# Patient Record
Sex: FEMALE | Race: WHITE | Employment: UNEMPLOYED | URBAN - METROPOLITAN AREA
[De-identification: names, ages, dates, MRNs, and addresses within clinical notes are randomized per-mention and may not be internally consistent; named-entity substitution may affect disease eponyms.]

---

## 2017-01-09 RX ORDER — SYRINGE-NEEDLE,INSULIN,0.5 ML 30 GX5/16"
SYRINGE, EMPTY DISPOSABLE MISCELLANEOUS
Qty: 100 SYRINGE | Refills: 5 | Status: SHIPPED | OUTPATIENT
Start: 2017-01-09 | End: 2017-03-15 | Stop reason: SDUPTHER

## 2017-01-09 NOTE — TELEPHONE ENCOUNTER
Please call [phone] when this is sent to the pharmacy. Pt called in requesting refill of her   Requested Prescriptions     Pending Prescriptions Disp Refills    insulin lispro protamine/insulin lispro (HUMALOG MIX 75-25) 100 unit/mL (75-25) injection 10 mL 5     Sig: INJECT 25 UNITS SUBCUTANEOUSLY BEFORE BREAKFAST AND DINNER    Insulin Syringe-Needle U-100 1/2 mL 30 gauge x 5/16 syrg 100 Syringe 5   .

## 2017-01-09 NOTE — TELEPHONE ENCOUNTER
Pt called to  Check status on insulin and needles. i they they both was approved. But i also see that the insulin may not have been sent to the pharmacy. could this be resent or corrected if need be. Pt states she's been without for awhile now.

## 2017-01-10 NOTE — TELEPHONE ENCOUNTER
Called the pt at the number listed above but there was no answer. Left her a message requesting a call back.

## 2017-03-15 ENCOUNTER — TELEPHONE (OUTPATIENT)
Dept: INTERNAL MEDICINE CLINIC | Age: 57
End: 2017-03-15

## 2017-03-15 RX ORDER — SYRINGE-NEEDLE,INSULIN,0.5 ML 30 GX5/16"
SYRINGE, EMPTY DISPOSABLE MISCELLANEOUS
Qty: 100 SYRINGE | Refills: 5 | Status: SHIPPED | OUTPATIENT
Start: 2017-03-15 | End: 2017-04-03 | Stop reason: SDUPTHER

## 2017-03-15 NOTE — TELEPHONE ENCOUNTER
Requested Prescriptions     Pending Prescriptions Disp Refills    Insulin Syringe-Needle U-100 1/2 mL 30 gauge x 5/16 syrg 100 Syringe 5     Sig: INJECT TWICE A DAY

## 2017-03-15 NOTE — TELEPHONE ENCOUNTER
Pt declined earlier appointment due to transportation issues.  Stated that she will keep her appointment that is scheduled for 3/23/17

## 2017-03-15 NOTE — TELEPHONE ENCOUNTER
Requested Prescriptions     Pending Prescriptions Disp Refills    Insulin Syringe-Needle U-100 1/2 mL 30 gauge x 5/16 syrg 100 Syringe 5     Sig: INJECT TWICE A DAY       Last office visit was  11/15/16  Next office visit is     3/23/17    Please assist.

## 2017-03-15 NOTE — TELEPHONE ENCOUNTER
Pt calling for RX refill and also shared the following, however she declined an earlier appt due to transportation. Has appt 03/23/2017     pt said for 2 to 3 months has red bumps that started off  itchy and white stuff come out when you press on them.  Located on stomach and in vaginal area     Pt does not have her own ph, but can be reached at [phone]    Pharmacy is [facility]

## 2017-04-03 ENCOUNTER — OFFICE VISIT (OUTPATIENT)
Dept: INTERNAL MEDICINE CLINIC | Age: 57
End: 2017-04-03

## 2017-04-03 ENCOUNTER — HOSPITAL ENCOUNTER (OUTPATIENT)
Dept: LAB | Age: 57
Discharge: HOME | End: 2017-04-03

## 2017-04-03 VITALS
OXYGEN SATURATION: 98 % | TEMPERATURE: 98.7 F | HEART RATE: 102 BPM | WEIGHT: 119.5 LBS | SYSTOLIC BLOOD PRESSURE: 128 MMHG | BODY MASS INDEX: 23.46 KG/M2 | HEIGHT: 60 IN | DIASTOLIC BLOOD PRESSURE: 81 MMHG | RESPIRATION RATE: 20 BRPM

## 2017-04-03 DIAGNOSIS — E78.00 HIGH CHOLESTEROL: ICD-10-CM

## 2017-04-03 DIAGNOSIS — R80.9 MICROALBUMINURIA: ICD-10-CM

## 2017-04-03 DIAGNOSIS — Z11.59 ENCOUNTER FOR HEPATITIS C SCREENING TEST FOR LOW RISK PATIENT: ICD-10-CM

## 2017-04-03 DIAGNOSIS — Z12.11 COLON CANCER SCREENING: ICD-10-CM

## 2017-04-03 DIAGNOSIS — K65.1 ABSCESS OF ABDOMINAL CAVITY (HCC): ICD-10-CM

## 2017-04-03 LAB — HBA1C MFR BLD HPLC: 11.7 %

## 2017-04-03 PROCEDURE — 99001 SPECIMEN HANDLING PT-LAB: CPT | Performed by: INTERNAL MEDICINE

## 2017-04-03 RX ORDER — SYRINGE-NEEDLE,INSULIN,0.5 ML 30 GX5/16"
SYRINGE, EMPTY DISPOSABLE MISCELLANEOUS
Qty: 100 SYRINGE | Refills: 5 | Status: SHIPPED | OUTPATIENT
Start: 2017-04-03 | End: 2018-07-09 | Stop reason: SDUPTHER

## 2017-04-03 RX ORDER — CEPHALEXIN 500 MG/1
500 CAPSULE ORAL 3 TIMES DAILY
Qty: 30 CAP | Refills: 0 | Status: SHIPPED | OUTPATIENT
Start: 2017-04-03 | End: 2017-04-13

## 2017-04-03 RX ORDER — LISINOPRIL 10 MG/1
10 TABLET ORAL DAILY
Qty: 30 TAB | Refills: 5 | Status: SHIPPED | OUTPATIENT
Start: 2017-04-03 | End: 2017-08-01 | Stop reason: SDUPTHER

## 2017-04-03 RX ORDER — GABAPENTIN 300 MG/1
CAPSULE ORAL
Qty: 90 CAP | Refills: 5 | Status: SHIPPED | OUTPATIENT
Start: 2017-04-03 | End: 2017-08-01 | Stop reason: SDUPTHER

## 2017-04-03 RX ORDER — DOCUSATE SODIUM 100 MG/1
100 CAPSULE, LIQUID FILLED ORAL 2 TIMES DAILY
Qty: 60 CAP | Refills: 3 | Status: SHIPPED | OUTPATIENT
Start: 2017-04-03 | End: 2019-03-15

## 2017-04-03 RX ORDER — BENZONATATE 200 MG/1
200 CAPSULE ORAL
Qty: 21 CAP | Refills: 0 | Status: SHIPPED | OUTPATIENT
Start: 2017-04-03 | End: 2017-04-10

## 2017-04-03 NOTE — PATIENT INSTRUCTIONS
Advance Directives: Care Instructions  Your Care Instructions  An advance directive is a legal way to state your wishes at the end of your life. It tells your family and your doctor what to do if you can no longer say what you want. There are two main types of advance directives. You can change them any time that your wishes change. · A living will tells your family and your doctor your wishes about life support and other treatment. · A durable power of  for health care lets you name a person to make treatment decisions for you when you can't speak for yourself. This person is called a health care agent. If you do not have an advance directive, decisions about your medical care may be made by a doctor or a  who doesn't know you. It may help to think of an advance directive as a gift to the people who care for you. If you have one, they won't have to make tough decisions by themselves. Follow-up care is a key part of your treatment and safety. Be sure to make and go to all appointments, and call your doctor if you are having problems. It's also a good idea to know your test results and keep a list of the medicines you take. How can you care for yourself at home? · Discuss your wishes with your loved ones and your doctor. This way, there are no surprises. · Many states have a unique form. Or you might use a universal form that has been approved by many states. This kind of form can sometimes be completed and stored online. Your electronic copy will then be available wherever you have a connection to the Internet. In most cases, doctors will respect your wishes even if you have a form from a different state. · You don't need a  to do an advance directive. But you may want to get legal advice. · Think about these questions when you prepare an advance directive:  ¨ Who do you want to make decisions about your medical care if you are not able to?  Many people choose a family member or close friend. ¨ Do you know enough about life support methods that might be used? If not, talk to your doctor so you understand. ¨ What are you most afraid of that might happen? You might be afraid of having pain, losing your independence, or being kept alive by machines. ¨ Where would you prefer to die? Choices include your home, a hospital, or a nursing home. ¨ Would you like to have information about hospice care to support you and your family? ¨ Do you want to donate organs when you die? ¨ Do you want certain Religion practices performed before you die? If so, put your wishes in the advance directive. · Read your advance directive every year, and make changes as needed. When should you call for help? Be sure to contact your doctor if you have any questions. Where can you learn more? Go to http://karis-shannon.info/. Enter R264 in the search box to learn more about \"Advance Directives: Care Instructions. \"  Current as of: November 17, 2016  Content Version: 11.2  © 0204-3982 Health[employer] Incorporated. Care instructions adapted under license by [employer] (which disclaims liability or warranty for this information). If you have questions about a medical condition or this instruction, always ask your healthcare professional. [address] any warranty or liability for your use of this information.

## 2017-04-03 NOTE — PROGRESS NOTES
Subjective:       Chief Complaint  The patient presents for follow up of diabetesand high cholesterol. Proteinuria, back pain         HPI  Josh Washington is a 61 y.o. female seen for follow up of diabetes. Shealso has  hyperlipidemia. Diabetes uncontrolled due to poor compliance with taking her insulin, pt is only taking Humalog mix 75/25 25 units every day instead of BID and she recently ran out of syringes and had no transportation to go to pharmacy so she has been off insulin for a few weeks. She is also just taking metformin 1x/day instead of BID, pt with poor insight to her medical problems due to developmental delay,   Hyperlipidemia control uncertain, no significant medication side effects noted, on Pravachol. Will check lipid profile today. Pt remains on lisinopril for her proteinuria. Diet and Lifestyle: generally follows a low fat low cholesterol diet, does not rigorously follow a diabetic diet, sedentary    Diabetic Review of Systems - home glucose monitoring: is performed sporadically, should be 3x/day . Other symptoms and concerns: . Pt's chronic back pain is stable on Ultram prn. Pt c/o of abscess on her left lower abdominal wall which is oozing pus. She also says she has something going on in her groin but she did not want to show it to me since I am male. I will start her on Keflex and refer her to GYN. Current Outpatient Prescriptions   Medication Sig    gabapentin (NEURONTIN) 300 mg capsule TAKE ONE CAPSULE BY MOUTH THREE TIMES DAILY    lisinopril (PRINIVIL, ZESTRIL) 10 mg tablet Take 1 Tab by mouth daily.  Insulin Syringe-Needle U-100 1/2 mL 30 gauge x 5/16 syrg INJECT TWICE A DAY    cephALEXin (KEFLEX) 500 mg capsule Take 1 Cap by mouth three (3) times daily for 10 days.  benzonatate (TESSALON) 200 mg capsule Take 1 Cap by mouth three (3) times daily as needed for Cough for up to 7 days.     docusate sodium (COLACE) 100 mg capsule Take 1 Cap by mouth two (2) times a day.    pravastatin (PRAVACHOL) 40 mg tablet TAKE ONE TABLET BY MOUTH EVERY NIGHT    traMADol (ULTRAM) 50 mg tablet TAKE TWO TABLETS BY MOUTH EVERY 6 HOURS AS NEEDED FOR PAIN    metFORMIN (GLUCOPHAGE) 1,000 mg tablet TAKE ONE TABLET BY MOUTH TWICE DAILY WITH MEALS    budesonide (RHINOCORT AQUA) 32 mcg/actuation nasal spray 2 Sprays by Both Nostrils route daily.  glucose blood VI test strips (ONETOUCH ULTRA TEST) strip Check BS 3x/day E11.65    aspirin 81 mg tablet Take 81 mg by mouth daily.  OXAPROZIN PO Take  by mouth two (2) times a day.  insulin lispro protamine/insulin lispro (HUMALOG MIX 75-25) 100 unit/mL (75-25) injection INJECT 25 UNITS SUBCUTANEOUSLY BEFORE BREAKFAST AND DINNER     No current facility-administered medications for this visit. Review of Systems  Respiratory: negative for dyspnea on exertion  Cardiovascular: negative for chest pain    Objective:     Visit Vitals    /81 (BP 1 Location: Left arm, BP Patient Position: Sitting)    Pulse (!) 102    Temp 98.7 °F (37.1 °C) (Oral)    Resp 20    Ht 5' (1.524 m)    Wt 119 lb 8 oz (54.2 kg)    SpO2 98%    BMI 23.34 kg/m2        Eyes - pupils equal and reactive, extraocular eye movements intact  Chest - clear to auscultation, no wheezes, rales or rhonchi, symmetric air entry  Heart - normal rate, regular rhythm, normal S1, S2, no murmurs, rubs, clicks or gallops  Extremities - no edema   Skin - skin abscess left lower abdomen. Observed with nurse Rama Fang.         Labs:   Lab Results   Component Value Date/Time    Hemoglobin A1c 8.6 05/16/2016 08:24 AM    Hemoglobin A1c 11.7 01/14/2016 08:15 AM    Hemoglobin A1c 8.1 09/08/2015 08:17 AM    Glucose 232 08/26/2016 04:30 PM    Glucose (POC) 227 10/14/2015 09:28 PM    Microalbumin/Creat ratio (mg/g creat) 17 03/29/2011 04:01 PM    Microalb/Creat ratio (ug/mg creat.) 90.7 09/08/2015 08:17 AM    Microalbumin,urine random 1.00 03/29/2011 04:01 PM    LDL, calculated 91 05/16/2016 08:24 AM    Creatinine (POC) 0.5 08/23/2010 10:49 AM    Creatinine 1.21 08/26/2016 04:30 PM      Lab Results   Component Value Date/Time    Cholesterol, total 168 05/16/2016 08:24 AM    HDL Cholesterol 54 05/16/2016 08:24 AM    LDL, calculated 91 05/16/2016 08:24 AM    Triglyceride 117 05/16/2016 08:24 AM    CHOL/HDL Ratio 5.7 03/26/2012 03:45 AM     Lab Results   Component Value Date/Time    ALT (SGPT) 13 08/26/2016 04:30 PM    AST (SGOT) 10 08/26/2016 04:30 PM    Alk. phosphatase 115 08/26/2016 04:30 PM    Bilirubin, direct 0.1 03/26/2012 03:45 AM    Bilirubin, total 0.5 08/26/2016 04:30 PM     Lab Results   Component Value Date/Time    GFR est AA 56 08/26/2016 04:30 PM    GFR est non-AA 46 08/26/2016 04:30 PM    Creatinine (POC) 0.5 08/23/2010 10:49 AM    Creatinine 1.21 08/26/2016 04:30 PM    BUN 9 08/26/2016 04:30 PM    Sodium 132 08/26/2016 04:30 PM    Potassium 4.0 08/26/2016 04:30 PM    Chloride 98 08/26/2016 04:30 PM    CO2 22 08/26/2016 04:30 PM      Lab Results   Component Value Date/Time    Glucose 232 08/26/2016 04:30 PM    Glucose (POC) 227 10/14/2015 09:28 PM              Assessment / Plan     Diabetes poorly controlled due to poor compliance with insulin, pt to use metformin BID and continue insulin 75/25 25 BID. Hyperlipidemia control uncertain, will check lipid profile and pt to continue to take pravachol on a regular basis. ICD-10-CM ICD-9-CM    1. Uncontrolled type 2 diabetes mellitus without complication, without long-term current use of insulin (HCC) E11.65 250.02 AMB POC HEMOGLOBIN D9I      METABOLIC PANEL, COMPREHENSIVE      HEMOGLOBIN A1C W/O EAG      MICROALBUMIN, UR, RAND W/ MICROALBUMIN/CREA RATIO      HEMOGLOBIN A1C W/O EAG      MICROALBUMIN, UR, RAND W/ MICROALBUMIN/CREA RATIO      METABOLIC PANEL, COMPREHENSIVE      HEMOGLOBIN A1C W/O EAG      MICROALBUMIN, UR, RAND W/ MICROALBUMIN/CREA RATIO   2.  High cholesterol E78.00 272.0 LIPID PANEL METABOLIC PANEL, COMPREHENSIVE      LIPID PANEL      LIPID PANEL   3. Microalbuminuria R80.9 791.0 Status unknown. Will continue lisinopril (PRINIVIL, ZESTRIL) 10 mg tablet      Check MICROALBUMIN, UR, RAND W/ MICROALBUMIN/CREA RATIO   4. Abscess of abdominal cavity (HCC) K65.1 567.22 cephALEXin (KEFLEX) 500 mg capsule and refer to GYN for further evaluation of groin infection per pt. CBC WITH AUTOMATED DIFF      CBC WITH AUTOMATED DIFF   5. Encounter for hepatitis C screening test for low risk patient Z11.59 V73.89 HEPATITIS C AB      HEPATITIS C AB   6. Colon cancer screening Z12.11 V76.51 OCCULT BLOOD, IMMUNOASSAY (FIT)                      Diabetic issues reviewed with her: diabetic diet discussed in detail, and low cholesterol diet, weight control and daily exercise discussed. Medication instructions and potential side effects reviewed with patient in detail. Follow-up Disposition:  Return in about 4 months (around 8/3/2017). Reviewed plan of care. Patient has provided input and agrees with goals.

## 2017-04-03 NOTE — MR AVS SNAPSHOT
Visit Information Date & Time Provider Department Dept. Phone Encounter #  
 4/3/2017  1:15 PM Harriet Fang MD [facility] 8 [id number] Follow-up Instructions Return in about 4 months (around 8/3/2017). Upcoming Health Maintenance Date Due Hepatitis C Screening 1960 EYE EXAM RETINAL OR DILATED Q1 5/13/1969 DTaP/Tdap/Td series (1 - Tdap) 5/13/1980 BREAST CANCER SCRN MAMMOGRAM 5/13/2009 FOOT EXAM Q1 8/14/2015 HEMOGLOBIN A1C Q6M 11/16/2016 Pneumococcal 19-64 Medium Risk (1 of 1 - PPSV23) 4/5/2017* MICROALBUMIN Q1 5/16/2017 LIPID PANEL Q1 5/16/2017 COLONOSCOPY 4/4/2021 *Topic was postponed. The date shown is not the original due date. Allergies as of 4/3/2017  Review Complete On: 4/3/2017 By: Harriet Fang MD  
 No Known Allergies Current Immunizations  Reviewed on 11/15/2016 Name Date Influenza Vaccine (Quad) PF 11/15/2016, 9/15/2015 Influenza Vaccine Whole 1/22/2011 Not reviewed this visit You Were Diagnosed With   
  
 Codes Comments Uncontrolled type 2 diabetes mellitus without complication, without long-term current use of insulin ([facility]    -  Primary ICD-10-CM: E11.65 ICD-9-CM: 250.02 Microalbuminuria     ICD-10-CM: R80.9 ICD-9-CM: 791.0 Abscess of abdominal cavity ([facility]     ICD-10-CM: K65.1 ICD-9-CM: 567.22 High cholesterol     ICD-10-CM: E78.00 ICD-9-CM: 272.0 Vitals BP Pulse Temp Resp Height(growth percentile) Weight(growth percentile) 128/81 (BP 1 Location: Left arm, BP Patient Position: Sitting) (!) 102 98.7 °F (37.1 °C) (Oral) 20 5' (1.524 m) 119 lb 8 oz (54.2 kg) SpO2 BMI OB Status Smoking Status 98% 23.34 kg/m2 Hysterectomy Current Every Day Smoker Vitals History BMI and BSA Data Body Mass Index Body Surface Area  
 23.34 kg/m 2 1.51 m 2 Preferred Pharmacy Pharmacy Name Phone [address] [place] [address] [phone] Your Updated Medication List  
  
   
This list is accurate as of: 4/3/17  1:47 PM.  Always use your most recent med list.  
  
  
  
  
 aspirin 81 mg tablet Take 81 mg by mouth daily. benzonatate 200 mg capsule Commonly known as:  TESSALON Take 1 Cap by mouth three (3) times daily as needed for Cough for up to 7 days. budesonide 32 mcg/actuation nasal spray Commonly known as:  RHINOCORT AQUA  
2 Sprays by Both Nostrils route daily. cephALEXin 500 mg capsule Commonly known as:  Jenn Crane Take 1 Cap by mouth three (3) times daily for 10 days. gabapentin 300 mg capsule Commonly known as:  NEURONTIN  
TAKE ONE CAPSULE BY MOUTH THREE TIMES DAILY  
  
 glucose blood VI test strips strip Commonly known as:  ONETOUCH ULTRA TEST Check BS 3x/day E11.65  
  
 insulin lispro protamine/insulin lispro 100 unit/mL (75-25) injection Commonly known as:  HumaLOG Mix 75-25 INJECT 25 UNITS SUBCUTANEOUSLY BEFORE BREAKFAST AND DINNER Insulin Syringe-Needle U-100 1/2 mL 30 gauge x 5/16 Syrg INJECT TWICE A DAY  
  
 lisinopril 10 mg tablet Commonly known as:  [place] Take 1 Tab by mouth daily. metFORMIN 1,000 mg tablet Commonly known as:  GLUCOPHAGE  
TAKE ONE TABLET BY MOUTH TWICE DAILY WITH MEALS  
  
 OXAPROZIN PO Take  by mouth two (2) times a day. pravastatin 40 mg tablet Commonly known as:  PRAVACHOL  
TAKE ONE TABLET BY MOUTH EVERY NIGHT  
  
 traMADol 50 mg tablet Commonly known as:  ULTRAM  
TAKE TWO TABLETS BY MOUTH EVERY 6 HOURS AS NEEDED FOR PAIN Prescriptions Sent to Pharmacy Refills  
 gabapentin (NEURONTIN) 300 mg capsule 5 Sig: TAKE ONE CAPSULE BY MOUTH THREE TIMES DAILY Class: Normal  
 Pharmacy: [id number] [place] [address], [facility] Ph #: [phone] lisinopril (PRINIVIL, ZESTRIL) 10 mg tablet 5 Sig: Take 1 Tab by mouth daily. Class: Normal  
 Pharmacy: [address] [place] [address], [address] [place] [address] Ph #: [phone] Route: Oral  
 Insulin Syringe-Needle U-100 1/2 mL 30 gauge x 5/16 syrg 5 Sig: INJECT TWICE A DAY Class: Normal  
 Pharmacy: [address] [place] [address], [address] Ph #: [phone]  
 cephALEXin (KEFLEX) 500 mg capsule 0 Sig: Take 1 Cap by mouth three (3) times daily for 10 days. Class: Normal  
 Pharmacy: [address] [place] [address], [address] [place] [address] Ph #: [phone] Route: Oral  
 benzonatate (TESSALON) 200 mg capsule 0 Sig: Take 1 Cap by mouth three (3) times daily as needed for Cough for up to 7 days. Class: Normal  
 Pharmacy: [address] [place] [address] Ph #: [phone] Route: Oral  
  
We Performed the Following AMB POC HEMOGLOBIN A1C [43521 CPT(R)] Follow-up Instructions Return in about 4 months (around 8/3/2017). To-Do List   
 10/01/2017 Lab:  LIPID PANEL   
  
 10/01/2017 Lab:  METABOLIC PANEL, COMPREHENSIVE   
  
 10/02/2017 Lab:  CBC WITH AUTOMATED DIFF   
  
 10/02/2017 Lab:  MICROALBUMIN, UR, RAND W/ MICROALBUMIN/CREA RATIO   
  
 10/03/2017 Lab:  HEMOGLOBIN A1C W/O EAG Patient Instructions Advance Directives: Care Instructions Your Care Instructions An advance directive is a legal way to state your wishes at the end of your life. It tells your family and your doctor what to do if you can no longer say what you want. There are two main types of advance directives. You can change them any time that your wishes change. · A living will tells your family and your doctor your wishes about life support and other treatment.  
· A durable power of  for health care lets you name a person to make treatment decisions for you when you can't speak for yourself. This person is called a health care agent. If you do not have an advance directive, decisions about your medical care may be made by a doctor or a  who doesn't know you. It may help to think of an advance directive as a gift to the people who care for you. If you have one, they won't have to make tough decisions by themselves. Follow-up care is a key part of your treatment and safety. Be sure to make and go to all appointments, and call your doctor if you are having problems. It's also a good idea to know your test results and keep a list of the medicines you take. How can you care for yourself at home? · Discuss your wishes with your loved ones and your doctor. This way, there are no surprises. · Many states have a unique form. Or you might use a universal form that has been approved by many states. This kind of form can sometimes be completed and stored online. Your electronic copy will then be available wherever you have a connection to the Internet. In most cases, doctors will respect your wishes even if you have a form from a different state. · You don't need a  to do an advance directive. But you may want to get legal advice. · Think about these questions when you prepare an advance directive: ¨ Who do you want to make decisions about your medical care if you are not able to? Many people choose a family member or close friend. ¨ Do you know enough about life support methods that might be used? If not, talk to your doctor so you understand. ¨ What are you most afraid of that might happen? You might be afraid of having pain, losing your independence, or being kept alive by machines. ¨ Where would you prefer to die? Choices include your home, a hospital, or a nursing home. ¨ Would you like to have information about hospice care to support you and your family? ¨ Do you want to donate organs when you die? ¨ Do you want certain Quaker practices performed before you die? If so, put your wishes in the advance directive. · Read your advance directive every year, and make changes as needed. When should you call for help? Be sure to contact your doctor if you have any questions. Where can you learn more? Go to http://karis-shannon.info/. Enter R264 in the search box to learn more about \"Advance Directives: Care Instructions. \" Current as of: November 17, 2016 Content Version: 11.2 © 9728-7762 [employer]. Care instructions adapted under license by [employer] (which disclaims liability or warranty for this information). If you have questions about a medical condition or this instruction, always ask your healthcare professional. [address] any warranty or liability for your use of this information. Introducing [facility]! Matthew Asher introduces [email or web address] patient portal. Now you can access parts of your medical record, email your doctor's office, and request medication refills online. 1. In your internet browser, go to [email or web address]: turn off the lights/Ringpay 2. Click on the First Time User? Click Here link in the Sign In box. You will see the New Member Sign Up page. 3. Enter your [email or web address] Access Code exactly as it appears below. You will not need to use this code after youve completed the sign-up process. If you do not sign up before the expiration date, you must request a new code. · [email or web address] Access Code: [email or web address] Expires: 7/2/2017  1:25 PM 
 
4. Enter the last four digits of your Social Security Number (xxxx) and Date of Birth (mm/dd/yyyy) as indicated and click Submit. You will be taken to the next sign-up page. 5. Create a [email or web address] ID. This will be your [email or web address] login ID and cannot be changed, so think of one that is secure and easy to remember. 6. Create a [email or web address] password. You can change your password at any time. 7. Enter your Password Reset Question and Answer. This can be used at a later time if you forget your password. 8. Enter your e-mail address. You will receive e-mail notification when new information is available in [address] 9. Click Sign Up. You can now view and download portions of your medical record. 10. Click the Download Summary menu link to download a portable copy of your medical information. If you have questions, please visit the Frequently Asked Questions section of the Ifbyphone website. Remember, Ifbyphone is NOT to be used for urgent needs. For medical emergencies, dial 911. Now available from your iPhone and Android! Please provide this summary of care documentation to your next provider. Your primary care clinician is listed as MARK CASH. If you have any questions after today's visit, please call [phone].

## 2017-04-03 NOTE — PROGRESS NOTES
Patient is in the office today for a bad cough, and rash on her stomach and groin. Patient states she has very bag leg pain, from giving herself insulin injections. Patient states she has not been giving herself insulin because she ran out of insulin syringes. 1. Have you been to the ER, urgent care clinic since your last visit? Hospitalized since your last visit? No    2. Have you seen or consulted any other health care providers outside of the [address] [place] [address] since your last visit? Include any pap smears or colon screening.  No

## 2017-04-04 LAB
ALBUMIN SERPL-MCNC: 4.3 G/DL (ref 3.5–5.5)
ALBUMIN/CREAT UR: 129.8 MG/G CREAT (ref 0–30)
ALBUMIN/GLOB SERPL: 1.8 {RATIO} (ref 1.2–2.2)
ALP SERPL-CCNC: 102 IU/L (ref 39–117)
ALT SERPL-CCNC: 8 IU/L (ref 0–32)
AST SERPL-CCNC: 10 IU/L (ref 0–40)
BASOPHILS # BLD AUTO: 0.1 X10E3/UL (ref 0–0.2)
BASOPHILS NFR BLD AUTO: 1 %
BILIRUB SERPL-MCNC: 0.3 MG/DL (ref 0–1.2)
BUN SERPL-MCNC: 13 MG/DL (ref 6–24)
BUN/CREAT SERPL: 12 (ref 9–23)
CALCIUM SERPL-MCNC: 9.3 MG/DL (ref 8.7–10.2)
CHLORIDE SERPL-SCNC: 93 MMOL/L (ref 96–106)
CHOLEST SERPL-MCNC: 216 MG/DL (ref 100–199)
CO2 SERPL-SCNC: 25 MMOL/L (ref 18–29)
CREAT SERPL-MCNC: 1.13 MG/DL (ref 0.57–1)
CREAT UR-MCNC: 87.5 MG/DL
EOSINOPHIL # BLD AUTO: 0.1 X10E3/UL (ref 0–0.4)
EOSINOPHIL NFR BLD AUTO: 1 %
ERYTHROCYTE [DISTWIDTH] IN BLOOD BY AUTOMATED COUNT: 15.2 % (ref 12.3–15.4)
GLOBULIN SER CALC-MCNC: 2.4 G/DL (ref 1.5–4.5)
GLUCOSE SERPL-MCNC: 292 MG/DL (ref 65–99)
HBA1C MFR BLD: 12.7 % (ref 4.8–5.6)
HCT VFR BLD AUTO: 40.2 % (ref 34–46.6)
HCV AB S/CO SERPL IA: <0.1 S/CO RATIO (ref 0–0.9)
HDLC SERPL-MCNC: 45 MG/DL
HGB BLD-MCNC: 13.2 G/DL (ref 11.1–15.9)
IMM GRANULOCYTES # BLD: 0 X10E3/UL (ref 0–0.1)
IMM GRANULOCYTES NFR BLD: 0 %
INTERPRETATION, 910389: NORMAL
INTERPRETATION: NORMAL
LDLC SERPL CALC-MCNC: 133 MG/DL (ref 0–99)
LYMPHOCYTES # BLD AUTO: 2.5 X10E3/UL (ref 0.7–3.1)
LYMPHOCYTES NFR BLD AUTO: 29 %
Lab: NORMAL
MCH RBC QN AUTO: 29.3 PG (ref 26.6–33)
MCHC RBC AUTO-ENTMCNC: 32.8 G/DL (ref 31.5–35.7)
MCV RBC AUTO: 89 FL (ref 79–97)
MICROALBUMIN UR-MCNC: 113.6 UG/ML
MONOCYTES # BLD AUTO: 0.7 X10E3/UL (ref 0.1–0.9)
MONOCYTES NFR BLD AUTO: 9 %
NEUTROPHILS # BLD AUTO: 5.2 X10E3/UL (ref 1.4–7)
NEUTROPHILS NFR BLD AUTO: 60 %
PDF IMAGE, 910387: NORMAL
PLATELET # BLD AUTO: 370 X10E3/UL (ref 150–379)
POTASSIUM SERPL-SCNC: 5.6 MMOL/L (ref 3.5–5.2)
PROT SERPL-MCNC: 6.7 G/DL (ref 6–8.5)
RBC # BLD AUTO: 4.5 X10E6/UL (ref 3.77–5.28)
SODIUM SERPL-SCNC: 133 MMOL/L (ref 134–144)
TRIGL SERPL-MCNC: 191 MG/DL (ref 0–149)
VLDLC SERPL CALC-MCNC: 38 MG/DL (ref 5–40)
WBC # BLD AUTO: 8.6 X10E3/UL (ref 3.4–10.8)

## 2017-06-01 ENCOUNTER — HOSPITAL ENCOUNTER (EMERGENCY)
Age: 57
Discharge: HOME | End: 2017-06-01
Attending: EMERGENCY MEDICINE
Payer: MEDICAID

## 2017-06-01 VITALS
HEART RATE: 98 BPM | OXYGEN SATURATION: 98 % | SYSTOLIC BLOOD PRESSURE: 158 MMHG | BODY MASS INDEX: 21.53 KG/M2 | TEMPERATURE: 97.4 F | WEIGHT: 121.5 LBS | RESPIRATION RATE: 18 BRPM | DIASTOLIC BLOOD PRESSURE: 88 MMHG | HEIGHT: 63 IN

## 2017-06-01 DIAGNOSIS — R35.0 URINARY FREQUENCY: ICD-10-CM

## 2017-06-01 DIAGNOSIS — I10 HYPERTENSION, ESSENTIAL: ICD-10-CM

## 2017-06-01 DIAGNOSIS — R73.9 HYPERGLYCEMIA: ICD-10-CM

## 2017-06-01 LAB
ALBUMIN SERPL BCP-MCNC: 4.2 G/DL (ref 3.4–5)
ALBUMIN/GLOB SERPL: 1.1 {RATIO} (ref 0.8–1.7)
ALP SERPL-CCNC: 99 U/L (ref 45–117)
ALT SERPL-CCNC: 17 U/L (ref 13–56)
ANION GAP BLD CALC-SCNC: 7 MMOL/L (ref 3–18)
APPEARANCE UR: CLEAR
AST SERPL W P-5'-P-CCNC: 10 U/L (ref 15–37)
BASOPHILS # BLD AUTO: 0.1 K/UL (ref 0–0.1)
BASOPHILS # BLD: 1 % (ref 0–2)
BILIRUB SERPL-MCNC: 0.3 MG/DL (ref 0.2–1)
BILIRUB UR QL: NEGATIVE
BUN SERPL-MCNC: 14 MG/DL (ref 7–18)
BUN/CREAT SERPL: 12 (ref 12–20)
CALCIUM SERPL-MCNC: 9 MG/DL (ref 8.5–10.1)
CHLORIDE SERPL-SCNC: 100 MMOL/L (ref 100–108)
CO2 SERPL-SCNC: 24 MMOL/L (ref 21–32)
COLOR UR: YELLOW
CREAT SERPL-MCNC: 1.14 MG/DL (ref 0.6–1.3)
DIFFERENTIAL METHOD BLD: ABNORMAL
EOSINOPHIL # BLD: 0.1 K/UL (ref 0–0.4)
EOSINOPHIL NFR BLD: 1 % (ref 0–5)
ERYTHROCYTE [DISTWIDTH] IN BLOOD BY AUTOMATED COUNT: 14.7 % (ref 11.6–14.5)
GLOBULIN SER CALC-MCNC: 3.9 G/DL (ref 2–4)
GLUCOSE BLD STRIP.AUTO-MCNC: 254 MG/DL (ref 70–110)
GLUCOSE BLD STRIP.AUTO-MCNC: 314 MG/DL (ref 70–110)
GLUCOSE SERPL-MCNC: 300 MG/DL (ref 74–99)
GLUCOSE UR STRIP.AUTO-MCNC: >1000 MG/DL
HCT VFR BLD AUTO: 40.7 % (ref 35–45)
HGB BLD-MCNC: 14.6 G/DL (ref 12–16)
HGB UR QL STRIP: NEGATIVE
KETONES UR QL STRIP.AUTO: NEGATIVE MG/DL
LEUKOCYTE ESTERASE UR QL STRIP.AUTO: NEGATIVE
LYMPHOCYTES # BLD AUTO: 28 % (ref 21–52)
LYMPHOCYTES # BLD: 2.5 K/UL (ref 0.9–3.6)
MCH RBC QN AUTO: 31 PG (ref 24–34)
MCHC RBC AUTO-ENTMCNC: 35.9 G/DL (ref 31–37)
MCV RBC AUTO: 86.4 FL (ref 74–97)
MONOCYTES # BLD: 0.6 K/UL (ref 0.05–1.2)
MONOCYTES NFR BLD AUTO: 7 % (ref 3–10)
NEUTS SEG # BLD: 5.7 K/UL (ref 1.8–8)
NEUTS SEG NFR BLD AUTO: 63 % (ref 40–73)
NITRITE UR QL STRIP.AUTO: NEGATIVE
PH UR STRIP: 5 [PH] (ref 5–8)
PLATELET # BLD AUTO: 377 K/UL (ref 135–420)
PMV BLD AUTO: 10.5 FL (ref 9.2–11.8)
POTASSIUM SERPL-SCNC: 4.3 MMOL/L (ref 3.5–5.5)
PROT SERPL-MCNC: 8.1 G/DL (ref 6.4–8.2)
PROT UR STRIP-MCNC: NEGATIVE MG/DL
RBC # BLD AUTO: 4.71 M/UL (ref 4.2–5.3)
SODIUM SERPL-SCNC: 131 MMOL/L (ref 136–145)
SP GR UR REFRACTOMETRY: 1.01 (ref 1–1.03)
UROBILINOGEN UR QL STRIP.AUTO: 0.2 EU/DL (ref 0.2–1)
WBC # BLD AUTO: 9 K/UL (ref 4.6–13.2)

## 2017-06-01 PROCEDURE — 87077 CULTURE AEROBIC IDENTIFY: CPT | Performed by: PHYSICIAN ASSISTANT

## 2017-06-01 PROCEDURE — 82962 GLUCOSE BLOOD TEST: CPT

## 2017-06-01 PROCEDURE — 85025 COMPLETE CBC W/AUTO DIFF WBC: CPT | Performed by: PHYSICIAN ASSISTANT

## 2017-06-01 PROCEDURE — 96360 HYDRATION IV INFUSION INIT: CPT

## 2017-06-01 PROCEDURE — 80053 COMPREHEN METABOLIC PANEL: CPT | Performed by: PHYSICIAN ASSISTANT

## 2017-06-01 PROCEDURE — 99283 EMERGENCY DEPT VISIT LOW MDM: CPT

## 2017-06-01 PROCEDURE — 87086 URINE CULTURE/COLONY COUNT: CPT | Performed by: PHYSICIAN ASSISTANT

## 2017-06-01 PROCEDURE — 81003 URINALYSIS AUTO W/O SCOPE: CPT | Performed by: PHYSICIAN ASSISTANT

## 2017-06-01 PROCEDURE — 74011250636 HC RX REV CODE- 250/636: Performed by: PHYSICIAN ASSISTANT

## 2017-06-01 PROCEDURE — 96361 HYDRATE IV INFUSION ADD-ON: CPT

## 2017-06-01 PROCEDURE — 87186 SC STD MICRODIL/AGAR DIL: CPT | Performed by: PHYSICIAN ASSISTANT

## 2017-06-01 RX ORDER — LISINOPRIL 10 MG/1
10 TABLET ORAL DAILY
Qty: 10 TAB | Refills: 0 | Status: SHIPPED | OUTPATIENT
Start: 2017-06-01 | End: 2017-06-01

## 2017-06-01 RX ORDER — LISINOPRIL 10 MG/1
10 TABLET ORAL DAILY
Qty: 10 TAB | Refills: 0 | Status: SHIPPED | OUTPATIENT
Start: 2017-06-01 | End: 2017-06-11

## 2017-06-01 RX ORDER — SODIUM CHLORIDE 9 MG/ML
1000 INJECTION, SOLUTION INTRAVENOUS ONCE
Status: COMPLETED | OUTPATIENT
Start: 2017-06-01 | End: 2017-06-01

## 2017-06-01 RX ADMIN — SODIUM CHLORIDE 1000 ML: 900 INJECTION, SOLUTION INTRAVENOUS at 16:12

## 2017-06-01 NOTE — DISCHARGE INSTRUCTIONS
Frequent Urination: Care Instructions  Your Care Instructions  An urge to urinate frequently but usually passing only small amounts of urine is a common symptom of urinary problems, such as urinary tract infections. The bladder may become inflamed. This can cause the urge to urinate. You may try to urinate more often than usual to try to soothe that urge. Frequent urination also may be caused by sexually transmitted infections (STIs) or kidney stones. Or it may happen when something irritates the tube that carries urine from the bladder to the outside of the body (urethra). It may also be a sign of diabetes. The cause may be hard to find. You may need tests. Follow-up care is a key part of your treatment and safety. Be sure to make and go to all appointments, and call your doctor if you are having problems. It's also a good idea to know your test results and keep a list of the medicines you take. How can you care for yourself at home? · Drink extra water for the next day or two. This will help make the urine less concentrated. (If you have kidney, heart, or liver disease and have to limit fluids, talk with your doctor before you increase the amount of fluids you drink.)  · Avoid drinks that are carbonated or have caffeine. They can irritate the bladder. For women:  · Urinate right after you have sex. · After you go to the bathroom, wipe from front to back. · Avoid douches, bubble baths, and feminine hygiene sprays. And avoid other feminine hygiene products that have deodorants. When should you call for help? Call your doctor now or seek immediate medical care if:  · You have new symptoms, such as fever, nausea, or vomiting. · You have new or worse symptoms of a urinary problem. For example:  ¨ You have blood or pus in your urine. ¨ You have chills or body aches. ¨ It hurts to urinate. ¨ You have groin or belly pain. ¨ You have pain in your back just below your rib cage (the flank area).   Watch closely for changes in your health, and be sure to contact your doctor if you feel thirstier than usual.  Where can you learn more? Go to http://karis-shannon.info/. Enter 480 [id number] in the search box to learn more about \"Frequent Urination: Care Instructions. \"  Current as of: November 28, 2016  Content Version: 11.2  © 4392-6532 [employer]. Care instructions adapted under license by [employer] (which disclaims liability or warranty for this information). If you have questions about a medical condition or this instruction, always ask your healthcare professional. [place] [address] any warranty or liability for your use of this information.

## 2017-06-01 NOTE — ED PROVIDER NOTES
HPI Comments: 55yoF with hx of DM, UTI, depression to ED c/o right flank pain x 2 days. Reports associated frequency and urgency which started this morning. Denies dysuria, hematuria, fever, chills, abd pain, N/V or any other complaints. States symptoms similar to previous UTIs. Patient is a 61 y.o. female presenting with flank pain and urgency. The history is provided by the patient. Flank Pain    Pertinent negatives include no dysuria. Urgency          Past Medical History:   Diagnosis Date    Bladder infection     Depression     Diabetes ([facility] [address]     Difficulty walking     DM (diabetes mellitus) ([facility] [address] 6/22/2012    Ill-defined condition     leaking bladder;high cholesterol       Past Surgical History:   Procedure Laterality Date    HX GYN      hysterectomy    HX OTHER SURGICAL      bladder surgery         Family History:   Problem Relation Age of Onset    Diabetes Mother     Cancer Mother     Heart Attack Father      had 3 hear attacks    Diabetes Father        Social History     Social History    Marital status: SINGLE     Spouse name: N/A    Number of children: N/A    Years of education: N/A     Occupational History    Not on file. Social History Main Topics    Smoking status: Current Every Day Smoker     Packs/day: 1.00     Years: 38.00    Smokeless tobacco: Never Used    Alcohol use No    Drug use: No    Sexual activity: No     Other Topics Concern    Not on file     Social History Narrative    ** Merged History Encounter **              ALLERGIES: Review of patient's allergies indicates no known allergies. Review of Systems   Genitourinary: Positive for flank pain and urgency. Negative for dysuria and hematuria. All other systems reviewed and are negative.       Vitals:    06/01/17 1433   BP: (!) 186/97   Pulse: 98   Resp: 18   Temp: 97.4 °F (36.3 °C)   SpO2: 98%   Weight: 55.1 kg (121 lb 8 oz)   Height: 5' 3\" (1.6 m)            Physical Exam   Constitutional: She appears well-developed and well-nourished. No distress. HENT:   Head: Normocephalic and atraumatic. Right Ear: External ear normal.   Left Ear: External ear normal.   Eyes: Conjunctivae are normal.   Neck: Normal range of motion. Neck supple. Cardiovascular: Normal rate and regular rhythm. Pulmonary/Chest: Effort normal and breath sounds normal.   Abdominal: Soft. Bowel sounds are normal. There is no tenderness. There is CVA tenderness (right). Neurological: She is alert. Skin: Skin is warm and dry. She is not diaphoretic. Psychiatric: She has a normal mood and affect. MDM  Number of Diagnoses or Management Options  Hyperglycemia:   Hypertension, essential:   Uncontrolled type 2 diabetes mellitus without complication, without long-term current use of insulin ([address]):   Urinary frequency:   Diagnosis management comments: Pt presents with flank pain, urgency, urinary frequency. FSBS noted to be 341. Pt states she did not take meds today due to not eating anything. Does not have glucometer at home. Glucometer given to pt per . Will check basic labs, give fluids. Anticipate discharge. Pt states she has been out of BP meds for several months, will refill 1 week of lisinopril. Advised to f/u with pcp regarding BP.  ISHAN Mcgowan 4:13 PM         Amount and/or Complexity of Data Reviewed  Clinical lab tests: reviewed and ordered    Risk of Complications, Morbidity, and/or Mortality  Presenting problems: moderate  Diagnostic procedures: low  Management options: low    Patient Progress  Patient progress: improved    ED Course       Procedures

## 2017-06-01 NOTE — ED NOTES
I have reviewed discharge instructions with the patient. The patient verbalized understanding.  Pt ambulated out of Ed in stable condition with no distress noted and no complaints voiced, states she will not be driving home

## 2017-06-01 NOTE — LETTER
6/3/2017 1:44 PM 
 
Ms. Chadwick Rene [id number] Dear Ms. Choi: The results of your lab work performed in our office were abnormal and we have had difficulty reaching you by telephone. Please contact our office as soon as possible to discuss these results. Sincerely, ISHAN Soliz

## 2017-06-01 NOTE — ED TRIAGE NOTES
Patient c/o right flank pain.  States \"I think I have a bladder infection because I feel like I gotta pee and i cant pee\"

## 2017-06-03 LAB
BACTERIA SPEC CULT: ABNORMAL
SERVICE CMNT-IMP: ABNORMAL

## 2017-06-03 NOTE — ED NOTES
Initial number not the correct number. Left message on mobile phone for results that need treatment and return call asap. Letter sent as well.

## 2017-06-06 RX ORDER — TRAMADOL HYDROCHLORIDE 50 MG/1
TABLET ORAL
Qty: 100 TAB | Refills: 3 | Status: SHIPPED | OUTPATIENT
Start: 2017-06-06 | End: 2018-10-15 | Stop reason: SDUPTHER

## 2017-06-07 ENCOUNTER — TELEPHONE (OUTPATIENT)
Dept: INTERNAL MEDICINE CLINIC | Age: 57
End: 2017-06-07

## 2017-06-12 NOTE — ED NOTES
Received phone call from patient regarding letter. Urine culture positive for klebsiella. Needs abx. Will call in [facility] to preferred pharmacy. [facility] in [place].   Eva Hathaway PA-C

## 2017-06-18 ENCOUNTER — APPOINTMENT (OUTPATIENT)
Dept: GENERAL RADIOLOGY | Age: 57
End: 2017-06-18
Attending: PHYSICIAN ASSISTANT
Payer: MEDICAID

## 2017-06-18 ENCOUNTER — HOSPITAL ENCOUNTER (EMERGENCY)
Age: 57
Discharge: HOME | End: 2017-06-18
Attending: EMERGENCY MEDICINE
Payer: MEDICAID

## 2017-06-18 VITALS
TEMPERATURE: 98.4 F | RESPIRATION RATE: 18 BRPM | SYSTOLIC BLOOD PRESSURE: 135 MMHG | DIASTOLIC BLOOD PRESSURE: 88 MMHG | OXYGEN SATURATION: 96 % | WEIGHT: 123 LBS | BODY MASS INDEX: 21.79 KG/M2 | HEART RATE: 98 BPM

## 2017-06-18 DIAGNOSIS — S40.011A CONTUSION OF RIGHT SHOULDER, INITIAL ENCOUNTER: Primary | ICD-10-CM

## 2017-06-18 PROCEDURE — 73030 X-RAY EXAM OF SHOULDER: CPT

## 2017-06-18 PROCEDURE — 74011250637 HC RX REV CODE- 250/637: Performed by: PHYSICIAN ASSISTANT

## 2017-06-18 PROCEDURE — 99283 EMERGENCY DEPT VISIT LOW MDM: CPT

## 2017-06-18 RX ORDER — ACETAMINOPHEN 500 MG
1000 TABLET ORAL
Status: COMPLETED | OUTPATIENT
Start: 2017-06-18 | End: 2017-06-18

## 2017-06-18 RX ORDER — TRAMADOL HYDROCHLORIDE 50 MG/1
50 TABLET ORAL
Qty: 12 TAB | Refills: 0 | Status: SHIPPED | OUTPATIENT
Start: 2017-06-18 | End: 2017-08-01 | Stop reason: SDUPTHER

## 2017-06-18 RX ORDER — ACETAMINOPHEN 325 MG/1
650 TABLET ORAL
Qty: 20 TAB | Refills: 0 | Status: SHIPPED | OUTPATIENT
Start: 2017-06-18

## 2017-06-18 RX ADMIN — ACETAMINOPHEN 1000 MG: 500 TABLET ORAL at 21:04

## 2017-06-19 NOTE — ED PROVIDER NOTES
HPI Comments: 8:05 PM Olga Reeves is a 61 y.o. female with a history of diabetes who presents to ED to be evaluated for upper back and R shoulder pain onset after a fall two days ago. Pt explains she fell from her bed onto the floor. No head injury or LOC at time of fall. Pt took half a Vicodin 5 mg for pain today. Pt denies taking blood thinners daily, LOC, or head injury. No other concerns at this time. The history is provided by the patient. Past Medical History:   Diagnosis Date    Bladder infection     Depression     Diabetes ([address]     Difficulty walking     DM (diabetes mellitus) ([facility] [address] 6/22/2012    Ill-defined condition     leaking bladder;high cholesterol       Past Surgical History:   Procedure Laterality Date    HX GYN      hysterectomy    HX OTHER SURGICAL      bladder surgery         Family History:   Problem Relation Age of Onset    Diabetes Mother     Cancer Mother     Heart Attack Father      had 3 hear attacks    Diabetes Father        Social History     Social History    Marital status: SINGLE     Spouse name: N/A    Number of children: N/A    Years of education: N/A     Occupational History    Not on file. Social History Main Topics    Smoking status: Current Every Day Smoker     Packs/day: 1.00     Years: 38.00    Smokeless tobacco: Never Used    Alcohol use No    Drug use: No    Sexual activity: No     Other Topics Concern    Not on file     Social History Narrative    ** Merged History Encounter **              ALLERGIES: Review of patient's allergies indicates no known allergies. Review of Systems   Constitutional: Negative for chills and fever. HENT: Negative for congestion. Eyes: Negative for photophobia, pain and visual disturbance. Respiratory: Negative for cough and shortness of breath. Cardiovascular: Negative for chest pain and leg swelling. Gastrointestinal: Negative for abdominal pain, diarrhea, nausea and vomiting. Genitourinary: Negative for difficulty urinating, dysuria and hematuria. Musculoskeletal: Positive for back pain (Upper) and joint swelling. Negative for arthralgias, neck pain and neck stiffness. R arm pain   Skin: Negative for rash. Neurological: Negative for dizziness, light-headedness, numbness and headaches. All other systems reviewed and are negative. There were no vitals filed for this visit. Physical Exam   Constitutional: She is oriented to person, place, and time. She appears well-developed and well-nourished. No distress. HENT:   Head: Normocephalic and atraumatic. Mouth/Throat: Oropharynx is clear and moist.   Eyes: Conjunctivae are normal.   Neck: Normal range of motion. Neck supple. Cardiovascular: Normal rate, regular rhythm and normal heart sounds. Pulmonary/Chest: Effort normal and breath sounds normal. No respiratory distress. She has no wheezes. She exhibits no tenderness. Abdominal: Soft. She exhibits no distension. There is no tenderness. Musculoskeletal: Normal range of motion. Non tender to midline palpation of the cervical, thoracic, and lumbar spine. No step off or deformity. FROM of BUE and BLE against resistance in flexion and extension with 5/5 strength. Non tender to bilateral shoulders/elbows/hands/hips/knees/ankles. Pulses intact and equal.      Rt scapula pain. FROM. Mild pain with active ROM of shoulder, no pain with distracted exam.    Neurological: She is alert and oriented to person, place, and time. No cranial nerve deficit. Coordination normal.   Skin: Skin is warm and dry. No rash noted. She is not diaphoretic. Psychiatric: She has a normal mood and affect. Nursing note and vitals reviewed. MDM  Number of Diagnoses or Management Options  Diagnosis management comments: Xray of shoulder and scapula appear without fracture or acute process. Pt will f/u with Dr. Cassie Ordonez by Tuesday for recheck.  No indication for further ED work up        Amount and/or Complexity of Data Reviewed  Tests in the radiology section of CPT®: ordered and reviewed      ED Course       Procedures      Scribe Attestation:   Rohini Sanchez, scribing for and in the presence of Brigette Monk June 18, 2017 at 8:08 PM     Physician Assistant Attestation:   I personally performed the services described in this documentation, reviewed and edited the documentation which was dictated to the scribe in my presence, and it accurately records my words and actions.  Brigette Monk  June 18, 2017 at 8:08 PM    Signed by: Rosa Valencia, June 18, 2017 at 8:08 PM

## 2017-06-19 NOTE — DISCHARGE INSTRUCTIONS
Bruises: Care Instructions  Your Care Instructions    Bruises occur when small blood vessels under the skin tear or rupture, most often from a twist, bump, or fall. Blood leaks into tissues under the skin and causes a black-and-blue spot that often turns colors, including purplish black, reddish blue, or yellowish green, as the bruise heals. Bruises hurt, but most are not serious and will go away on their own within 2 to 4 weeks. Sometimes, gravity causes them to spread down the body. A leg bruise usually will take longer to heal than a bruise on the face or arms. Follow-up care is a key part of your treatment and safety. Be sure to make and go to all appointments, and call your doctor if you are having problems. Its also a good idea to know your test results and keep a list of the medicines you take. How can you care for yourself at home? · Take pain medicines exactly as directed. ¨ If the doctor gave you a prescription medicine for pain, take it as prescribed. ¨ If you are not taking a prescription pain medicine, ask your doctor if you can take an over-the-counter medicine. · Put ice or a cold pack on the area for 10 to 20 minutes at a time. Put a thin cloth between the ice and your skin. · If you can, prop up the bruised area on pillows as much as possible for the next few days. Try to keep the bruise above the level of your heart. When should you call for help? Call your doctor now or seek immediate medical care if:  · You have signs of infection, such as:  ¨ Increased pain, swelling, warmth, or redness. ¨ Red streaks leading from the bruise. ¨ Pus draining from the bruise. ¨ A fever. · You have a bruise on your leg and signs of a blood clot, such as:  ¨ Increasing redness and swelling along with warmth, tenderness, and pain in the bruised area. ¨ Pain in your calf, back of the knee, thigh, or groin. ¨ Redness and swelling in your leg or groin. · Your pain gets worse.   Watch closely for changes in your health, and be sure to contact your doctor if:  · You do not get better as expected. Where can you learn more? Go to http://karis-shannon.info/. Enter [phone] in the search box to learn more about \"Bruises: Care Instructions. \"  Current as of: May 27, 2016  Content Version: 11.2  © 8720-5734 [employer]. Care instructions adapted under license by [employer] (which disclaims liability or warranty for this information). If you have questions about a medical condition or this instruction, always ask your healthcare professional. [place] [address] any warranty or liability for your use of this information.

## 2017-06-20 ENCOUNTER — TELEPHONE (OUTPATIENT)
Dept: INTERNAL MEDICINE CLINIC | Age: 57
End: 2017-06-20

## 2017-06-20 NOTE — TELEPHONE ENCOUNTER
Kimani Helton (not on [facility]) calling on pt behalf  wanted Dr Alana Boone to be aware of recent ED visit  Of 06/18/2017 for left arm and shoulder.     Per Ms Naomie Gitelman, pt declined an appt, said will see Dr Alana Boone at her August appt

## 2017-08-01 ENCOUNTER — OFFICE VISIT (OUTPATIENT)
Dept: INTERNAL MEDICINE CLINIC | Age: 57
End: 2017-08-01

## 2017-08-01 ENCOUNTER — HOSPITAL ENCOUNTER (OUTPATIENT)
Dept: LAB | Age: 57
Discharge: HOME | End: 2017-08-01

## 2017-08-01 VITALS
OXYGEN SATURATION: 99 % | BODY MASS INDEX: 21.62 KG/M2 | TEMPERATURE: 98.4 F | HEART RATE: 86 BPM | SYSTOLIC BLOOD PRESSURE: 138 MMHG | HEIGHT: 63 IN | WEIGHT: 122 LBS | DIASTOLIC BLOOD PRESSURE: 76 MMHG | RESPIRATION RATE: 20 BRPM

## 2017-08-01 DIAGNOSIS — M54.50 CHRONIC MIDLINE LOW BACK PAIN WITHOUT SCIATICA: ICD-10-CM

## 2017-08-01 DIAGNOSIS — G89.29 CHRONIC MIDLINE LOW BACK PAIN WITHOUT SCIATICA: ICD-10-CM

## 2017-08-01 DIAGNOSIS — E78.00 HIGH CHOLESTEROL: ICD-10-CM

## 2017-08-01 DIAGNOSIS — L02.429 BOIL, THIGH: ICD-10-CM

## 2017-08-01 DIAGNOSIS — R80.9 MICROALBUMINURIA: ICD-10-CM

## 2017-08-01 DIAGNOSIS — Z79.891 ENCOUNTER FOR LONG-TERM OPIATE ANALGESIC USE: ICD-10-CM

## 2017-08-01 LAB — HBA1C MFR BLD HPLC: 10.8 %

## 2017-08-01 PROCEDURE — 99001 SPECIMEN HANDLING PT-LAB: CPT | Performed by: INTERNAL MEDICINE

## 2017-08-01 RX ORDER — PRAVASTATIN SODIUM 40 MG/1
TABLET ORAL
Qty: 30 TAB | Refills: 5 | Status: SHIPPED | OUTPATIENT
Start: 2017-08-01 | End: 2018-04-10 | Stop reason: SDUPTHER

## 2017-08-01 RX ORDER — METFORMIN HYDROCHLORIDE 1000 MG/1
TABLET ORAL
Qty: 60 TAB | Refills: 5 | Status: SHIPPED | OUTPATIENT
Start: 2017-08-01 | End: 2018-04-10 | Stop reason: SDUPTHER

## 2017-08-01 RX ORDER — GABAPENTIN 300 MG/1
CAPSULE ORAL
Qty: 90 CAP | Refills: 5 | Status: SHIPPED | OUTPATIENT
Start: 2017-08-01 | End: 2018-04-10 | Stop reason: SDUPTHER

## 2017-08-01 RX ORDER — LISINOPRIL 10 MG/1
10 TABLET ORAL DAILY
Qty: 30 TAB | Refills: 5 | Status: SHIPPED | OUTPATIENT
Start: 2017-08-01 | End: 2018-04-12 | Stop reason: SDUPTHER

## 2017-08-01 RX ORDER — CLINDAMYCIN HYDROCHLORIDE 300 MG/1
300 CAPSULE ORAL 3 TIMES DAILY
Qty: 30 CAP | Refills: 0 | Status: SHIPPED | OUTPATIENT
Start: 2017-08-01 | End: 2017-08-11

## 2017-08-01 RX ORDER — TRAMADOL HYDROCHLORIDE 50 MG/1
50 TABLET ORAL
Qty: 12 TAB | Refills: 0 | Status: SHIPPED | OUTPATIENT
Start: 2017-08-01 | End: 2018-04-10 | Stop reason: SDUPTHER

## 2017-08-01 NOTE — PROGRESS NOTES
Subjective:       Chief Complaint  The patient presents for follow up of diabetesand high cholesterol. Proteinuria, back pain         HPI  Jacy Macedo is a 61 y.o. female seen for follow up of diabetes. Shealso has  hyperlipidemia. Diabetes uncontrolled due to poor compliance with taking her insulin, pt shoulde be taking Humalog mix 75/25 25 BID. She is also just taking metformin 1x/day instead of BID, pt with poor insight to her medical problems due to developmental delay,   Hyperlipidemia control uncertain, no significant medication side effects noted, on Pravachol. Will check lipid profile today. Pt remains on lisinopril for her proteinuria. Diet and Lifestyle: generally follows a low fat low cholesterol diet, does not rigorously follow a diabetic diet, sedentary    Diabetic Review of Systems - home glucose monitoring: is performed sporadically, should be 3x/day . Other symptoms and concerns: . Pt's chronic back pain is stable on Ultram prn. Pt c/o of abscess on her left thigh which is oozing pus. She also says she has something going on in her groin but she did not want to show it to me since I am male. I will start her on Keflex and refer her again to GYN. Current Outpatient Prescriptions   Medication Sig    traMADol (ULTRAM) 50 mg tablet Take 1 Tab by mouth every six (6) hours as needed for Pain. Max Daily Amount: 200 mg.    gabapentin (NEURONTIN) 300 mg capsule TAKE ONE CAPSULE BY MOUTH THREE TIMES DAILY    lisinopril (PRINIVIL, ZESTRIL) 10 mg tablet Take 1 Tab by mouth daily.     insulin lispro protamine/insulin lispro (HUMALOG MIX 75-25) 100 unit/mL (75-25) injection INJECT 25 UNITS SUBCUTANEOUSLY BEFORE BREAKFAST AND DINNER    pravastatin (PRAVACHOL) 40 mg tablet TAKE ONE TABLET BY MOUTH EVERY NIGHT    metFORMIN (GLUCOPHAGE) 1,000 mg tablet TAKE ONE TABLET BY MOUTH TWICE DAILY WITH MEALS    acetaminophen (TYLENOL) 325 mg tablet Take 2 Tabs by mouth every four (4) hours as needed for Pain.  traMADol (ULTRAM) 50 mg tablet TAKE 2 TABLETS BY MOUTH EVERY 6 HOURS AS NEEDED FOR PAIN    Insulin Syringe-Needle U-100 1/2 mL 30 gauge x 5/16 syrg INJECT TWICE A DAY    glucose blood VI test strips (ONETOUCH ULTRA TEST) strip Check BS 3x/day E11.65    aspirin 81 mg tablet Take 81 mg by mouth daily.  OXAPROZIN PO Take  by mouth two (2) times a day.  docusate sodium (COLACE) 100 mg capsule Take 1 Cap by mouth two (2) times a day.  budesonide (RHINOCORT AQUA) 32 mcg/actuation nasal spray 2 Sprays by Both Nostrils route daily. No current facility-administered medications for this visit. Review of Systems  Respiratory: negative for dyspnea on exertion  Cardiovascular: negative for chest pain    Objective:     Visit Vitals    /76 (BP 1 Location: Left arm, BP Patient Position: Sitting)    Pulse 86    Temp 98.4 °F (36.9 °C) (Oral)    Resp 20    Ht 5' 3\" (1.6 m)    Wt 122 lb (55.3 kg)    SpO2 99%    BMI 21.61 kg/m2        Eyes - pupils equal and reactive, extraocular eye movements intact  Chest - clear to auscultation, no wheezes, rales or rhonchi, symmetric air entry  Heart - normal rate, regular rhythm, normal S1, S2, no murmurs, rubs, clicks or gallops  Extremities - no edema   Skin - skin abscess left lower abdomen. Observed with nurse Martina Cooler.         Labs:   Lab Results   Component Value Date/Time    Hemoglobin A1c 12.7 04/03/2017 01:54 PM    Hemoglobin A1c 8.6 05/16/2016 08:24 AM    Hemoglobin A1c 11.7 01/14/2016 08:15 AM    Glucose 279 08/01/2017 03:29 PM    Glucose (POC) 254 06/01/2017 05:16 PM    Microalbumin/Creat ratio (mg/g creat) 17 03/29/2011 04:01 PM    Microalb/Creat ratio (ug/mg creat.) 129.8 04/03/2017 01:54 PM    Microalbumin,urine random 1.00 03/29/2011 04:01 PM    LDL, calculated 151 08/01/2017 03:29 PM    Creatinine, POC 0.5 08/23/2010 10:49 AM    Creatinine 0.97 08/01/2017 03:29 PM      Lab Results   Component Value Date/Time Cholesterol, total 225 08/01/2017 03:29 PM    HDL Cholesterol 55 08/01/2017 03:29 PM    LDL, calculated 151 08/01/2017 03:29 PM    Triglyceride 95 08/01/2017 03:29 PM    CHOL/HDL Ratio 5.7 03/26/2012 03:45 AM     Lab Results   Component Value Date/Time    ALT (SGPT) 7 08/01/2017 03:29 PM    AST (SGOT) 15 08/01/2017 03:29 PM    Alk. phosphatase 120 08/01/2017 03:29 PM    Bilirubin, direct 0.1 03/26/2012 03:45 AM    Bilirubin, total 0.3 08/01/2017 03:29 PM     Lab Results   Component Value Date/Time    GFR est AA 74 08/01/2017 03:29 PM    GFR est non-AA 65 08/01/2017 03:29 PM    Creatinine, POC 0.5 08/23/2010 10:49 AM    Creatinine 0.97 08/01/2017 03:29 PM    BUN 8 08/01/2017 03:29 PM    Sodium 128 08/01/2017 03:29 PM    Potassium 5.6 08/01/2017 03:29 PM    Chloride 88 08/01/2017 03:29 PM    CO2 23 08/01/2017 03:29 PM      Lab Results   Component Value Date/Time    Glucose 279 08/01/2017 03:29 PM    Glucose (POC) 254 06/01/2017 05:16 PM      Labs:   Lab Results   Component Value Date/Time    Hemoglobin A1c 12.7 04/03/2017 01:54 PM    Hemoglobin A1c (POC) 10.8 08/01/2017 03:04 PM              Assessment / Plan     Diabetes poorly controlled due to poor compliance with insulin, pt to use metformin BID and try to take  insulin 75/25 25 BID instead of every day. Hyperlipidemia poorly controlled on Pravachol due to poor compliance. ICD-10-CM ICD-9-CM    1. Uncontrolled type 2 diabetes mellitus without complication, without long-term current use of insulin (HCC) E11.65 250.02 Management as above AMB POC HEMOGLOBIN A1C   2. Microalbuminuria R80.9 791.0 lisinopril (PRINIVIL, ZESTRIL) 10 mg tablet   3. High cholesterol E78.00 272.0 LIPID PANEL      METABOLIC PANEL, COMPREHENSIVE      LIPID PANEL      METABOLIC PANEL, COMPREHENSIVE   4. Chronic midline low back pain without sciatica M54.5 724.2 Will continue traMADol (ULTRAM) 50 mg tablet    G89.29 338.29 13-DRUG SCREEN, UR      13-DRUG SCREEN, UR   5.  Boil, thigh L02.439 680.6 clindamycin (CLEOCIN) 300 mg capsule and pt to f/u with GN    6. Encounter for long-term opiate analgesic use Z79.891 V58.69 13-DRUG SCREEN, UR      13-DRUG SCREEN, UR                      Diabetic issues reviewed with her: diabetic diet discussed in detail, and low cholesterol diet, weight control and daily exercise discussed. Medication instructions and potential side effects reviewed with patient in detail. Follow-up Disposition:  Return in about 4 months (around 12/1/2017). Reviewed plan of care. Patient has provided input and agrees with goals.

## 2017-08-01 NOTE — MR AVS SNAPSHOT
Visit Information Date & Time Provider Department Dept. Phone Encounter #  
 8/1/2017  2:30 PM Tessa Mesa MD [facility] 8 [id number] Follow-up Instructions Return in about 4 months (around 12/1/2017). Your Appointments 12/12/2017  2:30 PM  
ROUTINE CARE with Tessa Mesa MD  
[facility] [employer] [facility] (--) Appt Note: 4 mo f/u  
 [address] [place] [address] [phone]  
  
   
 [address] [place] [address] [address] Upcoming Health Maintenance Date Due  
 EYE EXAM RETINAL OR DILATED Q1 5/13/1969 Pneumococcal 19-64 Medium Risk (1 of 1 - PPSV23) 5/13/1978 DTaP/Tdap/Td series (1 - Tdap) 5/13/1980 BREAST CANCER SCRN MAMMOGRAM 5/13/2009 FOOT EXAM Q1 8/14/2015 INFLUENZA AGE 9 TO ADULT 8/1/2017 HEMOGLOBIN A1C Q6M 10/3/2017 MICROALBUMIN Q1 4/3/2018 LIPID PANEL Q1 4/3/2018 COLONOSCOPY 4/4/2021 Allergies as of 8/1/2017  Review Complete On: 8/1/2017 By: Tessa Mesa MD  
 No Known Allergies Current Immunizations  Reviewed on 11/15/2016 Name Date Influenza Vaccine (Quad) PF 11/15/2016, 9/15/2015 Influenza Vaccine Whole 1/22/2011 Not reviewed this visit You Were Diagnosed With   
  
 Codes Comments Uncontrolled type 2 diabetes mellitus without complication, without long-term current use of insulin ([address]    -  Primary ICD-10-CM: E11.65 ICD-9-CM: 250.02 Microalbuminuria     ICD-10-CM: R80.9 ICD-9-CM: 791.0 High cholesterol     ICD-10-CM: E78.00 ICD-9-CM: 272.0 Chronic midline low back pain without sciatica     ICD-10-CM: M54.5, G89.29 ICD-9-CM: 724.2, 338.29 Boil, thigh     ICD-10-CM: L02.439 ICD-9-CM: 680. 6 Vitals BP Pulse Temp Resp Height(growth percentile) Weight(growth percentile) 138/76 (BP 1 Location: Left arm, BP Patient Position: Sitting) 86 98.4 °F (36.9 °C) (Oral) 20 5' 3\" (1.6 m) 122 lb (55.3 kg) SpO2 BMI OB Status Smoking Status 99% 21.61 kg/m2 Hysterectomy Current Every Day Smoker Vitals History BMI and BSA Data Body Mass Index Body Surface Area  
 21.61 kg/m 2 1.57 m 2 Preferred Pharmacy Pharmacy Name Phone [address], [address] [place] [address] [phone] Your Updated Medication List  
  
   
This list is accurate as of: 8/1/17  3:17 PM.  Always use your most recent med list.  
  
  
  
  
 acetaminophen 325 mg tablet Commonly known as:  TYLENOL Take 2 Tabs by mouth every four (4) hours as needed for Pain. aspirin 81 mg tablet Take 81 mg by mouth daily. budesonide 32 mcg/actuation nasal spray Commonly known as:  RHINOCORT AQUA  
2 Sprays by Both Nostrils route daily. clindamycin 300 mg capsule Commonly known as:  CLEOCIN Take 1 Cap by mouth three (3) times daily for 10 days. docusate sodium 100 mg capsule Commonly known as:  Nichole Rout Take 1 Cap by mouth two (2) times a day.  
  
 gabapentin 300 mg capsule Commonly known as:  NEURONTIN  
TAKE ONE CAPSULE BY MOUTH THREE TIMES DAILY  
  
 glucose blood VI test strips strip Commonly known as:  ONETOUCH ULTRA TEST Check BS 3x/day E11.65  
  
 insulin lispro protamine/insulin lispro 100 unit/mL (75-25) injection Commonly known as:  HumaLOG Mix 75-25 INJECT 25 UNITS SUBCUTANEOUSLY BEFORE BREAKFAST AND DINNER Insulin Syringe-Needle U-100 1/2 mL 30 gauge x 5/16 Syrg INJECT TWICE A DAY  
  
 lisinopril 10 mg tablet Commonly known as:  Zacarias June Take 1 Tab by mouth daily. metFORMIN 1,000 mg tablet Commonly known as:  GLUCOPHAGE  
TAKE ONE TABLET BY MOUTH TWICE DAILY WITH MEALS  
  
 OXAPROZIN PO Take  by mouth two (2) times a day. pravastatin 40 mg tablet Commonly known as:  PRAVACHOL  
TAKE ONE TABLET BY MOUTH EVERY NIGHT * traMADol 50 mg tablet Commonly known as:  Antoine Bulla  
 TAKE 2 TABLETS BY MOUTH EVERY 6 HOURS AS NEEDED FOR PAIN  
  
 * traMADol 50 mg tablet Commonly known as:  ULTRAM  
Take 1 Tab by mouth every six (6) hours as needed for Pain. Max Daily Amount: 200 mg.  
  
 * Notice: This list has 2 medication(s) that are the same as other medications prescribed for you. Read the directions carefully, and ask your doctor or other care provider to review them with you. Prescriptions Printed Refills  
 traMADol (ULTRAM) 50 mg tablet 0 Sig: Take 1 Tab by mouth every six (6) hours as needed for Pain. Max Daily Amount: 200 mg. Class: Print Route: Oral  
  
Prescriptions Sent to Pharmacy Refills  
 gabapentin (NEURONTIN) 300 mg capsule 5 Sig: TAKE ONE CAPSULE BY MOUTH THREE TIMES DAILY Class: Normal  
 Pharmacy: [address] [place] [address] Ph #: [phone]  
 lisinopril (PRINIVIL, ZESTRIL) 10 mg tablet 5 Sig: Take 1 Tab by mouth daily. Class: Normal  
 Pharmacy: [address] [place] [address] Ph #: [phone] Route: Oral  
 pravastatin (PRAVACHOL) 40 mg tablet 5 Sig: TAKE ONE TABLET BY MOUTH EVERY NIGHT Class: Normal  
 Pharmacy: [address] [place] [address] Ph #: [phone]  
 metFORMIN (GLUCOPHAGE) 1,000 mg tablet 5 Sig: TAKE ONE TABLET BY MOUTH TWICE DAILY WITH MEALS Class: Normal  
 Pharmacy: [address] [place] [address] Ph #: [phone]  
 clindamycin (CLEOCIN) 300 mg capsule 0 Sig: Take 1 Cap by mouth three (3) times daily for 10 days. Class: Normal  
 Pharmacy: [address] [place] [address] Ph #: [phone] Route: Oral  
  
We Performed the Following AMB POC HEMOGLOBIN A1C [14226 CPT(R)] Follow-up Instructions Return in about 4 months (around 12/1/2017). To-Do List   
 01/29/2018 Lab: LIPID PANEL   
  
 01/29/2018 Lab:  METABOLIC PANEL, COMPREHENSIVE Patient Instructions Learning About Diabetes Food Guidelines Your Care Instructions Meal planning is important to manage diabetes. It helps keep your blood sugar at a target level (which you set with your doctor). You don't have to eat special foods. You can eat what your family eats, including sweets once in a while. But you do have to pay attention to how often you eat and how much you eat of certain foods. You may want to work with a dietitian or a certified diabetes educator (CDE) to help you plan meals and snacks. A dietitian or CDE can also help you lose weight if that is one of your goals. What should you know about eating carbs? Managing the amount of carbohydrate (carbs) you eat is an important part of healthy meals when you have diabetes. Carbohydrate is found in many foods. · Learn which foods have carbs. And learn the amounts of carbs in different foods. ¨ Bread, cereal, pasta, and rice have about 15 grams of carbs in a serving. A serving is 1 slice of bread (1 ounce), ½ cup of cooked cereal, or 1/3 cup of cooked pasta or rice. ¨ Fruits have 15 grams of carbs in a serving. A serving is 1 small fresh fruit, such as an apple or orange; ½ of a banana; ½ cup of cooked or canned fruit; ½ cup of fruit juice; 1 cup of melon or raspberries; or 2 tablespoons of dried fruit. ¨ Milk and no-sugar-added yogurt have 15 grams of carbs in a serving. A serving is 1 cup of milk or 2/3 cup of no-sugar-added yogurt. ¨ Starchy vegetables have 15 grams of carbs in a serving. A serving is ½ cup of mashed potatoes or sweet potato; 1 cup winter squash; ½ of a small baked potato; ½ cup of cooked beans; or ½ cup cooked corn or green peas. · Learn how much carbs to eat each day and at each meal. A dietitian or CDE can teach you how to keep track of the amount of carbs you eat. This is called carbohydrate counting. · If you are not sure how to count carbohydrate grams, use the Plate Method to plan meals. It is a good, quick way to make sure that you have a balanced meal. It also helps you spread carbs throughout the day. ¨ Divide your plate by types of foods. Put non-starchy vegetables on half the plate, meat or other protein food on one-quarter of the plate, and a grain or starchy vegetable in the final quarter of the plate. To this you can add a small piece of fruit and 1 cup of milk or yogurt, depending on how many carbs you are supposed to eat at a meal. 
· Try to eat about the same amount of carbs at each meal. Do not \"save up\" your daily allowance of carbs to eat at one meal. 
· Proteins have very little or no carbs per serving. Examples of proteins are beef, chicken, turkey, fish, eggs, tofu, cheese, cottage cheese, and peanut butter. A serving size of meat is 3 ounces, which is about the size of a deck of cards. Examples of meat substitute serving sizes (equal to 1 ounce of meat) are 1/4 cup of cottage cheese, 1 egg, 1 tablespoon of peanut butter, and ½ cup of tofu. How can you eat out and still eat healthy? · Learn to estimate the serving sizes of foods that have carbohydrate. If you measure food at home, it will be easier to estimate the amount in a serving of restaurant food. · If the meal you order has too much carbohydrate (such as potatoes, corn, or baked beans), ask to have a low-carbohydrate food instead. Ask for a salad or green vegetables. · If you use insulin, check your blood sugar before and after eating out to help you plan how much to eat in the future. · If you eat more carbohydrate at a meal than you had planned, take a walk or do other exercise. This will help lower your blood sugar. What else should you know? · Limit saturated fat, such as the fat from meat and dairy products.  This is a healthy choice because people who have diabetes are at higher risk of heart disease. So choose lean cuts of meat and nonfat or low-fat dairy products. Use olive or canola oil instead of butter or shortening when cooking. · Don't skip meals. Your blood sugar may drop too low if you skip meals and take insulin or certain medicines for diabetes. · Check with your doctor before you drink alcohol. Alcohol can cause your blood sugar to drop too low. Alcohol can also cause a bad reaction if you take certain diabetes medicines. Follow-up care is a key part of your treatment and safety. Be sure to make and go to all appointments, and call your doctor if you are having problems. It's also a good idea to know your test results and keep a list of the medicines you take. Where can you learn more? Go to http://karis-shannon.info/. Enter W574 in the search box to learn more about \"Learning About Diabetes Food Guidelines. \" Current as of: March 13, 2017 Content Version: 11.3 © 9561-0725 "[employer]. Care instructions adapted under license by [employer] (which disclaims liability or warranty for this information). If you have questions about a medical condition or this instruction, always ask your healthcare professional. [place] [address] any warranty or liability for your use of this information. Introducing [facility]! Walter Chen introduces Nexopia patient portal. Now you can access parts of your medical record, email your doctor's office, and request medication refills online. 1. In your internet browser, go to [email or web address] "[employer]"/Pheedo 2. Click on the First Time User? Click Here link in the Sign In box. You will see the New Member Sign Up page. 3. Enter your Nexopia Access Code exactly as it appears below. You will not need to use this code after youve completed the sign-up process. If you do not sign up before the expiration date, you must request a new code. · HutGrip Access Code: [email or web address] Expires: 10/30/2017  2:49 PM 
 
4. Enter the last four digits of your Social Security Number (xxxx) and Date of Birth (mm/dd/yyyy) as indicated and click Submit. You will be taken to the next sign-up page. 5. Create a HutGrip ID. This will be your HutGrip login ID and cannot be changed, so think of one that is secure and easy to remember. 6. Create a HutGrip password. You can change your password at any time. 7. Enter your Password Reset Question and Answer. This can be used at a later time if you forget your password. 8. Enter your e-mail address. You will receive e-mail notification when new information is available in [address] 9. Click Sign Up. You can now view and download portions of your medical record. 10. Click the Download Summary menu link to download a portable copy of your medical information. If you have questions, please visit the Frequently Asked Questions section of the Hut[employer] website. Remember, HutGrip is NOT to be used for urgent needs. For medical emergencies, dial 911. Now available from your iPhone and Android! Please provide this summary of care documentation to your next provider. Your primary care clinician is listed as MARK CASH. If you have any questions after today's visit, please call [phone].

## 2017-08-01 NOTE — PATIENT INSTRUCTIONS

## 2017-08-01 NOTE — PROGRESS NOTES
Patient is in the office today for a follow up. Patient also has a boil on the left inner thigh that is hurting. 1. Have you been to the ER, urgent care clinic since your last visit? Hospitalized since your last visit? No    2. Have you seen or consulted any other health care providers outside of the [address] [place] [address] since your last visit? Include any pap smears or colon screening.  No

## 2017-08-02 LAB
ALBUMIN SERPL-MCNC: 4.4 G/DL (ref 3.5–5.5)
ALBUMIN/GLOB SERPL: 1.4 {RATIO} (ref 1.2–2.2)
ALP SERPL-CCNC: 120 IU/L (ref 39–117)
ALT SERPL-CCNC: 7 IU/L (ref 0–32)
AMPHETAMINES UR QL SCN: NEGATIVE NG/ML
AST SERPL-CCNC: 15 IU/L (ref 0–40)
BARBITURATES UR QL: NEGATIVE NG/ML
BENZODIAZ UR QL: NEGATIVE NG/ML
BILIRUB SERPL-MCNC: 0.3 MG/DL (ref 0–1.2)
BUN SERPL-MCNC: 8 MG/DL (ref 6–24)
BUN/CREAT SERPL: 8 (ref 9–23)
BZE UR QL: NEGATIVE NG/ML
CALCIUM SERPL-MCNC: 9.1 MG/DL (ref 8.7–10.2)
CANNABINOIDS UR QL SCN: NEGATIVE NG/ML
CHLORIDE SERPL-SCNC: 88 MMOL/L (ref 96–106)
CHOLEST SERPL-MCNC: 225 MG/DL (ref 100–199)
CO2 SERPL-SCNC: 23 MMOL/L (ref 18–29)
CREAT SERPL-MCNC: 0.97 MG/DL (ref 0.57–1)
CREAT UR-MCNC: 58.8 MG/DL (ref 20–300)
ETHANOL UR-MCNC: NEGATIVE %
GLOBULIN SER CALC-MCNC: 3.1 G/DL (ref 1.5–4.5)
GLUCOSE SERPL-MCNC: 279 MG/DL (ref 65–99)
HDLC SERPL-MCNC: 55 MG/DL
INTERPRETATION, 910389: NORMAL
LDLC SERPL CALC-MCNC: 151 MG/DL (ref 0–99)
MEPERIDINE UR QL: NEGATIVE NG/ML
METHADONE UR QL: NEGATIVE NG/ML
OPIATES UR QL SCN: NEGATIVE NG/ML
OXYCODONE+OXYMORPHONE UR QL SCN: NEGATIVE NG/ML
PCP UR QL: NEGATIVE NG/ML
POTASSIUM SERPL-SCNC: 5.6 MMOL/L (ref 3.5–5.2)
PROPOXYPH UR QL: NEGATIVE NG/ML
PROT SERPL-MCNC: 7.5 G/DL (ref 6–8.5)
SODIUM SERPL-SCNC: 128 MMOL/L (ref 134–144)
TRAMADOL UR QL SCN: NEGATIVE NG/ML
TRIGL SERPL-MCNC: 95 MG/DL (ref 0–149)
VLDLC SERPL CALC-MCNC: 19 MG/DL (ref 5–40)

## 2017-09-29 ENCOUNTER — HOSPITAL ENCOUNTER (EMERGENCY)
Age: 57
Discharge: HOME | End: 2017-09-29
Attending: EMERGENCY MEDICINE
Payer: MEDICAID

## 2017-09-29 VITALS
RESPIRATION RATE: 16 BRPM | DIASTOLIC BLOOD PRESSURE: 96 MMHG | HEART RATE: 89 BPM | SYSTOLIC BLOOD PRESSURE: 173 MMHG | WEIGHT: 122 LBS | BODY MASS INDEX: 21.61 KG/M2 | OXYGEN SATURATION: 98 % | TEMPERATURE: 97.8 F

## 2017-09-29 DIAGNOSIS — L24.A9 WOUND DRAINAGE: Primary | ICD-10-CM

## 2017-09-29 PROCEDURE — 99282 EMERGENCY DEPT VISIT SF MDM: CPT

## 2017-09-29 PROCEDURE — 74011000250 HC RX REV CODE- 250: Performed by: NURSE PRACTITIONER

## 2017-09-29 PROCEDURE — 17250 CHEM CAUT OF GRANLTJ TISSUE: CPT

## 2017-09-29 RX ORDER — SILVER NITRATE 38.21; 12.74 MG/1; MG/1
1 STICK TOPICAL
Status: COMPLETED | OUTPATIENT
Start: 2017-09-29 | End: 2017-09-29

## 2017-09-29 RX ADMIN — SILVER NITRATE APPLICATORS 1 APPLICATOR: 25; 75 STICK TOPICAL at 21:00

## 2017-09-30 NOTE — ED PROVIDER NOTES
HPI       Pt presents to ed with bleeding to a mole on her nose that she picked and it would not stop bleeding. At this time she has been holding pressure and she bleeding has stopped and there is scant amount of oozing. No history of bleeding problems and no other symptoms or complaints      Past Medical History:   Diagnosis Date    Bladder infection     Depression     Diabetes ([facility]     Difficulty walking     DM (diabetes mellitus) ([facility] 6/22/2012    Ill-defined condition     leaking bladder;high cholesterol       Past Surgical History:   Procedure Laterality Date    HX GYN      hysterectomy    HX OTHER SURGICAL      bladder surgery         Family History:   Problem Relation Age of Onset    Diabetes Mother     Cancer Mother     Heart Attack Father      had 3 hear attacks    Diabetes Father        Social History     Social History    Marital status: SINGLE     Spouse name: N/A    Number of children: N/A    Years of education: N/A     Occupational History    Not on file. Social History Main Topics    Smoking status: Current Every Day Smoker     Packs/day: 1.00     Years: 38.00    Smokeless tobacco: Never Used    Alcohol use No    Drug use: No    Sexual activity: No     Other Topics Concern    Not on file     Social History Narrative    ** Merged History Encounter **              ALLERGIES: Review of patient's allergies indicates no known allergies. Review of Systems   Skin: Positive for wound. All other systems reviewed and are negative. Vitals:    09/29/17 2029 09/29/17 2031   BP: (!) 173/96    Pulse: 89    Resp: 16    Temp: 97.8 °F (36.6 °C)    SpO2: 98%    Weight:  55.3 kg (122 lb)            Physical Exam   Constitutional: She is oriented to person, place, and time. She appears well-developed and well-nourished. HENT:   Head: Normocephalic and atraumatic. Eyes: Conjunctivae and EOM are normal. Pupils are equal, round, and reactive to light.    Neck: Normal range of motion. Neck supple. Cardiovascular: Normal rate and regular rhythm. Pulmonary/Chest: Effort normal and breath sounds normal.   Abdominal: Soft. Bowel sounds are normal.   Musculoskeletal: Normal range of motion. Neurological: She is alert and oriented to person, place, and time. She has normal reflexes. Skin: Skin is warm and dry. Scant amount of oozing of pink drainage from mole on nose. Psychiatric: She has a normal mood and affect. Her behavior is normal. Judgment and thought content normal.   Nursing note and vitals reviewed. MDM  Number of Diagnoses or Management Options  Diagnosis management comments: Silver nitrate stick used to stop oozing of fluid from mole pt will be discharged to home    Risk of Complications, Morbidity, and/or Mortality  Presenting problems: minimal  Diagnostic procedures: minimal  Management options: minimal      ED Course       Procedures            Vitals:  Patient Vitals for the past 12 hrs:   Temp Pulse Resp BP SpO2   09/29/17 2029 97.8 °F (36.6 °C) 89 16 (!) 173/96 98 %       Medications ordered:   Medications   silver nitrate applicators (ARZOL) 1 Applicator (not administered)          Progress notes, Consult notes or additional Procedure notes:   Silver nitrate sticks used to stop oozing of wound, no sign of infection    Reevaluation of patient:   I have reassessed the patient. Patient is feeling better and is asking to go home     Disposition:    Diagnosis:   1. Wound drainage        Disposition: to Home    Follow-up Information     Follow up With Details Comments MD Junior In 1 day  [address] [place] [address] [phone]             Patient's Medications   Start Taking    No medications on file   Continue Taking    ACETAMINOPHEN (TYLENOL) 325 MG TABLET    Take 2 Tabs by mouth every four (4) hours as needed for Pain. ASPIRIN 81 MG TABLET    Take 81 mg by mouth daily.       BUDESONIDE (RHINOCORT AQUA) 32 MCG/ACTUATION NASAL SPRAY    2 Sprays by Both Nostrils route daily. DOCUSATE SODIUM (COLACE) 100 MG CAPSULE    Take 1 Cap by mouth two (2) times a day. GABAPENTIN (NEURONTIN) 300 MG CAPSULE    TAKE ONE CAPSULE BY MOUTH THREE TIMES DAILY    GLUCOSE BLOOD VI TEST STRIPS (ONETOUCH ULTRA TEST) STRIP    Check BS 3x/day E11.65    INSULIN LISPRO PROTAMINE/INSULIN LISPRO (HUMALOG MIX 75-25) 100 UNIT/ML (75-25) INJECTION    INJECT 25 UNITS SUBCUTANEOUSLY BEFORE BREAKFAST AND DINNER    INSULIN SYRINGE-NEEDLE U-100 1/2 ML 30 GAUGE X 5/16 SYRG    INJECT TWICE A DAY    LISINOPRIL (PRINIVIL, ZESTRIL) 10 MG TABLET    Take 1 Tab by mouth daily. METFORMIN (GLUCOPHAGE) 1,000 MG TABLET    TAKE ONE TABLET BY MOUTH TWICE DAILY WITH MEALS    OXAPROZIN PO    Take  by mouth two (2) times a day. PRAVASTATIN (PRAVACHOL) 40 MG TABLET    TAKE ONE TABLET BY MOUTH EVERY NIGHT    TRAMADOL (ULTRAM) 50 MG TABLET    TAKE 2 TABLETS BY MOUTH EVERY 6 HOURS AS NEEDED FOR PAIN    TRAMADOL (ULTRAM) 50 MG TABLET    Take 1 Tab by mouth every six (6) hours as needed for Pain. Max Daily Amount: 200 mg. These Medications have changed    No medications on file   Stop Taking    No medications on file       Return to the ER if you are unable to obtain referral as directed. Almahuber Renetta Choi's  results have been reviewed with her. She has been counseled regarding her diagnosis, treatment, and plan. She verbally conveys understanding and agreement of the signs, symptoms, diagnosis, treatment and prognosis and additionally agrees to follow up as discussed. She also agrees with the care-plan and conveys that all of her questions have been answered. I have also provided discharge instructions for her that include: educational information regarding their diagnosis and treatment, and list of reasons why they would want to return to the ED prior to their follow-up appointment, should her condition change.

## 2017-09-30 NOTE — ED NOTES
I have reviewed discharge instructions with the patient. The patient verbalized understanding.  Pt ambulated out of Ed in stable condition no bleeding observed from mole upon discharge

## 2018-04-10 ENCOUNTER — HOSPITAL ENCOUNTER (OUTPATIENT)
Dept: LAB | Age: 58
Discharge: HOME | End: 2018-04-10
Payer: COMMERCIAL

## 2018-04-10 ENCOUNTER — OFFICE VISIT (OUTPATIENT)
Dept: FAMILY MEDICINE CLINIC | Age: 58
End: 2018-04-10

## 2018-04-10 VITALS
OXYGEN SATURATION: 96 % | DIASTOLIC BLOOD PRESSURE: 73 MMHG | BODY MASS INDEX: 23.95 KG/M2 | HEIGHT: 60 IN | WEIGHT: 122 LBS | SYSTOLIC BLOOD PRESSURE: 122 MMHG | HEART RATE: 84 BPM | TEMPERATURE: 97.7 F | RESPIRATION RATE: 20 BRPM

## 2018-04-10 DIAGNOSIS — E11.40 TYPE 2 DIABETES MELLITUS WITH DIABETIC NEUROPATHY, WITH LONG-TERM CURRENT USE OF INSULIN (HCC): Primary | ICD-10-CM

## 2018-04-10 DIAGNOSIS — Z79.4 TYPE 2 DIABETES MELLITUS WITH DIABETIC NEUROPATHY, WITH LONG-TERM CURRENT USE OF INSULIN (HCC): ICD-10-CM

## 2018-04-10 DIAGNOSIS — E78.00 HIGH CHOLESTEROL: ICD-10-CM

## 2018-04-10 DIAGNOSIS — M54.50 CHRONIC MIDLINE LOW BACK PAIN WITHOUT SCIATICA: ICD-10-CM

## 2018-04-10 DIAGNOSIS — G89.29 CHRONIC MIDLINE LOW BACK PAIN WITHOUT SCIATICA: ICD-10-CM

## 2018-04-10 DIAGNOSIS — R30.0 DYSURIA: ICD-10-CM

## 2018-04-10 DIAGNOSIS — E11.40 TYPE 2 DIABETES MELLITUS WITH DIABETIC NEUROPATHY, WITH LONG-TERM CURRENT USE OF INSULIN (HCC): ICD-10-CM

## 2018-04-10 DIAGNOSIS — R80.9 MICROALBUMINURIA: ICD-10-CM

## 2018-04-10 DIAGNOSIS — Z79.4 TYPE 2 DIABETES MELLITUS WITH DIABETIC NEUROPATHY, WITH LONG-TERM CURRENT USE OF INSULIN (HCC): Primary | ICD-10-CM

## 2018-04-10 LAB
APPEARANCE UR: CLEAR
BILIRUB UR QL: NEGATIVE
COLOR UR: YELLOW
GLUCOSE UR STRIP.AUTO-MCNC: >1000 MG/DL
HBA1C MFR BLD: 13 % (ref 4.2–5.6)
HGB UR QL STRIP: ABNORMAL
KETONES UR QL STRIP.AUTO: NEGATIVE MG/DL
LEUKOCYTE ESTERASE UR QL STRIP.AUTO: ABNORMAL
NITRITE UR QL STRIP.AUTO: NEGATIVE
PH UR STRIP: 5 [PH] (ref 5–8)
PROT UR STRIP-MCNC: ABNORMAL MG/DL
SP GR UR REFRACTOMETRY: 1.02 (ref 1–1.03)
UROBILINOGEN UR QL STRIP.AUTO: 0.2 EU/DL (ref 0.2–1)

## 2018-04-10 PROCEDURE — 36415 COLL VENOUS BLD VENIPUNCTURE: CPT | Performed by: INTERNAL MEDICINE

## 2018-04-10 PROCEDURE — 82570 ASSAY OF URINE CREATININE: CPT | Performed by: INTERNAL MEDICINE

## 2018-04-10 PROCEDURE — 81003 URINALYSIS AUTO W/O SCOPE: CPT | Performed by: INTERNAL MEDICINE

## 2018-04-10 PROCEDURE — 80053 COMPREHEN METABOLIC PANEL: CPT | Performed by: INTERNAL MEDICINE

## 2018-04-10 PROCEDURE — 80061 LIPID PANEL: CPT | Performed by: INTERNAL MEDICINE

## 2018-04-10 PROCEDURE — 83036 HEMOGLOBIN GLYCOSYLATED A1C: CPT | Performed by: INTERNAL MEDICINE

## 2018-04-10 RX ORDER — LISINOPRIL 10 MG/1
10 TABLET ORAL DAILY
Qty: 30 TAB | Refills: 5 | Status: SHIPPED | OUTPATIENT
Start: 2018-04-10 | End: 2018-07-16 | Stop reason: SDUPTHER

## 2018-04-10 RX ORDER — GABAPENTIN 300 MG/1
CAPSULE ORAL
Qty: 90 CAP | Refills: 5 | Status: SHIPPED | OUTPATIENT
Start: 2018-04-10 | End: 2018-10-15 | Stop reason: SDUPTHER

## 2018-04-10 RX ORDER — PRAVASTATIN SODIUM 40 MG/1
TABLET ORAL
Qty: 30 TAB | Refills: 5 | Status: SHIPPED | OUTPATIENT
Start: 2018-04-10 | End: 2018-10-15 | Stop reason: SDUPTHER

## 2018-04-10 RX ORDER — INSULIN LISPRO 100 [IU]/ML
INJECTION, SUSPENSION SUBCUTANEOUS
COMMUNITY
Start: 2018-01-08 | End: 2018-04-10 | Stop reason: SINTOL

## 2018-04-10 RX ORDER — METFORMIN HYDROCHLORIDE 1000 MG/1
TABLET ORAL
Qty: 60 TAB | Refills: 5 | Status: SHIPPED | OUTPATIENT
Start: 2018-04-10 | End: 2018-10-15 | Stop reason: SDUPTHER

## 2018-04-10 RX ORDER — TRAMADOL HYDROCHLORIDE 50 MG/1
50 TABLET ORAL
Qty: 100 TAB | Refills: 0 | Status: SHIPPED | OUTPATIENT
Start: 2018-04-10 | End: 2018-10-15 | Stop reason: SDUPTHER

## 2018-04-10 RX ORDER — INSULIN ASPART 100 [IU]/ML
INJECTION, SUSPENSION SUBCUTANEOUS
Qty: 10 ML | Refills: 4 | Status: SHIPPED | OUTPATIENT
Start: 2018-04-10 | End: 2018-08-20 | Stop reason: SDUPTHER

## 2018-04-10 RX ORDER — LISINOPRIL 10 MG/1
TABLET ORAL
COMMUNITY
Start: 2018-01-08 | End: 2018-04-10 | Stop reason: SDUPTHER

## 2018-04-10 NOTE — PATIENT INSTRUCTIONS

## 2018-04-10 NOTE — PROGRESS NOTES
Subjective:       Chief Complaint  The patient presents for follow up of diabetesand high cholesterol. Proteinuria, back pain         HPI  Sabiha Herrera is a 61 y.o. female seen for follow up of diabetes. Shealso has  hyperlipidemia. Diabetes uncontrolled due to poor compliance with taking her insulin, pt shoulde be taking Humalog mix 75/25 25 BID. She is also just taking metformin 1x/day instead of BID, pt with poor insight to her medical problems due to developmental delay, her son is with her today so hopefully she will be more compliant, Hyperlipidemia poorly controlled, no significant medication side effects noted, on Pravachol. Hopefully she will be more compliant. Pt remains on lisinopril for her proteinuria. Compliance remains an issue    Diet and Lifestyle: generally follows a low fat low cholesterol diet, does not rigorously follow a diabetic diet, sedentary    Diabetic Review of Systems - home glucose monitoring: is performed sporadically, should be 3x/day . Other symptoms and concerns: . Pt's chronic back pain is stable on Ultram prn.  reviewed. Current Outpatient Prescriptions   Medication Sig    gabapentin-lidocaine-menthol 300-4-1 mg-%-% [facility]     traMADol (ULTRAM) 50 mg tablet Take 1 Tab by mouth every six (6) hours as needed for Pain. Max Daily Amount: 200 mg.    insulin aspart protamine/insulin aspart (NOVOLOG MIX 70/30) 100 unit/mL (70-30) injection 25 units BID    lisinopril (PRINIVIL, ZESTRIL) 10 mg tablet Take 1 Tab by mouth daily.     metFORMIN (GLUCOPHAGE) 1,000 mg tablet TAKE ONE TABLET BY MOUTH TWICE DAILY WITH MEALS    pravastatin (PRAVACHOL) 40 mg tablet TAKE ONE TABLET BY MOUTH EVERY NIGHT    gabapentin (NEURONTIN) 300 mg capsule TAKE ONE CAPSULE BY MOUTH THREE TIMES DAILY    Insulin Syringe-Needle U-100 1/2 mL 30 gauge x 5/16 syrg INJECT TWICE A DAY    glucose blood VI test strips (ONETOUCH ULTRA TEST) strip Check BS 3x/day E11.65    ciprofloxacin HCl (CIPRO) 500 mg tablet Take 1 Tab by mouth two (2) times a day for 3 days.  acetaminophen (TYLENOL) 325 mg tablet Take 2 Tabs by mouth every four (4) hours as needed for Pain.  traMADol (ULTRAM) 50 mg tablet TAKE 2 TABLETS BY MOUTH EVERY 6 HOURS AS NEEDED FOR PAIN    docusate sodium (COLACE) 100 mg capsule Take 1 Cap by mouth two (2) times a day.  budesonide (RHINOCORT AQUA) 32 mcg/actuation nasal spray 2 Sprays by Both Nostrils route daily.  aspirin 81 mg tablet Take 81 mg by mouth daily.  OXAPROZIN PO Take  by mouth two (2) times a day. No current facility-administered medications for this visit.             Review of Systems  Respiratory: negative for dyspnea on exertion  Cardiovascular: negative for chest pain    Objective:     Visit Vitals    /73 (BP 1 Location: Left arm, BP Patient Position: Sitting)    Pulse 84    Temp 97.7 °F (36.5 °C) (Oral)    Resp 20    Ht 5' (1.524 m)    Wt 122 lb (55.3 kg)    SpO2 96%    BMI 23.83 kg/m2        Eyes - pupils equal and reactive, extraocular eye movements intact  Chest - clear to auscultation, no wheezes, rales or rhonchi, symmetric air entry  Heart - normal rate, regular rhythm, normal S1, S2, no murmurs, rubs, clicks or gallops  Extremities - no edema         Labs:   Lab Results   Component Value Date/Time    Hemoglobin A1c 13.0 (H) 04/10/2018 02:35 PM    Hemoglobin A1c 12.7 (H) 04/03/2017 01:54 PM    Hemoglobin A1c 8.6 (H) 05/16/2016 08:24 AM    Glucose 474 (HH) 04/10/2018 02:35 PM    Glucose (POC) 254 (H) 06/01/2017 05:16 PM    Microalbumin/Creat ratio (mg/g creat) 640 (H) 04/10/2018 02:35 PM    Microalbumin,urine random 11.80 (H) 04/10/2018 02:35 PM    LDL, calculated 130.2 (H) 04/10/2018 02:35 PM    Creatinine, POC 0.5 (L) 08/23/2010 10:49 AM    Creatinine 1.23 04/10/2018 02:35 PM      Lab Results   Component Value Date/Time    Cholesterol, total 219 (H) 04/10/2018 02:35 PM    HDL Cholesterol 52 04/10/2018 02:35 PM    LDL, calculated 130.2 (H) 04/10/2018 02:35 PM    Triglyceride 184 (H) 04/10/2018 02:35 PM    CHOL/HDL Ratio 4.2 04/10/2018 02:35 PM     Lab Results   Component Value Date/Time    ALT (SGPT) 14 04/10/2018 02:35 PM    AST (SGOT) 6 (L) 04/10/2018 02:35 PM    Alk. phosphatase 124 (H) 04/10/2018 02:35 PM    Bilirubin, direct 0.1 03/26/2012 03:45 AM    Bilirubin, total 0.3 04/10/2018 02:35 PM     Lab Results   Component Value Date/Time    GFR est AA 54 (L) 04/10/2018 02:35 PM    GFR est non-AA 45 (L) 04/10/2018 02:35 PM    Creatinine, POC 0.5 (L) 08/23/2010 10:49 AM    Creatinine 1.23 04/10/2018 02:35 PM    BUN 19 (H) 04/10/2018 02:35 PM    Sodium 129 (L) 04/10/2018 02:35 PM    Potassium 5.1 04/10/2018 02:35 PM    Chloride 95 (L) 04/10/2018 02:35 PM    CO2 23 04/10/2018 02:35 PM      Lab Results   Component Value Date/Time    Glucose 474 (HH) 04/10/2018 02:35 PM    Glucose (POC) 254 (H) 06/01/2017 05:16 PM      Labs:   Lab Results   Component Value Date/Time    Hemoglobin A1c 13.0 (H) 04/10/2018 02:35 PM    Hemoglobin A1c (POC) 10.8 08/01/2017 03:04 PM              Assessment / Plan     Diabetes poorly controlled due to poor compliance with insulin, pt to use metformin BID and try to take  insulin 75/25 25 BID. Pt's son to help her. Hyperlipidemia poorly controlled on Pravachol due to poor compliance. Pt will restart. ICD-10-CM ICD-9-CM    1. Type 2 diabetes mellitus with diabetic neuropathy, with long-term current use of insulin ([facility]) R56.55 027.57 METABOLIC PANEL, COMPREHENSIVE    Z79.4 357.2 MICROALBUMIN, UR, RAND W/ MICROALB/CREAT RATIO     V58.67 HEMOGLOBIN A1C W/O EAG   2. High cholesterol E78.00 272.0 LIPID PANEL   3. Microalbuminuria R80.9 791.0 Pt to take lisinopril on a regular basis    4. Chronic midline low back pain without sciatica M54.5 724.2 Well controlled on traMADol (ULTRAM) 50 mg tablet    G89.29 338.29    5. Dysuria R30.0 788. 1 URINALYSIS W/O MICRO                      Diabetic issues reviewed with her: diabetic diet discussed in detail, and low cholesterol diet, weight control and daily exercise discussed. Medication instructions and potential side effects reviewed with patient in detail. Follow-up Disposition:  Return in about 3 months (around 7/10/2018). Reviewed plan of care. Patient has provided input and agrees with goals.

## 2018-04-10 NOTE — PROGRESS NOTES
Patient is in the office today for 6 month follow up. 1. Have you been to the ER, urgent care clinic since your last visit? Hospitalized since your last visit? No    2. Have you seen or consulted any other health care providers outside of the [facility] since your last visit? Include any pap smears or colon screening.  No

## 2018-04-10 NOTE — MR AVS SNAPSHOT
[address] [place] [address] 
 
 
 [address] [address] 
[phone] Patient: Micael Meckel MRN: [id number] SRP:[birth year] Visit Information Date & Time Provider Department Dept. Phone Encounter #  
 4/10/2018  1:15 PM Rody Asencio, [address] [place] [address] [phone] [id number] Follow-up Instructions Return in about 3 months (around 7/10/2018). Upcoming Health Maintenance Date Due  
 EYE EXAM RETINAL OR DILATED Q1 5/13/1969 Pneumococcal 19-64 Medium Risk (1 of 1 - PPSV23) 5/13/1978 DTaP/Tdap/Td series (1 - Tdap) 5/13/1980 BREAST CANCER SCRN MAMMOGRAM 5/13/2009 FOOT EXAM Q1 8/14/2015 HEMOGLOBIN A1C Q6M 2/1/2018 MICROALBUMIN Q1 4/3/2018 Influenza Age 5 to Adult 4/10/2019* LIPID PANEL Q1 8/1/2018 COLONOSCOPY 4/4/2021 *Topic was postponed. The date shown is not the original due date. Allergies as of 4/10/2018  Review Complete On: 4/10/2018 By: Kirstin Alfredo LPN No Known Allergies Current Immunizations  Reviewed on 11/15/2016 Name Date Influenza Vaccine (Quad) PF 11/15/2016, 9/15/2015 Influenza Vaccine Whole 1/22/2011 Not reviewed this visit You Were Diagnosed With   
  
 Codes Comments Type 2 diabetes mellitus with diabetic neuropathy, with long-term current use of insulin ([facility])    -  Primary ICD-10-CM: E11.40, Z79.4 ICD-9-CM: 250.60, 357.2, V58.67 High cholesterol     ICD-10-CM: E78.00 ICD-9-CM: 272.0 Chronic midline low back pain without sciatica     ICD-10-CM: M54.5, G89.29 ICD-9-CM: 724.2, 338.29 Dysuria     ICD-10-CM: R30.0 ICD-9-CM: 722. 1 Vitals BP Pulse Temp Resp Height(growth percentile) Weight(growth percentile) 122/73 (BP 1 Location: Left arm, BP Patient Position: Sitting) 84 97.7 °F (36.5 °C) (Oral) 20 5' (1.524 m) 122 lb (55.3 kg) SpO2 BMI OB Status Smoking Status 96% 23.83 kg/m2 Hysterectomy Current Every Day Smoker BMI and BSA Data Body Mass Index Body Surface Area  
 23.83 kg/m 2 1.53 m 2 Preferred Pharmacy Pharmacy Name Phone [address] [place] [address]. [phone] Your Updated Medication List  
  
   
This list is accurate as of 4/10/18  2:21 PM.  Always use your most recent med list.  
  
  
  
  
 acetaminophen 325 mg tablet Commonly known as:  TYLENOL Take 2 Tabs by mouth every four (4) hours as needed for Pain. aspirin 81 mg tablet Take 81 mg by mouth daily. budesonide 32 mcg/actuation nasal spray Commonly known as:  RHINOCORT AQUA  
2 Sprays by Both Nostrils route daily. docusate sodium 100 mg capsule Commonly known as:  Kennieth Argue Take 1 Cap by mouth two (2) times a day.  
  
 gabapentin 300 mg capsule Commonly known as:  NEURONTIN  
TAKE ONE CAPSULE BY MOUTH THREE TIMES DAILY  
  
 gabapentin-lidocaine-menthol 300-4-1 mg-%-% [facility]  
  
 glucose blood VI test strips strip Commonly known as:  ONETOUCH ULTRA TEST Check BS 3x/day E11.65  
  
 insulin aspart protamine/insulin aspart 100 unit/mL (70-30) injection Commonly known as:  NOVOLOG MIX 70/30  
25 units BID Insulin Syringe-Needle U-100 1/2 mL 30 gauge x 5/16 Syrg INJECT TWICE A DAY  
  
 * lisinopril 10 mg tablet Commonly known as:  Phillip Freddy Take 1 Tab by mouth daily. * lisinopril 10 mg tablet Commonly known as:  Phillip Freddy Take 1 Tab by mouth daily. metFORMIN 1,000 mg tablet Commonly known as:  GLUCOPHAGE  
TAKE ONE TABLET BY MOUTH TWICE DAILY WITH MEALS  
  
 OXAPROZIN PO Take  by mouth two (2) times a day. pravastatin 40 mg tablet Commonly known as:  PRAVACHOL  
TAKE ONE TABLET BY MOUTH EVERY NIGHT * traMADol 50 mg tablet Commonly known as:  ULTRAM  
TAKE 2 TABLETS BY MOUTH EVERY 6 HOURS AS NEEDED FOR PAIN  
  
 * traMADol 50 mg tablet Commonly known as:  Lora Vegas  
 Take 1 Tab by mouth every six (6) hours as needed for Pain. Max Daily Amount: 200 mg.  
  
 * Notice: This list has 4 medication(s) that are the same as other medications prescribed for you. Read the directions carefully, and ask your doctor or other care provider to review them with you. Prescriptions Printed Refills  
 traMADol (ULTRAM) 50 mg tablet 0 Sig: Take 1 Tab by mouth every six (6) hours as needed for Pain. Max Daily Amount: 200 mg. Class: Print Route: Oral  
  
Prescriptions Sent to Pharmacy Refills  
 insulin aspart protamine/insulin aspart (NOVOLOG MIX 70/30) 100 unit/mL (70-30) injection 4 Si units BID Class: Normal  
 Pharmacy: [address] [place] [address]. Ph #: [phone]  
 lisinopril (PRINIVIL, ZESTRIL) 10 mg tablet 5 Sig: Take 1 Tab by mouth daily. Class: Normal  
 Pharmacy: [address] [place] [address]. Ph #: [phone] Route: Oral  
 metFORMIN (GLUCOPHAGE) 1,000 mg tablet 5 Sig: TAKE ONE TABLET BY MOUTH TWICE DAILY WITH MEALS Class: Normal  
 Pharmacy: [address] [place] [address]. Ph #: [phone]  
 pravastatin (PRAVACHOL) 40 mg tablet 5 Sig: TAKE ONE TABLET BY MOUTH EVERY NIGHT Class: Normal  
 Pharmacy: [address] [place] [address] [place] [address]. Ph #: [phone]  
 gabapentin (NEURONTIN) 300 mg capsule 5 Sig: TAKE ONE CAPSULE BY MOUTH THREE TIMES DAILY Class: Normal  
 Pharmacy: [address] [place] [address]. Ph #: [phone] Follow-up Instructions Return in about 3 months (around 7/10/2018). To-Do List   
 04/10/2018 Lab:  URINALYSIS W/O MICRO   
  
 10/08/2018 Lab:  LIPID PANEL   
  
 10/08/2018 Lab:  METABOLIC PANEL, COMPREHENSIVE   
  
 10/09/2018 Lab:  MICROALBUMIN, UR, RAND W/ MICROALB/CREAT RATIO   
  
 10/10/2018 Lab:  HEMOGLOBIN A1C W/O EAG Patient Instructions Learning About Diabetes Food Guidelines Your Care Instructions Meal planning is important to manage diabetes. It helps keep your blood sugar at a target level (which you set with your doctor). You don't have to eat special foods. You can eat what your family eats, including sweets once in a while. But you do have to pay attention to how often you eat and how much you eat of certain foods. You may want to work with a dietitian or a certified diabetes educator (CDE) to help you plan meals and snacks. A dietitian or CDE can also help you lose weight if that is one of your goals. What should you know about eating carbs? Managing the amount of carbohydrate (carbs) you eat is an important part of healthy meals when you have diabetes. Carbohydrate is found in many foods. · Learn which foods have carbs. And learn the amounts of carbs in different foods. ¨ Bread, cereal, pasta, and rice have about 15 grams of carbs in a serving. A serving is 1 slice of bread (1 ounce), ½ cup of cooked cereal, or 1/3 cup of cooked pasta or rice. ¨ Fruits have 15 grams of carbs in a serving. A serving is 1 small fresh fruit, such as an apple or orange; ½ of a banana; ½ cup of cooked or canned fruit; ½ cup of fruit juice; 1 cup of melon or raspberries; or 2 tablespoons of dried fruit. ¨ Milk and no-sugar-added yogurt have 15 grams of carbs in a serving. A serving is 1 cup of milk or 2/3 cup of no-sugar-added yogurt. ¨ Starchy vegetables have 15 grams of carbs in a serving. A serving is ½ cup of mashed potatoes or sweet potato; 1 cup winter squash; ½ of a small baked potato; ½ cup of cooked beans; or ½ cup cooked corn or green peas. · Learn how much carbs to eat each day and at each meal. A dietitian or CDE can teach you how to keep track of the amount of carbs you eat. This is called carbohydrate counting. · If you are not sure how to count carbohydrate grams, use the Plate Method to plan meals. It is a good, quick way to make sure that you have a balanced meal. It also helps you spread carbs throughout the day. ¨ Divide your plate by types of foods. Put non-starchy vegetables on half the plate, meat or other protein food on one-quarter of the plate, and a grain or starchy vegetable in the final quarter of the plate. To this you can add a small piece of fruit and 1 cup of milk or yogurt, depending on how many carbs you are supposed to eat at a meal. 
· Try to eat about the same amount of carbs at each meal. Do not \"save up\" your daily allowance of carbs to eat at one meal. 
· Proteins have very little or no carbs per serving. Examples of proteins are beef, chicken, turkey, fish, eggs, tofu, cheese, cottage cheese, and peanut butter. A serving size of meat is 3 ounces, which is about the size of a deck of cards. Examples of meat substitute serving sizes (equal to 1 ounce of meat) are 1/4 cup of cottage cheese, 1 egg, 1 tablespoon of peanut butter, and ½ cup of tofu. How can you eat out and still eat healthy? · Learn to estimate the serving sizes of foods that have carbohydrate. If you measure food at home, it will be easier to estimate the amount in a serving of restaurant food. · If the meal you order has too much carbohydrate (such as potatoes, corn, or baked beans), ask to have a low-carbohydrate food instead. Ask for a salad or green vegetables. · If you use insulin, check your blood sugar before and after eating out to help you plan how much to eat in the future. · If you eat more carbohydrate at a meal than you had planned, take a walk or do other exercise. This will help lower your blood sugar. What else should you know? · Limit saturated fat, such as the fat from meat and dairy products.  This is a healthy choice because people who have diabetes are at higher risk of heart disease. So choose lean cuts of meat and nonfat or low-fat dairy products. Use olive or canola oil instead of butter or shortening when cooking. · Don't skip meals. Your blood sugar may drop too low if you skip meals and take insulin or certain medicines for diabetes. · Check with your doctor before you drink alcohol. Alcohol can cause your blood sugar to drop too low. Alcohol can also cause a bad reaction if you take certain diabetes medicines. Follow-up care is a key part of your treatment and safety. Be sure to make and go to all appointments, and call your doctor if you are having problems. It's also a good idea to know your test results and keep a list of the medicines you take. Where can you learn more? Go to http://karis-shannon.info/. Enter T758 in the search box to learn more about \"Learning About Diabetes Food Guidelines. \" Current as of: March 13, 2017 Content Version: 11.4 © 5253-0912 [employer]. Care instructions adapted under license by [employer] (which disclaims liability or warranty for this information). If you have questions about a medical condition or this instruction, always ask your healthcare professional. [address] any warranty or liability for your use of this information. Introducing [facility]! Clayton Alcala introduces [employer] patient portal. Now you can access parts of your medical record, email your doctor's office, and request medication refills online. 1. In your internet browser, go to [email or web address] 2. Click on the First Time User? Click Here link in the Sign In box. You will see the New Member Sign Up page. 3. Enter your [employer] Access Code exactly as it appears below. You will not need to use this code after youve completed the sign-up process. If you do not sign up before the expiration date, you must request a new code. · [employer] Access Code: [email or web address] Expires: 7/9/2018  2:21 PM 
 
4. Enter the last four digits of your Social Security Number (xxxx) and Date of Birth (mm/dd/yyyy) as indicated and click Submit. You will be taken to the next sign-up page. 5. Create a [employer] ID. This will be your [employer] login ID and cannot be changed, so think of one that is secure and easy to remember. 6. Create a [employer] password. You can change your password at any time. 7. Enter your Password Reset Question and Answer. This can be used at a later time if you forget your password. 8. Enter your e-mail address. You will receive e-mail notification when new information is available in [address] 9. Click Sign Up. You can now view and download portions of your medical record. 10. Click the Download Summary menu link to download a portable copy of your medical information. If you have questions, please visit the Frequently Asked Questions section of the [employer] website. Remember, [employer] is NOT to be used for urgent needs. For medical emergencies, dial 911. Now available from your iPhone and Android! Please provide this summary of care documentation to your next provider. Your primary care clinician is listed as MARK CASH. If you have any questions after today's visit, please call [phone].

## 2018-04-11 ENCOUNTER — TELEPHONE (OUTPATIENT)
Dept: FAMILY MEDICINE CLINIC | Age: 58
End: 2018-04-11

## 2018-04-11 LAB
ALBUMIN SERPL-MCNC: 3.8 G/DL (ref 3.4–5)
ALBUMIN/GLOB SERPL: 1.1 {RATIO} (ref 0.8–1.7)
ALP SERPL-CCNC: 124 U/L (ref 45–117)
ALT SERPL-CCNC: 14 U/L (ref 13–56)
ANION GAP SERPL CALC-SCNC: 11 MMOL/L (ref 3–18)
AST SERPL-CCNC: 6 U/L (ref 15–37)
BILIRUB SERPL-MCNC: 0.3 MG/DL (ref 0.2–1)
BUN SERPL-MCNC: 19 MG/DL (ref 7–18)
BUN/CREAT SERPL: 15 (ref 12–20)
CALCIUM SERPL-MCNC: 9.2 MG/DL (ref 8.5–10.1)
CHLORIDE SERPL-SCNC: 95 MMOL/L (ref 100–108)
CHOLEST SERPL-MCNC: 219 MG/DL
CO2 SERPL-SCNC: 23 MMOL/L (ref 21–32)
CREAT SERPL-MCNC: 1.23 MG/DL (ref 0.6–1.3)
CREAT UR-MCNC: 18.44 MG/DL (ref 30–125)
GLOBULIN SER CALC-MCNC: 3.5 G/DL (ref 2–4)
GLUCOSE SERPL-MCNC: 474 MG/DL (ref 74–99)
HDLC SERPL-MCNC: 52 MG/DL (ref 40–60)
HDLC SERPL: 4.2 {RATIO} (ref 0–5)
LDLC SERPL CALC-MCNC: 130.2 MG/DL (ref 0–100)
LDLC/HDLC SERPL: 2.5 {RATIO}
LIPID PROFILE,FLP: ABNORMAL
MICROALBUMIN UR-MCNC: 11.8 MG/DL (ref 0–3)
MICROALBUMIN/CREAT UR-RTO: 640 MG/G (ref 0–30)
POTASSIUM SERPL-SCNC: 5.1 MMOL/L (ref 3.5–5.5)
PROT SERPL-MCNC: 7.3 G/DL (ref 6.4–8.2)
SODIUM SERPL-SCNC: 129 MMOL/L (ref 136–145)
TRIGL SERPL-MCNC: 184 MG/DL (ref ?–150)
VLDLC SERPL CALC-MCNC: 36.8 MG/DL

## 2018-04-11 RX ORDER — CIPROFLOXACIN 500 MG/1
500 TABLET ORAL 2 TIMES DAILY
Qty: 6 TAB | Refills: 0 | Status: SHIPPED | OUTPATIENT
Start: 2018-04-11 | End: 2018-04-14

## 2018-04-11 NOTE — TELEPHONE ENCOUNTER
Kitty Arreaga with [facility] lab called with a critical glucose of 458. Spoke with patients Daughter Kathryn (onPHI) she is aware BS are high and needs to restart insulin ASAP and also antibiotic sent in for UTI. Any questions have patient call.

## 2018-04-11 NOTE — TELEPHONE ENCOUNTER
Tell pt her BS are high so she needs to restart insulin ASAP. She has a UTI and I will send in antibiotic.

## 2018-04-12 PROBLEM — E11.21 TYPE 2 DIABETES WITH NEPHROPATHY (HCC): Status: ACTIVE | Noted: 2018-04-12

## 2018-07-11 ENCOUNTER — TELEPHONE (OUTPATIENT)
Dept: FAMILY MEDICINE CLINIC | Age: 58
End: 2018-07-11

## 2018-07-11 NOTE — TELEPHONE ENCOUNTER
Patient called to ask for assistance with scheduling transportation to appointment. Patient not able to read or write and will need assistance when scheduling transportation and  once appointment is over. The insurance has schedule [facility] fo 7/16/18 appointment with Dr Eliseo Orantes. They will pick patient up at 1:00pm. [facility] #[id number] Phone # [phone].

## 2018-07-16 ENCOUNTER — HOSPITAL ENCOUNTER (OUTPATIENT)
Dept: LAB | Age: 58
Discharge: HOME | End: 2018-07-16
Payer: COMMERCIAL

## 2018-07-16 ENCOUNTER — OFFICE VISIT (OUTPATIENT)
Dept: FAMILY MEDICINE CLINIC | Age: 58
End: 2018-07-16

## 2018-07-16 VITALS
WEIGHT: 112 LBS | SYSTOLIC BLOOD PRESSURE: 115 MMHG | OXYGEN SATURATION: 98 % | HEART RATE: 101 BPM | DIASTOLIC BLOOD PRESSURE: 77 MMHG | HEIGHT: 60 IN | BODY MASS INDEX: 21.99 KG/M2 | TEMPERATURE: 97.9 F | RESPIRATION RATE: 20 BRPM

## 2018-07-16 DIAGNOSIS — R80.9 MICROALBUMINURIA: ICD-10-CM

## 2018-07-16 DIAGNOSIS — G89.29 CHRONIC MIDLINE LOW BACK PAIN WITHOUT SCIATICA: ICD-10-CM

## 2018-07-16 DIAGNOSIS — E78.00 HIGH CHOLESTEROL: ICD-10-CM

## 2018-07-16 DIAGNOSIS — M54.50 CHRONIC MIDLINE LOW BACK PAIN WITHOUT SCIATICA: ICD-10-CM

## 2018-07-16 LAB
ALBUMIN SERPL-MCNC: 4.1 G/DL (ref 3.4–5)
ALBUMIN/GLOB SERPL: 0.9 {RATIO} (ref 0.8–1.7)
ALP SERPL-CCNC: 162 U/L (ref 45–117)
ALT SERPL-CCNC: 16 U/L (ref 13–56)
ANION GAP SERPL CALC-SCNC: 13 MMOL/L (ref 3–18)
AST SERPL-CCNC: 10 U/L (ref 15–37)
BILIRUB SERPL-MCNC: 0.4 MG/DL (ref 0.2–1)
BUN SERPL-MCNC: 24 MG/DL (ref 7–18)
BUN/CREAT SERPL: 15 (ref 12–20)
CALCIUM SERPL-MCNC: 9.3 MG/DL (ref 8.5–10.1)
CHLORIDE SERPL-SCNC: 96 MMOL/L (ref 100–108)
CHOLEST SERPL-MCNC: 199 MG/DL
CO2 SERPL-SCNC: 23 MMOL/L (ref 21–32)
CREAT SERPL-MCNC: 1.58 MG/DL (ref 0.6–1.3)
GLOBULIN SER CALC-MCNC: 4.4 G/DL (ref 2–4)
GLUCOSE POC: 355 MG/DL
GLUCOSE SERPL-MCNC: 355 MG/DL (ref 74–99)
HBA1C MFR BLD HPLC: 11 %
HDLC SERPL-MCNC: 53 MG/DL (ref 40–60)
HDLC SERPL: 3.8 {RATIO} (ref 0–5)
LDLC SERPL CALC-MCNC: 106.8 MG/DL (ref 0–100)
LIPID PROFILE,FLP: ABNORMAL
POTASSIUM SERPL-SCNC: 5.3 MMOL/L (ref 3.5–5.5)
PROT SERPL-MCNC: 8.5 G/DL (ref 6.4–8.2)
SODIUM SERPL-SCNC: 132 MMOL/L (ref 136–145)
TRIGL SERPL-MCNC: 196 MG/DL (ref ?–150)
VLDLC SERPL CALC-MCNC: 39.2 MG/DL

## 2018-07-16 PROCEDURE — 80053 COMPREHEN METABOLIC PANEL: CPT | Performed by: INTERNAL MEDICINE

## 2018-07-16 PROCEDURE — 80061 LIPID PANEL: CPT | Performed by: INTERNAL MEDICINE

## 2018-07-16 PROCEDURE — 36415 COLL VENOUS BLD VENIPUNCTURE: CPT | Performed by: INTERNAL MEDICINE

## 2018-07-16 RX ORDER — LANCETS
EACH MISCELLANEOUS
Qty: 100 EACH | Refills: 5 | Status: SHIPPED | OUTPATIENT
Start: 2018-07-16 | End: 2021-10-14 | Stop reason: SDUPTHER

## 2018-07-16 RX ORDER — LISINOPRIL 10 MG/1
TABLET ORAL
Qty: 30 TAB | Refills: 5 | Status: SHIPPED | OUTPATIENT
Start: 2018-07-16 | End: 2018-10-15 | Stop reason: SDUPTHER

## 2018-07-16 RX ORDER — INSULIN PUMP SYRINGE, 3 ML
EACH MISCELLANEOUS
Qty: 1 KIT | Refills: 0 | Status: SHIPPED | OUTPATIENT
Start: 2018-07-16 | End: 2021-10-14 | Stop reason: SDUPTHER

## 2018-07-16 NOTE — PROGRESS NOTES
Patient is in the office today for 3 month follow up, patient is also complaining of dizziness. 1. Have you been to the ER, urgent care clinic since your last visit? Hospitalized since your last visit? No    2. Have you seen or consulted any other health care providers outside of the [address] [place] [address] since your last visit? Include any pap smears or colon screening.  No

## 2018-07-16 NOTE — PATIENT INSTRUCTIONS
Dizziness: Care Instructions  Your Care Instructions  Dizziness is the feeling of unsteadiness or fuzziness in your head. It is different than having vertigo, which is a feeling that the room is spinning or that you are moving or falling. It is also different from lightheadedness, which is the feeling that you are about to faint. It can be hard to know what causes dizziness. Some people feel dizzy when they have migraine headaches. Sometimes bouts of flu can make you feel dizzy. Some medical conditions, such as heart problems or high blood pressure, can make you feel dizzy. Many medicines can cause dizziness, including medicines for high blood pressure, pain, or anxiety. If a medicine causes your symptoms, your doctor may recommend that you stop or change the medicine. If it is a problem with your heart, you may need medicine to help your heart work better. If there is no clear reason for your symptoms, your doctor may suggest watching and waiting for a while to see if the dizziness goes away on its own. Follow-up care is a key part of your treatment and safety. Be sure to make and go to all appointments, and call your doctor if you are having problems. It's also a good idea to know your test results and keep a list of the medicines you take. How can you care for yourself at home? · If your doctor recommends or prescribes medicine, take it exactly as directed. Call your doctor if you think you are having a problem with your medicine. · Do not drive while you feel dizzy. · Try to prevent falls. Steps you can take include:  ¨ Using nonskid mats, adding grab bars near the tub, and using night-lights. ¨ Clearing your home so that walkways are free of anything you might trip on. ¨ Letting family and friends know that you have been feeling dizzy. This will help them know how to help you. When should you call for help? Call 911 anytime you think you may need emergency care.  For example, call if:    · You passed out (lost consciousness).     · You have dizziness along with symptoms of a heart attack. These may include:  ¨ Chest pain or pressure, or a strange feeling in the chest.  ¨ Sweating. ¨ Shortness of breath. ¨ Nausea or vomiting. ¨ Pain, pressure, or a strange feeling in the back, neck, jaw, or upper belly or in one or both shoulders or arms. ¨ Lightheadedness or sudden weakness. ¨ A fast or irregular heartbeat.     · You have symptoms of a stroke. These may include:  ¨ Sudden numbness, tingling, weakness, or loss of movement in your face, arm, or leg, especially on only one side of your body. ¨ Sudden vision changes. ¨ Sudden trouble speaking. ¨ Sudden confusion or trouble understanding simple statements. ¨ Sudden problems with walking or balance. ¨ A sudden, severe headache that is different from past headaches.    Call your doctor now or seek immediate medical care if:    · You feel dizzy and have a fever, headache, or ringing in your ears.     · You have new or increased nausea and vomiting.     · Your dizziness does not go away or comes back.    Watch closely for changes in your health, and be sure to contact your doctor if:    · You do not get better as expected. Where can you learn more? Go to http://karis-shannon.info/. Enter Y804 in the search box to learn more about \"Dizziness: Care Instructions. \"  Current as of: November 20, 2017  Content Version: 11.7  © 6625-8649 [employer]. Care instructions adapted under license by [employer] (which disclaims liability or warranty for this information). If you have questions about a medical condition or this instruction, always ask your healthcare professional. [place] [address] any warranty or liability for your use of this information.

## 2018-07-16 NOTE — MR AVS SNAPSHOT
[address] [place] [address] 
 
 
 [address] 25 072 7479 Candis Kuo [address] 
[phone] Patient: Valerie Chow MRN: [id number] HCX:[id number] Visit Information Date & Time Provider Department Dept. Phone Encounter #  
 7/16/2018  2:30 PM Frankhaylie Espinoza[address] [phone] [id number] Follow-up Instructions Return in about 3 months (around 10/16/2018) for labs 1 week before. Upcoming Health Maintenance Date Due  
 EYE EXAM RETINAL OR DILATED Q1 5/13/1969 Pneumococcal 19-64 Medium Risk (1 of 1 - PPSV23) 5/13/1978 DTaP/Tdap/Td series (1 - Tdap) 5/13/1980 BREAST CANCER SCRN MAMMOGRAM 5/13/2009 FOOT EXAM Q1 8/14/2015 Influenza Age 5 to Adult 4/10/2019* HEMOGLOBIN A1C Q6M 10/10/2018 MICROALBUMIN Q1 4/10/2019 LIPID PANEL Q1 4/10/2019 COLONOSCOPY 4/4/2021 *Topic was postponed. The date shown is not the original due date. Allergies as of 7/16/2018  Review Complete On: 7/16/2018 By: Merari Monreal LPN No Known Allergies Current Immunizations  Reviewed on 11/15/2016 Name Date Influenza Vaccine (Quad) PF 11/15/2016, 9/15/2015 Influenza Vaccine Whole 1/22/2011 Not reviewed this visit You Were Diagnosed With   
  
 Codes Comments Uncontrolled type 2 diabetes mellitus without complication, without long-term current use of insulin ([facility]    -  Primary ICD-10-CM: E11.65 ICD-9-CM: 250.02 High cholesterol     ICD-10-CM: E78.00 ICD-9-CM: 272.0 Vitals BP Pulse Temp Resp Height(growth percentile) Weight(growth percentile) 115/77 (BP 1 Location: Left arm, BP Patient Position: Sitting) (!) 101 97.9 °F (36.6 °C) (Oral) 20 5' (1.524 m) 112 lb (50.8 kg) LMP SpO2 BMI OB Status Smoking Status (LMP Unknown) 98% 21.87 kg/m2 Hysterectomy Current Every Day Smoker BMI and BSA Data Body Mass Index Body Surface Area  
 21.87 kg/m 2 1.47 m 2 Preferred Pharmacy Pharmacy Name [facility] [address] [place] [address],[facility] [address] [place] [address] [phone] Your Updated Medication List  
  
   
This list is accurate as of 7/16/18  2:55 PM.  Always use your most recent med list.  
  
  
  
  
 acetaminophen 325 mg tablet Commonly known as:  TYLENOL Take 2 Tabs by mouth every four (4) hours as needed for Pain. aspirin 81 mg tablet Take 81 mg by mouth daily. Blood-Glucose Meter monitoring kit Check BS 3x/day E11.65  
  
 budesonide 32 mcg/actuation nasal spray Commonly known as:  RHINOCORT AQUA  
2 Sprays by Both Nostrils route daily. docusate sodium 100 mg capsule Commonly known as:  Génesis Mule Take 1 Cap by mouth two (2) times a day.  
  
 gabapentin 300 mg capsule Commonly known as:  NEURONTIN  
TAKE ONE CAPSULE BY MOUTH THREE TIMES DAILY  
  
 gabapentin-lidocaine-menthol 300-4-1 mg-%-% [facility]  
  
 glucose blood VI test strips strip Commonly known as:  ONETOUCH ULTRA TEST Check BS 3x/day E11.65  
  
 insulin aspart protamine/insulin aspart 100 unit/mL (70-30) injection Commonly known as:  NOVOLOG MIX 70/30  
25 units BID Insulin Syringe-Needle U-100 1/2 mL 30 gauge x 5/16 Syrg INJECT TWICE A DAY Lancets Misc Check BS 3x/day E11.65  
  
 lisinopril 10 mg tablet Commonly known as:  Anna Vasquez Take 1 Tab by mouth daily. metFORMIN 1,000 mg tablet Commonly known as:  GLUCOPHAGE  
TAKE ONE TABLET BY MOUTH TWICE DAILY WITH MEALS  
  
 OXAPROZIN PO Take  by mouth two (2) times a day. pravastatin 40 mg tablet Commonly known as:  PRAVACHOL  
TAKE ONE TABLET BY MOUTH EVERY NIGHT * traMADol 50 mg tablet Commonly known as:  ULTRAM  
TAKE 2 TABLETS BY MOUTH EVERY 6 HOURS AS NEEDED FOR PAIN  
  
 * traMADol 50 mg tablet Commonly known as:  ULTRAM  
Take 1 Tab by mouth every six (6) hours as needed for Pain. Max Daily Amount: 200 mg. * Notice: This list has 2 medication(s) that are the same as other medications prescribed for you. Read the directions carefully, and ask your doctor or other care provider to review them with you. Prescriptions Sent to Pharmacy Refills Blood-Glucose Meter monitoring kit 0 Sig: Check BS 3x/day E11.65 Class: Normal  
 Pharmacy: [facility] [address] Ph #: [phone] Lancets misc 5 Sig: Check BS 3x/day E11.65 Class: Normal  
 Pharmacy: [facility] [address] Ph #: [phone]  
 glucose blood VI test strips (ONETOUCH ULTRA TEST) strip 5 Sig: Check BS 3x/day E11.65 Class: Normal  
 Pharmacy: [facility] [address] Ph #: [phone] Follow-up Instructions Return in about 3 months (around 10/16/2018) for labs 1 week before. To-Do List   
 01/13/2019 Lab:  LIPID PANEL   
  
 01/13/2019 Lab:  METABOLIC PANEL, COMPREHENSIVE Patient Instructions Dizziness: Care Instructions Your Care Instructions Dizziness is the feeling of unsteadiness or fuzziness in your head. It is different than having vertigo, which is a feeling that the room is spinning or that you are moving or falling. It is also different from lightheadedness, which is the feeling that you are about to faint. It can be hard to know what causes dizziness. Some people feel dizzy when they have migraine headaches. Sometimes bouts of flu can make you feel dizzy. Some medical conditions, such as heart problems or high blood pressure, can make you feel dizzy. Many medicines can cause dizziness, including medicines for high blood pressure, pain, or anxiety. If a medicine causes your symptoms, your doctor may recommend that you stop or change the medicine. If it is a problem with your heart, you may need medicine to help your heart work better.  If there is no clear reason for your symptoms, your doctor may suggest watching and waiting for a while to see if the dizziness goes away on its own. Follow-up care is a key part of your treatment and safety. Be sure to make and go to all appointments, and call your doctor if you are having problems. It's also a good idea to know your test results and keep a list of the medicines you take. How can you care for yourself at home? · If your doctor recommends or prescribes medicine, take it exactly as directed. Call your doctor if you think you are having a problem with your medicine. · Do not drive while you feel dizzy. · Try to prevent falls. Steps you can take include: ¨ Using nonskid mats, adding grab bars near the tub, and using night-lights. ¨ Clearing your home so that walkways are free of anything you might trip on. ¨ Letting family and friends know that you have been feeling dizzy. This will help them know how to help you. When should you call for help? Call 911 anytime you think you may need emergency care. For example, call if: 
  · You passed out (lost consciousness).  
  · You have dizziness along with symptoms of a heart attack. These may include: ¨ Chest pain or pressure, or a strange feeling in the chest. 
¨ Sweating. ¨ Shortness of breath. ¨ Nausea or vomiting. ¨ Pain, pressure, or a strange feeling in the back, neck, jaw, or upper belly or in one or both shoulders or arms. ¨ Lightheadedness or sudden weakness. ¨ A fast or irregular heartbeat.  
  · You have symptoms of a stroke. These may include: 
¨ Sudden numbness, tingling, weakness, or loss of movement in your face, arm, or leg, especially on only one side of your body. ¨ Sudden vision changes. ¨ Sudden trouble speaking. ¨ Sudden confusion or trouble understanding simple statements. ¨ Sudden problems with walking or balance. ¨ A sudden, severe headache that is different from past headaches.  
 Call your doctor now or seek immediate medical care if:   · You feel dizzy and have a fever, headache, or ringing in your ears.  
  · You have new or increased nausea and vomiting.  
  · Your dizziness does not go away or comes back.  
 Watch closely for changes in your health, and be sure to contact your doctor if: 
  · You do not get better as expected. Where can you learn more? Go to http://karis-shannon.info/. Enter Q368 in the search box to learn more about \"Dizziness: Care Instructions. \" Current as of: November 20, 2017 Content Version: 11.7 © 0393-3569 [employer]. Care instructions adapted under license by [employer] (which disclaims liability or warranty for this information). If you have questions about a medical condition or this instruction, always ask your healthcare professional. [address] any warranty or liability for your use of this information. Introducing \[facility]\"" & HEALTH SERVICES! [facility] introduces [employer] patient portal. Now you can access parts of your medical record, email your doctor's office, and request medication refills online. 1. In your internet browser, go to [email or web address] insightt 2. Click on the First Time User? Click Here link in the Sign In box. You will see the New Member Sign Up page. 3. Enter your [employer] Access Code exactly as it appears below. You will not need to use this code after youve completed the sign-up process. If you do not sign up before the expiration date, you must request a new code. · [employer] Access Code: [email or web address] Expires: 10/14/2018  2:55 PM 
 
4. Enter the last four digits of your Social Security Number (xxxx) and Date of Birth (mm/dd/yyyy) as indicated and click Submit. You will be taken to the next sign-up page. 5. Create a [employer] ID. This will be your [employer] login ID and cannot be changed, so think of one that is secure and easy to remember. 6. Create a [employer] password. You can change your password at any time. 7. Enter your Password Reset Question and Answer. This can be used at a later time if you forget your password. 8. Enter your e-mail address. You will receive e-mail notification when new information is available in [address] 9. Click Sign Up. You can now view and download portions of your medical record. 10. Click the Download Summary menu link to download a portable copy of your medical information. If you have questions, please visit the Frequently Asked Questions section of the [employer] website. Remember, [employer] is NOT to be used for urgent needs. For medical emergencies, dial 911. Now available from your iPhone and Android! Please provide this summary of care documentation to your next provider. Your primary care clinician is listed as MARK CASH. If you have any questions after today's visit, please call [phone].

## 2018-07-18 NOTE — PROGRESS NOTES
Subjective:       Chief Complaint  The patient presents for follow up of diabetesand high cholesterol. Proteinuria, back pain         HPI  Linh Hill is a 60 y.o. female seen for follow up of diabetes. Shealso has  hyperlipidemia. Diabetes uncontrolled due to poor compliance with taking her insulin, pt should be taking Humalog mix 75/25 25 BID. Is hard for me to confirm the patient is actually taken this dose of insulin due to patient having some mild MR and her relative with her does not actually look at her injections. He will try to be more vigilant so that we can confirm the actual amount of insulin patient is taking. She is also just taking metformin 1x/day instead of BID, pt with poor insight to her medical problems due to developmental delay, her son is with her today so hopefully she will be more compliant, Hyperlipidemia borderline controlled, no significant medication side effects noted, on Pravachol. Pt remains on lisinopril for her proteinuria. Compliance remains an issue. Patient has been unwilling to come in to see Pharm. D. due to issues with transportation    Diet and Lifestyle: generally follows a low fat low cholesterol diet, does not rigorously follow a diabetic diet, sedentary    Diabetic Review of Systems - home glucose monitoring: is performed sporadically, should be 3x/day . Other symptoms and concerns: . Pt's chronic back pain is stable on Ultram prn.  reviewed. Patient is also taking Neurontin for peripheral neuropathy. According to son patient is having some problems with dizziness. Unfortunately due to patient's developmental delay she is unable to give me a good explanation of this dizziness. Concerned because of her weight loss that it may be due to mild dehydration due to uncontrolled diabetes or even low blood sugars due to poor p.o. intake. Vertigo is also a possibility.   Will try and get diabetes under control to get that factor out of the equation and then further evaluate      Current Outpatient Prescriptions   Medication Sig    Blood-Glucose Meter monitoring kit Check BS 3x/day E11.65    Lancets misc Check BS 3x/day E11.65    glucose blood VI test strips (ONETOUCH ULTRA TEST) strip Check BS 3x/day E11.65    Insulin Syringe-Needle U-100 1/2 mL 30 gauge x 5/16 syrg INJECT TWICE A DAY    gabapentin-lidocaine-menthol 300-4-1 mg-%-% [facility]     traMADol (ULTRAM) 50 mg tablet Take 1 Tab by mouth every six (6) hours as needed for Pain. Max Daily Amount: 200 mg.    insulin aspart protamine/insulin aspart (NOVOLOG MIX 70/30) 100 unit/mL (70-30) injection 25 units BID    metFORMIN (GLUCOPHAGE) 1,000 mg tablet TAKE ONE TABLET BY MOUTH TWICE DAILY WITH MEALS    pravastatin (PRAVACHOL) 40 mg tablet TAKE ONE TABLET BY MOUTH EVERY NIGHT    gabapentin (NEURONTIN) 300 mg capsule TAKE ONE CAPSULE BY MOUTH THREE TIMES DAILY    acetaminophen (TYLENOL) 325 mg tablet Take 2 Tabs by mouth every four (4) hours as needed for Pain.  traMADol (ULTRAM) 50 mg tablet TAKE 2 TABLETS BY MOUTH EVERY 6 HOURS AS NEEDED FOR PAIN    docusate sodium (COLACE) 100 mg capsule Take 1 Cap by mouth two (2) times a day.  budesonide (RHINOCORT AQUA) 32 mcg/actuation nasal spray 2 Sprays by Both Nostrils route daily.  aspirin 81 mg tablet Take 81 mg by mouth daily.  OXAPROZIN PO Take  by mouth two (2) times a day.  lisinopril (PRINIVIL, ZESTRIL) 10 mg tablet TAKE 1 TABLET BY MOUTH ONCE DAILY     No current facility-administered medications for this visit.             Review of Systems  Respiratory: negative for dyspnea on exertion  Cardiovascular: negative for chest pain    Objective:     Visit Vitals    /77 (BP 1 Location: Left arm, BP Patient Position: Sitting)    Pulse (!) 101    Temp 97.9 °F (36.6 °C) (Oral)    Resp 20    Ht 5' (1.524 m)    Wt 112 lb (50.8 kg)    LMP  (LMP Unknown)    SpO2 98%    BMI 21.87 kg/m2        Eyes - pupils equal and reactive, extraocular eye movements intact  Chest - clear to auscultation, no wheezes, rales or rhonchi, symmetric air entry  Heart - normal rate, regular rhythm, normal S1, S2, no murmurs, rubs, clicks or gallops  Extremities - no edema         Labs:   Lab Results   Component Value Date/Time    Hemoglobin A1c 13.0 (H) 04/10/2018 02:35 PM    Hemoglobin A1c 12.7 (H) 04/03/2017 01:54 PM    Hemoglobin A1c 8.6 (H) 05/16/2016 08:24 AM    Glucose 355 (H) 07/16/2018 03:06 PM    Glucose (POC) 254 (H) 06/01/2017 05:16 PM    Glucose  07/16/2018 03:51 PM    Microalbumin/Creat ratio (mg/g creat) 640 (H) 04/10/2018 02:35 PM    Microalbumin,urine random 11.80 (H) 04/10/2018 02:35 PM    LDL, calculated 106.8 (H) 07/16/2018 03:06 PM    Creatinine, POC 0.5 (L) 08/23/2010 10:49 AM    Creatinine 1.58 (H) 07/16/2018 03:06 PM      Lab Results   Component Value Date/Time    Cholesterol, total 199 07/16/2018 03:06 PM    HDL Cholesterol 53 07/16/2018 03:06 PM    LDL, calculated 106.8 (H) 07/16/2018 03:06 PM    Triglyceride 196 (H) 07/16/2018 03:06 PM    CHOL/HDL Ratio 3.8 07/16/2018 03:06 PM     Lab Results   Component Value Date/Time    ALT (SGPT) 16 07/16/2018 03:06 PM    AST (SGOT) 10 (L) 07/16/2018 03:06 PM    Alk.  phosphatase 162 (H) 07/16/2018 03:06 PM    Bilirubin, direct 0.1 03/26/2012 03:45 AM    Bilirubin, total 0.4 07/16/2018 03:06 PM     Lab Results   Component Value Date/Time    GFR est AA 41 (L) 07/16/2018 03:06 PM    GFR est non-AA 33 (L) 07/16/2018 03:06 PM    Creatinine, POC 0.5 (L) 08/23/2010 10:49 AM    Creatinine 1.58 (H) 07/16/2018 03:06 PM    BUN 24 (H) 07/16/2018 03:06 PM    Sodium 132 (L) 07/16/2018 03:06 PM    Potassium 5.3 07/16/2018 03:06 PM    Chloride 96 (L) 07/16/2018 03:06 PM    CO2 23 07/16/2018 03:06 PM      Lab Results   Component Value Date/Time    Glucose 355 (H) 07/16/2018 03:06 PM    Glucose (POC) 254 (H) 06/01/2017 05:16 PM    Glucose  07/16/2018 03:51 PM      Labs:   Lab Results   Component Value Date/Time Hemoglobin A1c 13.0 (H) 04/10/2018 02:35 PM    Hemoglobin A1c (POC) 11.0 07/16/2018 03:51 PM              Assessment / Plan     Diabetes poorly controlled due to poor compliance with insulin, pt to use metformin BID and try to take  insulin 75/25 35 units BID. Pt's son to help her. Hyperlipidemia borderline controlled on Pravachol. Patient seems to be more compliant with taking this medication      ICD-10-CM ICD-9-CM    1. Uncontrolled type 2 diabetes mellitus without complication, without long-term current use of insulin (HCC) E11.65 250.02 AMB POC HEMOGLOBIN A1C      AMB POC GLUCOSE BLOOD, BY GLUCOSE MONITORING DEVICE   2. High cholesterol E78.00 272.0 LIPID PANEL      METABOLIC PANEL, COMPREHENSIVE   3. Chronic midline low back pain without sciatica M54.5 724.2  fairly well controlled on tramadol as needed    G89.29 338.29    4. Microalbuminuria R80.9 791.0  patient to continue on lisinopril 10 mg daily                      Diabetic issues reviewed with her: diabetic diet discussed in detail, and low cholesterol diet, weight control and daily exercise discussed. Medication instructions and potential side effects reviewed with patient in detail. Follow-up Disposition:  Return in about 3 months (around 10/16/2018) for labs 1 week before. Reviewed plan of care. Patient has provided input and agrees with goals.

## 2018-08-20 RX ORDER — INSULIN ASPART 100 [IU]/ML
INJECTION, SUSPENSION SUBCUTANEOUS
Qty: 10 ML | Refills: 4 | Status: SHIPPED | OUTPATIENT
Start: 2018-08-20 | End: 2018-10-15 | Stop reason: SDUPTHER

## 2018-08-20 RX ORDER — SYRINGE-NEEDLE,INSULIN,0.5 ML 30 GX5/16"
SYRINGE, EMPTY DISPOSABLE MISCELLANEOUS
Qty: 100 SYRINGE | Refills: 5 | Status: SHIPPED | OUTPATIENT
Start: 2018-08-20 | End: 2020-02-07 | Stop reason: SDUPTHER

## 2018-08-20 NOTE — TELEPHONE ENCOUNTER
[facility] pharmacy calling requesting a refill on the patients     Humalog mix 75-25 25units before breakfast and inner    Insulin needles 1/2mL 30 gauge 6mm

## 2018-10-15 ENCOUNTER — HOSPITAL ENCOUNTER (OUTPATIENT)
Dept: LAB | Age: 58
Discharge: HOME | End: 2018-10-15
Payer: COMMERCIAL

## 2018-10-15 ENCOUNTER — OFFICE VISIT (OUTPATIENT)
Dept: FAMILY MEDICINE CLINIC | Age: 58
End: 2018-10-15

## 2018-10-15 VITALS
HEART RATE: 80 BPM | TEMPERATURE: 98.5 F | RESPIRATION RATE: 18 BRPM | OXYGEN SATURATION: 98 % | DIASTOLIC BLOOD PRESSURE: 69 MMHG | BODY MASS INDEX: 22.97 KG/M2 | HEIGHT: 60 IN | SYSTOLIC BLOOD PRESSURE: 112 MMHG | WEIGHT: 117 LBS

## 2018-10-15 DIAGNOSIS — R80.9 MICROALBUMINURIA: ICD-10-CM

## 2018-10-15 DIAGNOSIS — H66.90 ACUTE OTITIS MEDIA, UNSPECIFIED OTITIS MEDIA TYPE: ICD-10-CM

## 2018-10-15 DIAGNOSIS — E11.40 TYPE 2 DIABETES MELLITUS WITH DIABETIC NEUROPATHY, WITH LONG-TERM CURRENT USE OF INSULIN (HCC): Primary | ICD-10-CM

## 2018-10-15 DIAGNOSIS — E11.40 TYPE 2 DIABETES MELLITUS WITH DIABETIC NEUROPATHY, WITH LONG-TERM CURRENT USE OF INSULIN (HCC): ICD-10-CM

## 2018-10-15 DIAGNOSIS — E78.00 HIGH CHOLESTEROL: ICD-10-CM

## 2018-10-15 DIAGNOSIS — E55.9 VITAMIN D DEFICIENCY: ICD-10-CM

## 2018-10-15 DIAGNOSIS — Z79.4 TYPE 2 DIABETES MELLITUS WITH DIABETIC NEUROPATHY, WITH LONG-TERM CURRENT USE OF INSULIN (HCC): ICD-10-CM

## 2018-10-15 DIAGNOSIS — Z79.4 TYPE 2 DIABETES MELLITUS WITH DIABETIC NEUROPATHY, WITH LONG-TERM CURRENT USE OF INSULIN (HCC): Primary | ICD-10-CM

## 2018-10-15 DIAGNOSIS — M25.551 PAIN OF RIGHT HIP JOINT: ICD-10-CM

## 2018-10-15 LAB
ALBUMIN SERPL-MCNC: 3.8 G/DL (ref 3.4–5)
ALBUMIN/GLOB SERPL: 1 {RATIO} (ref 0.8–1.7)
ALP SERPL-CCNC: 121 U/L (ref 45–117)
ALT SERPL-CCNC: 19 U/L (ref 13–56)
ANION GAP SERPL CALC-SCNC: 7 MMOL/L (ref 3–18)
AST SERPL-CCNC: 19 U/L (ref 15–37)
BILIRUB SERPL-MCNC: 0.3 MG/DL (ref 0.2–1)
BUN SERPL-MCNC: 12 MG/DL (ref 7–18)
BUN/CREAT SERPL: 9 (ref 12–20)
CALCIUM SERPL-MCNC: 8.9 MG/DL (ref 8.5–10.1)
CHLORIDE SERPL-SCNC: 103 MMOL/L (ref 100–108)
CHOLEST SERPL-MCNC: 159 MG/DL
CO2 SERPL-SCNC: 25 MMOL/L (ref 21–32)
CREAT SERPL-MCNC: 1.38 MG/DL (ref 0.6–1.3)
GLOBULIN SER CALC-MCNC: 3.8 G/DL (ref 2–4)
GLUCOSE SERPL-MCNC: 189 MG/DL (ref 74–99)
HBA1C MFR BLD HPLC: 11.4 %
HDLC SERPL-MCNC: 54 MG/DL (ref 40–60)
HDLC SERPL: 2.9 {RATIO} (ref 0–5)
LDLC SERPL CALC-MCNC: 90 MG/DL (ref 0–100)
LIPID PROFILE,FLP: NORMAL
POTASSIUM SERPL-SCNC: 5.6 MMOL/L (ref 3.5–5.5)
PROT SERPL-MCNC: 7.6 G/DL (ref 6.4–8.2)
SODIUM SERPL-SCNC: 135 MMOL/L (ref 136–145)
TRIGL SERPL-MCNC: 75 MG/DL (ref ?–150)
VLDLC SERPL CALC-MCNC: 15 MG/DL

## 2018-10-15 PROCEDURE — 80053 COMPREHEN METABOLIC PANEL: CPT | Performed by: INTERNAL MEDICINE

## 2018-10-15 PROCEDURE — 82306 VITAMIN D 25 HYDROXY: CPT | Performed by: INTERNAL MEDICINE

## 2018-10-15 PROCEDURE — 80061 LIPID PANEL: CPT | Performed by: INTERNAL MEDICINE

## 2018-10-15 PROCEDURE — 36415 COLL VENOUS BLD VENIPUNCTURE: CPT | Performed by: INTERNAL MEDICINE

## 2018-10-15 RX ORDER — AMOXICILLIN 500 MG/1
500 CAPSULE ORAL 2 TIMES DAILY
Qty: 20 CAP | Refills: 0 | Status: SHIPPED | OUTPATIENT
Start: 2018-10-15 | End: 2018-10-25

## 2018-10-15 RX ORDER — LISINOPRIL 10 MG/1
TABLET ORAL
Qty: 30 TAB | Refills: 5 | Status: SHIPPED | OUTPATIENT
Start: 2018-10-15 | End: 2019-11-15 | Stop reason: SDUPTHER

## 2018-10-15 RX ORDER — METFORMIN HYDROCHLORIDE 1000 MG/1
TABLET ORAL
Qty: 60 TAB | Refills: 5 | Status: SHIPPED | OUTPATIENT
Start: 2018-10-15 | End: 2019-05-20 | Stop reason: SDUPTHER

## 2018-10-15 RX ORDER — GABAPENTIN 300 MG/1
CAPSULE ORAL
Qty: 90 CAP | Refills: 5 | Status: SHIPPED | OUTPATIENT
Start: 2018-10-15 | End: 2019-03-25 | Stop reason: SDUPTHER

## 2018-10-15 RX ORDER — INSULIN ASPART 100 [IU]/ML
INJECTION, SUSPENSION SUBCUTANEOUS
Qty: 10 ML | Refills: 4 | Status: SHIPPED | OUTPATIENT
Start: 2018-10-15 | End: 2019-02-08 | Stop reason: SDUPTHER

## 2018-10-15 RX ORDER — PRAVASTATIN SODIUM 40 MG/1
TABLET ORAL
Qty: 30 TAB | Refills: 5 | Status: SHIPPED | OUTPATIENT
Start: 2018-10-15 | End: 2019-05-20 | Stop reason: SDUPTHER

## 2018-10-15 RX ORDER — TRAMADOL HYDROCHLORIDE 50 MG/1
TABLET ORAL
Qty: 100 TAB | Refills: 3 | Status: SHIPPED | OUTPATIENT
Start: 2018-10-15 | End: 2019-01-10 | Stop reason: SDUPTHER

## 2018-10-15 NOTE — PROGRESS NOTES
Patient is in the office today for a 3 month follow up. Patient states she has right hip pain 6/10.    1. Have you been to the ER, urgent care clinic since your last visit? Hospitalized since your last visit? No    2. Have you seen or consulted any other health care providers outside of the [address] [place] [address] since your last visit? Include any pap smears or colon screening.  No

## 2018-10-15 NOTE — PATIENT INSTRUCTIONS
Advance Directives: Care Instructions  Your Care Instructions  An advance directive is a legal way to state your wishes at the end of your life. It tells your family and your doctor what to do if you can no longer say what you want. There are two main types of advance directives. You can change them any time that your wishes change. · A living will tells your family and your doctor your wishes about life support and other treatment. · A durable power of  for health care lets you name a person to make treatment decisions for you when you can't speak for yourself. This person is called a health care agent. If you do not have an advance directive, decisions about your medical care may be made by a doctor or a  who doesn't know you. It may help to think of an advance directive as a gift to the people who care for you. If you have one, they won't have to make tough decisions by themselves. Follow-up care is a key part of your treatment and safety. Be sure to make and go to all appointments, and call your doctor if you are having problems. It's also a good idea to know your test results and keep a list of the medicines you take. How can you care for yourself at home? · Discuss your wishes with your loved ones and your doctor. This way, there are no surprises. · Many states have a unique form. Or you might use a universal form that has been approved by many states. This kind of form can sometimes be completed and stored online. Your electronic copy will then be available wherever you have a connection to the Internet. In most cases, doctors will respect your wishes even if you have a form from a different state. · You don't need a  to do an advance directive. But you may want to get legal advice. · Think about these questions when you prepare an advance directive:  ¨ Who do you want to make decisions about your medical care if you are not able to?  Many people choose a family member or close friend. ¨ Do you know enough about life support methods that might be used? If not, talk to your doctor so you understand. ¨ What are you most afraid of that might happen? You might be afraid of having pain, losing your independence, or being kept alive by machines. ¨ Where would you prefer to die? Choices include your home, a hospital, or a nursing home. ¨ Would you like to have information about hospice care to support you and your family? ¨ Do you want to donate organs when you die? ¨ Do you want certain [employer] practices performed before you die? If so, put your wishes in the advance directive. · Read your advance directive every year, and make changes as needed. When should you call for help? Be sure to contact your doctor if you have any questions. Where can you learn more? Go to http://karisEyeICshannon.info/. Enter R264 in the search box to learn more about \"Advance Directives: Care Instructions. \"  Current as of: April 19, 2018  Content Version: 11.8  © 0803-7657 [employer]. Care instructions adapted under license by [employer] (which disclaims liability or warranty for this information). If you have questions about a medical condition or this instruction, always ask your healthcare professional. [place] [address] any warranty or liability for your use of this information. Learning About Meal Planning for Diabetes  Why plan your meals? Meal planning can be a key part of managing diabetes. Planning meals and snacks with the right balance of carbohydrate, protein, and fat can help you keep your blood sugar at the target level you set with your doctor. You don't have to eat special foods. You can eat what your family eats, including sweets once in a while. But you do have to pay attention to how often you eat and how much you eat of certain foods. You may want to work with a dietitian or a certified diabetes educator.  He or she can give you tips and meal ideas and can answer your questions about meal planning. This health professional can also help you reach a healthy weight if that is one of your goals. What plan is right for you? Your dietitian or diabetes educator may suggest that you start with the plate format or carbohydrate counting. The plate format  The plate format is a simple way to help you manage how you eat. You plan meals by learning how much space each food should take on a plate. Using the plate format helps you spread carbohydrate throughout the day. It can make it easier to keep your blood sugar level within your target range. It also helps you see if you're eating healthy portion sizes. To use the plate format, you put non-starchy vegetables on half your plate. Add meat or meat substitutes on one-quarter of the plate. Put a grain or starchy vegetable (such as brown rice or a potato) on the final quarter of the plate. You can add a small piece of fruit and some low-fat or fat-free milk or yogurt, depending on your carbohydrate goal for each meal.  Here are some tips for using the plate format:  · Make sure that you are not using an oversized plate. A 9-inch plate is best. Many restaurants use larger plates. · Get used to using the plate format at home. Then you can use it when you eat out. · Write down your questions about using the plate format. Talk to your doctor, a dietitian, or a diabetes educator about your concerns. Carbohydrate counting  With carbohydrate counting, you plan meals based on the amount of carbohydrate in each food. Carbohydrate raises blood sugar higher and more quickly than any other nutrient. It is found in desserts, breads and cereals, and fruit. It's also found in starchy vegetables such as potatoes and corn, grains such as rice and pasta, and milk and yogurt. Spreading carbohydrate throughout the day helps keep your blood sugar levels within your target range.   Your daily amount depends on several things, including your weight, how active you are, which diabetes medicines you take, and what your goals are for your blood sugar levels. A registered dietitian or diabetes educator can help you plan how much carbohydrate to include in each meal and snack. A guideline for your daily amount of carbohydrate is:  · 45 to 60 grams at each meal. That's about the same as 3 to 4 carbohydrate servings. · 15 to 20 grams at each snack. That's about the same as 1 carbohydrate serving. The Nutrition Facts label on packaged foods tells you how much carbohydrate is in a serving of the food. First, look at the serving size on the food label. Is that the amount you eat in a serving? All of the nutrition information on a food label is based on that serving size. So if you eat more or less than that, you'll need to adjust the other numbers. Total carbohydrate is the next thing you need to look for on the label. If you count carbohydrate servings, one serving of carbohydrate is 15 grams. For foods that don't come with labels, such as fresh fruits and vegetables, you'll need a guide that lists carbohydrate in these foods. Ask your doctor, dietitian, or diabetes educator about books or other nutrition guides you can use. If you take insulin, you need to know how many grams of carbohydrate are in a meal. This lets you know how much rapid-acting insulin to take before you eat. If you use an insulin pump, you get a constant rate of insulin during the day. So the pump must be programmed at meals to give you extra insulin to cover the rise in blood sugar after meals. When you know how much carbohydrate you will eat, you can take the right amount of insulin. Or, if you always use the same amount of insulin, you need to make sure that you eat the same amount of carbohydrate at meals. If you need more help to understand carbohydrate counting and food labels, ask your doctor, dietitian, or diabetes educator.   How do you get started with meal planning? Here are some tips to get started:  · Plan your meals a week at a time. Don't forget to include snacks too. · Use cookbooks or online recipes to plan several main meals. Plan some quick meals for busy nights. You also can double some recipes that freeze well. Then you can save half for other busy nights when you don't have time to cook. · Make sure you have the ingredients you need for your recipes. If you're running low on basic items, put these items on your shopping list too. · List foods that you use to make breakfasts, lunches, and snacks. List plenty of fruits and vegetables. · Post this list on the refrigerator. Add to it as you think of more things you need. · Take the list to the store to do your weekly shopping. Follow-up care is a key part of your treatment and safety. Be sure to make and go to all appointments, and call your doctor if you are having problems. It's also a good idea to know your test results and keep a list of the medicines you take. Where can you learn more? Go to http://karis-shannon.info/. Gallito Cuero in the search box to learn more about \"Learning About Meal Planning for Diabetes. \"  Current as of: December 7, 2017  Content Version: 11.8  © 2734-5428 [employer]. Care instructions adapted under license by [employer] (which disclaims liability or warranty for this information). If you have questions about a medical condition or this instruction, always ask your healthcare professional. [place] [address] any warranty or liability for your use of this information.

## 2018-10-15 NOTE — MR AVS SNAPSHOT
[address] [place] [address] 
 
 
 [address] [address] [address]40 Candis Kuo [address] 
[phone] Patient: Samson Reason MRN: [id number] BDD:[id number] Visit Information Date & Time Provider Department Dept. Phone Encounter #  
 10/15/2018  1:00 PM Betzy Clayton, [address] [address] [phone] [id number] Follow-up Instructions Return in about 3 months (around 1/15/2019) for labs 1 week before. Upcoming Health Maintenance Date Due  
 EYE EXAM RETINAL OR DILATED Q1 5/13/1969 Pneumococcal 19-64 Medium Risk (1 of 1 - PPSV23) 5/13/1978 DTaP/Tdap/Td series (1 - Tdap) 5/13/1980 Shingrix Vaccine Age 50> (1 of 2) 5/13/2009 BREAST CANCER SCRN MAMMOGRAM 5/13/2009 FOOT EXAM Q1 8/14/2015 Influenza Age 5 to Adult 4/10/2019* HEMOGLOBIN A1C Q6M 1/16/2019 MICROALBUMIN Q1 4/10/2019 LIPID PANEL Q1 7/16/2019 COLONOSCOPY 4/4/2021 *Topic was postponed. The date shown is not the original due date. Allergies as of 10/15/2018  Review Complete On: 10/15/2018 By: Narayan Ngo LPN No Known Allergies Current Immunizations  Reviewed on 11/15/2016 Name Date Influenza Vaccine (Quad) PF 11/15/2016, 9/15/2015 Influenza Vaccine Whole 1/22/2011 Not reviewed this visit You Were Diagnosed With   
  
 Codes Comments Type 2 diabetes mellitus with diabetic neuropathy, with long-term current use of insulin (HCC)    -  Primary ICD-10-CM: E11.40, Z79.4 ICD-9-CM: 250.60, 357.2, V58.67 High cholesterol     ICD-10-CM: E78.00 ICD-9-CM: 272.0 Pain of right hip joint     ICD-10-CM: M25.551 ICD-9-CM: 719.45 Vitamin D deficiency     ICD-10-CM: E55.9 ICD-9-CM: 268.9 Acute otitis media, unspecified otitis media type     ICD-10-CM: H66.90 ICD-9-CM: 382. 9 Vitals BP Pulse Temp Resp Height(growth percentile) Weight(growth percentile)  112/69 (BP 1 Location: Left arm, BP Patient Position: Sitting) 80 98.5 °F (36.9 °C) (Oral) 18 5' (1.524 m) 117 lb (53.1 kg) LMP SpO2 BMI OB Status Smoking Status (LMP Unknown) 98% 22.85 kg/m2 Hysterectomy Current Every Day Smoker BMI and BSA Data Body Mass Index Body Surface Area  
 22.85 kg/m 2 1.5 m 2 Preferred Pharmacy Pharmacy Name [facility]  Jocelyne Oliva, [address] [place] [address],[address] [place] [address] [phone] Your Updated Medication List  
  
   
This list is accurate as of 10/15/18  1:32 PM.  Always use your most recent med list.  
  
  
  
  
 acetaminophen 325 mg tablet Commonly known as:  TYLENOL Take 2 Tabs by mouth every four (4) hours as needed for Pain.  
  
 amoxicillin 500 mg capsule Commonly known as:  AMOXIL Take 1 Cap by mouth two (2) times a day for 10 days. aspirin 81 mg tablet Take 81 mg by mouth daily. Blood-Glucose Meter monitoring kit Check BS 3x/day E11.65  
  
 budesonide 32 mcg/actuation nasal spray Commonly known as:  RHINOCORT AQUA  
2 Sprays by Both Nostrils route daily. docusate sodium 100 mg capsule Commonly known as:  Zafar Rutland Take 1 Cap by mouth two (2) times a day.  
  
 gabapentin 300 mg capsule Commonly known as:  NEURONTIN  
TAKE ONE CAPSULE BY MOUTH THREE TIMES DAILY  
  
 gabapentin-lidocaine-menthol 300-4-1 mg-%-% [facility]  
  
 glucose blood VI test strips strip Commonly known as:  ONETOUCH ULTRA TEST Check BS 3x/day E11.65  
  
 insulin aspart protamine/insulin aspart 100 unit/mL (70-30) injection Commonly known as:  NOVOLOG MIX 70/30  
25 units BID Insulin Syringe-Needle U-100 1/2 mL 30 gauge x 5/16 Syrg INJECT TWICE A DAY Lancets Misc Check BS 3x/day E11.65  
  
 lisinopril 10 mg tablet Commonly known as:  PRINIVIL, ZESTRIL  
TAKE 1 TABLET BY MOUTH ONCE DAILY  
  
 metFORMIN 1,000 mg tablet Commonly known as:  GLUCOPHAGE  
TAKE ONE TABLET BY MOUTH TWICE DAILY WITH MEALS  
  
 OXAPROZIN PO Take  by mouth two (2) times a day. pravastatin 40 mg tablet Commonly known as:  PRAVACHOL  
TAKE ONE TABLET BY MOUTH EVERY NIGHT  
  
 traMADol 50 mg tablet Commonly known as:  ULTRAM  
TAKE 2 TABLETS BY MOUTH EVERY 6 HOURS AS NEEDED FOR PAIN Prescriptions Printed Refills  
 traMADol (ULTRAM) 50 mg tablet 3 Sig: TAKE 2 TABLETS BY MOUTH EVERY 6 HOURS AS NEEDED FOR PAIN Class: Print Prescriptions Sent to Pharmacy Refills  
 insulin aspart protamine/insulin aspart (NOVOLOG MIX 70/30) 100 unit/mL (70-30) injection 4 Si units BID Class: Normal  
 Pharmacy: [facility] [address] [place] [address] Ph #: [phone]  
 lisinopril (PRINIVIL, ZESTRIL) 10 mg tablet 5 Sig: TAKE 1 TABLET BY MOUTH ONCE DAILY Class: Normal  
 Pharmacy: [facility] [address] [place] [address] Ph #: [phone]  
 metFORMIN (GLUCOPHAGE) 1,000 mg tablet 5 Sig: TAKE ONE TABLET BY MOUTH TWICE DAILY WITH MEALS Class: Normal  
 Pharmacy: [facility] [address] [place] [address] Ph #: [phone]  
 pravastatin (PRAVACHOL) 40 mg tablet 5 Sig: TAKE ONE TABLET BY MOUTH EVERY NIGHT Class: Normal  
 Pharmacy: [facility] [address] [place] [address] Ph #: [phone]  
 gabapentin (NEURONTIN) 300 mg capsule 5 Sig: TAKE ONE CAPSULE BY MOUTH THREE TIMES DAILY Class: Normal  
 Pharmacy: [facility] [address] [place] [address] Ph #: [phone]  
 amoxicillin (AMOXIL) 500 mg capsule 0 Sig: Take 1 Cap by mouth two (2) times a day for 10 days. Class: Normal  
 Pharmacy: [facility] [address] [place] [address] Ph #: [phone] Route: Oral  
  
We Performed the Following AMB POC HEMOGLOBIN A1C [02976 CPT(R)] Follow-up Instructions Return in about 3 months (around 1/15/2019) for labs 1 week before. To-Do List   
 2019 Lab:  LIPID PANEL   
  
 2019 Lab: METABOLIC PANEL, COMPREHENSIVE   
  
 04/16/2019 Lab:  VITAMIN D, 25 HYDROXY Patient Instructions Advance Directives: Care Instructions Your Care Instructions An advance directive is a legal way to state your wishes at the end of your life. It tells your family and your doctor what to do if you can no longer say what you want. There are two main types of advance directives. You can change them any time that your wishes change. · A living will tells your family and your doctor your wishes about life support and other treatment. · A durable power of  for health care lets you name a person to make treatment decisions for you when you can't speak for yourself. This person is called a health care agent. If you do not have an advance directive, decisions about your medical care may be made by a doctor or a  who doesn't know you. It may help to think of an advance directive as a gift to the people who care for you. If you have one, they won't have to make tough decisions by themselves. Follow-up care is a key part of your treatment and safety. Be sure to make and go to all appointments, and call your doctor if you are having problems. It's also a good idea to know your test results and keep a list of the medicines you take. How can you care for yourself at home? · Discuss your wishes with your loved ones and your doctor. This way, there are no surprises. · Many states have a unique form. Or you might use a universal form that has been approved by many states. This kind of form can sometimes be completed and stored online. Your electronic copy will then be available wherever you have a connection to the Internet. In most cases, doctors will respect your wishes even if you have a form from a different state. · You don't need a  to do an advance directive. But you may want to get legal advice. · Think about these questions when you prepare an advance directive: ¨ Who do you want to make decisions about your medical care if you are not able to? Many people choose a family member or close friend. ¨ Do you know enough about life support methods that might be used? If not, talk to your doctor so you understand. ¨ What are you most afraid of that might happen? You might be afraid of having pain, losing your independence, or being kept alive by machines. ¨ Where would you prefer to die? Choices include your home, a hospital, or a nursing home. ¨ Would you like to have information about hospice care to support you and your family? ¨ Do you want to donate organs when you die? ¨ Do you want certain Islam practices performed before you die? If so, put your wishes in the advance directive. · Read your advance directive every year, and make changes as needed. When should you call for help? Be sure to contact your doctor if you have any questions. Where can you learn more? Go to http://karis-shannon.info/. Enter R264 in the search box to learn more about \"Advance Directives: Care Instructions. \" Current as of: April 19, 2018 Content Version: 11.8 © 0394-2336 [employer]. Care instructions adapted under license by [employer] (which disclaims liability or warranty for this information). If you have questions about a medical condition or this instruction, always ask your healthcare professional. [address] any warranty or liability for your use of this information. Learning About Meal Planning for Diabetes Why plan your meals? Meal planning can be a key part of managing diabetes. Planning meals and snacks with the right balance of carbohydrate, protein, and fat can help you keep your blood sugar at the target level you set with your doctor. You don't have to eat special foods. You can eat what your family eats, including sweets once in a while.  But you do have to pay attention to how often you eat and how much you eat of certain foods. You may want to work with a dietitian or a certified diabetes educator. He or she can give you tips and meal ideas and can answer your questions about meal planning. This health professional can also help you reach a healthy weight if that is one of your goals. What plan is right for you? Your dietitian or diabetes educator may suggest that you start with the plate format or carbohydrate counting. The plate format The plate format is a simple way to help you manage how you eat. You plan meals by learning how much space each food should take on a plate. Using the plate format helps you spread carbohydrate throughout the day. It can make it easier to keep your blood sugar level within your target range. It also helps you see if you're eating healthy portion sizes. To use the plate format, you put non-starchy vegetables on half your plate. Add meat or meat substitutes on one-quarter of the plate. Put a grain or starchy vegetable (such as brown rice or a potato) on the final quarter of the plate. You can add a small piece of fruit and some low-fat or fat-free milk or yogurt, depending on your carbohydrate goal for each meal. 
Here are some tips for using the plate format: · Make sure that you are not using an oversized plate. A 9-inch plate is best. Many restaurants use larger plates. · Get used to using the plate format at home. Then you can use it when you eat out. · Write down your questions about using the plate format. Talk to your doctor, a dietitian, or a diabetes educator about your concerns. Carbohydrate counting With carbohydrate counting, you plan meals based on the amount of carbohydrate in each food. Carbohydrate raises blood sugar higher and more quickly than any other nutrient. It is found in desserts, breads and cereals, and fruit.  It's also found in starchy vegetables such as potatoes and corn, grains such as rice and pasta, and milk and yogurt. Spreading carbohydrate throughout the day helps keep your blood sugar levels within your target range. Your daily amount depends on several things, including your weight, how active you are, which diabetes medicines you take, and what your goals are for your blood sugar levels. A registered dietitian or diabetes educator can help you plan how much carbohydrate to include in each meal and snack. A guideline for your daily amount of carbohydrate is: · 45 to 60 grams at each meal. That's about the same as 3 to 4 carbohydrate servings. · 15 to 20 grams at each snack. That's about the same as 1 carbohydrate serving. The Nutrition Facts label on packaged foods tells you how much carbohydrate is in a serving of the food. First, look at the serving size on the food label. Is that the amount you eat in a serving? All of the nutrition information on a food label is based on that serving size. So if you eat more or less than that, you'll need to adjust the other numbers. Total carbohydrate is the next thing you need to look for on the label. If you count carbohydrate servings, one serving of carbohydrate is 15 grams. For foods that don't come with labels, such as fresh fruits and vegetables, you'll need a guide that lists carbohydrate in these foods. Ask your doctor, dietitian, or diabetes educator about books or other nutrition guides you can use. If you take insulin, you need to know how many grams of carbohydrate are in a meal. This lets you know how much rapid-acting insulin to take before you eat. If you use an insulin pump, you get a constant rate of insulin during the day. So the pump must be programmed at meals to give you extra insulin to cover the rise in blood sugar after meals. When you know how much carbohydrate you will eat, you can take the right amount of insulin.  Or, if you always use the same amount of insulin, you need to make sure that you eat the same amount of carbohydrate at meals. If you need more help to understand carbohydrate counting and food labels, ask your doctor, dietitian, or diabetes educator. How do you get started with meal planning? Here are some tips to get started: 
· Plan your meals a week at a time. Don't forget to include snacks too. · Use cookbooks or online recipes to plan several main meals. Plan some quick meals for busy nights. You also can double some recipes that freeze well. Then you can save half for other busy nights when you don't have time to cook. · Make sure you have the ingredients you need for your recipes. If you're running low on basic items, put these items on your shopping list too. · List foods that you use to make breakfasts, lunches, and snacks. List plenty of fruits and vegetables. · Post this list on the refrigerator. Add to it as you think of more things you need. · Take the list to the store to do your weekly shopping. Follow-up care is a key part of your treatment and safety. Be sure to make and go to all appointments, and call your doctor if you are having problems. It's also a good idea to know your test results and keep a list of the medicines you take. Where can you learn more? Go to http://karis-shannon.info/. Janna Metzger in the search box to learn more about \"Learning About Meal Planning for Diabetes. \" Current as of: December 7, 2017 Content Version: 11.8 © 1166-5173 [employer]. Care instructions adapted under license by [employer] (which disclaims liability or warranty for this information). If you have questions about a medical condition or this instruction, always ask your healthcare professional. [address] any warranty or liability for your use of this information. Introducing [facility]!    
 [facility] introduces [employer] patient portal. Now you can access parts of your medical record, email your doctor's office, and request medication refills online. 1. In your internet browser, go to [email or web address] 2. Click on the First Time User? Click Here link in the Sign In box. You will see the New Member Sign Up page. 3. Enter your [employer] Access Code exactly as it appears below. You will not need to use this code after youve completed the sign-up process. If you do not sign up before the expiration date, you must request a new code. · [employer] Access Code: [email or web address] Expires: 1/13/2019  1:32 PM 
 
4. Enter the last four digits of your Social Security Number (xxxx) and Date of Birth (mm/dd/yyyy) as indicated and click Submit. You will be taken to the next sign-up page. 5. Create a [employer] ID. This will be your [employer] login ID and cannot be changed, so think of one that is secure and easy to remember. 6. Create a [employer] password. You can change your password at any time. 7. Enter your Password Reset Question and Answer. This can be used at a later time if you forget your password. 8. Enter your e-mail address. You will receive e-mail notification when new information is available in [employer]. 9. Click Sign Up. You can now view and download portions of your medical record. 10. Click the Download Summary menu link to download a portable copy of your medical information. If you have questions, please visit the Frequently Asked Questions section of the [employer] website. Remember, [employer] is NOT to be used for urgent needs. For medical emergencies, dial 911. Now available from your iPhone and Android! Please provide this summary of care documentation to your next provider. Your primary care clinician is listed as MARK CASH. If you have any questions after today's visit, please call [phone].

## 2018-10-16 LAB — 25(OH)D3 SERPL-MCNC: 11.2 NG/ML (ref 30–100)

## 2018-10-24 PROBLEM — R80.9 MICROALBUMINURIA: Status: ACTIVE | Noted: 2018-10-24

## 2018-10-25 NOTE — PROGRESS NOTES
Subjective:       Chief Complaint  The patient presents for follow up of diabetesand high cholesterol. Proteinuria, back pain         HPI  Marsha Boas is a 60 y.o. female seen for follow up of diabetes. Shealso has  hyperlipidemia. Diabetes uncontrolled due to poor compliance with taking her insulin, pt should be taking Humalog mix 75/25 25 BID. Is hard for me to confirm the patient is actually taking this dose of insulin due to patient having some mild MR. Patient has poor insight into how to adjust her insulin and she does not check her blood sugars to be able to adjust insulin. Patient's son is not with her today. She is also just taking metformin 1x/day instead of BID, pt with poor insight to her medical problems due to developmental delay, her son is not with her today. Hyperlipidemia borderline controlled, no significant medication side effects noted, on Pravachol. Pt remains on lisinopril for her proteinuria. Compliance remains an issue. Patient has been unwilling to come in to see Pharm. D. due to issues with transportation    Diet and Lifestyle: generally follows a low fat low cholesterol diet, does not rigorously follow a diabetic diet, sedentary    Diabetic Review of Systems - home glucose monitoring: is performed sporadically, should be 3x/day . Other symptoms and concerns: . Pt's chronic back pain is stable on Ultram prn.  reviewed. Patient is also taking Neurontin for peripheral neuropathy. Patient complaining of ear pain today. She has minimal drainage from her ear.     Current Outpatient Medications   Medication Sig    insulin aspart protamine/insulin aspart (NOVOLOG MIX 70/30) 100 unit/mL (70-30) injection 25 units BID    lisinopril (PRINIVIL, ZESTRIL) 10 mg tablet TAKE 1 TABLET BY MOUTH ONCE DAILY    metFORMIN (GLUCOPHAGE) 1,000 mg tablet TAKE ONE TABLET BY MOUTH TWICE DAILY WITH MEALS    pravastatin (PRAVACHOL) 40 mg tablet TAKE ONE TABLET BY MOUTH EVERY NIGHT    gabapentin (NEURONTIN) 300 mg capsule TAKE ONE CAPSULE BY MOUTH THREE TIMES DAILY    traMADol (ULTRAM) 50 mg tablet TAKE 2 TABLETS BY MOUTH EVERY 6 HOURS AS NEEDED FOR PAIN    amoxicillin (AMOXIL) 500 mg capsule Take 1 Cap by mouth two (2) times a day for 10 days.  Insulin Syringe-Needle U-100 1/2 mL 30 gauge x 5/16 syrg INJECT TWICE A DAY    Blood-Glucose Meter monitoring kit Check BS 3x/day E11.65    Lancets misc Check BS 3x/day E11.65    glucose blood VI test strips (ONETOUCH ULTRA TEST) strip Check BS 3x/day E11.65    gabapentin-lidocaine-menthol 300-4-1 mg-%-% [facility]     aspirin 81 mg tablet Take 81 mg by mouth daily.  OXAPROZIN PO Take  by mouth two (2) times a day.  acetaminophen (TYLENOL) 325 mg tablet Take 2 Tabs by mouth every four (4) hours as needed for Pain.  docusate sodium (COLACE) 100 mg capsule Take 1 Cap by mouth two (2) times a day.  budesonide (RHINOCORT AQUA) 32 mcg/actuation nasal spray 2 Sprays by Both Nostrils route daily. No current facility-administered medications for this visit.             Review of Systems  Respiratory: negative for dyspnea on exertion  Cardiovascular: negative for chest pain    Objective:     Visit Vitals  /69 (BP 1 Location: Left arm, BP Patient Position: Sitting)   Pulse 80   Temp 98.5 °F (36.9 °C) (Oral)   Resp 18   Ht 5' (1.524 m)   Wt 117 lb (53.1 kg)   LMP  (LMP Unknown)   SpO2 98%   BMI 22.85 kg/m²        Eyes - pupils equal and reactive, extraocular eye movements intact  Chest - clear to auscultation, no wheezes, rales or rhonchi, symmetric air entry  Heart - normal rate, regular rhythm, normal S1, S2, no murmurs, rubs, clicks or gallops  Extremities - no edema         Labs:   Lab Results   Component Value Date/Time    Hemoglobin A1c 13.0 (H) 04/10/2018 02:35 PM    Hemoglobin A1c 12.7 (H) 04/03/2017 01:54 PM    Hemoglobin A1c 8.6 (H) 05/16/2016 08:24 AM    Glucose 189 (H) 10/15/2018 01:36 PM    Glucose (POC) 254 (H) 06/01/2017 05:16 PM    Glucose  07/16/2018 03:51 PM    Microalbumin/Creat ratio (mg/g creat) 640 (H) 04/10/2018 02:35 PM    Microalbumin,urine random 11.80 (H) 04/10/2018 02:35 PM    LDL, calculated 90 10/15/2018 01:36 PM    Creatinine, POC 0.5 (L) 08/23/2010 10:49 AM    Creatinine 1.38 (H) 10/15/2018 01:36 PM      Lab Results   Component Value Date/Time    Cholesterol, total 159 10/15/2018 01:36 PM    HDL Cholesterol 54 10/15/2018 01:36 PM    LDL, calculated 90 10/15/2018 01:36 PM    Triglyceride 75 10/15/2018 01:36 PM    CHOL/HDL Ratio 2.9 10/15/2018 01:36 PM     Lab Results   Component Value Date/Time    ALT (SGPT) 19 10/15/2018 01:36 PM    AST (SGOT) 19 10/15/2018 01:36 PM    Alk. phosphatase 121 (H) 10/15/2018 01:36 PM    Bilirubin, direct 0.1 03/26/2012 03:45 AM    Bilirubin, total 0.3 10/15/2018 01:36 PM     Lab Results   Component Value Date/Time    GFR est AA 47 (L) 10/15/2018 01:36 PM    GFR est non-AA 39 (L) 10/15/2018 01:36 PM    Creatinine, POC 0.5 (L) 08/23/2010 10:49 AM    Creatinine 1.38 (H) 10/15/2018 01:36 PM    BUN 12 10/15/2018 01:36 PM    Sodium 135 (L) 10/15/2018 01:36 PM    Potassium 5.6 (H) 10/15/2018 01:36 PM    Chloride 103 10/15/2018 01:36 PM    CO2 25 10/15/2018 01:36 PM      Lab Results   Component Value Date/Time    Glucose 189 (H) 10/15/2018 01:36 PM    Glucose (POC) 254 (H) 06/01/2017 05:16 PM    Glucose  07/16/2018 03:51 PM      Labs:   Lab Results   Component Value Date/Time    Hemoglobin A1c 13.0 (H) 04/10/2018 02:35 PM    Hemoglobin A1c (POC) 11.4 10/15/2018 01:16 PM              Assessment / Plan     Diabetes poorly controlled due to poor compliance with insulin, pt to use metformin BID and try to take  insulin 75/25 35 units BID. Patient does not seem to get much help family members with her insulin. Hyperlipidemia borderline controlled on Pravachol. Patient seems to be more compliant with taking this medication. ICD-10-CM ICD-9-CM    1.  Type 2 diabetes mellitus with diabetic neuropathy, with long-term current use of insulin ([facility]) E11.40 250.60 AMB POC HEMOGLOBIN A1C    L80.0 681.6 METABOLIC PANEL, COMPREHENSIVE     V58.67    2. High cholesterol E78.00 272.0 LIPID PANEL   3. Vitamin D deficiency E55.9 268.9  poorly controlled  on vitamin D supplementation due to poor compliance VITAMIN D, 25 HYDROXY   4. Pain of right hip joint M25.551 719.45  controlled on traMADol (ULTRAM) 50 mg tablet   5. Acute otitis media, unspecified otitis media type H66.90 382.9  will treat with amoxicillin   6. Microalbuminuria R80.9 791.0 Patient is on lisinopril 10 mg but again concerned about her compliance with taking the medication`                    Diabetic issues reviewed with her: diabetic diet discussed in detail, and low cholesterol diet, weight control and daily exercise discussed. Medication instructions and potential side effects reviewed with patient in detail. Follow-up Disposition:  Return in about 3 months (around 1/15/2019) for labs 1 week before. Reviewed plan of care. Patient has provided input and agrees with goals.

## 2018-12-07 ENCOUNTER — TELEPHONE (OUTPATIENT)
Dept: FAMILY MEDICINE CLINIC | Age: 58
End: 2018-12-07

## 2018-12-07 RX ORDER — NITROFURANTOIN 25; 75 MG/1; MG/1
100 CAPSULE ORAL 2 TIMES DAILY
Qty: 10 CAP | Refills: 0 | Status: SHIPPED | OUTPATIENT
Start: 2018-12-07 | End: 2019-01-04 | Stop reason: SDUPTHER

## 2018-12-07 NOTE — TELEPHONE ENCOUNTER
Rosy Cooper is callling in regard to the PT. She is living and taking care of the pt. Pt has no way to get to the doctor and does not want to go to the ER. Rosy Cooper is calling b/c she thinks the pt has a UTI infection. Pt is experiencing lower back pain and nausea and is putting out a lot of urine. Rosy Cooper is just wondering if the doctor can send in an antibiotic so that the pt can get the help that she needs. Please advise.      [phone]

## 2019-01-04 RX ORDER — NITROFURANTOIN 25; 75 MG/1; MG/1
CAPSULE ORAL
Qty: 10 CAP | Refills: 0 | Status: SHIPPED | OUTPATIENT
Start: 2019-01-04 | End: 2019-03-15

## 2019-01-10 ENCOUNTER — TELEPHONE (OUTPATIENT)
Dept: FAMILY MEDICINE CLINIC | Age: 59
End: 2019-01-10

## 2019-01-10 DIAGNOSIS — M25.551 PAIN OF RIGHT HIP JOINT: ICD-10-CM

## 2019-01-10 RX ORDER — TRAMADOL HYDROCHLORIDE 50 MG/1
TABLET ORAL
Qty: 100 TAB | Refills: 3 | Status: SHIPPED | OUTPATIENT
Start: 2019-01-10 | End: 2019-04-25 | Stop reason: SDUPTHER

## 2019-01-10 NOTE — TELEPHONE ENCOUNTER
Requested Prescriptions     Pending Prescriptions Disp Refills    traMADol (ULTRAM) 50 mg tablet 100 Tab 3     Sig: TAKE 2 TABLETS BY MOUTH EVERY 6 HOURS AS NEEDED FOR PAIN

## 2019-01-18 ENCOUNTER — TELEPHONE (OUTPATIENT)
Dept: FAMILY MEDICINE CLINIC | Age: 59
End: 2019-01-18

## 2019-01-18 NOTE — TELEPHONE ENCOUNTER
Patients son Kirsten Tylercarol calling says the pharmacy is now charging the patient 14 for her tramadol and they stated it had something to due with how the prescription was written.  Son would like a call back  [facility] advise     [phone]

## 2019-01-18 NOTE — TELEPHONE ENCOUNTER
Quin with [facility] ins calling to ask Dr Drake Head to start request for PA for Tramadol

## 2019-01-18 NOTE — TELEPHONE ENCOUNTER
Spoke to son and informed him that because he is not on the patient's release of information I couldn't give him any information on this matter. He told me, he would return home in 25 minutes at which I would be able to talk to the patient directly.

## 2019-01-23 NOTE — TELEPHONE ENCOUNTER
Tramadol not covered by insurance due to [employer]. Please advise. Family is upset patient is unable to get pain medication.

## 2019-01-25 ENCOUNTER — HOSPITAL ENCOUNTER (OUTPATIENT)
Dept: LAB | Age: 59
Discharge: HOME | End: 2019-01-25
Payer: COMMERCIAL

## 2019-01-25 ENCOUNTER — OFFICE VISIT (OUTPATIENT)
Dept: FAMILY MEDICINE CLINIC | Age: 59
End: 2019-01-25

## 2019-01-25 VITALS
WEIGHT: 112 LBS | OXYGEN SATURATION: 97 % | DIASTOLIC BLOOD PRESSURE: 73 MMHG | SYSTOLIC BLOOD PRESSURE: 117 MMHG | RESPIRATION RATE: 18 BRPM | HEIGHT: 60 IN | HEART RATE: 112 BPM | BODY MASS INDEX: 21.99 KG/M2 | TEMPERATURE: 98.1 F

## 2019-01-25 DIAGNOSIS — E78.00 HIGH CHOLESTEROL: ICD-10-CM

## 2019-01-25 DIAGNOSIS — L02.411 ABSCESS OF RIGHT AXILLA: ICD-10-CM

## 2019-01-25 DIAGNOSIS — E55.9 VITAMIN D DEFICIENCY: ICD-10-CM

## 2019-01-25 DIAGNOSIS — M54.50 CHRONIC MIDLINE LOW BACK PAIN WITHOUT SCIATICA: ICD-10-CM

## 2019-01-25 DIAGNOSIS — E13.9 DM (DIABETES MELLITUS), SECONDARY (HCC): Primary | ICD-10-CM

## 2019-01-25 DIAGNOSIS — L29.9 PRURITUS: ICD-10-CM

## 2019-01-25 DIAGNOSIS — G89.29 CHRONIC MIDLINE LOW BACK PAIN WITHOUT SCIATICA: ICD-10-CM

## 2019-01-25 DIAGNOSIS — Z23 ENCOUNTER FOR IMMUNIZATION: ICD-10-CM

## 2019-01-25 DIAGNOSIS — L30.9 DERMATITIS: ICD-10-CM

## 2019-01-25 DIAGNOSIS — E13.9 DM (DIABETES MELLITUS), SECONDARY (HCC): ICD-10-CM

## 2019-01-25 DIAGNOSIS — R80.9 MICROALBUMINURIA: ICD-10-CM

## 2019-01-25 LAB
ALBUMIN SERPL-MCNC: 3.7 G/DL (ref 3.4–5)
ALBUMIN/GLOB SERPL: 0.8 {RATIO} (ref 0.8–1.7)
ALP SERPL-CCNC: 167 U/L (ref 45–117)
ALT SERPL-CCNC: 14 U/L (ref 13–56)
ANION GAP SERPL CALC-SCNC: 14 MMOL/L (ref 3–18)
AST SERPL-CCNC: 8 U/L (ref 15–37)
BILIRUB SERPL-MCNC: 0.5 MG/DL (ref 0.2–1)
BUN SERPL-MCNC: 35 MG/DL (ref 7–18)
BUN/CREAT SERPL: 19 (ref 12–20)
CALCIUM SERPL-MCNC: 9.2 MG/DL (ref 8.5–10.1)
CHLORIDE SERPL-SCNC: 88 MMOL/L (ref 100–108)
CHOLEST SERPL-MCNC: 211 MG/DL
CO2 SERPL-SCNC: 22 MMOL/L (ref 21–32)
CREAT SERPL-MCNC: 1.81 MG/DL (ref 0.6–1.3)
GLOBULIN SER CALC-MCNC: 4.4 G/DL (ref 2–4)
GLUCOSE SERPL-MCNC: 531 MG/DL (ref 74–99)
HBA1C MFR BLD HPLC: 13.3 %
HBA1C MFR BLD: 12.5 % (ref 4.2–5.6)
HDLC SERPL-MCNC: 41 MG/DL (ref 40–60)
HDLC SERPL: 5.1 {RATIO} (ref 0–5)
LDLC SERPL CALC-MCNC: 127.2 MG/DL (ref 0–100)
LIPID PROFILE,FLP: ABNORMAL
POTASSIUM SERPL-SCNC: 5.7 MMOL/L (ref 3.5–5.5)
PROT SERPL-MCNC: 8.1 G/DL (ref 6.4–8.2)
SODIUM SERPL-SCNC: 124 MMOL/L (ref 136–145)
TRIGL SERPL-MCNC: 214 MG/DL (ref ?–150)
VLDLC SERPL CALC-MCNC: 42.8 MG/DL

## 2019-01-25 PROCEDURE — 80061 LIPID PANEL: CPT

## 2019-01-25 PROCEDURE — 83036 HEMOGLOBIN GLYCOSYLATED A1C: CPT

## 2019-01-25 PROCEDURE — 36415 COLL VENOUS BLD VENIPUNCTURE: CPT

## 2019-01-25 PROCEDURE — 80053 COMPREHEN METABOLIC PANEL: CPT

## 2019-01-25 PROCEDURE — 82306 VITAMIN D 25 HYDROXY: CPT

## 2019-01-25 RX ORDER — MUPIROCIN 20 MG/G
OINTMENT TOPICAL
Qty: 22 G | Refills: 1 | Status: SHIPPED | OUTPATIENT
Start: 2019-01-25 | End: 2019-10-09

## 2019-01-25 RX ORDER — CEPHALEXIN 500 MG/1
500 CAPSULE ORAL 2 TIMES DAILY
Qty: 20 CAP | Refills: 0 | Status: SHIPPED | OUTPATIENT
Start: 2019-01-25 | End: 2019-02-04

## 2019-01-25 RX ORDER — HYDROXYZINE 25 MG/1
25 TABLET, FILM COATED ORAL
Qty: 100 TAB | Refills: 1 | Status: SHIPPED | OUTPATIENT
Start: 2019-01-25 | End: 2019-03-15

## 2019-01-25 NOTE — PROGRESS NOTES
Verbal order read back per Dr. Jensen Ndiaye flu vaccine. Patient received flu vaccine in left deltoid. Patient was observed and no signs or symptoms of allergic reaction noted at this time. Patient tolerated well and left without complaints. Patient received flu VIS.

## 2019-01-25 NOTE — PROGRESS NOTES
Subjective:       Chief Complaint  The patient presents for follow up of diabetesand high cholesterol. Proteinuria, back pain         HPI  Cody العراقي is a 60 y.o. female seen for follow up of diabetes. Shealso has  hyperlipidemia. Diabetes uncontrolled due to poor compliance with taking her insulin, pt should be taking Humalog mix 75/25 25 BID. Her new Aide will try to help her be more compliant so she can get her DM under control. The Aide admits pt is not compliant with taking her insulin. Pt says it hurts her legs? Patient has poor insight into how to adjust her insulin and she does not check her blood sugars to be able to adjust insulin. .  She is also just taking metformin 1x/day instead of BID, pt with poor insight to her medical problems due to developmental delay. .Hyperlipidemia borderline controlled, no significant medication side effects noted, on Pravachol. Pt remains on lisinopril for her proteinuria. Compliance remains an issue. Patient has been unwilling to come in to see Pharm. D. due to issues with transportation    Diet and Lifestyle: generally follows a low fat low cholesterol diet, does not rigorously follow a diabetic diet, sedentary    Diabetic Review of Systems - home glucose monitoring: is performed sporadically, should be 3x/day . Other symptoms and concerns: . Pt's chronic back pain is stable on Ultram prn.  reviewed. Patient is also taking Neurontin for peripheral neuropathy. Pt c/o abscess in her right axilla today which she has had for about 1 week. Pt also has rash on back and generalized itching. This is most likely due to uncontrolled DM. Current Outpatient Medications   Medication Sig    cephALEXin (KEFLEX) 500 mg capsule Take 1 Cap by mouth two (2) times a day for 10 days.  hydrOXYzine HCl (ATARAX) 25 mg tablet Take 1 Tab by mouth three (3) times daily as needed for Itching.     mupirocin (BACTROBAN) 2 % ointment Apply to area affected BID    traMADol (ULTRAM) 50 mg tablet TAKE 2 TABLETS BY MOUTH EVERY 6 HOURS AS NEEDED FOR PAIN    nitrofurantoin, macrocrystal-monohydrate, (MACROBID) 100 mg capsule TAKE 1 CAPSULE BY MOUTH TWO TIMES A DAY FOR 5 DAYS TILL ALL TAKEN    insulin aspart protamine/insulin aspart (NOVOLOG MIX 70/30) 100 unit/mL (70-30) injection 25 units BID    lisinopril (PRINIVIL, ZESTRIL) 10 mg tablet TAKE 1 TABLET BY MOUTH ONCE DAILY    metFORMIN (GLUCOPHAGE) 1,000 mg tablet TAKE ONE TABLET BY MOUTH TWICE DAILY WITH MEALS    pravastatin (PRAVACHOL) 40 mg tablet TAKE ONE TABLET BY MOUTH EVERY NIGHT    gabapentin (NEURONTIN) 300 mg capsule TAKE ONE CAPSULE BY MOUTH THREE TIMES DAILY    Insulin Syringe-Needle U-100 1/2 mL 30 gauge x 5/16 syrg INJECT TWICE A DAY    Blood-Glucose Meter monitoring kit Check BS 3x/day E11.65    Lancets misc Check BS 3x/day E11.65    glucose blood VI test strips (ONETOUCH ULTRA TEST) strip Check BS 3x/day E11.65    gabapentin-lidocaine-menthol 300-4-1 mg-%-% [facility]     acetaminophen (TYLENOL) 325 mg tablet Take 2 Tabs by mouth every four (4) hours as needed for Pain.  aspirin 81 mg tablet Take 81 mg by mouth daily.  OXAPROZIN PO Take  by mouth two (2) times a day.  docusate sodium (COLACE) 100 mg capsule Take 1 Cap by mouth two (2) times a day.  budesonide (RHINOCORT AQUA) 32 mcg/actuation nasal spray 2 Sprays by Both Nostrils route daily. No current facility-administered medications for this visit.             Review of Systems  Respiratory: negative for dyspnea on exertion  Cardiovascular: negative for chest pain    Objective:     Visit Vitals  /73 (BP 1 Location: Left arm, BP Patient Position: Sitting)   Pulse (!) 112   Temp 98.1 °F (36.7 °C) (Oral)   Resp 18   Ht 5' (1.524 m)   Wt 112 lb (50.8 kg)   LMP  (LMP Unknown)   SpO2 97%   BMI 21.87 kg/m²        Eyes - pupils equal and reactive, extraocular eye movements intact  Chest - clear to auscultation, no wheezes, rales or rhonchi, symmetric air entry  Heart - normal rate, regular rhythm, normal S1, S2, no murmurs, rubs, clicks or gallops  Extremities - no edema   SKin about 3-4 cm in diameter abscess right axilla which is currently not draining. Pt with excoriations on upper back and torso with areas of post inflammatory hyperpigmentation       Labs:   Lab Results   Component Value Date/Time    Hemoglobin A1c 12.5 (H) 01/25/2019 04:06 PM    Hemoglobin A1c 13.0 (H) 04/10/2018 02:35 PM    Hemoglobin A1c 12.7 (H) 04/03/2017 01:54 PM    Glucose 531 (HH) 01/25/2019 04:06 PM    Glucose (POC) 254 (H) 06/01/2017 05:16 PM    Glucose  07/16/2018 03:51 PM    Microalbumin/Creat ratio (mg/g creat) 640 (H) 04/10/2018 02:35 PM    Microalbumin,urine random 11.80 (H) 04/10/2018 02:35 PM    LDL, calculated 127.2 (H) 01/25/2019 04:06 PM    Creatinine, POC 0.5 (L) 08/23/2010 10:49 AM    Creatinine 1.81 (H) 01/25/2019 04:06 PM      Lab Results   Component Value Date/Time    Cholesterol, total 211 (H) 01/25/2019 04:06 PM    HDL Cholesterol 41 01/25/2019 04:06 PM    LDL, calculated 127.2 (H) 01/25/2019 04:06 PM    Triglyceride 214 (H) 01/25/2019 04:06 PM    CHOL/HDL Ratio 5.1 (H) 01/25/2019 04:06 PM     Lab Results   Component Value Date/Time    ALT (SGPT) 14 01/25/2019 04:06 PM    AST (SGOT) 8 (L) 01/25/2019 04:06 PM    Alk.  phosphatase 167 (H) 01/25/2019 04:06 PM    Bilirubin, direct 0.1 03/26/2012 03:45 AM    Bilirubin, total 0.5 01/25/2019 04:06 PM     Lab Results   Component Value Date/Time    GFR est AA 35 (L) 01/25/2019 04:06 PM    GFR est non-AA 29 (L) 01/25/2019 04:06 PM    Creatinine, POC 0.5 (L) 08/23/2010 10:49 AM    Creatinine 1.81 (H) 01/25/2019 04:06 PM    BUN 35 (H) 01/25/2019 04:06 PM    Sodium 124 (L) 01/25/2019 04:06 PM    Potassium 5.7 (H) 01/25/2019 04:06 PM    Chloride 88 (L) 01/25/2019 04:06 PM    CO2 22 01/25/2019 04:06 PM      Lab Results   Component Value Date/Time    Glucose 531 (HH) 01/25/2019 04:06 PM    Glucose (POC) 254 (H) 06/01/2017 05:16 PM    Glucose  07/16/2018 03:51 PM      Labs:   Lab Results   Component Value Date/Time    Hemoglobin A1c 12.5 (H) 01/25/2019 04:06 PM    Hemoglobin A1c (POC) 13.3 01/25/2019 03:26 PM              Assessment / Plan     Diabetes poorly controlled due to poor compliance with insulin, pt to use metformin BID and try to take  insulin 75/25 35 units BID. Patient's Aide will try to help her  Hyperlipidemia borderline controlled on Pravachol. Patient aide to get a pill counter to help her be more compliant. ICD-10-CM ICD-9-CM    1. [facility]di[facility] mellitus[facility] E13.9 249.00 AMB POC HEMOGLOBIN A1C      LIPID PANEL      METABOLIC PANEL, COMPREHENSIVE      HEMOGLOBIN A1C W/O EAG      MICROALBUMIN, UR, RAND W/ MICROALB/CREAT RATIO   2. High cholesterol E78.00 272.0    3. Microalbuminuria R80.9 791.0 Pt continues on lisinopril    4. Vitamin D deficiency E55.9 268.9 Not well controlled due to poor compliance. Pt to take vit D 2000 units/day VITAMIN D, 25 HYDROXY   5. Encounter for immunization Z23 V03.89 INFLUENZA VIRUS VAC QUAD,SPLIT,PRESV FREE SYRINGE IM   6. Chronic midline low back pain without sciatica M54.5 724.2 Stable on Ultram prn     G89.29 338.29    7. Abscess of right axilla L02.411 682. 3 Will treat with cephALEXin (KEFLEX) 500 mg capsule   8. Dermatitis L30.9 692.9 Will treat with mupirocin (BACTROBAN) 2 % ointment   9. Pruritus L29.9 698.9 Treat with hydrOXYzine HCl (ATARAX) 25 mg tablet                    Diabetic issues reviewed with her: diabetic diet discussed in detail, and low cholesterol diet, weight control and daily exercise discussed. Medication instructions and potential side effects reviewed with patient in detail. Follow-up Disposition:  Return in about 3 months (around 4/25/2019) for labs 1 week before. Reviewed plan of care. Patient has provided input and agrees with goals.

## 2019-01-25 NOTE — PATIENT INSTRUCTIONS

## 2019-01-25 NOTE — PROGRESS NOTES
Patient is in the office today for 3 month follow up and abscess under right arm     1. Have you been to the ER, urgent care clinic since your last visit? Hospitalized since your last visit? No    2. Have you seen or consulted any other health care providers outside of the [address] [place] [address] since your last visit? Include any pap smears or colon screening.  No

## 2019-01-26 LAB — 25(OH)D3 SERPL-MCNC: 8.6 NG/ML (ref 30–100)

## 2019-03-15 ENCOUNTER — APPOINTMENT (OUTPATIENT)
Dept: GENERAL RADIOLOGY | Age: 59
End: 2019-03-15
Attending: PHYSICIAN ASSISTANT
Payer: MEDICAID

## 2019-03-15 ENCOUNTER — HOSPITAL ENCOUNTER (EMERGENCY)
Age: 59
Discharge: HOME | End: 2019-03-16
Attending: EMERGENCY MEDICINE | Admitting: EMERGENCY MEDICINE
Payer: MEDICAID

## 2019-03-15 DIAGNOSIS — M25.551 PAIN OF RIGHT HIP JOINT: Primary | ICD-10-CM

## 2019-03-15 DIAGNOSIS — R73.9 HYPERGLYCEMIA: ICD-10-CM

## 2019-03-15 LAB
ALBUMIN SERPL-MCNC: 3.7 G/DL (ref 3.4–5)
ALBUMIN/GLOB SERPL: 0.9 {RATIO} (ref 0.8–1.7)
ALP SERPL-CCNC: 104 U/L (ref 45–117)
ALT SERPL-CCNC: 16 U/L (ref 13–56)
ANION GAP SERPL CALC-SCNC: 9 MMOL/L (ref 3–18)
APPEARANCE UR: CLEAR
AST SERPL-CCNC: 11 U/L (ref 15–37)
BACTERIA URNS QL MICRO: ABNORMAL /HPF
BASOPHILS # BLD: 0.1 K/UL (ref 0–0.1)
BASOPHILS NFR BLD: 1 % (ref 0–2)
BILIRUB SERPL-MCNC: 0.3 MG/DL (ref 0.2–1)
BILIRUB UR QL: NEGATIVE
BUN SERPL-MCNC: 16 MG/DL (ref 7–18)
BUN/CREAT SERPL: 12 (ref 12–20)
CALCIUM SERPL-MCNC: 8.8 MG/DL (ref 8.5–10.1)
CHLORIDE SERPL-SCNC: 101 MMOL/L (ref 100–108)
CO2 SERPL-SCNC: 25 MMOL/L (ref 21–32)
COLOR UR: YELLOW
CREAT SERPL-MCNC: 1.31 MG/DL (ref 0.6–1.3)
DIFFERENTIAL METHOD BLD: ABNORMAL
EOSINOPHIL # BLD: 0.1 K/UL (ref 0–0.4)
EOSINOPHIL NFR BLD: 2 % (ref 0–5)
EPITH CASTS URNS QL MICRO: ABNORMAL /LPF (ref 0–5)
ERYTHROCYTE [DISTWIDTH] IN BLOOD BY AUTOMATED COUNT: 15.3 % (ref 11.6–14.5)
GLOBULIN SER CALC-MCNC: 4.1 G/DL (ref 2–4)
GLUCOSE SERPL-MCNC: 118 MG/DL (ref 74–99)
GLUCOSE UR STRIP.AUTO-MCNC: 500 MG/DL
HCT VFR BLD AUTO: 39.8 % (ref 35–45)
HGB BLD-MCNC: 13.9 G/DL (ref 12–16)
HGB UR QL STRIP: NEGATIVE
KETONES UR QL STRIP.AUTO: NEGATIVE MG/DL
LEUKOCYTE ESTERASE UR QL STRIP.AUTO: ABNORMAL
LYMPHOCYTES # BLD: 3.1 K/UL (ref 0.9–3.6)
LYMPHOCYTES NFR BLD: 36 % (ref 21–52)
MCH RBC QN AUTO: 30.3 PG (ref 24–34)
MCHC RBC AUTO-ENTMCNC: 34.9 G/DL (ref 31–37)
MCV RBC AUTO: 86.7 FL (ref 74–97)
MONOCYTES # BLD: 0.6 K/UL (ref 0.05–1.2)
MONOCYTES NFR BLD: 7 % (ref 3–10)
NEUTS SEG # BLD: 4.7 K/UL (ref 1.8–8)
NEUTS SEG NFR BLD: 54 % (ref 40–73)
NITRITE UR QL STRIP.AUTO: NEGATIVE
PH UR STRIP: 5.5 [PH] (ref 5–8)
PLATELET # BLD AUTO: 351 K/UL (ref 135–420)
PMV BLD AUTO: 11.1 FL (ref 9.2–11.8)
POTASSIUM SERPL-SCNC: 3.6 MMOL/L (ref 3.5–5.5)
PROT SERPL-MCNC: 7.8 G/DL (ref 6.4–8.2)
PROT UR STRIP-MCNC: ABNORMAL MG/DL
RBC # BLD AUTO: 4.59 M/UL (ref 4.2–5.3)
RBC #/AREA URNS HPF: ABNORMAL /HPF (ref 0–5)
SODIUM SERPL-SCNC: 135 MMOL/L (ref 136–145)
SP GR UR REFRACTOMETRY: 1.01 (ref 1–1.03)
UROBILINOGEN UR QL STRIP.AUTO: 0.2 EU/DL (ref 0.2–1)
WBC # BLD AUTO: 8.6 K/UL (ref 4.6–13.2)
WBC URNS QL MICRO: ABNORMAL /HPF (ref 0–4)

## 2019-03-15 PROCEDURE — 74011250636 HC RX REV CODE- 250/636: Performed by: PHYSICIAN ASSISTANT

## 2019-03-15 PROCEDURE — 96360 HYDRATION IV INFUSION INIT: CPT

## 2019-03-15 PROCEDURE — 87086 URINE CULTURE/COLONY COUNT: CPT

## 2019-03-15 PROCEDURE — 73502 X-RAY EXAM HIP UNI 2-3 VIEWS: CPT

## 2019-03-15 PROCEDURE — 80053 COMPREHEN METABOLIC PANEL: CPT

## 2019-03-15 PROCEDURE — 85025 COMPLETE CBC W/AUTO DIFF WBC: CPT

## 2019-03-15 PROCEDURE — 87077 CULTURE AEROBIC IDENTIFY: CPT

## 2019-03-15 PROCEDURE — 74011250637 HC RX REV CODE- 250/637: Performed by: PHYSICIAN ASSISTANT

## 2019-03-15 PROCEDURE — 99285 EMERGENCY DEPT VISIT HI MDM: CPT

## 2019-03-15 PROCEDURE — 87186 SC STD MICRODIL/AGAR DIL: CPT

## 2019-03-15 PROCEDURE — 81001 URINALYSIS AUTO W/SCOPE: CPT

## 2019-03-15 RX ORDER — LIDOCAINE 50 MG/G
PATCH TOPICAL
Qty: 1 PACKAGE | Refills: 0 | Status: SHIPPED | OUTPATIENT
Start: 2019-03-15

## 2019-03-15 RX ORDER — NAPROXEN 500 MG/1
500 TABLET ORAL 2 TIMES DAILY WITH MEALS
Qty: 20 TAB | Refills: 0 | Status: SHIPPED | OUTPATIENT
Start: 2019-03-15 | End: 2019-03-25

## 2019-03-15 RX ORDER — ACETAMINOPHEN 500 MG
1000 TABLET ORAL
Status: COMPLETED | OUTPATIENT
Start: 2019-03-15 | End: 2019-03-15

## 2019-03-15 RX ADMIN — SODIUM CHLORIDE 1000 ML: 900 INJECTION, SOLUTION INTRAVENOUS at 22:56

## 2019-03-15 RX ADMIN — ACETAMINOPHEN 1000 MG: 500 TABLET ORAL at 22:56

## 2019-03-16 VITALS
WEIGHT: 112 LBS | SYSTOLIC BLOOD PRESSURE: 149 MMHG | HEART RATE: 79 BPM | TEMPERATURE: 98.3 F | OXYGEN SATURATION: 100 % | DIASTOLIC BLOOD PRESSURE: 78 MMHG | BODY MASS INDEX: 19.84 KG/M2 | HEIGHT: 63 IN | RESPIRATION RATE: 13 BRPM

## 2019-03-16 NOTE — ED NOTES
I performed a brief evaluation, including history and physical, of the patient here in triage and I have determined that pt will need further treatment and evaluation from the main side ER physician. I have placed initial orders to help in expediting patients care.      March 15, 2019 at 8:14 PM - ISHAN Reilly        Visit Vitals  /85 (BP 1 Location: Left arm, BP Patient Position: At rest)   Pulse 93   Temp 97.5 °F (36.4 °C)   Resp 18   Ht 5' 3\" (1.6 m)   Wt 50.8 kg (112 lb)   SpO2 99%   BMI 19.84 kg/m²

## 2019-03-16 NOTE — DISCHARGE INSTRUCTIONS
Patient Education      Take medication as prescribed. Follow-up with your primary care physician in 2 days for reassessment. Bring the results from this visit with your for their review. Return to the ED immediately for any new, worsening, or persistent symptoms, including fever, vomiting, or any other medical concerns. Hip Pain: Care Instructions  Your Care Instructions    Hip pain may be caused by many things, including overuse, a fall, or a twisting movement. Another cause of hip pain is arthritis. Your pain may increase when you stand up, walk, or squat. The pain may come and go or may be constant. Home treatment can help relieve hip pain, swelling, and stiffness. If your pain is ongoing, you may need more tests and treatment. Follow-up care is a key part of your treatment and safety. Be sure to make and go to all appointments, and call your doctor if you are having problems. It's also a good idea to know your test results and keep a list of the medicines you take. How can you care for yourself at home? · Take pain medicines exactly as directed. ? If the doctor gave you a prescription medicine for pain, take it as prescribed. ? If you are not taking a prescription pain medicine, ask your doctor if you can take an over-the-counter medicine. · Rest and protect your hip. Take a break from any activity, including standing or walking, that may cause pain. · Put ice or a cold pack against your hip for 10 to 20 minutes at a time. Try to do this every 1 to 2 hours for the next 3 days (when you are awake) or until the swelling goes down. Put a thin cloth between the ice and your skin. · Sleep on your healthy side with a pillow between your knees, or sleep on your back with pillows under your knees. · If there is no swelling, you can put moist heat, a heating pad, or a warm cloth on your hip. Do gentle stretching exercises to help keep your hip flexible. · Learn how to prevent falls.  Have your vision and hearing checked regularly. Wear slippers or shoes with a nonskid sole. · Stay at a healthy weight. · Wear comfortable shoes. When should you call for help? Call 911 anytime you think you may need emergency care. For example, call if:    · You have sudden chest pain and shortness of breath, or you cough up blood.     · You are not able to stand or walk or bear weight.     · Your buttocks, legs, or feet feel numb or tingly.     · Your leg or foot is cool or pale or changes color.     · You have severe pain.    Call your doctor now or seek immediate medical care if:    · You have signs of infection, such as:  ? Increased pain, swelling, warmth, or redness in the hip area. ? Red streaks leading from the hip area. ? Pus draining from the hip area. ? A fever.     · You have signs of a blood clot, such as:  ? Pain in your calf, back of the knee, thigh, or groin. ? Redness and swelling in your leg or groin.     · You are not able to bend, straighten, or move your leg normally.     · You have trouble urinating or having bowel movements.    Watch closely for changes in your health, and be sure to contact your doctor if:    · You do not get better as expected. Where can you learn more? Go to http://karis-shannon.info/. Enter O142 in the search box to learn more about \"Hip Pain: Care Instructions. \"  Current as of: September 23, 2018  Content Version: 11.9  © 6528-0398 [employer]. Care instructions adapted under license by [employer] (which disclaims liability or warranty for this information). If you have questions about a medical condition or this instruction, always ask your healthcare professional. [place] [address] any warranty or liability for your use of this information. Patient Education        Learning About High Blood Sugar  What is high blood sugar? Your body turns the food you eat into glucose (sugar), which it uses for energy.  But if your body isn't able to use the sugar right away, it can build up in your blood and lead to high blood sugar. When the amount of sugar in your blood stays too high for too much of the time, you may have diabetes. Diabetes is a disease that can cause serious health problems. The good news is that lifestyle changes may help you get your blood sugar back to normal and avoid or delay diabetes. What causes high blood sugar? Sugar (glucose) can build up in your blood if you:  · Are overweight. · Have a family history of diabetes. · Take certain medicines, such as steroids. What are the symptoms? Having high blood sugar may not cause any symptoms at all. Or it may make you feel very thirsty or very hungry. You may also urinate more often than usual, have blurry vision, or lose weight without trying. How is high blood sugar treated? You can take steps to lower your blood sugar level if you understand what makes it get higher. Your doctor may want you to learn how to test your blood sugar level at home. Then you can see how illness, stress, or different kinds of food or medicine raise or lower your blood sugar level. Other tests may be needed to see if you have diabetes. How can you prevent high blood sugar? · Watch your weight. If you're overweight, losing just a small amount of weight may help. Reducing fat around your waist is most important. · Limit the amount of calories, sweets, and unhealthy fat you eat. Ask your doctor if a dietitian can help you. A registered dietitian can help you create meal plans that fit your lifestyle. · Get at least 30 minutes of exercise on most days of the week. Exercise helps control your blood sugar. It also helps you maintain a healthy weight. Walking is a good choice. You also may want to do other activities, such as running, swimming, cycling, or playing tennis or team sports. · If your doctor prescribed medicines, take them exactly as prescribed.  Call your doctor if you think you are having a problem with your medicine. You will get more details on the specific medicines your doctor prescribes. Follow-up care is a key part of your treatment and safety. Be sure to make and go to all appointments, and call your doctor if you are having problems. It's also a good idea to know your test results and keep a list of the medicines you take. Where can you learn more? Go to http://karis-shannon.info/. Enter O108 in the search box to learn more about \"Learning About High Blood Sugar. \"  Current as of: July 25, 2018  Content Version: 11.9  © 3358-9801 [employer]. Care instructions adapted under license by [employer] (which disclaims liability or warranty for this information). If you have questions about a medical condition or this instruction, always ask your healthcare professional. [facility] any warranty or liability for your use of this information.

## 2019-03-16 NOTE — ED PROVIDER NOTES
EMERGENCY DEPARTMENT HISTORY AND PHYSICAL EXAM    9:18 PM      Date: 3/15/2019  Patient Name: Patrice Delgado    History of Presenting Illness     Chief Complaint   Patient presents with    Flank Pain         History Provided By: Patient    Chief Complaint: R hip pain  Duration:  Days  Timing:  Constant  Location: R lateral/posterior hip  Quality: Aching  Severity: Moderate  Modifying Factors: worse with movement  Associated Symptoms: denies any other associated signs or symptoms      Additional History (Context): Patrice Delgado is a 60 y.o. female with hx of depression, DM who presents with complaint of right lateral and posterior hip pain times 1 week. Patient denies injury or trauma. Denies fever, chills, nausea, vomiting, diarrhea, abdominal pain, dysuria, hematuria, numbness/tingling, leg edema. Notes pain is worse with movement and laying on the right side. Notes \"someone gave me a pain pill that I took\". PCP: Eduardo Hucthinson MD    Current Facility-Administered Medications   Medication Dose Route Frequency Provider Last Rate Last Dose    sodium chloride 0.9 % bolus infusion 1,000 mL  1,000 mL IntraVENous ONCE [place], [employer], [place]        acetaminophen (TYLENOL) tablet 1,000 mg  1,000 mg Oral NOW [place], [employer], [place]         Current Outpatient Medications   Medication Sig Dispense Refill    lidocaine (LIDODERM) 5 % Apply patch to the affected area for 12 hours a day and remove for 12 hours a day. 1 Package 0    naproxen (NAPROSYN) 500 mg tablet Take 1 Tab by mouth two (2) times daily (with meals) for 10 days.  20 Tab 0    insulin aspart protamine/insulin aspart (NOVOLOG MIX 70-30 U-100 INSULN) 100 unit/mL (70-30) injection INJECT 25 UNITS UNDER THE SKIN TWO TIMES A DAY 10 mL 3    mupirocin (BACTROBAN) 2 % ointment Apply to area affected BID 22 g 1    traMADol (ULTRAM) 50 mg tablet TAKE 2 TABLETS BY MOUTH EVERY 6 HOURS AS NEEDED FOR PAIN 100 Tab 3    lisinopril (PRINIVIL, ZESTRIL) 10 mg tablet TAKE 1 TABLET BY MOUTH ONCE DAILY 30 Tab 5    metFORMIN (GLUCOPHAGE) 1,000 mg tablet TAKE ONE TABLET BY MOUTH TWICE DAILY WITH MEALS 60 Tab 5    pravastatin (PRAVACHOL) 40 mg tablet TAKE ONE TABLET BY MOUTH EVERY NIGHT 30 Tab 5    gabapentin (NEURONTIN) 300 mg capsule TAKE ONE CAPSULE BY MOUTH THREE TIMES DAILY 90 Cap 5    Insulin Syringe-Needle U-100 1/2 mL 30 gauge x 5/16 syrg INJECT TWICE A  Syringe 5    Blood-Glucose Meter monitoring kit Check BS 3x/day E11.65 1 Kit 0    Lancets misc Check BS 3x/day E11.65 100 Each 5    glucose blood VI test strips (ONETOUCH ULTRA TEST) strip Check BS 3x/day E11.65 100 Strip 5    acetaminophen (TYLENOL) 325 mg tablet Take 2 Tabs by mouth every four (4) hours as needed for Pain. 20 Tab 0    aspirin 81 mg tablet Take 81 mg by mouth daily. Past History     Past Medical History:  Past Medical History:   Diagnosis Date    Bladder infection     Depression     Diabetes ([facility] [address]     Difficulty walking     DM (diabetes mellitus) ([facility] [address] 6/22/2012    Ill-defined condition     leaking bladder;high cholesterol       Past Surgical History:  Past Surgical History:   Procedure Laterality Date    HX GYN      hysterectomy    HX OTHER SURGICAL      bladder surgery       Family History:  Family History   Problem Relation Age of Onset    Diabetes Mother     Cancer Mother     Heart Attack Father         had 3 hear attacks    Diabetes Father        Social History:  Social History     Tobacco Use    Smoking status: Current Every Day Smoker     Packs/day: 1.00     Years: 38.00     Pack years: 38.00    Smokeless tobacco: Never Used   Substance Use Topics    Alcohol use: No    Drug use: No       Allergies:  No Known Allergies      Review of Systems       Review of Systems   Constitutional: Negative for chills and fever. Respiratory: Negative for shortness of breath. Cardiovascular: Negative for chest pain.    Gastrointestinal: Negative for abdominal pain, nausea and vomiting. Musculoskeletal: Positive for myalgias. Skin: Negative for rash. Neurological: Negative for weakness. All other systems reviewed and are negative. Physical Exam     Visit Vitals  /80 (BP 1 Location: Right arm, BP Patient Position: At rest)   Pulse 83   Temp 98.3 °F (36.8 °C)   Resp 18   Ht 5' 3\" (1.6 m)   Wt 50.8 kg (112 lb)   LMP  (LMP Unknown)   SpO2 100%   BMI 19.84 kg/m²         Physical Exam   Constitutional: She appears well-developed and well-nourished. No distress. Eating, no distress     HENT:   Head: Normocephalic and atraumatic. Neck: Normal range of motion. Neck supple. Cardiovascular: Normal rate, regular rhythm, normal heart sounds and intact distal pulses. Exam reveals no gallop and no friction rub. No murmur heard. Pulmonary/Chest: Effort normal and breath sounds normal. No respiratory distress. She has no wheezes. She has no rales. Abdominal: Soft. Bowel sounds are normal. She exhibits no distension and no mass. There is no tenderness. There is no rebound and no guarding. Musculoskeletal: Normal range of motion. Right hip: She exhibits bony tenderness (R SI joint TTP, no erythema or edema). She exhibits normal range of motion, no swelling, no crepitus and no deformity. Full ROM of extremity, strength 5/5 in LE   Neurological: She is alert. Skin: Skin is warm. No rash noted. She is not diaphoretic. Nursing note and vitals reviewed.         Diagnostic Study Results     Labs -  Recent Results (from the past 12 hour(s))   URINALYSIS W/ RFLX MICROSCOPIC    Collection Time: 03/15/19  8:18 PM   Result Value Ref Range    Color YELLOW      Appearance CLEAR      Specific gravity 1.008 1.005 - 1.030      pH (UA) 5.5 5.0 - 8.0      Protein TRACE (A) NEG mg/dL    Glucose 500 (A) NEG mg/dL    Ketone NEGATIVE  NEG mg/dL    Bilirubin NEGATIVE  NEG      Blood NEGATIVE  NEG      Urobilinogen 0.2 0.2 - 1.0 EU/dL    Nitrites NEGATIVE  NEG Leukocyte Esterase TRACE (A) NEG     URINE MICROSCOPIC ONLY    Collection Time: 03/15/19  8:18 PM   Result Value Ref Range    WBC 0 to 3 0 - 4 /hpf    RBC NONE 0 - 5 /hpf    Epithelial cells 3+ 0 - 5 /lpf    Bacteria FEW (A) NEG /hpf   CBC WITH AUTOMATED DIFF    Collection Time: 03/15/19  8:48 PM   Result Value Ref Range    WBC 8.6 4.6 - 13.2 K/uL    RBC 4.59 4.20 - 5.30 M/uL    HGB 13.9 12.0 - 16.0 g/dL    HCT 39.8 35.0 - 45.0 %    MCV 86.7 74.0 - 97.0 FL    MCH 30.3 24.0 - 34.0 PG    MCHC 34.9 31.0 - 37.0 g/dL    RDW 15.3 (H) 11.6 - 14.5 %    PLATELET 737 216 - 671 K/uL    MPV 11.1 9.2 - 11.8 FL    NEUTROPHILS 54 40 - 73 %    LYMPHOCYTES 36 21 - 52 %    MONOCYTES 7 3 - 10 %    EOSINOPHILS 2 0 - 5 %    BASOPHILS 1 0 - 2 %    ABS. NEUTROPHILS 4.7 1.8 - 8.0 K/UL    ABS. LYMPHOCYTES 3.1 0.9 - 3.6 K/UL    ABS. MONOCYTES 0.6 0.05 - 1.2 K/UL    ABS. EOSINOPHILS 0.1 0.0 - 0.4 K/UL    ABS. BASOPHILS 0.1 0.0 - 0.1 K/UL    DF AUTOMATED     METABOLIC PANEL, COMPREHENSIVE    Collection Time: 03/15/19  8:48 PM   Result Value Ref Range    Sodium 135 (L) 136 - 145 mmol/L    Potassium 3.6 3.5 - 5.5 mmol/L    Chloride 101 100 - 108 mmol/L    CO2 25 21 - 32 mmol/L    Anion gap 9 3.0 - 18 mmol/L    Glucose 118 (H) 74 - 99 mg/dL    BUN 16 7.0 - 18 MG/DL    Creatinine 1.31 (H) 0.6 - 1.3 MG/DL    BUN/Creatinine ratio 12 12 - 20      GFR est AA 50 (L) >60 ml/min/1.73m2    GFR est non-AA 42 (L) >60 ml/min/1.73m2    Calcium 8.8 8.5 - 10.1 MG/DL    Bilirubin, total 0.3 0.2 - 1.0 MG/DL    ALT (SGPT) 16 13 - 56 U/L    AST (SGOT) 11 (L) 15 - 37 U/L    Alk. phosphatase 104 45 - 117 U/L    Protein, total 7.8 6.4 - 8.2 g/dL    Albumin 3.7 3.4 - 5.0 g/dL    Globulin 4.1 (H) 2.0 - 4.0 g/dL    A-G Ratio 0.9 0.8 - 1.7         Radiologic Studies -   XR HIP RT W OR WO PELV 2-3 VWS    (Results Pending)   arthritis, no acute process      Medical Decision Making   I am the first provider for this patient.     I reviewed the vital signs, available nursing notes, past medical history, past surgical history, family history and social history. Vital Signs-Reviewed the patient's vital signs. Records Reviewed: Nursing Notes and Old Medical Records (Time of Review: 9:18 PM)    ED Course: Progress Notes, Reevaluation, and Consults:  10:37 PM Reviewed results with patient. Discussed need for close outpatient follow-up. Discussed strict return precautions, including fever, abdominal pain, or any other medical concerns. Provider Notes (Medical Decision Making): 39-year-old female who presents due to atraumatic right hip pain. Afebrile, vital signs stable, nontoxic appearing. No abdominal tenderness, no nausea or vomiting. Labs essentially unremarkable except for hyperglycemia. No erythema, rash, or wound to hip. X-ray without acute process. Full range of motion of extremity. Stable for discharge with symptomatic management and close outpatient follow-up. Diagnosis     Clinical Impression:   1. Pain of right hip joint    2. Hyperglycemia        Disposition: home    Follow-up Information     Follow up With Specialties Details Why [address] [facility] EMERGENCY DEPT Emergency Medicine  If symptoms worsen [address]  [phone]    Enma Dooley MD Internal Medicine In 2 days  [address]  [phone]                Medication List      START taking these medications    lidocaine 5 %  Commonly known as:  LIDODERM  Apply patch to the affected area for 12 hours a day and remove for 12 hours a day. naproxen 500 mg tablet  Commonly known as:  NAPROSYN  Take 1 Tab by mouth two (2) times daily (with meals) for 10 days. ASK your doctor about these medications    acetaminophen 325 mg tablet  Commonly known as:  TYLENOL  Take 2 Tabs by mouth every four (4) hours as needed for Pain.      aspirin 81 mg tablet     Blood-Glucose Meter monitoring kit  Check BS 3x/day E11.65     gabapentin 300 mg capsule  Commonly known as:  NEURONTIN  TAKE ONE CAPSULE BY MOUTH THREE TIMES DAILY     glucose blood VI test strips strip  Commonly known as:  ONETOUCH ULTRA TEST  Check BS 3x/day E11.65     insulin aspart protamine/insulin aspart 100 unit/mL (70-30) injection  Commonly known as:  NovoLOG Mix 70-30 U-100 Insuln  INJECT 25 UNITS UNDER THE SKIN TWO TIMES A DAY     Insulin Syringe-Needle U-100 0.5 mL 30 gauge x 5/16\" Syrg  INJECT TWICE A DAY     lancets Misc  Check BS 3x/day E11.65     lisinopril 10 mg tablet  Commonly known as:  PRINIVIL, ZESTRIL  TAKE 1 TABLET BY MOUTH ONCE DAILY     metFORMIN 1,000 mg tablet  Commonly known as:  GLUCOPHAGE  TAKE ONE TABLET BY MOUTH TWICE DAILY WITH MEALS     mupirocin 2 % ointment  Commonly known as:  BACTROBAN  Apply to area affected BID     pravastatin 40 mg tablet  Commonly known as:  PRAVACHOL  TAKE ONE TABLET BY MOUTH EVERY NIGHT     traMADol 50 mg tablet  Commonly known as:  ULTRAM  TAKE 2 TABLETS BY MOUTH EVERY 6 HOURS AS NEEDED FOR PAIN           Where to Get Your Medications      Information about where to get these medications is not yet available    Ask your nurse or doctor about these medications  · lidocaine 5 %  · naproxen 500 mg tablet

## 2019-03-16 NOTE — ED TRIAGE NOTES
Patient reports week long period os side pain reports pain on urination denies nausea vomiting or diarrhea.

## 2019-03-19 LAB
BACTERIA SPEC CULT: ABNORMAL
BACTERIA SPEC CULT: ABNORMAL
SERVICE CMNT-IMP: ABNORMAL

## 2019-03-25 RX ORDER — GABAPENTIN 300 MG/1
CAPSULE ORAL
Qty: 90 CAP | Refills: 4 | Status: SHIPPED | OUTPATIENT
Start: 2019-03-25 | End: 2019-03-26 | Stop reason: SDUPTHER

## 2019-03-26 RX ORDER — GABAPENTIN 300 MG/1
CAPSULE ORAL
Qty: 90 CAP | Refills: 4 | Status: SHIPPED | OUTPATIENT
Start: 2019-03-26 | End: 2019-04-25 | Stop reason: SDUPTHER

## 2019-04-18 ENCOUNTER — HOSPITAL ENCOUNTER (OUTPATIENT)
Dept: LAB | Age: 59
Discharge: HOME | End: 2019-04-18
Payer: MEDICAID

## 2019-04-18 LAB
ALBUMIN SERPL-MCNC: 4 G/DL (ref 3.4–5)
ALBUMIN/GLOB SERPL: 1.1 {RATIO} (ref 0.8–1.7)
ALP SERPL-CCNC: 124 U/L (ref 45–117)
ALT SERPL-CCNC: 18 U/L (ref 13–56)
ANION GAP SERPL CALC-SCNC: 8 MMOL/L (ref 3–18)
AST SERPL-CCNC: 13 U/L (ref 15–37)
BILIRUB SERPL-MCNC: 0.4 MG/DL (ref 0.2–1)
BUN SERPL-MCNC: 17 MG/DL (ref 7–18)
BUN/CREAT SERPL: 13 (ref 12–20)
CALCIUM SERPL-MCNC: 9.4 MG/DL (ref 8.5–10.1)
CHLORIDE SERPL-SCNC: 93 MMOL/L (ref 100–108)
CHOLEST SERPL-MCNC: 218 MG/DL
CO2 SERPL-SCNC: 27 MMOL/L (ref 21–32)
CREAT SERPL-MCNC: 1.31 MG/DL (ref 0.6–1.3)
GLOBULIN SER CALC-MCNC: 3.6 G/DL (ref 2–4)
GLUCOSE SERPL-MCNC: 297 MG/DL (ref 74–99)
HDLC SERPL-MCNC: 48 MG/DL (ref 40–60)
HDLC SERPL: 4.5 {RATIO} (ref 0–5)
LDLC SERPL CALC-MCNC: 133.6 MG/DL (ref 0–100)
LIPID PROFILE,FLP: ABNORMAL
POTASSIUM SERPL-SCNC: 5 MMOL/L (ref 3.5–5.5)
PROT SERPL-MCNC: 7.6 G/DL (ref 6.4–8.2)
SODIUM SERPL-SCNC: 128 MMOL/L (ref 136–145)
TRIGL SERPL-MCNC: 182 MG/DL (ref ?–150)
VLDLC SERPL CALC-MCNC: 36.4 MG/DL

## 2019-04-18 PROCEDURE — 80053 COMPREHEN METABOLIC PANEL: CPT

## 2019-04-18 PROCEDURE — 80061 LIPID PANEL: CPT

## 2019-04-18 PROCEDURE — 83036 HEMOGLOBIN GLYCOSYLATED A1C: CPT

## 2019-04-18 PROCEDURE — 82306 VITAMIN D 25 HYDROXY: CPT

## 2019-04-19 LAB
25(OH)D3 SERPL-MCNC: 17.4 NG/ML (ref 30–100)
EST. AVERAGE GLUCOSE BLD GHB EST-MCNC: 232 MG/DL
HBA1C MFR BLD: 9.7 % (ref 4.2–5.6)

## 2019-04-25 ENCOUNTER — OFFICE VISIT (OUTPATIENT)
Dept: FAMILY MEDICINE CLINIC | Age: 59
End: 2019-04-25

## 2019-04-25 VITALS
WEIGHT: 129 LBS | BODY MASS INDEX: 22.86 KG/M2 | DIASTOLIC BLOOD PRESSURE: 79 MMHG | OXYGEN SATURATION: 99 % | HEIGHT: 63 IN | HEART RATE: 81 BPM | SYSTOLIC BLOOD PRESSURE: 173 MMHG | TEMPERATURE: 97.6 F | RESPIRATION RATE: 20 BRPM

## 2019-04-25 DIAGNOSIS — Z79.891 CHRONIC PRESCRIPTION OPIATE USE: ICD-10-CM

## 2019-04-25 DIAGNOSIS — E13.9 DM (DIABETES MELLITUS), SECONDARY (HCC): Primary | ICD-10-CM

## 2019-04-25 DIAGNOSIS — M25.551 PAIN OF RIGHT HIP JOINT: ICD-10-CM

## 2019-04-25 DIAGNOSIS — R80.9 MICROALBUMINURIA: ICD-10-CM

## 2019-04-25 DIAGNOSIS — E55.9 VITAMIN D DEFICIENCY: ICD-10-CM

## 2019-04-25 DIAGNOSIS — R03.0 ELEVATED BLOOD PRESSURE READING: ICD-10-CM

## 2019-04-25 DIAGNOSIS — R05.9 COUGH: ICD-10-CM

## 2019-04-25 DIAGNOSIS — E78.00 HIGH CHOLESTEROL: ICD-10-CM

## 2019-04-25 RX ORDER — GABAPENTIN 300 MG/1
300 CAPSULE ORAL 3 TIMES DAILY
Qty: 90 CAP | Refills: 4 | Status: SHIPPED | OUTPATIENT
Start: 2019-04-25 | End: 2019-11-15 | Stop reason: SDUPTHER

## 2019-04-25 RX ORDER — CODEINE PHOSPHATE AND GUAIFENESIN 10; 100 MG/5ML; MG/5ML
10 SOLUTION ORAL
Qty: 120 ML | Refills: 0 | Status: SHIPPED | OUTPATIENT
Start: 2019-04-25 | End: 2019-05-25

## 2019-04-25 RX ORDER — ERGOCALCIFEROL 1.25 MG/1
50000 CAPSULE ORAL
Qty: 4 CAP | Refills: 5 | Status: SHIPPED | OUTPATIENT
Start: 2019-04-25 | End: 2020-01-06 | Stop reason: SDUPTHER

## 2019-04-25 RX ORDER — MONTELUKAST SODIUM 10 MG/1
10 TABLET ORAL DAILY
Qty: 30 TAB | Refills: 5 | Status: SHIPPED | OUTPATIENT
Start: 2019-04-25 | End: 2020-01-07

## 2019-04-25 RX ORDER — TRAMADOL HYDROCHLORIDE 50 MG/1
50 TABLET ORAL
Qty: 100 TAB | Refills: 3 | Status: SHIPPED | OUTPATIENT
Start: 2019-04-25 | End: 2019-05-25

## 2019-04-25 NOTE — PROGRESS NOTES
Patient is in the office today for 3 month follow up. 1. Have you been to the ER, urgent care clinic since your last visit? Hospitalized since your last visit? No    2. Have you seen or consulted any other health care providers outside of the [facility] since your last visit? Include any pap smears or colon screening.  No

## 2019-04-25 NOTE — PROGRESS NOTES
Subjective:       Chief Complaint  The patient presents for follow up of diabetesand high cholesterol. Proteinuria, back pain         HPI  Korin Mike is a 60 y.o. female seen for follow up of diabetes. Shealso has  hyperlipidemia. Diabetes improved control due to better compliance with taking her insulin since her caretaker with her today has been helping her, pt will continue taking Humalog mix 75/25 25 BID. Her new Aide will try to help her be more compliant so she can get her DM under control. They can increase 2 units at a time until [facility] are better controlled. She continutes taking metformin and will try to take BID, pt with poor insight to her medical problems due to developmental delay. .Hyperlipidemia borderline controlled, no significant medication side effects noted, on Pravachol. Pt remains on lisinopril for her proteinuria. Compliance remains an issue. Patient has been unwilling to come in to see Pharm. D. due to issues with transportation. Her BP is elevated today. Will recheck at next OV and adjust lisinopril if needed. Diet and Lifestyle: generally follows a low fat low cholesterol diet, does not rigorously follow a diabetic diet, sedentary    Diabetic Review of Systems - home glucose monitoring: is performed sporadically, should be 3x/day . Other symptoms and concerns: . Pt's chronic back pain is stable on Ultram prn.  reviewed. Patient is also taking Neurontin for peripheral neuropathy. Pt's vit D level on low side. Pt to take vit D 50,000 units/week on a regular basis. Current Outpatient Medications   Medication Sig    gabapentin (NEURONTIN) 300 mg capsule Take 1 Cap by mouth three (3) times daily.  traMADol (ULTRAM) 50 mg tablet Take 1 Tab by mouth every eight (8) hours as needed for Pain for up to 30 days. Max Daily Amount: 150 mg. TAKE 2 TABLETS BY MOUTH EVERY 6 HOURS AS NEEDED FOR PAIN    montelukast (SINGULAIR) 10 mg tablet Take 1 Tab by mouth daily.  guaiFENesin-codeine (CHERATUSSIN AC) 100-10 mg/5 mL solution Take 10 mL by mouth four (4) times daily as needed for Cough for up to 30 days. Max Daily Amount: 40 mL.  ergocalciferol (ERGOCALCIFEROL) 50,000 unit capsule Take 1 Cap by mouth every seven (7) days.  lidocaine (LIDODERM) 5 % Apply patch to the affected area for 12 hours a day and remove for 12 hours a day.  insulin aspart protamine/insulin aspart (NOVOLOG MIX 70-30 U-100 INSULN) 100 unit/mL (70-30) injection INJECT 25 UNITS UNDER THE SKIN TWO TIMES A DAY    mupirocin (BACTROBAN) 2 % ointment Apply to area affected BID    lisinopril (PRINIVIL, ZESTRIL) 10 mg tablet TAKE 1 TABLET BY MOUTH ONCE DAILY    metFORMIN (GLUCOPHAGE) 1,000 mg tablet TAKE ONE TABLET BY MOUTH TWICE DAILY WITH MEALS    pravastatin (PRAVACHOL) 40 mg tablet TAKE ONE TABLET BY MOUTH EVERY NIGHT    Insulin Syringe-Needle U-100 1/2 mL 30 gauge x 5/16 syrg INJECT TWICE A DAY    Blood-Glucose Meter monitoring kit Check BS 3x/day E11.65    Lancets misc Check BS 3x/day E11.65    glucose blood VI test strips (ONETOUCH ULTRA TEST) strip Check BS 3x/day E11.65    acetaminophen (TYLENOL) 325 mg tablet Take 2 Tabs by mouth every four (4) hours as needed for Pain.  aspirin 81 mg tablet Take 81 mg by mouth daily. No current facility-administered medications for this visit.             Review of Systems  Respiratory: negative for dyspnea on exertion  Cardiovascular: negative for chest pain    Objective:     Visit Vitals  /79 (BP 1 Location: Left arm, BP Patient Position: Sitting)   Pulse 81   Temp 97.6 °F (36.4 °C) (Oral)   Resp 20   Ht 5' 3\" (1.6 m)   Wt 129 lb (58.5 kg)   LMP  (LMP Unknown)   SpO2 99%   BMI 22.85 kg/m²        Eyes - pupils equal and reactive, extraocular eye movements intact  Chest - clear to auscultation, no wheezes, rales or rhonchi, symmetric air entry  Heart - normal rate, regular rhythm, normal S1, S2, no murmurs, rubs, clicks or gallops  Extremities - no edema         Labs:   Lab Results   Component Value Date/Time    Hemoglobin A1c 9.7 (H) 04/18/2019 08:35 AM    Hemoglobin A1c 12.5 (H) 01/25/2019 04:06 PM    Hemoglobin A1c 13.0 (H) 04/10/2018 02:35 PM    Glucose 297 (H) 04/18/2019 08:35 AM    Glucose (POC) 254 (H) 06/01/2017 05:16 PM    Glucose  07/16/2018 03:51 PM    Microalbumin/Creat ratio (mg/g creat) 640 (H) 04/10/2018 02:35 PM    Microalbumin,urine random 11.80 (H) 04/10/2018 02:35 PM    LDL, calculated 133.6 (H) 04/18/2019 08:35 AM    Creatinine, POC 0.5 (L) 08/23/2010 10:49 AM    Creatinine 1.31 (H) 04/18/2019 08:35 AM      Lab Results   Component Value Date/Time    Cholesterol, total 218 (H) 04/18/2019 08:35 AM    HDL Cholesterol 48 04/18/2019 08:35 AM    LDL, calculated 133.6 (H) 04/18/2019 08:35 AM    Triglyceride 182 (H) 04/18/2019 08:35 AM    CHOL/HDL Ratio 4.5 04/18/2019 08:35 AM     Lab Results   Component Value Date/Time    ALT (SGPT) 18 04/18/2019 08:35 AM    AST (SGOT) 13 (L) 04/18/2019 08:35 AM    Alk.  phosphatase 124 (H) 04/18/2019 08:35 AM    Bilirubin, direct 0.1 03/26/2012 03:45 AM    Bilirubin, total 0.4 04/18/2019 08:35 AM     Lab Results   Component Value Date/Time    GFR est AA 50 (L) 04/18/2019 08:35 AM    GFR est non-AA 42 (L) 04/18/2019 08:35 AM    Creatinine, POC 0.5 (L) 08/23/2010 10:49 AM    Creatinine 1.31 (H) 04/18/2019 08:35 AM    BUN 17 04/18/2019 08:35 AM    Sodium 128 (L) 04/18/2019 08:35 AM    Potassium 5.0 04/18/2019 08:35 AM    Chloride 93 (L) 04/18/2019 08:35 AM    CO2 27 04/18/2019 08:35 AM      Lab Results   Component Value Date/Time    Glucose 297 (H) 04/18/2019 08:35 AM    Glucose (POC) 254 (H) 06/01/2017 05:16 PM    Glucose  07/16/2018 03:51 PM      Labs:   Lab Results   Component Value Date/Time    Hemoglobin A1c 9.7 (H) 04/18/2019 08:35 AM    Hemoglobin A1c (POC) 13.3 01/25/2019 03:26 PM              Assessment / Plan     Diabetes better controlled but still not optimal with insulin, pt to continue  metformin BID and can titrate insulin 75/25 up to 30 units BID 2 units at a time. Patient's Aide will continue to help her  Hyperlipidemia borderline controlled on Pravachol. Patient aide to get a pill counter to help her be more compliant. ICD-10-CM ICD-9-CM    1. DM (diabetes mellitus), secondary ([facility] [address] E13.9 249.00 HEMOGLOBIN A1C W/O EAG   2. High cholesterol E78.00 272.0 LIPID PANEL      METABOLIC PANEL, COMPREHENSIVE   3. Microalbuminuria R80.9 791.0 Pt to take lisinopril on a regular basis MICROALBUMIN, UR, RAND W/ MICROALB/CREAT RATIO   4. Vitamin D deficiency E55.9 268.9 Uncontrolled. Pt to take vit D 50,000 units/week on a regular basis VITAMIN D, 25 HYDROXY   5. Elevated blood pressure reading R03.0 796.2 Will continue to monitor and adjust lisinopril if needed    6. Pain of right hip joint M25.551 719.45 Will continue traMADol (ULTRAM) 50 mg tablet      Check 14-DRUG SCREEN, UR   7. Cough R05 786.2 guaiFENesin-codeine (CHERATUSSIN AC) 100-10 mg/5 mL solution   8. Chronic prescription opiate use Z79.891 V58.69 14-DRUG SCREEN, UR                    Diabetic issues reviewed with her: diabetic diet discussed in detail, and low cholesterol diet, weight control and daily exercise discussed. Medication instructions and potential side effects reviewed with patient in detail. Follow-up and Dispositions    · Return in about 3 months (around 7/25/2019) for labs 1 week before. Reviewed plan of care. Patient has provided input and agrees with goals.

## 2019-04-25 NOTE — PATIENT INSTRUCTIONS
High Blood Pressure: Care Instructions  Overview    It's normal for blood pressure to go up and down throughout the day. But if it stays up, you have high blood pressure. Another name for high blood pressure is hypertension. Despite what a lot of people think, high blood pressure usually doesn't cause headaches or make you feel dizzy or lightheaded. It usually has no symptoms. But it does increase your risk of stroke, heart attack, and other problems. You and your doctor will talk about your risks of these problems based on your blood pressure. Your doctor will give you a goal for your blood pressure. Your goal will be based on your health and your age. Lifestyle changes, such as eating healthy and being active, are always important to help lower blood pressure. You might also take medicine to reach your blood pressure goal.  Follow-up care is a key part of your treatment and safety. Be sure to make and go to all appointments, and call your doctor if you are having problems. It's also a good idea to know your test results and keep a list of the medicines you take. How can you care for yourself at home? Medical treatment  · If you stop taking your medicine, your blood pressure will go back up. You may take one or more types of medicine to lower your blood pressure. Be safe with medicines. Take your medicine exactly as prescribed. Call your doctor if you think you are having a problem with your medicine. · Talk to your doctor before you start taking aspirin every day. Aspirin can help certain people lower their risk of a heart attack or stroke. But taking aspirin isn't right for everyone, because it can cause serious bleeding. · See your doctor regularly. You may need to see the doctor more often at first or until your blood pressure comes down. · If you are taking blood pressure medicine, talk to your doctor before you take decongestants or anti-inflammatory medicine, such as ibuprofen.  Some of these medicines can raise blood pressure. · Learn how to check your blood pressure at home. Lifestyle changes  · Stay at a healthy weight. This is especially important if you put on weight around the waist. Losing even 10 pounds can help you lower your blood pressure. · If your doctor recommends it, get more exercise. Walking is a good choice. Bit by bit, increase the amount you walk every day. Try for at least 30 minutes on most days of the week. You also may want to swim, bike, or do other activities. · Avoid or limit alcohol. Talk to your doctor about whether you can drink any alcohol. · Try to limit how much sodium you eat to less than 2,300 milligrams (mg) a day. Your doctor may ask you to try to eat less than 1,500 mg a day. · Eat plenty of fruits (such as bananas and oranges), vegetables, legumes, whole grains, and low-fat dairy products. · Lower the amount of saturated fat in your diet. Saturated fat is found in animal products such as milk, cheese, and meat. Limiting these foods may help you lose weight and also lower your risk for heart disease. · Do not smoke. Smoking increases your risk for heart attack and stroke. If you need help quitting, talk to your doctor about stop-smoking programs and medicines. These can increase your chances of quitting for good. When should you call for help? Call 911 anytime you think you may need emergency care. This may mean having symptoms that suggest that your blood pressure is causing a serious heart or blood vessel problem. Your blood pressure may be over 180/120.   For example, call 911 if:    · You have symptoms of a heart attack. These may include:  ? Chest pain or pressure, or a strange feeling in the chest.  ? Sweating. ? Shortness of breath. ? Nausea or vomiting. ? Pain, pressure, or a strange feeling in the back, neck, jaw, or upper belly or in one or both shoulders or arms. ? Lightheadedness or sudden weakness.   ? A fast or irregular heartbeat.     · You have symptoms of a stroke. These may include:  ? Sudden numbness, tingling, weakness, or loss of movement in your face, arm, or leg, especially on only one side of your body. ? Sudden vision changes. ? Sudden trouble speaking. ? Sudden confusion or trouble understanding simple statements. ? Sudden problems with walking or balance. ? A sudden, severe headache that is different from past headaches.     · You have severe back or belly pain.    Do not wait until your blood pressure comes down on its own. Get help right away.   Call your doctor now or seek immediate care if:    · Your blood pressure is much higher than normal (such as 180/120 or higher), but you don't have symptoms.     · You think high blood pressure is causing symptoms, such as:  ? Severe headache.  ? Blurry vision.    Watch closely for changes in your health, and be sure to contact your doctor if:    · Your blood pressure measures higher than your doctor recommends at least 2 times. That means the top number is higher or the bottom number is higher, or both.     · You think you may be having side effects from your blood pressure medicine. Where can you learn more? Go to http://karis-shannon.info/. Enter A953 in the search box to learn more about \"High Blood Pressure: Care Instructions. \"  Current as of: July 22, 2018  Content Version: 11.9  © 0044-0889 [employer]. Care instructions adapted under license by [employer] (which disclaims liability or warranty for this information). If you have questions about a medical condition or this instruction, always ask your healthcare professional. [place] [address] any warranty or liability for your use of this information.

## 2019-05-15 ENCOUNTER — HOSPITAL ENCOUNTER (EMERGENCY)
Age: 59
Discharge: HOME | End: 2019-05-15
Attending: EMERGENCY MEDICINE
Payer: MEDICAID

## 2019-05-15 ENCOUNTER — APPOINTMENT (OUTPATIENT)
Dept: GENERAL RADIOLOGY | Age: 59
End: 2019-05-15
Attending: PHYSICIAN ASSISTANT
Payer: MEDICAID

## 2019-05-15 VITALS
OXYGEN SATURATION: 98 % | HEIGHT: 63 IN | TEMPERATURE: 96 F | HEART RATE: 109 BPM | DIASTOLIC BLOOD PRESSURE: 93 MMHG | WEIGHT: 112 LBS | RESPIRATION RATE: 16 BRPM | SYSTOLIC BLOOD PRESSURE: 141 MMHG | BODY MASS INDEX: 19.84 KG/M2

## 2019-05-15 DIAGNOSIS — R05.9 COUGH: Primary | ICD-10-CM

## 2019-05-15 DIAGNOSIS — J06.9 VIRAL UPPER RESPIRATORY TRACT INFECTION: ICD-10-CM

## 2019-05-15 PROCEDURE — 71046 X-RAY EXAM CHEST 2 VIEWS: CPT

## 2019-05-15 PROCEDURE — 99281 EMR DPT VST MAYX REQ PHY/QHP: CPT

## 2019-05-15 RX ORDER — AZITHROMYCIN 500 MG/1
TABLET, FILM COATED ORAL
Qty: 3 TAB | Refills: 0 | Status: SHIPPED | OUTPATIENT
Start: 2019-05-15 | End: 2019-09-30 | Stop reason: ALTCHOICE

## 2019-05-15 RX ORDER — BENZONATATE 100 MG/1
100 CAPSULE ORAL
Qty: 30 CAP | Refills: 0 | Status: SHIPPED | OUTPATIENT
Start: 2019-05-15 | End: 2019-05-22

## 2019-05-15 NOTE — ED PROVIDER NOTES
EMERGENCY DEPARTMENT HISTORY AND PHYSICAL EXAM 
 
6:30 PM 
 
 
Date: 5/15/2019 Patient Name: Gilbert Agarwal History of Presenting Illness Chief Complaint Patient presents with  Cough History Provided By: Patient Chief Complaint: cough Additional History (Context): Gilbert Agarwal is a 60 y.o. female with diabetes who presents with cough x 2 weeks, productive with white sputum. She smokes 2 packs a day. Her chest hurts but only when she coughs. She denies shortness of breath or leg swelling. She has not been taking anything for her cough. She denies any other issues. Denies back pain. PCP: Kristal Zuleta MD 
 
Current Outpatient Medications Medication Sig Dispense Refill  azithromycin (ZITHROMAX TRI-JORGE) 500 mg tab Take 1 tablet daily 3 Tab 0  
 benzonatate (TESSALON PERLES) 100 mg capsule Take 1 Cap by mouth three (3) times daily as needed for Cough for up to 7 days. 30 Cap 0  
 gabapentin (NEURONTIN) 300 mg capsule Take 1 Cap by mouth three (3) times daily. 90 Cap 4  
 traMADol (ULTRAM) 50 mg tablet Take 1 Tab by mouth every eight (8) hours as needed for Pain for up to 30 days. Max Daily Amount: 150 mg. TAKE 2 TABLETS BY MOUTH EVERY 6 HOURS AS NEEDED FOR PAIN 100 Tab 3  
 montelukast (SINGULAIR) 10 mg tablet Take 1 Tab by mouth daily. 30 Tab 5  
 guaiFENesin-codeine (CHERATUSSIN AC) 100-10 mg/5 mL solution Take 10 mL by mouth four (4) times daily as needed for Cough for up to 30 days. Max Daily Amount: 40 mL. 120 mL 0  
 ergocalciferol (ERGOCALCIFEROL) 50,000 unit capsule Take 1 Cap by mouth every seven (7) days. 4 Cap 5  lidocaine (LIDODERM) 5 % Apply patch to the affected area for 12 hours a day and remove for 12 hours a day.  1 Package 0  
 insulin aspart protamine/insulin aspart (NOVOLOG MIX 70-30 U-100 INSULN) 100 unit/mL (70-30) injection INJECT 25 UNITS UNDER THE SKIN TWO TIMES A DAY 10 mL 3  
  mupirocin (BACTROBAN) 2 % ointment Apply to area affected BID 22 g 1  
 lisinopril (PRINIVIL, ZESTRIL) 10 mg tablet TAKE 1 TABLET BY MOUTH ONCE DAILY 30 Tab 5  
 metFORMIN (GLUCOPHAGE) 1,000 mg tablet TAKE ONE TABLET BY MOUTH TWICE DAILY WITH MEALS 60 Tab 5  pravastatin (PRAVACHOL) 40 mg tablet TAKE ONE TABLET BY MOUTH EVERY NIGHT 30 Tab 5  
 Insulin Syringe-Needle U-100 1/2 mL 30 gauge x 5/16 syrg INJECT TWICE A  Syringe 5  Blood-Glucose Meter monitoring kit Check BS 3x/day E11.65 1 Kit 0  
 Lancets misc Check BS 3x/day E11.65 100 Each 5  
 glucose blood VI test strips (ONETOUCH ULTRA TEST) strip Check BS 3x/day E11.65 100 Strip 5  
 acetaminophen (TYLENOL) 325 mg tablet Take 2 Tabs by mouth every four (4) hours as needed for Pain. 20 Tab 0  
 aspirin 81 mg tablet Take 81 mg by mouth daily. Past History Past Medical History: 
Past Medical History:  
Diagnosis Date  Bladder infection  Depression  Diabetes ([facility] [address]  Difficulty walking  DM (diabetes mellitus) ([facility] [address] 6/22/2012  Ill-defined condition   
 leaking bladder;high cholesterol Past Surgical History: 
Past Surgical History:  
Procedure Laterality Date  HX GYN    
 hysterectomy  HX OTHER SURGICAL    
 bladder surgery Family History: 
Family History Problem Relation Age of Onset  Diabetes Mother  Cancer Mother  Heart Attack Father   
     had 3 hear attacks  Diabetes Father Social History: 
Social History Tobacco Use  Smoking status: Current Every Day Smoker Packs/day: 2.00 Years: 38.00 Pack years: 76.00  Smokeless tobacco: Never Used Substance Use Topics  Alcohol use: No  
 Drug use: No  
 
 
Allergies: 
No Known Allergies Review of Systems Review of Systems Constitutional: Negative for fever. HENT: Negative for facial swelling. Eyes: Negative for visual disturbance. Respiratory: Positive for cough. Negative for shortness of breath. Cardiovascular: Negative for chest pain. Gastrointestinal: Negative for abdominal pain. Genitourinary: Negative for dysuria. Musculoskeletal: Negative for neck pain. Skin: Negative for rash. Neurological: Negative for dizziness. Psychiatric/Behavioral: Negative for confusion. All other systems reviewed and are negative. Physical Exam  
 
Visit Vitals BP (!) 141/93 (BP 1 Location: Left arm, BP Patient Position: At rest) Pulse (!) 109 Temp 96 °F (35.6 °C) Resp 16 Ht 5' 3\" (1.6 m) Wt 50.8 kg (112 lb) LMP  (LMP Unknown) SpO2 98% BMI 19.84 kg/m² Physical Exam  
Constitutional: She is oriented to person, place, and time. She appears well-developed and well-nourished. No distress. HENT:  
Head: Normocephalic and atraumatic. Eyes: Conjunctivae are normal.  
Neck: Normal range of motion. Cardiovascular: Normal rate, regular rhythm and normal heart sounds. Pulmonary/Chest: Effort normal and breath sounds normal. She has no wheezes. She has no rales. Abdominal: She exhibits no distension. Musculoskeletal: Normal range of motion. Neurological: She is alert and oriented to person, place, and time. Skin: Skin is warm and dry. She is not diaphoretic. Psychiatric: She has a normal mood and affect. Nursing note and vitals reviewed. Diagnostic Study Results Labs - No results found for this or any previous visit (from the past 12 hour(s)). Radiologic Studies -  
XR CHEST PA LAT Final Result IMPRESSION:  
  
1. No acute cardiopulmonary disease. 2.  T7 compression deformity, not present on 05/03/15. Medical Decision Making I am the first provider for this patient. I reviewed the vital signs, available nursing notes, past medical history, past surgical history, family history and social history. Vital Signs-Reviewed the patient's vital signs. Records Reviewed: Nursing Notes (Time of Review: 6:30 PM) ED Course: Progress Notes, Reevaluation, and Consults: 
 
 
Provider Notes (Medical Decision Making): MDM Number of Diagnoses or Management Options Cough:  
Viral upper respiratory tract infection:  
Diagnosis management comments: Productive cough x 2 weeks. CXR negative for pneumonia, but will cover with abx due to comorbidities. Strongly recommended smoking cessation. Diagnosis Clinical Impression: 1. Cough 2. Viral upper respiratory tract infection Disposition: Discharged Follow-up Information Follow up With Specialties Details Why Contact Info Kristal Zuleta MD Internal Medicine Schedule an appointment as soon as possible for a visit If symptoms do not improve [address] [address] [place] [phone] [facility] EMERGENCY DEPT Emergency Medicine  Immediately if symptoms worsen [address] Patient's Medications Start Taking AZITHROMYCIN (ZITHROMAX TRI-JORGE) 500 MG TAB    Take 1 tablet daily BENZONATATE (TESSALON PERLES) 100 MG CAPSULE    Take 1 Cap by mouth three (3) times daily as needed for Cough for up to 7 days. Continue Taking ACETAMINOPHEN (TYLENOL) 325 MG TABLET    Take 2 Tabs by mouth every four (4) hours as needed for Pain. ASPIRIN 81 MG TABLET    Take 81 mg by mouth daily. BLOOD-GLUCOSE METER MONITORING KIT    Check BS 3x/day E11.65 ERGOCALCIFEROL (ERGOCALCIFEROL) 50,000 UNIT CAPSULE    Take 1 Cap by mouth every seven (7) days. GABAPENTIN (NEURONTIN) 300 MG CAPSULE    Take 1 Cap by mouth three (3) times daily. GLUCOSE BLOOD VI TEST STRIPS (ONETOUCH ULTRA TEST) STRIP    Check BS 3x/day E11.65 GUAIFENESIN-CODEINE (CHERATUSSIN AC) 100-10 MG/5 ML SOLUTION    Take 10 mL by mouth four (4) times daily as needed for Cough for up to 30 days. Max Daily Amount: 40 mL. INSULIN ASPART PROTAMINE/INSULIN ASPART (NOVOLOG MIX 70-30 U-100 INSULN) 100 UNIT/ML (70-30) INJECTION    INJECT 25 UNITS UNDER THE SKIN TWO TIMES A DAY INSULIN SYRINGE-NEEDLE U-100 1/2 ML 30 GAUGE X 5/16 SYRG    INJECT TWICE A DAY  
 LANCETS MISC    Check BS 3x/day E11.65 LIDOCAINE (LIDODERM) 5 %    Apply patch to the affected area for 12 hours a day and remove for 12 hours a day. LISINOPRIL (PRINIVIL, ZESTRIL) 10 MG TABLET    TAKE 1 TABLET BY MOUTH ONCE DAILY METFORMIN (GLUCOPHAGE) 1,000 MG TABLET    TAKE ONE TABLET BY MOUTH TWICE DAILY WITH MEALS MONTELUKAST (SINGULAIR) 10 MG TABLET    Take 1 Tab by mouth daily. MUPIROCIN (BACTROBAN) 2 % OINTMENT    Apply to area affected BID  
 PRAVASTATIN (PRAVACHOL) 40 MG TABLET    TAKE ONE TABLET BY MOUTH EVERY NIGHT  
 TRAMADOL (ULTRAM) 50 MG TABLET    Take 1 Tab by mouth every eight (8) hours as needed for Pain for up to 30 days. Max Daily Amount: 150 mg. TAKE 2 TABLETS BY MOUTH EVERY 6 HOURS AS NEEDED FOR PAIN These Medications have changed No medications on file Stop Taking No medications on file  
 
_______________________________ Attestations: 
Scribe Attestation Cesar Negrete PA-C acting as a scribe for and in the presence of [employer] May 15, 2019 at 6:35 PM 
    
Provider Attestation:     
I personally performed the services described in the documentation, reviewed the documentation, as recorded by the scribe in my presence, and it accurately and completely records my words and actions. May 15, 2019 at 6:35 PM - KATIE Benoit 
_______________________________

## 2019-05-15 NOTE — ED TRIAGE NOTES
The patient presents for evaluation of a productive cough of white sputum x two weeks. Denies fever, nausea, vomiting, or diarrhea.

## 2019-05-15 NOTE — DISCHARGE INSTRUCTIONS
Patient Education        Cough: Care Instructions  Your Care Instructions    A cough is your body's response to something that bothers your throat or airways. Many things can cause a cough. You might cough because of a cold or the flu, bronchitis, or asthma. Smoking, postnasal drip, allergies, and stomach acid that backs up into your throat also can cause coughs. A cough is a symptom, not a disease. Most coughs stop when the cause, such as a cold, goes away. You can take a few steps at home to cough less and feel better. Follow-up care is a key part of your treatment and safety. Be sure to make and go to all appointments, and call your doctor if you are having problems. It's also a good idea to know your test results and keep a list of the medicines you take. How can you care for yourself at home? · Drink lots of water and other fluids. This helps thin the mucus and soothes a dry or sore throat. Honey or lemon juice in hot water or tea may ease a dry cough. · Take cough medicine as directed by your doctor. · Prop up your head on pillows to help you breathe and ease a dry cough. · Try cough drops to soothe a dry or sore throat. Cough drops don't stop a cough. Medicine-flavored cough drops are no better than candy-flavored drops or hard candy. · Do not smoke. Avoid secondhand smoke. If you need help quitting, talk to your doctor about stop-smoking programs and medicines. These can increase your chances of quitting for good. When should you call for help? Call 911 anytime you think you may need emergency care.  For example, call if:    · You have severe trouble breathing.    Call your doctor now or seek immediate medical care if:    · You cough up blood.     · You have new or worse trouble breathing.     · You have a new or higher fever.     · You have a new rash.    Watch closely for changes in your health, and be sure to contact your doctor if:    · You cough more deeply or more often, especially if you notice more mucus or a change in the color of your mucus.     · You have new symptoms, such as a sore throat, an earache, or sinus pain.     · You do not get better as expected. Where can you learn more? Go to http://karis-shannon.info/. Enter D279 in the search box to learn more about \"Cough: Care Instructions. \"  Current as of: September 5, 2018  Content Version: 11.9  © 9145-6314 [employer]. Care instructions adapted under license by [employer] (which disclaims liability or warranty for this information). If you have questions about a medical condition or this instruction, always ask your healthcare professional. [address] any warranty or liability for your use of this information.

## 2019-05-20 DIAGNOSIS — L29.9 PRURITUS: ICD-10-CM

## 2019-05-20 RX ORDER — PRAVASTATIN SODIUM 40 MG/1
TABLET ORAL
Qty: 30 TAB | Refills: 4 | Status: SHIPPED | OUTPATIENT
Start: 2019-05-20 | End: 2019-11-15 | Stop reason: SDUPTHER

## 2019-05-20 RX ORDER — METFORMIN HYDROCHLORIDE 1000 MG/1
TABLET ORAL
Qty: 60 TAB | Refills: 4 | Status: SHIPPED | OUTPATIENT
Start: 2019-05-20 | End: 2020-01-13 | Stop reason: SDUPTHER

## 2019-05-21 RX ORDER — HYDROXYZINE 25 MG/1
TABLET, FILM COATED ORAL
Qty: 90 TAB | Refills: 0 | Status: SHIPPED | OUTPATIENT
Start: 2019-05-21

## 2019-07-02 ENCOUNTER — HOSPITAL ENCOUNTER (EMERGENCY)
Age: 59
Discharge: HOME | End: 2019-07-02
Attending: EMERGENCY MEDICINE
Payer: MEDICAID

## 2019-07-02 VITALS
HEART RATE: 98 BPM | OXYGEN SATURATION: 98 % | RESPIRATION RATE: 16 BRPM | TEMPERATURE: 97.4 F | DIASTOLIC BLOOD PRESSURE: 79 MMHG | SYSTOLIC BLOOD PRESSURE: 150 MMHG

## 2019-07-02 DIAGNOSIS — M10.9 ACUTE GOUT OF RIGHT WRIST, UNSPECIFIED CAUSE: Primary | ICD-10-CM

## 2019-07-02 DIAGNOSIS — M10.9 ACUTE GOUT OF RIGHT FOOT, UNSPECIFIED CAUSE: ICD-10-CM

## 2019-07-02 PROCEDURE — 99283 EMERGENCY DEPT VISIT LOW MDM: CPT

## 2019-07-02 PROCEDURE — 74011250637 HC RX REV CODE- 250/637: Performed by: PHYSICIAN ASSISTANT

## 2019-07-02 PROCEDURE — L3908 WHO COCK-UP NONMOLDE PRE OTS: HCPCS

## 2019-07-02 RX ORDER — COLCHICINE 0.6 MG/1
TABLET ORAL
Qty: 30 TAB | Refills: 0 | Status: SHIPPED | OUTPATIENT
Start: 2019-07-02

## 2019-07-02 RX ORDER — COLCHICINE 0.6 MG/1
0.6 CAPSULE ORAL
Status: COMPLETED | OUTPATIENT
Start: 2019-07-02 | End: 2019-07-02

## 2019-07-02 RX ORDER — COLCHICINE 0.6 MG/1
TABLET ORAL
Qty: 30 TAB | Refills: 0 | Status: SHIPPED | OUTPATIENT
Start: 2019-07-02 | End: 2019-07-02

## 2019-07-02 RX ADMIN — COLCHICINE 0.6 MG: 0.6 CAPSULE ORAL at 15:20

## 2019-07-02 NOTE — ED PROVIDER NOTES
EMERGENCY DEPARTMENT HISTORY AND PHYSICAL EXAM    2:57 PM      Date: 7/2/2019  Patient Name: Clarita Barrera    History of Presenting Illness     Chief Complaint   Patient presents with    Hand Pain    Foot Pain       History Provided By: Patient    Chief Complaint: Right wrist swelling/pain, right foot swelling/pain, minimal pain to bilateral thumbs  Duration:  Days  Timing:  Acute  Location:   Quality: Aching  Severity: Moderate  Modifying Factors:   Associated Symptoms: denies any other associated signs or symptoms      Additional History (Context):Chantel Norris is a 60 y.o. female with a pertinent history of gout, DM, HLD who presents to the emergency department for evaluation of pain and swelling to bilateral thumbs, right wrist, and right foot x2 days. Patient reports history of gout with similar presentation. Patient does not drink alcohol or eats shellfish. Patient denies recent fevers, chills, nausea, vomiting, injury/trauma, or any other complaints. No treatments prior to arrival.    PCP:  Simi Tolbert MD      Current Facility-Administered Medications   Medication Dose Route Frequency Provider Last Rate Last Dose    colchicine (MITIGARE) capsule 0.6 mg  0.6 mg Oral NOW Penny Perales PA-C         Current Outpatient Medications   Medication Sig Dispense Refill    colchicine 0.6 mg tablet Take 1 tablet by mouth Q1Hour for gout pain. NOT TO EXCEED 3 TABLETS IN 24 HOURS. 30 Tab 0    hydrOXYzine HCl (ATARAX) 25 mg tablet TAKE 1 TABLET BY MOUTH THREE TIMES DAILY AS NEEDED FOR ITCHING 90 Tab 0    pravastatin (PRAVACHOL) 40 mg tablet TAKE ONE TABLET BY MOUTH EVERY NIGHT 30 Tab 4    metFORMIN (GLUCOPHAGE) 1,000 mg tablet TAKE ONE TABLET BY MOUTH TWICE DAILY WITH MEALS 60 Tab 4    azithromycin (ZITHROMAX TRI-JORGE) 500 mg tab Take 1 tablet daily 3 Tab 0    gabapentin (NEURONTIN) 300 mg capsule Take 1 Cap by mouth three (3) times daily.  90 Cap 4    montelukast (SINGULAIR) 10 mg tablet Take 1 Tab by mouth daily. 30 Tab 5    ergocalciferol (ERGOCALCIFEROL) 50,000 unit capsule Take 1 Cap by mouth every seven (7) days. 4 Cap 5    lidocaine (LIDODERM) 5 % Apply patch to the affected area for 12 hours a day and remove for 12 hours a day. 1 Package 0    insulin aspart protamine/insulin aspart (NOVOLOG MIX 70-30 U-100 INSULN) 100 unit/mL (70-30) injection INJECT 25 UNITS UNDER THE SKIN TWO TIMES A DAY 10 mL 3    mupirocin (BACTROBAN) 2 % ointment Apply to area affected BID 22 g 1    lisinopril (PRINIVIL, ZESTRIL) 10 mg tablet TAKE 1 TABLET BY MOUTH ONCE DAILY 30 Tab 5    Insulin Syringe-Needle U-100 1/2 mL 30 gauge x 5/16 syrg INJECT TWICE A  Syringe 5    Blood-Glucose Meter monitoring kit Check BS 3x/day E11.65 1 Kit 0    Lancets misc Check BS 3x/day E11.65 100 Each 5    glucose blood VI test strips (ONETOUCH ULTRA TEST) strip Check BS 3x/day E11.65 100 Strip 5    acetaminophen (TYLENOL) 325 mg tablet Take 2 Tabs by mouth every four (4) hours as needed for Pain. 20 Tab 0    aspirin 81 mg tablet Take 81 mg by mouth daily. Past History     Past Medical History:  Past Medical History:   Diagnosis Date    Bladder infection     Depression     Diabetes ([facility]     Difficulty walking     DM (diabetes mellitus) ([facility] 6/22/2012    Ill-defined condition     leaking bladder;high cholesterol       Past Surgical History:  Past Surgical History:   Procedure Laterality Date    HX GYN      hysterectomy    HX OTHER SURGICAL      bladder surgery       Family History:  Family History   Problem Relation Age of Onset    Diabetes Mother     Cancer Mother     Heart Attack Father         had 3 hear attacks    Diabetes Father        Social History:  Social History     Tobacco Use    Smoking status: Current Every Day Smoker     Packs/day: 2.00     Years: 38.00     Pack years: 76.00    Smokeless tobacco: Never Used   Substance Use Topics    Alcohol use: No    Drug use:  No Allergies:  No Known Allergies      Review of Systems       Review of Systems   Constitutional: Negative for chills and fever. HENT: Negative for congestion, rhinorrhea and sore throat. Respiratory: Negative for cough and shortness of breath. Cardiovascular: Negative for chest pain. Gastrointestinal: Negative for abdominal pain, blood in stool, constipation, diarrhea, nausea and vomiting. Genitourinary: Negative for dysuria, frequency and hematuria. Musculoskeletal: Positive for arthralgias and joint swelling. Negative for back pain and myalgias. Skin: Negative for rash and wound. Neurological: Negative for dizziness and headaches. All other systems reviewed and are negative. Physical Exam     Visit Vitals  /79 (BP 1 Location: Right arm)   Pulse 98   Temp 97.4 °F (36.3 °C)   Resp 16   LMP  (LMP Unknown)   SpO2 98%         Physical Exam   Constitutional: She is oriented to person, place, and time. She appears well-developed and well-nourished. No distress. HENT:   Head: Normocephalic and atraumatic. Eyes: Conjunctivae are normal.   Neck: Normal range of motion. Neck supple. Cardiovascular: Normal rate, regular rhythm and normal heart sounds. Pulmonary/Chest: Effort normal and breath sounds normal. No respiratory distress. She has no wheezes. She has no rales. She exhibits no tenderness. Musculoskeletal: She exhibits no edema or deformity. Erythema with trace edema noted to radial aspect of right wrist, bilateral thumbs, right dorsal forefoot. Intact distal neurovascular status all extremities. Patient moving all extremities and no acute distress. Neurological: She is alert and oriented to person, place, and time. She has normal reflexes. Skin: Skin is warm and dry. She is not diaphoretic. Psychiatric: She has a normal mood and affect. Nursing note and vitals reviewed.         Diagnostic Study Results     Labs -  No results found for this or any previous visit (from the past 12 hour(s)). Radiologic Studies -   No results found. Medical Decision Making   I am the first provider for this patient. I reviewed the vital signs, available nursing notes, past medical history, past surgical history, family history and social history. Vital Signs-Reviewed the patient's vital signs. Pulse Oximetry Analysis -  98% on room air (Interpretation)    Records Reviewed: Nursing Notes and Old Medical Records (Time of Review: 2:57 PM)    ED Course: Progress Notes, Reevaluation, and Consults:      Provider Notes (Medical Decision Making):   differential diagnosis: Osteoarthritis, gout, RA, unlikely cellulitis     Plan: patient presents ambulatory no significant distress with normal vitals. No infectious signs or symptoms. Exam and HPI consistent with gout flare. Treated with colchicine, first dose here. Patient requesting wrist splint for comfortprovided. Follow-up with PCP. At this time, patient is stable and appropriate for discharge home. Patient demonstrates understanding of current diagnoses and is in agreement with the treatment plan. They are advised that while the likelihood of serious underlying condition is low at this point given the evaluation performed today, we cannot fully rule it out. They are advised to immediately return with any new symptoms or worsening of current condition. All questions have been answered. Patient is given educational material regarding their diagnoses, including danger symptoms and when to return to the ED. Diagnosis     Clinical Impression:   1. Acute gout of right wrist, unspecified cause    2.  Acute gout of right foot, unspecified cause        Disposition: DC Home    Follow-up Information     Follow up With Specialties Details Why Contact Info    Lydia Priest MD Internal Medicine Call in 2 days  [address] [facility]      [facility] EMERGENCY DEPT Emergency Medicine Go to As needed, If symptoms worsen [address] [facility] [address]  [phone]           Patient's Medications   Start Taking    COLCHICINE 0.6 MG TABLET    Take 1 tablet by mouth Q1Hour for gout pain. NOT TO EXCEED 3 TABLETS IN 24 HOURS. Continue Taking    ACETAMINOPHEN (TYLENOL) 325 MG TABLET    Take 2 Tabs by mouth every four (4) hours as needed for Pain. ASPIRIN 81 MG TABLET    Take 81 mg by mouth daily. AZITHROMYCIN (ZITHROMAX TRI-JORGE) 500 MG TAB    Take 1 tablet daily    BLOOD-GLUCOSE METER MONITORING KIT    Check BS 3x/day E11.65    ERGOCALCIFEROL (ERGOCALCIFEROL) 50,000 UNIT CAPSULE    Take 1 Cap by mouth every seven (7) days. GABAPENTIN (NEURONTIN) 300 MG CAPSULE    Take 1 Cap by mouth three (3) times daily. GLUCOSE BLOOD VI TEST STRIPS (ONETOUCH ULTRA TEST) STRIP    Check BS 3x/day E11.65    HYDROXYZINE HCL (ATARAX) 25 MG TABLET    TAKE 1 TABLET BY MOUTH THREE TIMES DAILY AS NEEDED FOR ITCHING    INSULIN ASPART PROTAMINE/INSULIN ASPART (NOVOLOG MIX 70-30 U-100 INSULN) 100 UNIT/ML (70-30) INJECTION    INJECT 25 UNITS UNDER THE SKIN TWO TIMES A DAY    INSULIN SYRINGE-NEEDLE U-100 1/2 ML 30 GAUGE X 5/16 SYRG    INJECT TWICE A DAY    LANCETS MISC    Check BS 3x/day E11.65    LIDOCAINE (LIDODERM) 5 %    Apply patch to the affected area for 12 hours a day and remove for 12 hours a day. LISINOPRIL (PRINIVIL, ZESTRIL) 10 MG TABLET    TAKE 1 TABLET BY MOUTH ONCE DAILY    METFORMIN (GLUCOPHAGE) 1,000 MG TABLET    TAKE ONE TABLET BY MOUTH TWICE DAILY WITH MEALS    MONTELUKAST (SINGULAIR) 10 MG TABLET    Take 1 Tab by mouth daily.     MUPIROCIN (BACTROBAN) 2 % OINTMENT    Apply to area affected BID    PRAVASTATIN (PRAVACHOL) 40 MG TABLET    TAKE ONE TABLET BY MOUTH EVERY NIGHT   These Medications have changed    No medications on file   Stop Taking    No medications on file     _______________________________

## 2019-07-02 NOTE — DISCHARGE INSTRUCTIONS
Patient Education     Please return immediately to the Emergency Room for re-evaluation if you are not improving, develop any new symptoms, or develop worsening of current symptoms! If you have been prescribed a medication and are unable to take this medication for any reason, please return to the Emergency Department for further evaluation! If you have been referred for follow-up to a specialist, but are unable to follow-up and your symptoms are either not improving or are worsening, please return to the Emergency Department for further evaluation! Gout: Care Instructions  Your Care Instructions    Gout is a form of arthritis caused by a buildup of uric acid crystals in a joint. It causes sudden attacks of pain, swelling, redness, and stiffness, usually in one joint, especially the big toe. Gout usually comes on without a cause. But it can be brought on by drinking alcohol (especially beer) or eating seafood and red meat. Taking certain medicines, such as diuretics or aspirin, also can bring on an attack of gout. Taking your medicines as prescribed and following up with your doctor regularly can help you avoid gout attacks in the future. Follow-up care is a key part of your treatment and safety. Be sure to make and go to all appointments, and call your doctor if you are having problems. It's also a good idea to know your test results and keep a list of the medicines you take. How can you care for yourself at home? · If the joint is swollen, put ice or a cold pack on the area for 10 to 20 minutes at a time. Put a thin cloth between the ice and your skin. · Prop up the sore limb on a pillow when you ice it or anytime you sit or lie down during the next 3 days. Try to keep it above the level of your heart. This will help reduce swelling. · Rest sore joints. Avoid activities that put weight or strain on the joints for a few days.  Take short rest breaks from your regular activities during the day.  · Take your medicines exactly as prescribed. Call your doctor if you think you are having a problem with your medicine. · Take pain medicines exactly as directed. ? If the doctor gave you a prescription medicine for pain, take it as prescribed. ? If you are not taking a prescription pain medicine, ask your doctor if you can take an over-the-counter medicine. · Eat less seafood and red meat. · Check with your doctor before drinking alcohol. · Losing weight, if you are overweight, may help reduce attacks of gout. But do not go on a [employer]. \" Losing a lot of weight in a short amount of time can cause a gout attack. When should you call for help? Call your doctor now or seek immediate medical care if:    · You have a fever.     · The joint is so painful you cannot use it.     · You have sudden, unexplained swelling, redness, warmth, or severe pain in one or more joints.    Watch closely for changes in your health, and be sure to contact your doctor if:    · You have joint pain.     · Your symptoms get worse or are not improving after 2 or 3 days. Where can you learn more? Go to http://karis-shannon.info/. Enter Z256 in the search box to learn more about \"Gout: Care Instructions. \"  Current as of: Loreta 10, 2018  Content Version: 11.9  © 4620-1385 [employer]. Care instructions adapted under license by [employer] (which disclaims liability or warranty for this information). If you have questions about a medical condition or this instruction, always ask your healthcare professional. [place] [address] any warranty or liability for your use of this information. Patient Education        Purine-Restricted Diet: Care Instructions  Your Care Instructions    Purines are substances that are found in some foods. Your body turns purines into uric acid.  High levels of uric acid can cause gout, which is a form of arthritis that causes pain and inflammation in joints. You may be able to help control the amount of uric acid in your body by limiting high-purine foods in your diet. Follow-up care is a key part of your treatment and safety. Be sure to make and go to all appointments, and call your doctor if you are having problems. It's also a good idea to know your test results and keep a list of the medicines you take. How can you care for yourself at home? · Plan your meals and snacks around foods that are low in purines and are safe for you to eat. These foods include:  ? Green vegetables and tomatoes. ? Fruits. ? Whole-grain breads, rice, and cereals. ? Eggs, peanut butter, and nuts. ? Low-fat milk, cheese, and other milk products. ? Popcorn. ? Gelatin desserts, chocolate, cocoa, and cakes and sweets, in small amounts. · You can eat certain foods that are medium-high in purines, but eat them only once in a while. These foods include:  ? Legumes, such as dried beans and dried peas. You can have 1 cup cooked legumes each day. ? Asparagus, cauliflower, spinach, mushrooms, and green peas. ? Fish and seafood (other than very high-purine seafood). ? Oatmeal, wheat bran, and wheat germ. · Limit very high-purine foods, including:  ? Organ meats, such as liver, kidneys, sweetbreads, and brains. ? Meats, including stone, beef, pork, and lamb. ? Game meats and any other meats in large amounts. ? Anchovies, sardines, herring, mackerel, and scallops. ? Gravy. ? Beer. Where can you learn more? Go to http://karis-shannon.info/. Enter F448 in the search box to learn more about \"Purine-Restricted Diet: Care Instructions. \"  Current as of: March 28, 2018  Content Version: 11.9  © 4851-2872 [employer]. Care instructions adapted under license by [employer] (which disclaims liability or warranty for this information).  If you have questions about a medical condition or this instruction, always ask your healthcare professional. [address] any warranty or liability for your use of this information.

## 2019-07-18 ENCOUNTER — HOSPITAL ENCOUNTER (OUTPATIENT)
Dept: LAB | Age: 59
Discharge: HOME | End: 2019-07-18
Payer: MEDICAID

## 2019-07-18 DIAGNOSIS — E78.00 HIGH CHOLESTEROL: ICD-10-CM

## 2019-07-18 DIAGNOSIS — E13.9 DM (DIABETES MELLITUS), SECONDARY (HCC): ICD-10-CM

## 2019-07-18 DIAGNOSIS — Z79.891 CHRONIC PRESCRIPTION OPIATE USE: ICD-10-CM

## 2019-07-18 DIAGNOSIS — R80.9 MICROALBUMINURIA: ICD-10-CM

## 2019-07-18 DIAGNOSIS — E55.9 VITAMIN D DEFICIENCY: ICD-10-CM

## 2019-07-18 DIAGNOSIS — M25.551 PAIN OF RIGHT HIP JOINT: ICD-10-CM

## 2019-07-18 LAB
ALBUMIN SERPL-MCNC: 3.7 G/DL (ref 3.4–5)
ALBUMIN/GLOB SERPL: 1.2 {RATIO} (ref 0.8–1.7)
ALP SERPL-CCNC: 85 U/L (ref 45–117)
ALT SERPL-CCNC: 20 U/L (ref 13–56)
ANION GAP SERPL CALC-SCNC: 4 MMOL/L (ref 3–18)
AST SERPL-CCNC: 21 U/L (ref 10–38)
BILIRUB SERPL-MCNC: 0.2 MG/DL (ref 0.2–1)
BUN SERPL-MCNC: 14 MG/DL (ref 7–18)
BUN/CREAT SERPL: 11 (ref 12–20)
CALCIUM SERPL-MCNC: 9.3 MG/DL (ref 8.5–10.1)
CHLORIDE SERPL-SCNC: 103 MMOL/L (ref 100–111)
CHOLEST SERPL-MCNC: 160 MG/DL
CO2 SERPL-SCNC: 29 MMOL/L (ref 21–32)
CREAT SERPL-MCNC: 1.33 MG/DL (ref 0.6–1.3)
GLOBULIN SER CALC-MCNC: 3.2 G/DL (ref 2–4)
GLUCOSE SERPL-MCNC: 112 MG/DL (ref 74–99)
HDLC SERPL-MCNC: 50 MG/DL (ref 40–60)
HDLC SERPL: 3.2 {RATIO} (ref 0–5)
LDLC SERPL CALC-MCNC: 91 MG/DL (ref 0–100)
LIPID PROFILE,FLP: NORMAL
POTASSIUM SERPL-SCNC: 5.1 MMOL/L (ref 3.5–5.5)
PROT SERPL-MCNC: 6.9 G/DL (ref 6.4–8.2)
SODIUM SERPL-SCNC: 136 MMOL/L (ref 136–145)
TRIGL SERPL-MCNC: 95 MG/DL (ref ?–150)
VLDLC SERPL CALC-MCNC: 19 MG/DL

## 2019-07-18 PROCEDURE — 82306 VITAMIN D 25 HYDROXY: CPT

## 2019-07-18 PROCEDURE — 80053 COMPREHEN METABOLIC PANEL: CPT

## 2019-07-18 PROCEDURE — 82043 UR ALBUMIN QUANTITATIVE: CPT

## 2019-07-18 PROCEDURE — 80061 LIPID PANEL: CPT

## 2019-07-18 PROCEDURE — 36415 COLL VENOUS BLD VENIPUNCTURE: CPT

## 2019-07-18 PROCEDURE — 83036 HEMOGLOBIN GLYCOSYLATED A1C: CPT

## 2019-07-18 PROCEDURE — 80307 DRUG TEST PRSMV CHEM ANLYZR: CPT

## 2019-07-19 LAB
25(OH)D3 SERPL-MCNC: 18.1 NG/ML (ref 30–100)
6MAM UR QL SCN: NEGATIVE NG/ML
AMPHETAMINE SCREEN, URINE, 734836: NEGATIVE NG/ML
BARBITURATES UR QL SCN: NEGATIVE NG/ML
BENZODIAZ UR QL: NEGATIVE NG/ML
BUPRENORPHINE, URINE: NEGATIVE NG/ML
BZE UR QL: NEGATIVE NG/ML
CANNABINOIDS UR QL SCN: NEGATIVE NG/ML
CREAT UR-MCNC: 28 MG/DL (ref 30–125)
CREAT UR-MCNC: 31.8 MG/DL (ref 20–300)
EDDP UR QL: NEGATIVE NG/ML
ETHANOL UR-MCNC: NEGATIVE %
HBA1C MFR BLD: 10.7 % (ref 4.2–5.6)
METHADONE UR QL SCN: NEGATIVE NG/ML
MICROALBUMIN UR-MCNC: 9.62 MG/DL (ref 0–3)
MICROALBUMIN/CREAT UR-RTO: 344 MG/G (ref 0–30)
OPIATES UR QL SCN: NEGATIVE NG/ML
OXYCODONE+OXYMORPHONE UR QL SCN: NEGATIVE NG/ML
PCP UR QL: NEGATIVE NG/ML
PH UR: 7 [PH] (ref 4.5–8.9)
PROPOXYPH UR QL: NEGATIVE NG/ML

## 2019-07-25 ENCOUNTER — OFFICE VISIT (OUTPATIENT)
Dept: FAMILY MEDICINE CLINIC | Age: 59
End: 2019-07-25

## 2019-07-25 VITALS
DIASTOLIC BLOOD PRESSURE: 92 MMHG | TEMPERATURE: 97.9 F | HEIGHT: 63 IN | RESPIRATION RATE: 20 BRPM | HEART RATE: 94 BPM | SYSTOLIC BLOOD PRESSURE: 160 MMHG | BODY MASS INDEX: 21.09 KG/M2 | OXYGEN SATURATION: 98 % | WEIGHT: 119 LBS

## 2019-07-25 DIAGNOSIS — R80.9 MICROALBUMINURIA: ICD-10-CM

## 2019-07-25 DIAGNOSIS — M54.50 CHRONIC MIDLINE LOW BACK PAIN WITHOUT SCIATICA: ICD-10-CM

## 2019-07-25 DIAGNOSIS — G89.29 CHRONIC MIDLINE LOW BACK PAIN WITHOUT SCIATICA: ICD-10-CM

## 2019-07-25 DIAGNOSIS — I10 ESSENTIAL HYPERTENSION: ICD-10-CM

## 2019-07-25 DIAGNOSIS — E11.40 TYPE 2 DIABETES MELLITUS WITH DIABETIC NEUROPATHY, WITH LONG-TERM CURRENT USE OF INSULIN (HCC): Primary | ICD-10-CM

## 2019-07-25 DIAGNOSIS — E78.00 HIGH CHOLESTEROL: ICD-10-CM

## 2019-07-25 DIAGNOSIS — Z79.4 TYPE 2 DIABETES MELLITUS WITH DIABETIC NEUROPATHY, WITH LONG-TERM CURRENT USE OF INSULIN (HCC): Primary | ICD-10-CM

## 2019-07-25 DIAGNOSIS — E55.9 VITAMIN D DEFICIENCY: ICD-10-CM

## 2019-07-25 DIAGNOSIS — M25.531 RIGHT WRIST PAIN: ICD-10-CM

## 2019-07-25 NOTE — PROGRESS NOTES
Patient is in the office today for 3 month follow up. 1. Have you been to the ER, urgent care clinic since your last visit? Hospitalized since your last visit? No    2. Have you seen or consulted any other health care providers outside of the [address] [place] [address] since your last visit? Include any pap smears or colon screening.  No

## 2019-07-25 NOTE — PROGRESS NOTES
Subjective:       Chief Complaint  The patient presents for follow up of diabetesand high cholesterol. Proteinuria, back pain         HPI  Rome Marquez is a 60 y.o. female seen for follow up of diabetes. She[place]varun has  hyperlipidemia. Diabetes poorly control due to poor compliance with taking her insulin,   her caretaker gives her her insulin at night, pt does not give herself in the AM, pt is prescribed  Humalog mix 75/25 25 BID. Her new Aide will try to help her be more compliant so she can get her DM under control. It has been difficult since pt is not motivated to take care of health. She continutes taking metformin and will try to take BID, pt with poor insight to her medical problems due to developmental delay. .Hyperlipidemia borderline controlled, no significant medication side effects noted, on Pravachol. Pt remains on lisinopril for her proteinuria. Compliance remains an issue. Patient has been unwilling to come in to see Pharm. D. due to issues with transportation. Her BP remains elevated today. May need to increase lisinopril if BP remains up. Diet and Lifestyle: generally follows a low fat low cholesterol diet, does not rigorously follow a diabetic diet, sedentary    Diabetic Review of Systems - home glucose monitoring: is performed sporadically, should be 3x/day . Other symptoms and concerns: . Pt's chronic back pain is stable on Ultram prn.  reviewed. Patient is also taking Neurontin for peripheral neuropathy. Pt's vit D level on low side. Pt to take vit D 50,000 units/week on a regular basis. Pt c/o right wrist pain for the last few weeks whgich is not improving even with brace. Pain with ulnar deviation. Current Outpatient Medications   Medication Sig    colchicine 0.6 mg tablet Take 1 tablet by mouth Q1Hour for gout pain. NOT TO EXCEED 3 TABLETS IN 24 HOURS.     hydrOXYzine HCl (ATARAX) 25 mg tablet TAKE 1 TABLET BY MOUTH THREE TIMES DAILY AS NEEDED FOR ITCHING    pravastatin (PRAVACHOL) 40 mg tablet TAKE ONE TABLET BY MOUTH EVERY NIGHT    metFORMIN (GLUCOPHAGE) 1,000 mg tablet TAKE ONE TABLET BY MOUTH TWICE DAILY WITH MEALS    azithromycin (ZITHROMAX TRI-JORGE) 500 mg tab Take 1 tablet daily    gabapentin (NEURONTIN) 300 mg capsule Take 1 Cap by mouth three (3) times daily.  montelukast (SINGULAIR) 10 mg tablet Take 1 Tab by mouth daily.  ergocalciferol (ERGOCALCIFEROL) 50,000 unit capsule Take 1 Cap by mouth every seven (7) days.  lidocaine (LIDODERM) 5 % Apply patch to the affected area for 12 hours a day and remove for 12 hours a day.  insulin aspart protamine/insulin aspart (NOVOLOG MIX 70-30 U-100 INSULN) 100 unit/mL (70-30) injection INJECT 25 UNITS UNDER THE SKIN TWO TIMES A DAY    mupirocin (BACTROBAN) 2 % ointment Apply to area affected BID    lisinopril (PRINIVIL, ZESTRIL) 10 mg tablet TAKE 1 TABLET BY MOUTH ONCE DAILY    Insulin Syringe-Needle U-100 1/2 mL 30 gauge x 5/16 syrg INJECT TWICE A DAY    Blood-Glucose Meter monitoring kit Check BS 3x/day E11.65    Lancets misc Check BS 3x/day E11.65    glucose blood VI test strips (ONETOUCH ULTRA TEST) strip Check BS 3x/day E11.65    acetaminophen (TYLENOL) 325 mg tablet Take 2 Tabs by mouth every four (4) hours as needed for Pain.  aspirin 81 mg tablet Take 81 mg by mouth daily. No current facility-administered medications for this visit.             Review of Systems  Respiratory: negative for dyspnea on exertion  Cardiovascular: negative for chest pain    Objective:     Visit Vitals  BP (!) 160/92 (BP 1 Location: Left arm, BP Patient Position: Sitting)   Pulse 94   Temp 97.9 °F (36.6 °C) (Oral)   Resp 20   Ht 5' 3\" (1.6 m)   Wt 119 lb (54 kg)   LMP  (LMP Unknown)   SpO2 98%   BMI 21.08 kg/m²        Eyes - pupils equal and reactive, extraocular eye movements intact  Chest - clear to auscultation, no wheezes, rales or rhonchi, symmetric air entry  Heart - normal rate, regular rhythm, normal S1, S2, no murmurs, rubs, clicks or gallops  Extremities - no edema         Labs:   Lab Results   Component Value Date/Time    Hemoglobin A1c 10.7 (H) 07/18/2019 08:25 AM    Hemoglobin A1c 9.7 (H) 04/18/2019 08:35 AM    Hemoglobin A1c 12.5 (H) 01/25/2019 04:06 PM    Glucose 112 (H) 07/18/2019 08:25 AM    Glucose (POC) 254 (H) 06/01/2017 05:16 PM    Glucose  07/16/2018 03:51 PM    Microalbumin/Creat ratio (mg/g creat) 344 (H) 07/18/2019 08:25 AM    Microalbumin,urine random 9.62 (H) 07/18/2019 08:25 AM    LDL, calculated 91 07/18/2019 08:25 AM    Creatinine, POC 0.5 (L) 08/23/2010 10:49 AM    Creatinine 1.33 (H) 07/18/2019 08:25 AM      Lab Results   Component Value Date/Time    Cholesterol, total 160 07/18/2019 08:25 AM    HDL Cholesterol 50 07/18/2019 08:25 AM    LDL, calculated 91 07/18/2019 08:25 AM    Triglyceride 95 07/18/2019 08:25 AM    CHOL/HDL Ratio 3.2 07/18/2019 08:25 AM     Lab Results   Component Value Date/Time    ALT (SGPT) 20 07/18/2019 08:25 AM    AST (SGOT) 21 07/18/2019 08:25 AM    Alk.  phosphatase 85 07/18/2019 08:25 AM    Bilirubin, direct 0.1 03/26/2012 03:45 AM    Bilirubin, total 0.2 07/18/2019 08:25 AM     Lab Results   Component Value Date/Time    GFR est AA 49 (L) 07/18/2019 08:25 AM    GFR est non-AA 41 (L) 07/18/2019 08:25 AM    Creatinine, POC 0.5 (L) 08/23/2010 10:49 AM    Creatinine 1.33 (H) 07/18/2019 08:25 AM    BUN 14 07/18/2019 08:25 AM    Sodium 136 07/18/2019 08:25 AM    Potassium 5.1 07/18/2019 08:25 AM    Chloride 103 07/18/2019 08:25 AM    CO2 29 07/18/2019 08:25 AM      Lab Results   Component Value Date/Time    Glucose 112 (H) 07/18/2019 08:25 AM    Glucose (POC) 254 (H) 06/01/2017 05:16 PM    Glucose  07/16/2018 03:51 PM      Labs:   Lab Results   Component Value Date/Time    Hemoglobin A1c 10.7 (H) 07/18/2019 08:25 AM    Hemoglobin A1c (POC) 13.3 01/25/2019 03:26 PM              Assessment / Plan     Diabetes uncontrolled due to poor compliance with  insulin 75/25 up to 25 units BID   Patient's Aide will continue to help her  Hyperlipidemia borderline controlled on Pravachol. Patient aide to get a pill counter to help her be more compliant. ICD-10-CM ICD-9-CM    1. Type 2 diabetes mellitus with diabetic neuropathy, with long-term current use of insulin ([facility]) D50.17 109.44 METABOLIC PANEL, COMPREHENSIVE    Z79.4 357.2 HEMOGLOBIN A1C W/O EAG     V58.67    2. High cholesterol E78.00 272.0 LIPID PANEL   3. Essential hypertension I10 401.9 Uncontrolled. Will need to increase lisinopril. .    4. Microalbuminuria R80.9 791.0 Worsening due to poor compliance with lisinopril. 5. Vitamin D deficiency E55.9 268.9 Uncontrolled due to poor compliance with vit D 50,000 units/week VITAMIN D, 25 HYDROXY   6. Chronic midline low back pain without sciatica M54.5 724.2 Stable on ultram     G89.29 338.29    7. Right wrist pain M25.531 719.43 REFERRAL TO ORTHOPEDICS                    Diabetic issues reviewed with her: diabetic diet discussed in detail, and low cholesterol diet, weight control and daily exercise discussed. Medication instructions and potential side effects reviewed with patient in detail. Follow-up and Dispositions    · Return in about 3 months (around 10/25/2019) for labs 1 week before. Reviewed plan of care. Patient has provided input and agrees with goals.

## 2019-07-25 NOTE — PATIENT INSTRUCTIONS
Hyperlipidemia: After Your Visit  Your Care Instructions  Hyperlipidemia is too much fat in your blood. The body has several kinds of fat, including cholesterol and triglycerides. Your body needs fat for many things, such as making new cells. But too much fat in your blood increases your chances of having a heart attack or stroke. You may be able to lower your cholesterol and triglycerides with a heart-healthy diet, exercise, and if needed, medicine. Your doctor may want you to try lifestyle changes first to see whether they lower the fat in your blood. You may need to take medicine if lifestyle changes do not lower the fat in your blood enough. Follow-up care is a key part of your treatment and safety. Be sure to make and go to all appointments, and call your doctor if you are having problems. Its also a good idea to know your test results and keep a list of the medicines you take. How can you care for yourself at home? Take your medicines  · Take your medicines exactly as prescribed. Call your doctor if you think you are having a problem with your medicine. · If you take medicine to lower your cholesterol, go to follow-up visits. You will need to have blood tests. · Do not take large doses of niacin, which is a B vitamin, while taking medicine called statins. It may increase the chance of muscle pain and liver problems. · Talk to your doctor about avoiding grapefruit juice if you are taking statins. Grapefruit juice can raise the level of this medicine in your blood. This could increase side effects. Eat more fruits, vegetables, and fiber  · Fruits and vegetables have lots of nutrients that help protect against heart disease, and they have littleif anyfat. Try to eat at least five servings a day. Dark green, deep orange, or yellow fruits and vegetables are healthy choices. · Keep carrots, celery, and other veggies handy for snacks.  Buy fruit that is in season and store it where you can see it so that you will be tempted to eat it. Cook dishes that have a lot of veggies in them, such as stir-fries and soups. · Foods high in fiber may reduce your cholesterol and provide important vitamins and minerals. High-fiber foods include whole-grain cereals and breads, oatmeal, beans, brown rice, citrus fruits, and apples. · Buy whole-grain breads and cereals instead of white bread and pastries. Limit saturated fat  · Read food labels and try to avoid saturated fat and trans fat. They increase your risk of heart disease. · Use olive or canola oil when you cook. Try cholesterol-lowering spreads, such as Benecol or Take Control. · Bake, broil, grill, or steam foods instead of frying them. · Limit the amount of high-fat meats you eat, including hot dogs and sausages. Cut out all visible fat when you prepare meat. · Eat fish, skinless poultry, and soy products such as tofu instead of high-fat meats. Soybeans may be especially good for your heart. Eat at least two servings of fish a week. Certain fish, such as salmon, contain omega-3 fatty acids, which may help reduce your risk of heart attack. · Choose low-fat or fat-free milk and dairy products. Get exercise, limit alcohol, and quit smoking  · Get more exercise. Work with your doctor to set up an exercise program. Even if you can do only a small amount, exercise will help you get stronger, have more energy, and manage your weight and your stress. Walking is an easy way to get exercise. Gradually increase the amount you walk every day. Aim for at least 30 minutes on most days of the week. You also may want to swim, bike, or do other activities. · Limit alcohol to no more than 2 drinks a day for men and 1 drink a day for women. · Do not smoke. If you need help quitting, talk to your doctor about stop-smoking programs and medicines. These can increase your chances of quitting for good. When should you call for help?   Call 911 anytime you think you may need emergency care. For example, call if:  · You have symptoms of a heart attack. These may include:  ¨ Chest pain or pressure, or a strange feeling in the chest.  ¨ Sweating. ¨ Shortness of breath. ¨ Nausea or vomiting. ¨ Pain, pressure, or a strange feeling in the back, neck, jaw, or upper belly or in one or both shoulders or arms. ¨ Lightheadedness or sudden weakness. ¨ A fast or irregular heartbeat. After you call 911, the  may tell you to chew 1 adult-strength or 2 to 4 low-dose aspirin. Wait for an ambulance. Do not try to drive yourself. · You have signs of a stroke. These may include:  ¨ Sudden numbness, paralysis, or weakness in your face, arm, or leg, especially on only one side of your body. ¨ New problems with walking or balance. ¨ Sudden vision changes. ¨ Drooling or slurred speech. ¨ New problems speaking or understanding simple statements, or feeling confused. ¨ A sudden, severe headache that is different from past headaches. · You passed out (lost consciousness). Call your doctor now or seek immediate medical care if:  · You have muscle pain or weakness. Watch closely for changes in your health, and be sure to contact your doctor if:  · You are very tired. · You have an upset stomach, gas, constipation, or belly pain or cramps. Where can you learn more? Go to ipnexus.be  Enter C406 in the search box to learn more about \"Hyperlipidemia: After Your Visit. \"   © 6583-8670 [employer]. Care instructions adapted under license by [employer] (which disclaims liability or warranty for this information). This care instruction is for use with your licensed healthcare professional. If you have questions about a medical condition or this instruction, always ask your healthcare professional. [place] [address] any warranty or liability for your use of this information.   Content Version: 2.5.187037; Last Revised: October 13, 2011

## 2019-09-30 ENCOUNTER — APPOINTMENT (OUTPATIENT)
Dept: GENERAL RADIOLOGY | Age: 59
DRG: 305 | End: 2019-09-30
Attending: EMERGENCY MEDICINE
Payer: MEDICAID

## 2019-09-30 ENCOUNTER — HOSPITAL ENCOUNTER (INPATIENT)
Age: 59
LOS: 9 days | Discharge: HOME HEALTH | DRG: 305 | End: 2019-10-09
Attending: EMERGENCY MEDICINE | Admitting: HOSPITALIST
Payer: MEDICAID

## 2019-09-30 DIAGNOSIS — L02.91 ABSCESS: Primary | ICD-10-CM

## 2019-09-30 DIAGNOSIS — G89.4 CHRONIC PAIN SYNDROME: ICD-10-CM

## 2019-09-30 PROBLEM — I10 HYPERTENSION: Status: ACTIVE | Noted: 2019-09-30

## 2019-09-30 PROBLEM — L02.619 CELLULITIS AND ABSCESS OF FOOT: Status: ACTIVE | Noted: 2019-09-30

## 2019-09-30 PROBLEM — L03.119 CELLULITIS AND ABSCESS OF FOOT: Status: ACTIVE | Noted: 2019-09-30

## 2019-09-30 PROBLEM — E87.1 HYPONATREMIA: Status: ACTIVE | Noted: 2019-09-30

## 2019-09-30 LAB
ALBUMIN SERPL-MCNC: 3.6 G/DL (ref 3.4–5)
ALBUMIN/GLOB SERPL: 0.8 {RATIO} (ref 0.8–1.7)
ALP SERPL-CCNC: 114 U/L (ref 45–117)
ALT SERPL-CCNC: 14 U/L (ref 13–56)
ANION GAP SERPL CALC-SCNC: 9 MMOL/L (ref 3–18)
AST SERPL-CCNC: 7 U/L (ref 10–38)
BASOPHILS # BLD: 0.1 K/UL (ref 0–0.1)
BASOPHILS NFR BLD: 1 % (ref 0–2)
BILIRUB SERPL-MCNC: 0.5 MG/DL (ref 0.2–1)
BUN SERPL-MCNC: 25 MG/DL (ref 7–18)
BUN/CREAT SERPL: 16 (ref 12–20)
CALCIUM SERPL-MCNC: 9.3 MG/DL (ref 8.5–10.1)
CHLORIDE SERPL-SCNC: 96 MMOL/L (ref 100–111)
CO2 SERPL-SCNC: 23 MMOL/L (ref 21–32)
CREAT SERPL-MCNC: 1.58 MG/DL (ref 0.6–1.3)
DIFFERENTIAL METHOD BLD: ABNORMAL
EOSINOPHIL # BLD: 0.1 K/UL (ref 0–0.4)
EOSINOPHIL NFR BLD: 1 % (ref 0–5)
ERYTHROCYTE [DISTWIDTH] IN BLOOD BY AUTOMATED COUNT: 13.2 % (ref 11.6–14.5)
EST. AVERAGE GLUCOSE BLD GHB EST-MCNC: 312 MG/DL
GLOBULIN SER CALC-MCNC: 4.6 G/DL (ref 2–4)
GLUCOSE BLD STRIP.AUTO-MCNC: 213 MG/DL (ref 70–110)
GLUCOSE BLD STRIP.AUTO-MCNC: 440 MG/DL (ref 70–110)
GLUCOSE SERPL-MCNC: 437 MG/DL (ref 74–99)
HBA1C MFR BLD: 12.5 % (ref 4.2–5.6)
HCT VFR BLD AUTO: 36.6 % (ref 35–45)
HGB BLD-MCNC: 12.9 G/DL (ref 12–16)
LACTATE BLD-SCNC: 1.25 MMOL/L (ref 0.4–2)
LYMPHOCYTES # BLD: 1.9 K/UL (ref 0.9–3.6)
LYMPHOCYTES NFR BLD: 15 % (ref 21–52)
MCH RBC QN AUTO: 29.9 PG (ref 24–34)
MCHC RBC AUTO-ENTMCNC: 35.2 G/DL (ref 31–37)
MCV RBC AUTO: 84.7 FL (ref 74–97)
MONOCYTES # BLD: 1.3 K/UL (ref 0.05–1.2)
MONOCYTES NFR BLD: 10 % (ref 3–10)
NEUTS SEG # BLD: 9.3 K/UL (ref 1.8–8)
NEUTS SEG NFR BLD: 73 % (ref 40–73)
PLATELET # BLD AUTO: 388 K/UL (ref 135–420)
PMV BLD AUTO: 10.6 FL (ref 9.2–11.8)
POTASSIUM SERPL-SCNC: 5 MMOL/L (ref 3.5–5.5)
PROT SERPL-MCNC: 8.2 G/DL (ref 6.4–8.2)
RBC # BLD AUTO: 4.32 M/UL (ref 4.2–5.3)
SODIUM SERPL-SCNC: 128 MMOL/L (ref 136–145)
WBC # BLD AUTO: 12.6 K/UL (ref 4.6–13.2)

## 2019-09-30 PROCEDURE — 99283 EMERGENCY DEPT VISIT LOW MDM: CPT

## 2019-09-30 PROCEDURE — 74011250636 HC RX REV CODE- 250/636: Performed by: EMERGENCY MEDICINE

## 2019-09-30 PROCEDURE — 71045 X-RAY EXAM CHEST 1 VIEW: CPT

## 2019-09-30 PROCEDURE — 83036 HEMOGLOBIN GLYCOSYLATED A1C: CPT

## 2019-09-30 PROCEDURE — 80053 COMPREHEN METABOLIC PANEL: CPT

## 2019-09-30 PROCEDURE — 74011636637 HC RX REV CODE- 636/637: Performed by: NURSE PRACTITIONER

## 2019-09-30 PROCEDURE — 74011000258 HC RX REV CODE- 258: Performed by: NURSE PRACTITIONER

## 2019-09-30 PROCEDURE — 82962 GLUCOSE BLOOD TEST: CPT

## 2019-09-30 PROCEDURE — 85025 COMPLETE CBC W/AUTO DIFF WBC: CPT

## 2019-09-30 PROCEDURE — 87076 CULTURE ANAEROBE IDENT EACH: CPT

## 2019-09-30 PROCEDURE — 74011250636 HC RX REV CODE- 250/636: Performed by: NURSE PRACTITIONER

## 2019-09-30 PROCEDURE — 90715 TDAP VACCINE 7 YRS/> IM: CPT | Performed by: EMERGENCY MEDICINE

## 2019-09-30 PROCEDURE — 65270000029 HC RM PRIVATE

## 2019-09-30 PROCEDURE — 87040 BLOOD CULTURE FOR BACTERIA: CPT

## 2019-09-30 PROCEDURE — 74011250637 HC RX REV CODE- 250/637: Performed by: NURSE PRACTITIONER

## 2019-09-30 PROCEDURE — 93005 ELECTROCARDIOGRAM TRACING: CPT

## 2019-09-30 PROCEDURE — 74011000258 HC RX REV CODE- 258: Performed by: EMERGENCY MEDICINE

## 2019-09-30 PROCEDURE — 83605 ASSAY OF LACTIC ACID: CPT

## 2019-09-30 PROCEDURE — 74011636637 HC RX REV CODE- 636/637: Performed by: EMERGENCY MEDICINE

## 2019-09-30 PROCEDURE — 73630 X-RAY EXAM OF FOOT: CPT

## 2019-09-30 RX ORDER — SODIUM CHLORIDE 0.9 % (FLUSH) 0.9 %
5-10 SYRINGE (ML) INJECTION AS NEEDED
Status: DISCONTINUED | OUTPATIENT
Start: 2019-09-30 | End: 2019-10-09 | Stop reason: HOSPADM

## 2019-09-30 RX ORDER — HEPARIN SODIUM 5000 [USP'U]/ML
5000 INJECTION, SOLUTION INTRAVENOUS; SUBCUTANEOUS EVERY 8 HOURS
Status: DISCONTINUED | OUTPATIENT
Start: 2019-09-30 | End: 2019-10-09 | Stop reason: HOSPADM

## 2019-09-30 RX ORDER — IBUPROFEN 200 MG
1 TABLET ORAL EVERY 24 HOURS
Status: DISCONTINUED | OUTPATIENT
Start: 2019-09-30 | End: 2019-10-09 | Stop reason: HOSPADM

## 2019-09-30 RX ORDER — PRAVASTATIN SODIUM 20 MG/1
40 TABLET ORAL
Status: DISCONTINUED | OUTPATIENT
Start: 2019-09-30 | End: 2019-10-09 | Stop reason: HOSPADM

## 2019-09-30 RX ORDER — MAGNESIUM SULFATE 100 %
4 CRYSTALS MISCELLANEOUS AS NEEDED
Status: DISCONTINUED | OUTPATIENT
Start: 2019-09-30 | End: 2019-10-09 | Stop reason: HOSPADM

## 2019-09-30 RX ORDER — VANCOMYCIN/0.9 % SOD CHLORIDE 1 G/100 ML
1000 PLASTIC BAG, INJECTION (ML) INTRAVENOUS ONCE
Status: COMPLETED | OUTPATIENT
Start: 2019-09-30 | End: 2019-09-30

## 2019-09-30 RX ORDER — DEXTROSE 50 % IN WATER (D50W) INTRAVENOUS SYRINGE
25-50 AS NEEDED
Status: DISCONTINUED | OUTPATIENT
Start: 2019-09-30 | End: 2019-09-30 | Stop reason: CLARIF

## 2019-09-30 RX ORDER — INSULIN LISPRO 100 [IU]/ML
INJECTION, SOLUTION INTRAVENOUS; SUBCUTANEOUS
Status: DISCONTINUED | OUTPATIENT
Start: 2019-09-30 | End: 2019-10-09 | Stop reason: HOSPADM

## 2019-09-30 RX ORDER — GUAIFENESIN 100 MG/5ML
81 LIQUID (ML) ORAL DAILY
Status: DISCONTINUED | OUTPATIENT
Start: 2019-10-01 | End: 2019-10-09 | Stop reason: HOSPADM

## 2019-09-30 RX ORDER — VANCOMYCIN/0.9 % SOD CHLORIDE 750 MG/250
750 PLASTIC BAG, INJECTION (ML) INTRAVENOUS EVERY 24 HOURS
Status: DISCONTINUED | OUTPATIENT
Start: 2019-10-01 | End: 2019-10-01

## 2019-09-30 RX ORDER — DEXTROSE MONOHYDRATE 100 MG/ML
125-250 INJECTION, SOLUTION INTRAVENOUS AS NEEDED
Status: DISCONTINUED | OUTPATIENT
Start: 2019-09-30 | End: 2019-10-09 | Stop reason: HOSPADM

## 2019-09-30 RX ORDER — MONTELUKAST SODIUM 10 MG/1
10 TABLET ORAL DAILY
Status: DISCONTINUED | OUTPATIENT
Start: 2019-10-01 | End: 2019-10-09 | Stop reason: HOSPADM

## 2019-09-30 RX ORDER — GABAPENTIN 300 MG/1
300 CAPSULE ORAL 3 TIMES DAILY
Status: DISCONTINUED | OUTPATIENT
Start: 2019-09-30 | End: 2019-10-01

## 2019-09-30 RX ORDER — HYDRALAZINE HYDROCHLORIDE 20 MG/ML
10 INJECTION INTRAMUSCULAR; INTRAVENOUS
Status: DISCONTINUED | OUTPATIENT
Start: 2019-09-30 | End: 2019-10-08

## 2019-09-30 RX ORDER — INSULIN GLARGINE 100 [IU]/ML
30 INJECTION, SOLUTION SUBCUTANEOUS DAILY
Status: DISCONTINUED | OUTPATIENT
Start: 2019-10-01 | End: 2019-10-01

## 2019-09-30 RX ORDER — ACETAMINOPHEN 325 MG/1
650 TABLET ORAL
Status: DISCONTINUED | OUTPATIENT
Start: 2019-09-30 | End: 2019-10-02

## 2019-09-30 RX ORDER — SODIUM CHLORIDE 9 MG/ML
100 INJECTION, SOLUTION INTRAVENOUS CONTINUOUS
Status: DISPENSED | OUTPATIENT
Start: 2019-09-30 | End: 2019-10-01

## 2019-09-30 RX ADMIN — PIPERACILLIN SODIUM AND TAZOBACTAM SODIUM 4.5 G: 4; .5 INJECTION, POWDER, LYOPHILIZED, FOR SOLUTION INTRAVENOUS at 16:15

## 2019-09-30 RX ADMIN — GABAPENTIN 300 MG: 300 CAPSULE ORAL at 21:38

## 2019-09-30 RX ADMIN — SODIUM CHLORIDE 620 ML: 900 INJECTION, SOLUTION INTRAVENOUS at 18:01

## 2019-09-30 RX ADMIN — VANCOMYCIN HYDROCHLORIDE 1000 MG: 10 INJECTION, POWDER, LYOPHILIZED, FOR SOLUTION INTRAVENOUS at 16:46

## 2019-09-30 RX ADMIN — SODIUM CHLORIDE 100 ML/HR: 900 INJECTION, SOLUTION INTRAVENOUS at 21:33

## 2019-09-30 RX ADMIN — PIPERACILLIN SODIUM,TAZOBACTAM SODIUM 2.25 G: 2; .25 INJECTION, POWDER, FOR SOLUTION INTRAVENOUS at 21:36

## 2019-09-30 RX ADMIN — SODIUM CHLORIDE 1000 ML: 900 INJECTION, SOLUTION INTRAVENOUS at 16:46

## 2019-09-30 RX ADMIN — INSULIN LISPRO 4 UNITS: 100 INJECTION, SOLUTION INTRAVENOUS; SUBCUTANEOUS at 21:30

## 2019-09-30 RX ADMIN — PRAVASTATIN SODIUM 40 MG: 20 TABLET ORAL at 21:38

## 2019-09-30 RX ADMIN — TETANUS TOXOID, REDUCED DIPHTHERIA TOXOID AND ACELLULAR PERTUSSIS VACCINE, ADSORBED 0.5 ML: 5; 2.5; 8; 8; 2.5 SUSPENSION INTRAMUSCULAR at 15:55

## 2019-09-30 RX ADMIN — INSULIN HUMAN 10 UNITS: 100 INJECTION, SOLUTION PARENTERAL at 16:43

## 2019-09-30 NOTE — PROGRESS NOTES
Kinetic Dosing- Initial Progress Note    Pharmacy Consult ordered by Dr. Sarah Barcenas     Indication: SSTI    Patient clinical status and labs ordered/reviewed. Pt Weight Weight: 54.4 kg (120 lb)   Serum Creatinine Lab Results   Component Value Date/Time    Creatinine 1.58 (H) 09/30/2019 03:52 PM    Creatinine, POC 0.5 (L) 08/23/2010 10:49 AM         Creatinine Clearance Estimated Creatinine Clearance: 31.3 mL/min (A) (based on SCr of 1.58 mg/dL (H)). BUN Lab Results   Component Value Date/Time    BUN 25 (H) 09/30/2019 03:52 PM         WBC Lab Results   Component Value Date/Time    WBC 12.6 09/30/2019 03:52 PM        Temperature Temp: 99 °F (37.2 °C)   HR Pulse (Heart Rate): (!) 108       BP BP: 107/75           Kinetic Dosing Parameters:   Vd = 45L     K = 0.029 hr-1              t ½ = 24h    Drug Levels:   Vancomycin    No results for input(s): VANCP, VANCT, VANCR, VANRA in the last 72 hours. Gentamicin   No results for input(s): GENP, GENT in the last 72 hours. No lab exists for component:  GENR   Tobramycin   No results for input(s): TOBP, TOBT, TOBR in the last 72 hours. Amikacin   No results for input(s): Norris Poag in the last 72 hours.     No lab exists for component:  SACHA Braxton DAMIKR     Dose for naïve patient was initiated at: Vancomycin 1gm IV x1 then 750mg q24h    Continue to monitor    Sign: [facility] [place]  Date: 9/30/2019  Time: 4:48 PM

## 2019-09-30 NOTE — ED PROVIDER NOTES
EMERGENCY DEPARTMENT HISTORY AND PHYSICAL EXAM    3:53 PM      Date: 9/30/2019  Patient Name: Fawad Maldonado    History of Presenting Illness     Chief Complaint   Patient presents with    Foot Injury         History Provided By: Patient    Additional History (Context): Fawad Maldonado is a 60 y.o. female with diabetes who presents with foot abscess. Patient states she stepped on a nail 2 weeks ago. She did not seek medical attention at the time. Area is now swollen. The top of the foot is now red. She denies fever. She has been taking Motrin and Tylenol with no improvement. Patient denies nausea, vomiting or diarrhea. Patient smokes 2 packs/day. Denies any alcohol recreational drug use. PCP: Liat Freeman MD      Current Facility-Administered Medications   Medication Dose Route Frequency Provider Last Rate Last Dose    diph,Pertuss(AC),Tet Vac-PF (BOOSTRIX) suspension 0.5 mL  0.5 mL IntraMUSCular PRIOR TO DISCHARGE Liz Morse, PA        sodium chloride (NS) flush 5-10 mL  5-10 mL IntraVENous PRN Miguelito Nayak DO        vancomycin (VANCOCIN) 1000 mg in  ml infusion  1,000 mg IntraVENous ONCE Miguelito Nayak DO        clindamycin phosphate (CLEOCIN) 600 mg in 0.9% sodium chloride 100 mL IVPB  600 mg IntraVENous Q8H Miguelito Nayak DO        piperacillin-tazobactam (ZOSYN) 4.5 g in 0.9% sodium chloride (MBP/ADV) 100 mL MBP  4.5 g IntraVENous Q6H Miguelito Nayak DO        sodium chloride 0.9 % bolus infusion 1,000 mL  1,000 mL IntraVENous ONCE Miguelito Nayak DO        Followed by   [facility] sodium chloride 0.9 % bolus infusion 620 mL  620 mL IntraVENous ONCE León Nayak DO         Current Outpatient Medications   Medication Sig Dispense Refill    colchicine 0.6 mg tablet Take 1 tablet by mouth Q1Hour for gout pain. NOT TO EXCEED 3 TABLETS IN 24 HOURS.  30 Tab 0    hydrOXYzine HCl (ATARAX) 25 mg tablet TAKE 1 TABLET BY MOUTH THREE TIMES DAILY AS NEEDED FOR ITCHING 90 Tab 0  pravastatin (PRAVACHOL) 40 mg tablet TAKE ONE TABLET BY MOUTH EVERY NIGHT 30 Tab 4    metFORMIN (GLUCOPHAGE) 1,000 mg tablet TAKE ONE TABLET BY MOUTH TWICE DAILY WITH MEALS 60 Tab 4    azithromycin (ZITHROMAX TRI-JORGE) 500 mg tab Take 1 tablet daily 3 Tab 0    gabapentin (NEURONTIN) 300 mg capsule Take 1 Cap by mouth three (3) times daily. 90 Cap 4    montelukast (SINGULAIR) 10 mg tablet Take 1 Tab by mouth daily. 30 Tab 5    ergocalciferol (ERGOCALCIFEROL) 50,000 unit capsule Take 1 Cap by mouth every seven (7) days. 4 Cap 5    lidocaine (LIDODERM) 5 % Apply patch to the affected area for 12 hours a day and remove for 12 hours a day. 1 Package 0    insulin aspart protamine/insulin aspart (NOVOLOG MIX 70-30 U-100 INSULN) 100 unit/mL (70-30) injection INJECT 25 UNITS UNDER THE SKIN TWO TIMES A DAY 10 mL 3    mupirocin (BACTROBAN) 2 % ointment Apply to area affected BID 22 g 1    lisinopril (PRINIVIL, ZESTRIL) 10 mg tablet TAKE 1 TABLET BY MOUTH ONCE DAILY 30 Tab 5    Insulin Syringe-Needle U-100 1/2 mL 30 gauge x 5/16 syrg INJECT TWICE A  Syringe 5    Blood-Glucose Meter monitoring kit Check BS 3x/day E11.65 1 Kit 0    Lancets misc Check BS 3x/day E11.65 100 Each 5    glucose blood VI test strips (ONETOUCH ULTRA TEST) strip Check BS 3x/day E11.65 100 Strip 5    acetaminophen (TYLENOL) 325 mg tablet Take 2 Tabs by mouth every four (4) hours as needed for Pain. 20 Tab 0    aspirin 81 mg tablet Take 81 mg by mouth daily.            Past History     Past Medical History:  Past Medical History:   Diagnosis Date    Bladder infection     Depression     Diabetes ([facility] [address]     Difficulty walking     DM (diabetes mellitus) ([facility] [address] 6/22/2012    Ill-defined condition     leaking bladder;high cholesterol       Past Surgical History:  Past Surgical History:   Procedure Laterality Date    HX GYN      hysterectomy    HX OTHER SURGICAL      bladder surgery       Family History:  Family History   Problem Relation Age of Onset    Diabetes Mother     Cancer Mother     Heart Attack Father         had 3 hear attacks    Diabetes Father        Social History:  Social History     Tobacco Use    Smoking status: Current Every Day Smoker     Packs/day: 2.00     Years: 38.00     Pack years: 76.00    Smokeless tobacco: Never Used   Substance Use Topics    Alcohol use: No    Drug use: No       Allergies:  No Known Allergies      Review of Systems       Review of Systems   Constitutional: Negative. Negative for chills, diaphoresis and fever. HENT: Negative. Negative for congestion, rhinorrhea and sore throat. Eyes: Negative. Negative for pain, discharge and redness. Respiratory: Negative. Negative for cough, chest tightness, shortness of breath and wheezing. Cardiovascular: Negative. Negative for chest pain. Gastrointestinal: Negative. Negative for abdominal pain, constipation, diarrhea, nausea and vomiting. Genitourinary: Negative. Negative for dysuria, flank pain, frequency, hematuria and urgency. Musculoskeletal: Positive for myalgias. Negative for back pain and neck pain. Skin: Positive for wound. Negative for rash. Neurological: Negative. Negative for syncope, weakness, numbness and headaches. Psychiatric/Behavioral: Negative. All other systems reviewed and are negative. Physical Exam     Visit Vitals  /75 (BP 1 Location: Right arm, BP Patient Position: At rest;Sitting)   Pulse (!) 108   Temp 99 °F (37.2 °C)   Resp 18   Ht 5' 3\" (1.6 m)   LMP  (LMP Unknown)   SpO2 96%   BMI 21.08 kg/m²         Physical Exam   Constitutional: She appears well-developed and well-nourished. Non-toxic appearance. She does not have a sickly appearance. She does not appear ill. No distress. HENT:   Head: Normocephalic and atraumatic. Mouth/Throat: Oropharynx is clear and moist. No oropharyngeal exudate. Eyes: Pupils are equal, round, and reactive to light.  Conjunctivae and EOM are normal. No scleral icterus. Neck: Normal range of motion. Neck supple. No hepatojugular reflux and no JVD present. No tracheal deviation present. No thyromegaly present. Cardiovascular: Normal rate, regular rhythm, S1 normal, S2 normal, normal heart sounds, intact distal pulses and normal pulses. Exam reveals no gallop, no S3 and no S4. No murmur heard. Pulses:       Radial pulses are 2+ on the right side, and 2+ on the left side. Dorsalis pedis pulses are 2+ on the right side, and 2+ on the left side. Pulmonary/Chest: Effort normal and breath sounds normal. No respiratory distress. She has no decreased breath sounds. She has no wheezes. She has no rhonchi. She has no rales. Abdominal: Soft. Normal appearance and bowel sounds are normal. She exhibits no distension and no mass. There is no hepatosplenomegaly. There is no tenderness. There is no rigidity, no rebound, no guarding, no CVA tenderness, no tenderness at McBurney's point and negative Duggan's sign. Musculoskeletal: Normal range of motion. Feet:    Lymphadenopathy:        Head (right side): No submental, no submandibular, no preauricular and no occipital adenopathy present. Head (left side): No submental, no submandibular, no preauricular and no occipital adenopathy present. She has no cervical adenopathy. Right: No supraclavicular adenopathy present. Left: No supraclavicular adenopathy present. Neurological: She is alert. She has normal strength and normal reflexes. She is not disoriented. No cranial nerve deficit or sensory deficit. Coordination and gait normal. GCS eye subscore is 4. GCS verbal subscore is 5. GCS motor subscore is 6. Grossly intact    Skin: Skin is warm, dry and intact. No rash noted. She is not diaphoretic. Psychiatric: She has a normal mood and affect.  Her speech is normal and behavior is normal. Judgment and thought content normal. Cognition and memory are normal.   Nursing note and vitals reviewed. Diagnostic Study Results     Labs -  No results found for this or any previous visit (from the past 12 hour(s)). Radiologic Studies -   XR FOOT LT MIN 3 V    (Results Pending)   XR CHEST PORT    (Results Pending)         Medical Decision Making   Provider Notes (Medical Decision Making):  MDM  Number of Diagnoses or Management Options  Abscess:       I am the first provider for this patient. I reviewed the vital signs, available nursing notes, past medical history, past surgical history, family history and social history. Vital Signs-Reviewed the patient's vital signs. Records Reviewed: Nursing Notes (Time of Review: 3:53 PM)    ED Course: Progress Notes, Reevaluation, and Consults:    Labs essentially normal with the exception of glucose of 437. X-ray of the left foot showed no osteomyelitis. However, there was areas of gas seen on x-ray. 4:23 PM 9/30/2019      Consult:  Discussed care with Dr Citlali Craig, hospitalist. Standard discussion; including history of patients chief complaint, available diagnostic results, and treatment course. Agrees to admit. 3:57 PM        Diagnosis       Clinical Impression:   1. Abscess        Disposition: Admitted       Attestation        Provider Attestation:     I personally performed the services described in the documentation, reviewed the documentation and it accurately and completely records my words and actions utilizing the [address] September 30, 2019 at 3:53 PM - Vinh Nayak DO    Disclaimer. It is dictated using utilizing voice recognition software. Unfortunately this leads to occasional typographical errors. I apologize in advance if the situation occurs. If questions arise please do not hesitate to contact me or call our department.

## 2019-09-30 NOTE — ED TRIAGE NOTES
Patient states that she stepped in a refugio nail about a month ago on the left foot. Patient states that it is now swollen and red.

## 2019-09-30 NOTE — H&P
Hospitalist Admission History and Physical    NAME:  Saida Covington   :   1960   MRN:   [id number]     PCP:  Art Pratt MD  Date/Time:  2019 4:56 PM  Subjective:   CHIEF COMPLAINT:  Left foot infection    HISTORY OF PRESENT ILLNESS:     Martinez Pizano is a 60 y.o.  female with past medical history of poorly controlled type 2 DM, gout and some issues with compliance / developmental delay per PCP records who presents with left foot pain following stepping on a refugio nail - patient states a couple months ago, states it \"healed\" and then flared up a couple weeks ago / has been getting worse and now with difficulty ambulating. Patient states she has been taking her pills as prescribed but she states she has been having difficulty ambulating to the refrigerator to get her insulin. Past Medical History:   Diagnosis Date    Bladder infection     Depression     [facility]     Difficulty walking     DM (diabetes mellitus) ([facility] [facility] 2012    Ill-defined condition     leaking bladder;high cholesterol        Past Surgical History:   Procedure Laterality Date    HX GYN      hysterectomy    HX OTHER SURGICAL      bladder surgery       Social History     Tobacco Use    Smoking status: Current Every Day Smoker     Packs/day: 2.00     Years: 38.00     Pack years: 76.00    Smokeless tobacco: Never Used   Substance Use Topics    Alcohol use: No        Family History   Problem Relation Age of Onset    Diabetes Mother     Cancer Mother     Heart Attack Father         had 3 hear attacks    Diabetes Father         No Known Allergies     Prior to Admission Medications   Prescriptions Last Dose Informant Patient Reported? Taking?    Blood-Glucose Meter monitoring kit Unknown at Unknown time  No No   Sig: Check BS 3x/day E11.65   Insulin Syringe-Needle U-100 1/2 mL 30 gauge x 5/16 syrg Unknown at Unknown time  No No   Sig: INJECT TWICE A DAY   Lancets misc Unknown at Unknown time  No No   Sig: Check BS 3x/day E11.65   acetaminophen (TYLENOL) 325 mg tablet 9/23/2019 at Unknown time  No Yes   Sig: Take 2 Tabs by mouth every four (4) hours as needed for Pain. aspirin 81 mg tablet Not Taking at Unknown time  Yes No   Sig: Take 81 mg by mouth daily. colchicine 0.6 mg tablet Not Taking at Unknown time  No No   Sig: Take 1 tablet by mouth Q1Hour for gout pain. NOT TO EXCEED 3 TABLETS IN 24 HOURS.   ergocalciferol (ERGOCALCIFEROL) 50,000 unit capsule 9/23/2019 at Unknown time  No Yes   Sig: Take 1 Cap by mouth every seven (7) days. gabapentin (NEURONTIN) 300 mg capsule 9/29/2019 at Unknown time  No Yes   Sig: Take 1 Cap by mouth three (3) times daily. glucose blood VI test strips (ONETOUCH ULTRA TEST) strip Unknown at Unknown time  No No   Sig: Check BS 3x/day E11.65   hydrOXYzine HCl (ATARAX) 25 mg tablet Unknown at Unknown time  No No   Sig: TAKE 1 TABLET BY MOUTH THREE TIMES DAILY AS NEEDED FOR ITCHING   insulin aspart protamine/insulin aspart (NOVOLOG MIX 70-30 U-100 INSULN) 100 unit/mL (70-30) injection Unknown at Unknown time  No No   Sig: INJECT 25 UNITS UNDER THE SKIN TWO TIMES A DAY   lidocaine (LIDODERM) 5 % Not Taking at Unknown time  No No   Sig: Apply patch to the affected area for 12 hours a day and remove for 12 hours a day. lisinopril (PRINIVIL, ZESTRIL) 10 mg tablet 9/29/2019 at Unknown time  No Yes   Sig: TAKE 1 TABLET BY MOUTH ONCE DAILY   metFORMIN (GLUCOPHAGE) 1,000 mg tablet 9/29/2019 at Unknown time  No Yes   Sig: TAKE ONE TABLET BY MOUTH TWICE DAILY WITH MEALS   montelukast (SINGULAIR) 10 mg tablet 9/29/2019 at Unknown time  No Yes   Sig: Take 1 Tab by mouth daily.    mupirocin (BACTROBAN) 2 % ointment Not Taking at Unknown time  No No   Sig: Apply to area affected BID   pravastatin (PRAVACHOL) 40 mg tablet 9/29/2019 at Unknown time  No Yes   Sig: TAKE ONE TABLET BY MOUTH EVERY NIGHT      Facility-Administered Medications: None       REVIEW OF SYSTEMS:     [] Unable to obtain ROS due to  []mental status change  []sedated   []intubated   []Total of 12 systems reviewed as follows:  Constitutional:  negative fever, negative chills, negative weight loss  Eyes:   negative visual changes  ENT:   negative sore throat, tongue or lip swelling  Respiratory:  negative cough, negative dyspnea  Cards:   negative for chest pain, palpitations, lower extremity edema  GI:   negative for nausea, vomiting, diarrhea, and abdominal pain  Genitourinary: negative for frequency, dysuria  Hematologic:  negative for easy bruising and gum/nose bleeding  Musculoskel: negative for myalgias,  back pain and muscle weakness  Neurological:  negative for headaches, dizziness, vertigo  Behavl/Psych:  negative for feelings of anxiety, depression     Pertinent Positives include : reports intermittent pruritis (a couple fleas were visualized on her person), pain to left foot with palpation / ambulation      Objective:   VITALS:    Visit Vitals  /75 (BP 1 Location: Right arm, BP Patient Position: At rest;Sitting)   Pulse (!) 108   Temp 99 °F (37.2 °C)   Resp 18   Ht 5' 3\" (1.6 m)   Wt 54.4 kg (120 lb)   LMP  (LMP Unknown)   SpO2 96%   BMI 21.26 kg/m²     Temp (24hrs), Av °F (37.2 °C), Min:99 °F (37.2 °C), Max:99 °F (37.2 °C)      PHYSICAL EXAM:   General:    Alert, cooperative, no distress, appears older than stated age. Head:   Normocephalic, without obvious abnormality, atraumatic. Eyes:   Conjunctivae clear, anicteric sclerae. Nose:  Nares normal. No drainage or sinus tenderness. Throat:    Lips, mucosa, and tongue normal.  No Thrush  Back:    Symmetric  Lungs:   Clear to auscultation bilaterally. No Wheezing or Rhonchi. No rales. Chest wall:  No tenderness or deformity. No Accessory muscle use. Heart:   Regular rate and rhythm,  no murmur, rub or gallop. Abdomen:   Soft, non-tender. Not distended. Bowel sounds normal. No masses  Extremities: Extremities normal, atraumatic, No cyanosis. No edema.  No clubbing; left foot with erythema outlined / tender; quarter sized w/ small amount of residual drainage  Skin:     Texture, turgor normal. No rashes or lesions. Not Jaundiced  Lymph nodes: Cervical, supraclavicular normal.  Psych:  Poor insight. Not depressed. Not anxious or agitated. Neurologic: No facial asymmetry. No aphasia or slurred speech. Normal strength, Alert and oriented X 3. LAB DATA REVIEWED:    No components found for: [place]  Recent Labs     09/30/19  1552   *   K 5.0   CL 96*   CO2 23   BUN 25*   CREA 1.58*   *   CA 9.3   ALB 3.6   WBC 12.6   HGB 12.9   HCT 36.6        IMAGING RESULTS:   [x]  I have personally reviewed the actual   [x]CXR  []CT scan    Assessment/Plan:      Active Problems:    Cellulitis and abscess of foot (9/30/2019)     ___________________________________________________  PLAN:    1. Cellulitis with abscess of left foot - s/p I&D in ED; cont vanc and zosyn; wound cx requested; podiatry called (message left with Dr. Alfredo Andrade) request to see  2. Type 2 Diabetes Mellitus with hyperglycemia - pt states she has been having difficulty getting to insulin d/t problems with ambulation; A1c ordered; start lantus / SSI, follow for need to adjust  3. Hypertension - hold lisinopril in setting of #5; hydralazine PRN   4. Hyponatremia - start NS; monitor in AM  5. Developmental delay - noted in PCP records; discussed with Krista Cason whom helps manage medications   6. Risk for acute kidney injury / dehydration - start IVF; hold lisinopril; follow  7. Tobacco abuse - reports smoking 2PPD; start nicotine patch  8. H/o gout     Discussed with son's girlfriend Nicohaylie Cason at [phone] she is able to state compliance with oral medications, she is unable to confirm compliance with insulin.       Risk of deterioration:  [x]Low    []Moderate  []High    Prophylaxis:  []Lovenox  []Coumadin  [x]Hep SQ  []SCDs  []H2B/PPI    Disposition:  []Home w/ Family   [x]HH PT,OT,RN   []SNF/LTC []SAH/Rehab    Discussed Code Status:    [x]Full Code      []DNR     ___________________________________________________    Care Plan discussed with:    [x]Patient   [x]Family    []ED Care Manager  []ED Doc   [x]Specialist : Message left with Dr. Laxmi Le  Total Time Coordinating Admission:   70   minutes    []Total Critical Care Time:     ___________________________________________________  Admitting Provider: Allie Pitt NP

## 2019-09-30 NOTE — ROUTINE PROCESS
TRANSFER - IN REPORT: 
 
Verbal report received from TOÑOΑΡΝΑKATELYNN RN(name) on Dovray Ovens  being received from ED(unit) for routine progression of care Report consisted of patients Situation, Background, Assessment and  
Recommendations(SBAR). Information from the following report(s) SBAR, Kardex, ED Summary and Recent Results was reviewed with the receiving nurse. Opportunity for questions and clarification was provided. Assessment completed upon patients arrival to unit and care assumed.

## 2019-09-30 NOTE — ED NOTES
TRANSFER - OUT REPORT:    Verbal report given to [employer] (name) on Bubba Stein  being transferred to [address] [place] [address](unit) for routine progression of care       Report consisted of patients Situation, Background, Assessment and   Recommendations(SBAR). Information from the following report(s) SBAR and Kardex was reviewed with the receiving nurse. Lines:   Peripheral IV 09/30/19 Left Antecubital (Active)   Site Assessment Clean, dry, & intact 9/30/2019  3:58 PM   Phlebitis Assessment 0 9/30/2019  3:58 PM   Infiltration Assessment 0 9/30/2019  3:58 PM   Dressing Status Clean, dry, & intact 9/30/2019  3:58 PM   Hub Color/Line Status Pink 9/30/2019  3:58 PM        Opportunity for questions and clarification was provided.       Patient transported with:   patient belongings  Family members

## 2019-10-01 ENCOUNTER — APPOINTMENT (OUTPATIENT)
Dept: MRI IMAGING | Age: 59
DRG: 305 | End: 2019-10-01
Attending: PODIATRIST
Payer: MEDICAID

## 2019-10-01 ENCOUNTER — ANESTHESIA EVENT (OUTPATIENT)
Dept: SURGERY | Age: 59
DRG: 305 | End: 2019-10-01
Payer: MEDICAID

## 2019-10-01 LAB
ANION GAP SERPL CALC-SCNC: 7 MMOL/L (ref 3–18)
ATRIAL RATE: 107 BPM
BASOPHILS # BLD: 0 K/UL (ref 0–0.06)
BASOPHILS NFR BLD: 0 % (ref 0–3)
BUN SERPL-MCNC: 21 MG/DL (ref 7–18)
BUN/CREAT SERPL: 15 (ref 12–20)
CALCIUM SERPL-MCNC: 8.5 MG/DL (ref 8.5–10.1)
CALCULATED P AXIS, ECG09: 58 DEGREES
CALCULATED R AXIS, ECG10: 85 DEGREES
CALCULATED T AXIS, ECG11: 41 DEGREES
CHLORIDE SERPL-SCNC: 105 MMOL/L (ref 100–111)
CO2 SERPL-SCNC: 22 MMOL/L (ref 21–32)
CREAT SERPL-MCNC: 1.43 MG/DL (ref 0.6–1.3)
DIAGNOSIS, 93000: NORMAL
DIFFERENTIAL METHOD BLD: ABNORMAL
EOSINOPHIL # BLD: 0.1 K/UL (ref 0–0.4)
EOSINOPHIL NFR BLD: 1 % (ref 0–5)
ERYTHROCYTE [DISTWIDTH] IN BLOOD BY AUTOMATED COUNT: 13.3 % (ref 11.6–14.5)
GLUCOSE BLD STRIP.AUTO-MCNC: 267 MG/DL (ref 70–110)
GLUCOSE BLD STRIP.AUTO-MCNC: 277 MG/DL (ref 70–110)
GLUCOSE BLD STRIP.AUTO-MCNC: 354 MG/DL (ref 70–110)
GLUCOSE BLD STRIP.AUTO-MCNC: 452 MG/DL (ref 70–110)
GLUCOSE SERPL-MCNC: 232 MG/DL (ref 74–99)
HCT VFR BLD AUTO: 33.9 % (ref 35–45)
HGB BLD-MCNC: 11.7 G/DL (ref 12–16)
LYMPHOCYTES # BLD: 3 K/UL (ref 0.8–3.5)
LYMPHOCYTES NFR BLD: 21 % (ref 20–51)
MCH RBC QN AUTO: 29.4 PG (ref 24–34)
MCHC RBC AUTO-ENTMCNC: 34.5 G/DL (ref 31–37)
MCV RBC AUTO: 85.2 FL (ref 74–97)
METAMYELOCYTES NFR BLD MANUAL: 1 %
MONOCYTES # BLD: 0.6 K/UL (ref 0–1)
MONOCYTES NFR BLD: 4 % (ref 2–9)
NEUTS SEG # BLD: 10.4 K/UL (ref 1.8–8)
NEUTS SEG NFR BLD: 73 % (ref 42–75)
P-R INTERVAL, ECG05: 170 MS
PLATELET # BLD AUTO: 353 K/UL (ref 135–420)
PLATELET COMMENTS,PCOM: ABNORMAL
PMV BLD AUTO: 10.4 FL (ref 9.2–11.8)
POTASSIUM SERPL-SCNC: 4.2 MMOL/L (ref 3.5–5.5)
Q-T INTERVAL, ECG07: 350 MS
QRS DURATION, ECG06: 106 MS
QTC CALCULATION (BEZET), ECG08: 467 MS
RBC # BLD AUTO: 3.98 M/UL (ref 4.2–5.3)
RBC MORPH BLD: ABNORMAL
SODIUM SERPL-SCNC: 134 MMOL/L (ref 136–145)
VENTRICULAR RATE, ECG03: 107 BPM
WBC # BLD AUTO: 14.2 K/UL (ref 4.6–13.2)

## 2019-10-01 PROCEDURE — 74011250637 HC RX REV CODE- 250/637: Performed by: HOSPITALIST

## 2019-10-01 PROCEDURE — 87070 CULTURE OTHR SPECIMN AEROBIC: CPT

## 2019-10-01 PROCEDURE — 74011250636 HC RX REV CODE- 250/636: Performed by: HOSPITALIST

## 2019-10-01 PROCEDURE — 87077 CULTURE AEROBIC IDENTIFY: CPT

## 2019-10-01 PROCEDURE — 82962 GLUCOSE BLOOD TEST: CPT

## 2019-10-01 PROCEDURE — 94762 N-INVAS EAR/PLS OXIMTRY CONT: CPT

## 2019-10-01 PROCEDURE — 65270000029 HC RM PRIVATE

## 2019-10-01 PROCEDURE — 74011636637 HC RX REV CODE- 636/637: Performed by: NURSE PRACTITIONER

## 2019-10-01 PROCEDURE — 74011250637 HC RX REV CODE- 250/637: Performed by: NURSE PRACTITIONER

## 2019-10-01 PROCEDURE — 73718 MRI LOWER EXTREMITY W/O DYE: CPT

## 2019-10-01 PROCEDURE — 74011000258 HC RX REV CODE- 258: Performed by: NURSE PRACTITIONER

## 2019-10-01 PROCEDURE — 80048 BASIC METABOLIC PNL TOTAL CA: CPT

## 2019-10-01 PROCEDURE — 74011000250 HC RX REV CODE- 250: Performed by: PODIATRIST

## 2019-10-01 PROCEDURE — 74011250636 HC RX REV CODE- 250/636: Performed by: NURSE PRACTITIONER

## 2019-10-01 PROCEDURE — 85025 COMPLETE CBC W/AUTO DIFF WBC: CPT

## 2019-10-01 PROCEDURE — 74011000258 HC RX REV CODE- 258: Performed by: HOSPITALIST

## 2019-10-01 PROCEDURE — 74011636637 HC RX REV CODE- 636/637: Performed by: HOSPITALIST

## 2019-10-01 PROCEDURE — 36415 COLL VENOUS BLD VENIPUNCTURE: CPT

## 2019-10-01 PROCEDURE — 74011250636 HC RX REV CODE- 250/636: Performed by: NURSE ANESTHETIST, CERTIFIED REGISTERED

## 2019-10-01 PROCEDURE — 87186 SC STD MICRODIL/AGAR DIL: CPT

## 2019-10-01 RX ORDER — FAMOTIDINE 20 MG/1
20 TABLET, FILM COATED ORAL ONCE
Status: DISPENSED | OUTPATIENT
Start: 2019-10-01 | End: 2019-10-02

## 2019-10-01 RX ORDER — LIDOCAINE HYDROCHLORIDE 10 MG/ML
0.1 INJECTION, SOLUTION EPIDURAL; INFILTRATION; INTRACAUDAL; PERINEURAL AS NEEDED
Status: DISCONTINUED | OUTPATIENT
Start: 2019-10-01 | End: 2019-10-02 | Stop reason: HOSPADM

## 2019-10-01 RX ORDER — MORPHINE SULFATE 4 MG/ML
4 INJECTION INTRAVENOUS
Status: DISCONTINUED | OUTPATIENT
Start: 2019-10-01 | End: 2019-10-02

## 2019-10-01 RX ORDER — VANCOMYCIN/0.9 % SOD CHLORIDE 1 G/100 ML
1000 PLASTIC BAG, INJECTION (ML) INTRAVENOUS EVERY 24 HOURS
Status: DISCONTINUED | OUTPATIENT
Start: 2019-10-01 | End: 2019-10-03

## 2019-10-01 RX ORDER — INSULIN LISPRO 100 [IU]/ML
INJECTION, SOLUTION INTRAVENOUS; SUBCUTANEOUS ONCE
Status: ACTIVE | OUTPATIENT
Start: 2019-10-01 | End: 2019-10-02

## 2019-10-01 RX ORDER — NALOXONE HYDROCHLORIDE 0.4 MG/ML
0.4 INJECTION, SOLUTION INTRAMUSCULAR; INTRAVENOUS; SUBCUTANEOUS
Status: DISCONTINUED | OUTPATIENT
Start: 2019-10-01 | End: 2019-10-09 | Stop reason: HOSPADM

## 2019-10-01 RX ORDER — INSULIN GLARGINE 100 [IU]/ML
40 INJECTION, SOLUTION SUBCUTANEOUS DAILY
Status: DISCONTINUED | OUTPATIENT
Start: 2019-10-02 | End: 2019-10-06

## 2019-10-01 RX ORDER — GABAPENTIN 100 MG/1
100 CAPSULE ORAL 3 TIMES DAILY
Status: DISCONTINUED | OUTPATIENT
Start: 2019-10-01 | End: 2019-10-09 | Stop reason: HOSPADM

## 2019-10-01 RX ORDER — SODIUM CHLORIDE, SODIUM LACTATE, POTASSIUM CHLORIDE, CALCIUM CHLORIDE 600; 310; 30; 20 MG/100ML; MG/100ML; MG/100ML; MG/100ML
50 INJECTION, SOLUTION INTRAVENOUS CONTINUOUS
Status: DISCONTINUED | OUTPATIENT
Start: 2019-10-01 | End: 2019-10-02 | Stop reason: HOSPADM

## 2019-10-01 RX ADMIN — SODIUM CHLORIDE 100 ML/HR: 900 INJECTION, SOLUTION INTRAVENOUS at 09:11

## 2019-10-01 RX ADMIN — INSULIN LISPRO 15 UNITS: 100 INJECTION, SOLUTION INTRAVENOUS; SUBCUTANEOUS at 16:19

## 2019-10-01 RX ADMIN — PIPERACILLIN SODIUM,TAZOBACTAM SODIUM 2.25 G: 2; .25 INJECTION, POWDER, FOR SOLUTION INTRAVENOUS at 15:18

## 2019-10-01 RX ADMIN — INSULIN LISPRO 6 UNITS: 100 INJECTION, SOLUTION INTRAVENOUS; SUBCUTANEOUS at 07:58

## 2019-10-01 RX ADMIN — GABAPENTIN 100 MG: 100 CAPSULE ORAL at 21:23

## 2019-10-01 RX ADMIN — PIPERACILLIN SODIUM,TAZOBACTAM SODIUM 2.25 G: 2; .25 INJECTION, POWDER, FOR SOLUTION INTRAVENOUS at 04:12

## 2019-10-01 RX ADMIN — MORPHINE SULFATE 4 MG: 4 INJECTION INTRAVENOUS at 21:09

## 2019-10-01 RX ADMIN — VANCOMYCIN HYDROCHLORIDE 1000 MG: 10 INJECTION, POWDER, LYOPHILIZED, FOR SOLUTION INTRAVENOUS at 16:42

## 2019-10-01 RX ADMIN — SODIUM CHLORIDE 100 ML/HR: 900 INJECTION, SOLUTION INTRAVENOUS at 16:42

## 2019-10-01 RX ADMIN — Medication 10 ML: at 06:43

## 2019-10-01 RX ADMIN — GABAPENTIN 300 MG: 300 CAPSULE ORAL at 15:18

## 2019-10-01 RX ADMIN — INSULIN LISPRO 9 UNITS: 100 INJECTION, SOLUTION INTRAVENOUS; SUBCUTANEOUS at 21:07

## 2019-10-01 RX ADMIN — INSULIN LISPRO 10 UNITS: 100 INJECTION, SOLUTION INTRAVENOUS; SUBCUTANEOUS at 11:39

## 2019-10-01 RX ADMIN — HEPARIN SODIUM 5000 UNITS: 5000 INJECTION INTRAVENOUS; SUBCUTANEOUS at 16:18

## 2019-10-01 RX ADMIN — ACETAMINOPHEN 650 MG: 325 TABLET ORAL at 09:01

## 2019-10-01 RX ADMIN — MONTELUKAST 10 MG: 10 TABLET, FILM COATED ORAL at 08:56

## 2019-10-01 RX ADMIN — PIPERACILLIN SODIUM,TAZOBACTAM SODIUM 2.25 G: 2; .25 INJECTION, POWDER, FOR SOLUTION INTRAVENOUS at 21:07

## 2019-10-01 RX ADMIN — ACETAMINOPHEN 650 MG: 325 TABLET ORAL at 00:15

## 2019-10-01 RX ADMIN — GABAPENTIN 300 MG: 300 CAPSULE ORAL at 08:56

## 2019-10-01 RX ADMIN — MORPHINE SULFATE 4 MG: 4 INJECTION INTRAVENOUS at 17:42

## 2019-10-01 RX ADMIN — HEPARIN SODIUM 5000 UNITS: 5000 INJECTION INTRAVENOUS; SUBCUTANEOUS at 08:56

## 2019-10-01 RX ADMIN — ASPIRIN 81 MG 81 MG: 81 TABLET ORAL at 08:56

## 2019-10-01 RX ADMIN — DAKIN'S SOLUTION 0.125% (QUARTER STRENGTH): 0.12 SOLUTION at 09:10

## 2019-10-01 RX ADMIN — INSULIN GLARGINE 30 UNITS: 100 INJECTION, SOLUTION SUBCUTANEOUS at 09:01

## 2019-10-01 RX ADMIN — PRAVASTATIN SODIUM 40 MG: 20 TABLET ORAL at 21:06

## 2019-10-01 RX ADMIN — HEPARIN SODIUM 5000 UNITS: 5000 INJECTION INTRAVENOUS; SUBCUTANEOUS at 00:48

## 2019-10-01 RX ADMIN — SODIUM CHLORIDE, SODIUM LACTATE, POTASSIUM CHLORIDE, AND CALCIUM CHLORIDE 50 ML/HR: 600; 310; 30; 20 INJECTION, SOLUTION INTRAVENOUS at 21:28

## 2019-10-01 RX ADMIN — PIPERACILLIN SODIUM,TAZOBACTAM SODIUM 2.25 G: 2; .25 INJECTION, POWDER, FOR SOLUTION INTRAVENOUS at 09:10

## 2019-10-01 NOTE — ROUTINE PROCESS
1935 Bedside and Verbal shift change  [facility] to SINA Butler (oncoming)  Pt. Is AOX r. IV Sl, Pt. denies  pain at this time. Report included the following information SBAR, Kardex, Procedure Summary, Intake/Output, MAR, Recent Lab Results. Will resume care and monitor Pt. Condition. Pt. Educated on call bell when in need of help and assistance. Pt. verbalized understanding. Bed alarm on. 
Pt. Educated on room and MD's order. Pt. Verbalized understanding. Pt. Head to toe Assessment Done and documented. Pt. Changed gown, purewick on. Nisha Clint 2045  Pt. Made no complaints. 2130  Pt. Denies pain. 2216 ECG done. 2300  Pt. Not in distress. 0005  Mews score 4 Notified Dr Flo Spears of Pt. Mews score. Blood culture done x2 in ED. Will given tylenol. MD made no order. 0015  Pt. Given tylenol 650mg po. 
 
0200  Pt. Resting with eyes closed, easily awaken. 0400  Pt. Denies discomfort. 0530  Pt. Given complete care, bath back care, changed gown, linen and pads. 0615  Pt. Able to rest well throughout the shift. Verbal and bedside Shift changed report given to Emeka Blanca RN (oncoming RN) on Pt. Condition. Report consisted of patients Situation, History, Activities, intake/output,  Background, Assessment and Recommendations(SBAR). Information from the following report(s) Kardex, order Summary, Lab results and MAR was reviewed with the receiving nurse. Opportunity for questions and clarification was provided.

## 2019-10-01 NOTE — CONSULTS
Consult    Patient: Arron Reynoso MRN: [id number]  SSN: [id number]    YOB: 1960  Age: 60 y.o. Sex: female      Subjective:      Arron Reynoso is a 60 y.o. female who is being seen for asked to evaluate and teat infected puncture wound left foot. Old puncture wound became secondarily infected.     Past Medical History:   Diagnosis Date    Bladder infection     Depression     [facility] ([facility] [address]     Difficulty walking     DM (diabetes mellitus) ([facility] [address] 6/22/2012    Ill-defined condition     leaking bladder;high cholesterol     Past Surgical History:   Procedure Laterality Date    HX GYN      hysterectomy    HX OTHER SURGICAL      bladder surgery      Family History   Problem Relation Age of Onset    Diabetes Mother     Cancer Mother     Heart Attack Father         had 3 hear attacks    Diabetes Father      Social History     Tobacco Use    Smoking status: Current Every Day Smoker     Packs/day: 2.00     Years: 38.00     Pack years: 76.00    Smokeless tobacco: Never Used   Substance Use Topics    Alcohol use: No      Current Facility-Administered Medications   Medication Dose Route Frequency Provider Last Rate Last Dose    sodium hypochlorite (QUARTER STRENGTH DAKIN'S) 0.125% irrigation (bottle)   Topical BID Leva Jez, DPM        sodium chloride (NS) flush 5-10 mL  5-10 mL IntraVENous PRN Echo Presto B, NP   10 mL at 10/01/19 0643    insulin lispro (HUMALOG) injection   SubCUTAneous AC&HS Echo Presto B, NP   6 Units at 10/01/19 0758    glucose chewable tablet 16 g  4 Tab Oral PRN Kia Distad, NP        glucagon (GLUCAGEN) injection 1 mg  1 mg IntraMUSCular PRN Kia Distad, NP        piperacillin-tazobactam (ZOSYN) 2.25 g in 0.9% sodium chloride (MBP/ADV) 50 mL MBP  2.25 g IntraVENous Q6H Echo Presto B,  mL/hr at 10/01/19 0412 2.25 g at 10/01/19 0412    nicotine (NICODERM CQ) 21 mg/24 hr patch 1 Patch  1 Patch TransDERmal Q24H Bety LEMONS, NP        heparin (porcine) injection 5,000 Units  5,000 Units SubCUTAneous Q8H Caine Antonio B, NP   5,000 Units at 10/01/19 0048    vancomycin (VANCOCIN) 750 mg in  mL infusion  750 mg IntraVENous Q24H Bety LEMONS, NP        insulin glargine (LANTUS) injection 30 Units  30 Units SubCUTAneous DAILY Bety LEMONS NP        0.9% sodium chloride infusion  100 mL/hr IntraVENous CONTINUOUS Bety LEMONS,  mL/hr at 09/30/19 2133 100 mL/hr at 09/30/19 2133    acetaminophen (TYLENOL) tablet 650 mg  650 mg Oral Q4H PRN Bety LEMONS, NP   650 mg at 10/01/19 0015    aspirin chewable tablet 81 mg  81 mg Oral DAILY Antwan Padilla NP        gabapentin (NEURONTIN) capsule 300 mg  300 mg Oral TID Bety LEMONS, NP   300 mg at 09/30/19 2138    pravastatin (PRAVACHOL) tablet 40 mg  40 mg Oral QHS Bety LEMONS, NP   40 mg at 09/30/19 2138    montelukast (SINGULAIR) tablet 10 mg  10 mg Oral DAILY Antwan Padilla NP        hydrALAZINE (APRESOLINE) 20 mg/mL injection 10 mg  10 mg IntraVENous Q6H PRN Antwan Padilla NP        dextrose 10% infusion 125-250 mL  125-250 mL IntraVENous PRN Bety LEMONS, NP            No Known Allergies    Review of Systems:  A comprehensive review of systems was negative except for that written in the History of Present Illness. Objective:     Vitals:    09/30/19 2009 10/01/19 0004 10/01/19 0057 10/01/19 0407   BP: 157/78 150/78 136/67 137/72   Pulse: (!) 107 (!) 110 (!) 109 89   Resp: 18 17 18 18   Temp: 100.3 °F (37.9 °C) (!) 102.2 °F (39 °C) 100.2 °F (37.9 °C) 98.1 °F (36.7 °C)   SpO2: 97% 96% 97% 98%   Weight:       Height:            Physical Exam  Inspected left foot. Resolving cellulitis. Plantar puncture wound, drainage,devitalized tissue. Redness, swelling dorsal left second and third metatarsal phalangeal joint.    Assessment:     Hospital Problems  Date Reviewed: 10/1/2019 Codes Class Noted POA    Cellulitis and abscess of foot ICD-10-CM: Y58.857, L02.619  ICD-9-CM: 682.7  9/30/2019 Unknown        Hypertension ICD-10-CM: I10  ICD-9-CM: 401.9  9/30/2019 Unknown        Hyponatremia ICD-10-CM: E87.1  ICD-9-CM: 276.1  9/30/2019 Unknown              Plan:     Needs further debridement and drainage, patient is somewhat resistant. MRI to rule out osteitis and /or septic joint.      Signed By: Arina Juarez DPM     October 1, 2019

## 2019-10-01 NOTE — PROGRESS NOTES
Bedside and Verbal shift change report given to Chelo Harris RN (oncoming nurse) by Edita Geller (offgoing nurse). Report included the following information SBAR and Kardex.

## 2019-10-01 NOTE — ROUTINE PROCESS
Bedside and Verbal shift change report given to Elena Marin RN (oncoming nurse) by Lashaun Parra RN (offgoing nurse). Report included the following information SBAR, Kardex, ED Summary and Recent Results. Report given to nurse as was reported per ED nurse.

## 2019-10-01 NOTE — PROGRESS NOTES
conducted an initial consultation and Spiritual Assessment for Echo Valenzuela, who is a 60 y.o.,female. Patient's Primary Language is: [place]. According to the patient's EMR Sabianism Affiliation is: Non Baptism.     The reason the Patient came to the hospital is:   Patient Active Problem List    Diagnosis Date Noted    Cellulitis and abscess of foot 09/30/2019    Hypertension 09/30/2019    Hyponatremia 09/30/2019    Microalbuminuria 10/24/2018    Type 2 diabetes with nephropathy ([facility] 04/12/2018    Type 2 diabetes mellitus with diabetic neuropathy ([facility] 04/10/2018    DM (diabetes mellitus), type 2, uncontrolled ([facility] 01/21/2016    Midline low back pain without sciatica 01/21/2016    Vaginitis, atrophic 05/03/2015    High cholesterol 05/09/2014    Hip pain 06/23/2012    Depression 08/11/2011    Sacroiliac joint pain 08/11/2011    Lumbago     Chronic pain syndrome     Myalgia and myositis, unspecified     Encounter for long-term (current) use of other medications         The  provided the following Interventions:  Initiated a relationship of care and support. Explored issues of bogdan, belief, spirituality and Congregational/ritual needs while hospitalized. Listened empathically. Provided chaplaincy education. Provided information about Spiritual Care Services. Offered prayer and assurance of continued prayers on patient's behalf. Chart reviewed. The following outcomes where achieved:  Patient shared limited information about both their medical narrative and spiritual journey/beliefs.  confirmed Patient's Sabianism Affiliation. Patient processed feeling about current hospitalization. Patient expressed gratitude for 's visit. Assessment:  Patient does not have any Congregational/cultural needs that will affect patient's preferences in health care. There are no spiritual or Congregational issues which require intervention at this time. Plan:  Chaplains will continue to follow and will provide pastoral care on an as needed/requested basis.  recommends bedside caregivers page  on duty if patient shows signs of acute spiritual or emotional distress.     [facility]   [phone]

## 2019-10-01 NOTE — PROGRESS NOTES
Hospitalist Progress Note    Patient: Bolivar Levy Age: 60 y.o. : 1960 MR#: [id number] SSN: [id number]  Date/Time: 10/1/2019 7:33 PM    Subjective:   Ms. Rut Middleton is a 60year old female with a history of uncontrolled Diabetes Mellitus on insulin, A1C 12.5, hyperlipidemia, tobacco use 2 packs/day and gout who reportedly stepped on a nail some time in September. She developed pain in the foot with swelling and redness and presented to the ER 2019. In the ED , she was tachycardic, 108, T 99, /75, pulse ox 96%, RR 18, alert, left plantar foot with 2.5 cm fluctuant puncture wound, WBC 12.6, lactic acid 1.25, glucose 440 mg/dl, Na+ 128, K+ 5.0, Cl 96, CO2 23, BUN 25, creatinine. 1.58, GFR 33, A1C 12.5  Podiatry was consulted from the ED. Sepsis bundle was initiated in the ED. Patient was admitted to telemetry with left foot wound, uncontrolled DM and hyponatremia. Patient received resting bed, asks about taking tele off, discussed her hyponatremia and tachycardia and need for tele  Has Nicoderm patch on 21 mg, fidgety, Denies etOH od drug use    Review of Systems -  A 12 point Review of Systems was performed and unremarkable except as noted in the HPI. Objective:   VS:   Visit Vitals  /67 (BP 1 Location: Right arm, BP Patient Position: At rest)   Pulse 75   Temp 99.3 °F (37.4 °C)   Resp 18   Ht 5' 3\" (1.6 m)   Wt 54.4 kg (120 lb)   LMP  (LMP Unknown)   SpO2 99%   Breastfeeding?  No   BMI 21.26 kg/m²      Tmax/24hrs: Temp (24hrs), Av.7 °F (37.6 °C), Min:98.1 °F (36.7 °C), Max:102.2 °F (39 °C)       Intake/Output Summary (Last 24 hours) at 10/1/2019 1933  Last data filed at 10/1/2019 1711  Gross per 24 hour   Intake 2119 ml   Output 3000 ml   Net -881 ml       General:awake and alert   HEENT:speech clear and goal directed, facial symmetry, temporal atrophy, conjunctiva clear, anicteric, mucous membranes dry, neck supple  Cardiovascular: HR reg 75  Pulmonary: clear   GI: soft, BS+, non tender   Extremities:  Moving all 4 extremities in bed, wound left foot, no erythema above ankle, calves soft, + pulses    Labs:    Recent Results (from the past 24 hour(s))   GLUCOSE, POC    Collection Time: 09/30/19  9:30 PM   Result Value Ref Range    Glucose (POC) 213 (H) 70 - 110 mg/dL   EKG, 12 LEAD, INITIAL    Collection Time: 09/30/19 10:16 PM   Result Value Ref Range    Ventricular Rate 107 BPM    Atrial Rate 107 BPM    P-R Interval 170 ms    QRS Duration 106 ms    Q-T Interval 350 ms    QTC Calculation (Bezet) 467 ms    Calculated P Axis 58 degrees    Calculated R Axis 85 degrees    Calculated T Axis 41 degrees    Diagnosis       Sinus tachycardia  Low voltage QRS  Borderline ECG  When compared with ECG of 26-MAR-2012 05:27,  No significant change was found  Confirmed by Manda Burgess MD, Flora Macdonald ([id number]) on 10/1/2019 7:70:41 AM     METABOLIC PANEL, BASIC    Collection Time: 10/01/19  4:06 AM   Result Value Ref Range    Sodium 134 (L) 136 - 145 mmol/L    Potassium 4.2 3.5 - 5.5 mmol/L    Chloride 105 100 - 111 mmol/L    CO2 22 21 - 32 mmol/L    Anion gap 7 3.0 - 18 mmol/L    Glucose 232 (H) 74 - 99 mg/dL    BUN 21 (H) 7.0 - 18 MG/DL    Creatinine 1.43 (H) 0.6 - 1.3 MG/DL    BUN/Creatinine ratio 15 12 - 20      GFR est AA 45 (L) >60 ml/min/1.73m2    GFR est non-AA 37 (L) >60 ml/min/1.73m2    Calcium 8.5 8.5 - 10.1 MG/DL   CBC WITH AUTOMATED DIFF    Collection Time: 10/01/19  4:06 AM   Result Value Ref Range    WBC 14.2 (H) 4.6 - 13.2 K/uL    RBC 3.98 (L) 4.20 - 5.30 M/uL    HGB 11.7 (L) 12.0 - 16.0 g/dL    HCT 33.9 (L) 35.0 - 45.0 %    MCV 85.2 74.0 - 97.0 FL    MCH 29.4 24.0 - 34.0 PG    MCHC 34.5 31.0 - 37.0 g/dL    RDW 13.3 11.6 - 14.5 %    PLATELET 559 636 - 294 K/uL    MPV 10.4 9.2 - 11.8 FL    NEUTROPHILS 73 42 - 75 %    LYMPHOCYTES 21 20 - 51 %    MONOCYTES 4 2 - 9 %    EOSINOPHILS 1 0 - 5 %    BASOPHILS 0 0 - 3 %    METAMYELOCYTES 1 (H) 0 %    ABS. NEUTROPHILS 10.4 (H) 1.8 - 8.0 K/UL    ABS. LYMPHOCYTES 3.0 0.8 - 3.5 K/UL    ABS. MONOCYTES 0.6 0 - 1.0 K/UL    ABS. EOSINOPHILS 0.1 0.0 - 0.4 K/UL    ABS. BASOPHILS 0.0 0.0 - 0.06 K/UL    DF MANUAL      PLATELET COMMENTS ADEQUATE PLATELETS      RBC COMMENTS NORMOCYTIC, NORMOCHROMIC     GLUCOSE, POC    Collection Time: 10/01/19  6:06 AM   Result Value Ref Range    Glucose (POC) 267 (H) 70 - 110 mg/dL   CULTURE, WOUND W GRAM STAIN    Collection Time: 10/01/19  9:48 AM   Result Value Ref Range    Special Requests: RIGHT FOOT     GRAM STAIN MANY WBC'S      GRAM STAIN RARE GRAM POSITIVE COCCI IN PAIRS      Culture result: PENDING    GLUCOSE, POC    Collection Time: 10/01/19 11:35 AM   Result Value Ref Range    Glucose (POC) 452 (HH) 70 - 110 mg/dL   GLUCOSE, POC    Collection Time: 10/01/19  4:11 PM   Result Value Ref Range    Glucose (POC) 354 (H) 70 - 110 mg/dL       Imaging:  Mri Foot Lt Wo Cont    Result Date: 10/1/2019  EXAM: MRI of the Left  foot INDICATION: Osteomyelitis suspected, foot swelling, no arthropathy, no ulcer, diabetic pt TECHNIQUE: Multiplanar multisequence MR imaging of the left  foot IV Contrast: None COMPARISON: Plain film dated 9/30/2019 FINDINGS: Osseous Structures: There is marrow edema in the medial sesamoid of the first metatarsal. The marrow signal of the left forefoot is otherwise unremarkable. The marrow signal is preserved. No fracture identified. Ligaments: No gross ligamentous pathology appreciated. Tendons/Muscle: Mild muscular edema is noted in the forefoot. Soft Tissues/Other: There is subcutaneous emphysema in the forefoot at the level of the proximal phalanges of the first through fifth toes. There appears to be a defect in the plantar soft tissues near the base of the second and third toes. No fluid collection is noted. IMPRESSION: 1. Plantar ulcer with subcutaneous emphysema in the forefoot. 2.  Medial sesamoiditis. 3.  No evidence of osteomyelitis.       Chest Xray 9/30/2019  EXAM:  Chest Portable.     INDICATION: Sepsis      COMPARISON:  05/15/19    TECHNIQUE:  Portable AP chest study   FINDINGS:    - Both lungs are clear.   - No pleural effusion or pneumothorax is detected. - Cardiac silhouette, mediastinum and hilar regions appear unremarkable. - No significant interval changes are appreciated   IMPRESSION  IMPRESSION:   Negative study.        Left foot Xray 9/30/2019    FINDINGS:       - Abnormal subcutaneous air collections are noted adjacent to the 1st and 2nd  proximal phalanges along the plantar aspect. Suspicious for colonization by  gas-forming organisms.  - No acute fracture or subluxation.     IMPRESSION  IMPRESSION:       1. Abnormal subcutaneous air collections along the plantar aspect suggestive of  aggressive infectious process. 2.  No acute bony abnormalities      Assessment/Plan:   Principal Problem:    Puncture wound of foot, left, complicated (7/03/7144)    Active Problems:    DM (diabetes mellitus), type 2, uncontrolled ([facility] (1/21/2016)      Hyponatremia (9/30/2019)      Proteinuria (10/1/2019)      Overview: RX  lisinopril for proteinuria      Acute on chronic renal insufficiency (10/1/2019)    Patient had her  bring in all her medications  RX fill dates were 8/17/2019 and essentially no tabs were missing from fill number, insulin vial had seal on. Attempted to discuss medication compliance with patient, who responded \"yes, I guess they are due for refills. \"  Diverting concern to scheduled procedure in am; will need further counseling when receptive    Continue empiric antibiotics pending cultures  Start Lantus with POC glucose with coverage  Parenteral fluids    Continue statin  Continue holding ace for now    Additional Notes:      Full Code    Disposition:   Home    Case discussed with:  [x]Patient  []Family  [x]Nursing  []Case Management  DVT Prophylaxis:  []Lovenox  []Hep SQ  []SCDs  []Coumadin   []On Heparin gtt    Signed By: Radha Griffin DO     October 1, 2019 7:33 PM

## 2019-10-01 NOTE — DIABETES MGMT
Diabetes Patient/Family Education Record  Factors That  May Influence Patients Ability  to Learn or  Comply with Recommendations   []   Language barrier    []   Cultural needs   []   Motivation    []   Cognitive limitation    []   Physical   [x]   Education    []   Physiological factors   []   Hearing/vision/speaking impairment   []   Anabaptism beliefs    []   Financial factors   []  Other:   []  No factors identified at this time. Person Instructed:   [x]   Patient   [x]   Family (brother)   []  Other     Preference for Learning:   [x]   Verbal   [x]   Written   []  Demonstration     Level of Comprehension & Competence:    []  Good                                      [] Fair                                     []  Poor                             [x]  Needs Reinforcement   [x]  Teachback completed    Education Component:   [x]  Medication management, including how to administer insulin (if appropriate) and potential medication interactions Novolog 70/30 insulin 25 units BID per PTA Meds. Pt unable to state how much she takes but reports giving her own injections. Stressed importance of taking insulin as prescribed. Also take Metformin    [x]  Nutritional management -obtain usual meal pattern   []  Exercise   [x]  Signs, symptoms, and treatment of hyperglycemia and hypoglycemia   [x] Prevention, recognition and treatment of hyperglycemia and hypoglycemia   [x]  Importance of blood glucose monitoring and how to obtain a blood glucose meter pt has glucometer but states she does not know how to use it.  States her family member Favian Preston who lives with her is a nurse and helps her   []  Instruction on use of the blood glucose meter   [x]  Discuss the importance of HbA1C monitoring    []  Sick day guidelines   []  Proper use and disposal of lancets, needles, syringes or insulin pens (if appropriate)   [x]  Potential long-term complications (retinopathy, kidney disease, neuropathy, foot care) [x] Information about whom to contact in case of emergency or for more information    [x]  Goal:  Patient/family will demonstrate understanding of Diabetes Self Management Skills by: 10/09/19  Plan for post-discharge education or self-management support:    [x] Outpatient class schedule provided            [] Patient Declined    [] Scheduled for outpatient classes (date) _______  Verify:  Does patient understand how diabetes medications work? Needs reinforcement  Does patient know what their most recent A1c is? reviewed  Does patient monitor glucose at home? yes  Does patient have difficulty obtaining diabetes medications or testing supplies?  None reported       Thomas Oliver, RD, CDE

## 2019-10-01 NOTE — PROGRESS NOTES
NUTRITION    Nursing Referral: [facility]     RECOMMENDATIONS / PLAN:     - Modify supplement: Continue Glucerna Shakes, decrease to once daily  - Continue RD inpatient monitoring and evaluation. NUTRITION INTERVENTIONS & DIAGNOSIS:     - Meals/snacks: modified composition  - Medical food supplement therapy: modify    Nutrition Diagnosis: Unintended weight loss related to predicted prolonged inadequate oral intake as evidenced by -24 lb, 10.4% x 3 years PTA. ASSESSMENT:     Pt off unit at time of visit. Has excellent meal intake since admission per chart. Noted weight fluctuations, with wt loss in past 3 years PTA per chart hx. Noted Glucerna Shake TID ordered for pt; given po intake of meals, energy provided by diet and estimated nutrition needs, will not need supplement TID. Nutritional intake adequate to meet patients estimated nutritional needs:  Yes    Diet: DIET DIABETIC CONSISTENT CARB Regular; No Conc.  Sweets  DIET NUTRITIONAL SUPPLEMENTS All Meals; [address] SHAKE  DIET NPO      Food Allergies:  None known   Current Appetite: Unknown  Appetite/meal intake prior to admission: Unknown  Feeding Limitations:  [] Swallowing difficulty    [] Chewing difficulty    [] Other:  Current Meal Intake:   Patient Vitals for the past 100 hrs:   % Diet Eaten   10/01/19 1711 100 %   10/01/19 1350 100 %   10/01/19 0839 100 %       BM:  PTA  Skin Integrity:  No pressure injury or wound noted  Edema:   [x] No     [] Yes   Pertinent Medications: Reviewed: NS at 100 mL/hr, SSI, lantus ordered    Recent Labs     10/01/19  0406 09/30/19  1552   * 128*   K 4.2 5.0    96*   CO2 22 23   * 437*   BUN 21* 25*   CREA 1.43* 1.58*   CA 8.5 9.3   ALB  --  3.6   SGOT  --  7*   ALT  --  14       Intake/Output Summary (Last 24 hours) at 10/1/2019 1723  Last data filed at 10/1/2019 1711  Gross per 24 hour   Intake 3989 ml   Output 3000 ml   Net 989 ml       Anthropometrics:  Ht Readings from Last 1 Encounters:   09/30/19 5' 3\" (1.6 m)     Last 3 Recorded Weights in this Encounter    09/30/19 1616   Weight: 54.4 kg (120 lb)     Body mass index is 21.26 kg/m². Weight History:    Weight fluctuating PTA. +29 lb from 8/2011 to 7/2014. Then wt loss and regain through 6/2016.  -24 lb, 10.4% x 3 years PTA per chart hx    Weight Metrics 9/30/2019 7/25/2019 5/15/2019 4/25/2019 3/15/2019 1/25/2019 10/15/2018   Weight 120 lb 119 lb 112 lb 129 lb 112 lb 112 lb 117 lb   BMI 21.26 kg/m2 21.08 kg/m2 19.84 kg/m2 22.85 kg/m2 19.84 kg/m2 21.87 kg/m2 22.85 kg/m2        Admitting Diagnosis: Cellulitis and abscess of foot [L03.119, L02.619]  Pertinent PMHx: depression, DM, high cholesterol, gout    Education Needs:        [x] None identified  [] Identified - Not appropriate at this time  []  Identified and addressed - refer to education log  Learning Limitations:   [x] None identified  [] Identified    Cultural, Tenriism & ethnic food preferences:  [x] None identified    [] Identified and addressed     ESTIMATED NUTRITION NEEDS:     Calories: 9482-7234 kcal (MSJx1.2-1.3) based on  [x] Actual BW: 54 kg     [] IBW   Protein: 43-54 gm (0.8-1 gm/kg) based on  [x] Actual BW      [] IBW   Fluid: 1 mL/kcal     MONITORING & EVALUATION:     Nutrition Goal(s):   - PO nutrition intake will meet >75% of patient estimated nutritional needs within the next 7 days. Outcome: New/Initial goal     Monitoring:   [x] Food and beverage intake   [x] Diet order   [x] Nutrition-focused physical findings   [x] Treatment/therapy   [] Weight   [] Enteral nutrition intake        Previous Recommendations (for follow-up assessments only):  Not Applicable     Discharge Planning: No nutritional discharge needs at this time.     [x] Participated in care planning, discharge planning, & interdisciplinary rounds as appropriate      Natali Wall   Pager: [phone]

## 2019-10-01 NOTE — PROGRESS NOTES
Reason for Admission:   Cellulitis and abscess of foot [L03.119, L02.619]               RRAT Score:     22             Resources/supports as identified by patient/family:       Top Challenges facing patient (as identified by patient/family and CM): Finances/Medication cost?     No   Transportation      Son's girlfriend  Support system or lack thereof? Family support  Living arrangements? Her son, son's girlfriend and her daughter lives with pt   Self-care/ADLs/Cognition? Independent         Current Advanced Directive/Advance Care Plan:   no                          Plan for utilizing home health: To be determined                      Likelihood of readmission:   HIGH    Transition of Care Plan:                    Initial assessment completed with patient. Cognitive status of patient: oriented to time, place, person and situation. Face sheet information confirmed:  yes. The patient designates her son Fenton Afshin to participate in her discharge plan and to receive any needed information. This patient lives in a home with son, son's girlfriend and her daughter. Patient is able to navigate steps as needed. Prior to hospitalization, patient was considered to be independent with ADLs/IADLS : yes . Patient has a current ACP document on file: no  The son's girlfriend will be available to transport patient home upon discharge. The patient already has no medical equipment available in the home. Patient is not currently active with home health. Patient has not stayed in a skilled nursing facility or rehab. Was  stay within last 60 days : no. This patient is on dialysis :no    Currently, the discharge plan is home vs home health    The patient states that she can obtain her medications from the pharmacy, and take her medications as directed. Patient's current insurance is [facility] Medicaid.       Care Management Interventions  PCP Verified by CM: Yes(per pt she saw her pcp in September)  Palliative Care Criteria Met (RRAT>21 & CHF Dx)?: No  Mode of Transport at Discharge: Other (see comment)(family)  Transition of Care Consult (CM Consult): Discharge Planning  Physical Therapy Consult: No  Occupational Therapy Consult: No  Speech Therapy Consult: No  Current Support Network:  Other(pt's son, his girlfriend and girlfriend's daughter lives with her)  Confirm Follow Up Transport: Family  Plan discussed with Pt/Family/Caregiver: Yes  Discharge Location  Discharge Placement: Home(vs home health)      CUBA Galindo RN  Care Management  Pager: [phone]

## 2019-10-01 NOTE — ROUTINE PROCESS
Please complete MRI screening form with patient or knowledgeable family member and fax to MRI. Thanks.

## 2019-10-01 NOTE — DIABETES MGMT
GLYCEMIC CONTROL PLAN OF CARE     Assessment/Recommendations:  Pt is a 60year old female with a past medical history significant for type 2 diabetes, , gout, and depression who presented with left foot infection. Blood glucose elevated above targets, noted addition of Lantus insulin today, continue to adjust as needed. Continue very insulin resistant correctional Lispro coverage. Continue inpatient monitoring and intervention. Most recent blood glucose values:  9/30/2019 16:21 9/30/2019 21:30 10/1/2019 06:06 10/1/2019 11:35   440 (HH) 213 (H) 267 (H) 452 (HH)     Current A1C of 13.3% is equivalent to average blood glucose of 335 mg/dL mg/dl over the past 2-3 months.     Current hospital diabetes medications:   Lantus insulin 30 units daily  Correctional Lispro insulin 4 times daily ACHS (very resistant scale)    Home diabetes medications:  Metformin 1,000 mg twice daily with meals  Novolog 70/30 insulin 25 units BID    Diet: Diabetic consistent carbohydrate, no concentrated sweets     Education:  Will follow for educational needs      Hermelindo Cárdenas RD, CDE

## 2019-10-02 ENCOUNTER — ANESTHESIA (OUTPATIENT)
Dept: SURGERY | Age: 59
DRG: 305 | End: 2019-10-02
Payer: MEDICAID

## 2019-10-02 PROBLEM — N18.9 ACUTE ON CHRONIC RENAL INSUFFICIENCY: Status: ACTIVE | Noted: 2019-10-01

## 2019-10-02 PROBLEM — S91.332A: Status: ACTIVE | Noted: 2019-09-30

## 2019-10-02 PROBLEM — R80.9 PROTEINURIA: Chronic | Status: ACTIVE | Noted: 2019-10-01

## 2019-10-02 PROBLEM — N28.9 ACUTE ON CHRONIC RENAL INSUFFICIENCY: Status: ACTIVE | Noted: 2019-10-01

## 2019-10-02 LAB
ANION GAP SERPL CALC-SCNC: 7 MMOL/L (ref 3–18)
BUN SERPL-MCNC: 21 MG/DL (ref 7–18)
BUN/CREAT SERPL: 15 (ref 12–20)
CALCIUM SERPL-MCNC: 7.9 MG/DL (ref 8.5–10.1)
CHLORIDE SERPL-SCNC: 105 MMOL/L (ref 100–111)
CO2 SERPL-SCNC: 22 MMOL/L (ref 21–32)
CREAT SERPL-MCNC: 1.41 MG/DL (ref 0.6–1.3)
GLUCOSE BLD STRIP.AUTO-MCNC: 141 MG/DL (ref 70–110)
GLUCOSE BLD STRIP.AUTO-MCNC: 173 MG/DL (ref 70–110)
GLUCOSE BLD STRIP.AUTO-MCNC: 191 MG/DL (ref 70–110)
GLUCOSE BLD STRIP.AUTO-MCNC: 245 MG/DL (ref 70–110)
GLUCOSE BLD STRIP.AUTO-MCNC: 76 MG/DL (ref 70–110)
GLUCOSE BLD STRIP.AUTO-MCNC: 90 MG/DL (ref 70–110)
GLUCOSE BLD STRIP.AUTO-MCNC: 98 MG/DL (ref 70–110)
GLUCOSE SERPL-MCNC: 96 MG/DL (ref 74–99)
POTASSIUM SERPL-SCNC: 3.9 MMOL/L (ref 3.5–5.5)
SODIUM SERPL-SCNC: 134 MMOL/L (ref 136–145)

## 2019-10-02 PROCEDURE — 87075 CULTR BACTERIA EXCEPT BLOOD: CPT

## 2019-10-02 PROCEDURE — 80048 BASIC METABOLIC PNL TOTAL CA: CPT

## 2019-10-02 PROCEDURE — 36415 COLL VENOUS BLD VENIPUNCTURE: CPT

## 2019-10-02 PROCEDURE — 74011250636 HC RX REV CODE- 250/636: Performed by: HOSPITALIST

## 2019-10-02 PROCEDURE — 74011000258 HC RX REV CODE- 258: Performed by: NURSE PRACTITIONER

## 2019-10-02 PROCEDURE — 65270000029 HC RM PRIVATE

## 2019-10-02 PROCEDURE — 74011250636 HC RX REV CODE- 250/636

## 2019-10-02 PROCEDURE — 76010000138 HC OR TIME 0.5 TO 1 HR: Performed by: PODIATRIST

## 2019-10-02 PROCEDURE — 87186 SC STD MICRODIL/AGAR DIL: CPT

## 2019-10-02 PROCEDURE — 0JBR0ZZ EXCISION OF LEFT FOOT SUBCUTANEOUS TISSUE AND FASCIA, OPEN APPROACH: ICD-10-PCS | Performed by: PODIATRIST

## 2019-10-02 PROCEDURE — 74011000258 HC RX REV CODE- 258: Performed by: HOSPITALIST

## 2019-10-02 PROCEDURE — 74011250636 HC RX REV CODE- 250/636: Performed by: NURSE PRACTITIONER

## 2019-10-02 PROCEDURE — 74011250637 HC RX REV CODE- 250/637: Performed by: HOSPITALIST

## 2019-10-02 PROCEDURE — 87077 CULTURE AEROBIC IDENTIFY: CPT

## 2019-10-02 PROCEDURE — 76210000006 HC OR PH I REC 0.5 TO 1 HR: Performed by: PODIATRIST

## 2019-10-02 PROCEDURE — 77030040361 HC SLV COMPR DVT MDII -B: Performed by: PODIATRIST

## 2019-10-02 PROCEDURE — 87070 CULTURE OTHR SPECIMN AEROBIC: CPT

## 2019-10-02 PROCEDURE — 74011000258 HC RX REV CODE- 258

## 2019-10-02 PROCEDURE — 82962 GLUCOSE BLOOD TEST: CPT

## 2019-10-02 PROCEDURE — 77030040922 HC BLNKT HYPOTHRM STRY -A: Performed by: PODIATRIST

## 2019-10-02 PROCEDURE — 77030018836 HC SOL IRR NACL ICUM -A: Performed by: PODIATRIST

## 2019-10-02 PROCEDURE — 74011636637 HC RX REV CODE- 636/637: Performed by: HOSPITALIST

## 2019-10-02 PROCEDURE — 76060000032 HC ANESTHESIA 0.5 TO 1 HR: Performed by: PODIATRIST

## 2019-10-02 PROCEDURE — 74011000250 HC RX REV CODE- 250: Performed by: PODIATRIST

## 2019-10-02 RX ORDER — PROPOFOL 10 MG/ML
INJECTION, EMULSION INTRAVENOUS
Status: DISCONTINUED | OUTPATIENT
Start: 2019-10-02 | End: 2019-10-02 | Stop reason: HOSPADM

## 2019-10-02 RX ORDER — HYDROCODONE BITARTRATE AND ACETAMINOPHEN 5; 325 MG/1; MG/1
1-2 TABLET ORAL
Status: DISCONTINUED | OUTPATIENT
Start: 2019-10-02 | End: 2019-10-08

## 2019-10-02 RX ORDER — MORPHINE SULFATE 2 MG/ML
2 INJECTION, SOLUTION INTRAMUSCULAR; INTRAVENOUS
Status: DISCONTINUED | OUTPATIENT
Start: 2019-10-02 | End: 2019-10-08

## 2019-10-02 RX ORDER — NEOMYCIN AND POLYMYXIN B SULFATES 40; 200000 MG/ML; [USP'U]/ML
SOLUTION IRRIGATION AS NEEDED
Status: DISCONTINUED | OUTPATIENT
Start: 2019-10-02 | End: 2019-10-02 | Stop reason: HOSPADM

## 2019-10-02 RX ORDER — MIDAZOLAM HYDROCHLORIDE 1 MG/ML
INJECTION, SOLUTION INTRAMUSCULAR; INTRAVENOUS AS NEEDED
Status: DISCONTINUED | OUTPATIENT
Start: 2019-10-02 | End: 2019-10-02 | Stop reason: HOSPADM

## 2019-10-02 RX ORDER — FENTANYL CITRATE 50 UG/ML
INJECTION, SOLUTION INTRAMUSCULAR; INTRAVENOUS AS NEEDED
Status: DISCONTINUED | OUTPATIENT
Start: 2019-10-02 | End: 2019-10-02 | Stop reason: HOSPADM

## 2019-10-02 RX ORDER — LIDOCAINE HYDROCHLORIDE 20 MG/ML
INJECTION, SOLUTION EPIDURAL; INFILTRATION; INTRACAUDAL; PERINEURAL AS NEEDED
Status: DISCONTINUED | OUTPATIENT
Start: 2019-10-02 | End: 2019-10-02 | Stop reason: HOSPADM

## 2019-10-02 RX ORDER — ACETAMINOPHEN 325 MG/1
650 TABLET ORAL
Status: DISCONTINUED | OUTPATIENT
Start: 2019-10-02 | End: 2019-10-09 | Stop reason: HOSPADM

## 2019-10-02 RX ORDER — PROPOFOL 10 MG/ML
INJECTION, EMULSION INTRAVENOUS AS NEEDED
Status: DISCONTINUED | OUTPATIENT
Start: 2019-10-02 | End: 2019-10-02 | Stop reason: HOSPADM

## 2019-10-02 RX ADMIN — PIPERACILLIN SODIUM,TAZOBACTAM SODIUM 2.25 G: 2; .25 INJECTION, POWDER, FOR SOLUTION INTRAVENOUS at 10:00

## 2019-10-02 RX ADMIN — ACETAMINOPHEN 650 MG: 325 TABLET ORAL at 20:16

## 2019-10-02 RX ADMIN — GABAPENTIN 100 MG: 100 CAPSULE ORAL at 21:42

## 2019-10-02 RX ADMIN — MONTELUKAST 10 MG: 10 TABLET, FILM COATED ORAL at 14:50

## 2019-10-02 RX ADMIN — PROPOFOL 30 MG: 10 INJECTION, EMULSION INTRAVENOUS at 12:43

## 2019-10-02 RX ADMIN — MIDAZOLAM HYDROCHLORIDE 2 MG: 1 INJECTION, SOLUTION INTRAMUSCULAR; INTRAVENOUS at 12:31

## 2019-10-02 RX ADMIN — HEPARIN SODIUM 5000 UNITS: 5000 INJECTION INTRAVENOUS; SUBCUTANEOUS at 16:40

## 2019-10-02 RX ADMIN — MORPHINE SULFATE 4 MG: 4 INJECTION INTRAVENOUS at 03:38

## 2019-10-02 RX ADMIN — HYDROCODONE BITARTRATE AND ACETAMINOPHEN 2 TABLET: 5; 325 TABLET ORAL at 21:41

## 2019-10-02 RX ADMIN — PROPOFOL 50 MG: 10 INJECTION, EMULSION INTRAVENOUS at 12:39

## 2019-10-02 RX ADMIN — VANCOMYCIN HYDROCHLORIDE 1000 MG: 10 INJECTION, POWDER, LYOPHILIZED, FOR SOLUTION INTRAVENOUS at 16:42

## 2019-10-02 RX ADMIN — PIPERACILLIN SODIUM,TAZOBACTAM SODIUM 2.25 G: 2; .25 INJECTION, POWDER, FOR SOLUTION INTRAVENOUS at 21:42

## 2019-10-02 RX ADMIN — PROPOFOL 50 MCG/KG/MIN: 10 INJECTION, EMULSION INTRAVENOUS at 12:39

## 2019-10-02 RX ADMIN — INSULIN LISPRO 3 UNITS: 100 INJECTION, SOLUTION INTRAVENOUS; SUBCUTANEOUS at 11:10

## 2019-10-02 RX ADMIN — PRAVASTATIN SODIUM 40 MG: 20 TABLET ORAL at 21:41

## 2019-10-02 RX ADMIN — INSULIN LISPRO 6 UNITS: 100 INJECTION, SOLUTION INTRAVENOUS; SUBCUTANEOUS at 21:41

## 2019-10-02 RX ADMIN — MORPHINE SULFATE 4 MG: 4 INJECTION INTRAVENOUS at 16:35

## 2019-10-02 RX ADMIN — ASPIRIN 81 MG 81 MG: 81 TABLET ORAL at 14:50

## 2019-10-02 RX ADMIN — CALCIUM GLUCONATE 2 G: 98 INJECTION, SOLUTION INTRAVENOUS at 17:25

## 2019-10-02 RX ADMIN — FENTANYL CITRATE 50 MCG: 50 INJECTION, SOLUTION INTRAMUSCULAR; INTRAVENOUS at 12:36

## 2019-10-02 RX ADMIN — PIPERACILLIN SODIUM,TAZOBACTAM SODIUM 2.25 G: 2; .25 INJECTION, POWDER, FOR SOLUTION INTRAVENOUS at 03:28

## 2019-10-02 RX ADMIN — PIPERACILLIN SODIUM,TAZOBACTAM SODIUM 2.25 G: 2; .25 INJECTION, POWDER, FOR SOLUTION INTRAVENOUS at 16:41

## 2019-10-02 RX ADMIN — GABAPENTIN 100 MG: 100 CAPSULE ORAL at 16:41

## 2019-10-02 RX ADMIN — ACETAMINOPHEN 650 MG: 325 TABLET ORAL at 00:22

## 2019-10-02 RX ADMIN — INSULIN GLARGINE 40 UNITS: 100 INJECTION, SOLUTION SUBCUTANEOUS at 09:21

## 2019-10-02 NOTE — CONSULTS
Infectious Disease Consultation Note    Requested by:dr. Ericka Hines    Reason: left foot puncture wound infection    Current abx Prior abx   Pip/tazo, vancomycin since 9/30/19      Lines:       Assessment :    60 y.o.  female with past medical history of uncontrolled type 2 DM (last hgbA1C 13.3 on 1/25/19), gout and some issues with compliance / developmental delay per PCP records who presented to ed on 9/30/19 with left foot pain following stepping on a refugio nail     MRI foot 10//1/19-  There is subcutaneous emphysema in the forefoot at the level of the proximal phalanges of the first through fifth toes. defect in the plantar soft tissues near the base of the second and third toes. Clinical presentation c/w sepsis (POA) due to left foot cellulitis, infected left foot puncture wound in a patient with uncontrolled type 2 DM. S/p I&D left foot on 10/2/19. Now with gram positive bacteremia - ?bloodstream infection due to left foot infection versus contaminant. Will need to f/u ID and repeat blood cultures to verify. Recommendations:    1.  conitnue pip/tazo, vancomycin. 2. F/u ID of gpc in blood cultures  3. Repeat blood cultures  4. F/u preop, intra op wound cultures  5. Will finalize abx in about 2 days based on wound cultures, blood cultures, clinical course. Advance Care planning:full code: discussed  with patient/surrogate decision maker: Thank you for consultation request. Above plan was discussed in details with patient,  and dr Ericka Hines. Please call me if any further questions or concerns. Will continue to participate in the care of this patient. HPI:     60 y.o.    female with past medical history of uncontrolled type 2 DM (last hgbA1C 13.3 on 1/25/19), gout and some issues with compliance / developmental delay per PCP records who presented to ed on 9/30/19 with left foot pain following stepping on a refugio nail - patient states a couple months ago, states it [place] and then flared up a couple weeks ago / has been getting worse and now with difficulty ambulating. Patient states she has been taking her pills as prescribed but she states she has been having difficulty ambulating to the refrigerator to get her insulin. Started on broad spectrum abx. Podiatry consulted. MRI foot -  There is subcutaneous emphysema in the forefoot at the level of the proximal phalanges of the first through fifth toes. defect in the plantar soft tissues near the base of the second and third toes. S/p I&D 10/2/19. I have been consulted for further recommendations.      As per brother at bedside, patient stepped on a nail about a week ago. Of note, one set of blood culture 9/30 reveals gpc. Wound cultures 10/1- reveal gram positive cocci. Patient denies fever, headaches, visual disturbances, sore throat, runny nose, earaches, cp, sob, chills, cough, abdominal pain, diarrhea, burning micturition, pain or weakness in extremities. He denies back pain/flank pain. He denies recent sick contacts. No h/o recent travel. No known h/o MRSA colonization or infection in the past.      Past Medical History:   Diagnosis Date    Bladder infection     Depression     Diabetes ([address]     Difficulty walking     DM (diabetes mellitus) ([facility] [address] 6/22/2012    Ill-defined condition     leaking bladder;high cholesterol    Proteinuria 10/1/2019    RX  lisinopril for proteinuria    Puncture wound of foot, left, complicated 5/99/6358       Past Surgical History:   Procedure Laterality Date    HX GYN      hysterectomy    HX OTHER SURGICAL      bladder surgery       home Medication List    Details   colchicine 0.6 mg tablet Take 1 tablet by mouth Q1Hour for gout pain. NOT TO EXCEED 3 TABLETS IN 24 HOURS.   Qty: 30 Tab, Refills: 0      hydrOXYzine HCl (ATARAX) 25 mg tablet TAKE 1 TABLET BY MOUTH THREE TIMES DAILY AS NEEDED FOR ITCHING  Qty: 90 Tab, Refills: 0    Associated Diagnoses: Pruritus      pravastatin (PRAVACHOL) 40 mg tablet TAKE ONE TABLET BY MOUTH EVERY NIGHT  Qty: 30 Tab, Refills: 4      metFORMIN (GLUCOPHAGE) 1,000 mg tablet TAKE ONE TABLET BY MOUTH TWICE DAILY WITH MEALS  Qty: 60 Tab, Refills: 4      gabapentin (NEURONTIN) 300 mg capsule Take 1 Cap by mouth three (3) times daily. Qty: 90 Cap, Refills: 4      montelukast (SINGULAIR) 10 mg tablet Take 1 Tab by mouth daily. Qty: 30 Tab, Refills: 5      ergocalciferol (ERGOCALCIFEROL) 50,000 unit capsule Take 1 Cap by mouth every seven (7) days. Qty: 4 Cap, Refills: 5      lidocaine (LIDODERM) 5 % Apply patch to the affected area for 12 hours a day and remove for 12 hours a day. Qty: 1 Package, Refills: 0      insulin aspart protamine/insulin aspart (NOVOLOG MIX 70-30 U-100 INSULN) 100 unit/mL (70-30) injection INJECT 25 UNITS UNDER THE SKIN TWO TIMES A DAY  Qty: 10 mL, Refills: 3    Comments: Before meals      mupirocin (BACTROBAN) 2 % ointment Apply to area affected BID  Qty: 22 g, Refills: 1    Associated Diagnoses: Dermatitis      lisinopril (PRINIVIL, ZESTRIL) 10 mg tablet TAKE 1 TABLET BY MOUTH ONCE DAILY  Qty: 30 Tab, Refills: 5      Insulin Syringe-Needle U-100 1/2 mL 30 gauge x 5/16 syrg INJECT TWICE A DAY  Qty: 100 Syringe, Refills: 5      Blood-Glucose Meter monitoring kit Check BS 3x/day E11.65  Qty: 1 Kit, Refills: 0      Lancets misc Check BS 3x/day E11.65  Qty: 100 Each, Refills: 5      glucose blood VI test strips (ONETOUCH ULTRA TEST) strip Check BS 3x/day E11.65  Qty: 100 Strip, Refills: 5      acetaminophen (TYLENOL) 325 mg tablet Take 2 Tabs by mouth every four (4) hours as needed for Pain. Qty: 20 Tab, Refills: 0      aspirin 81 mg tablet Take 81 mg by mouth daily.                Current Facility-Administered Medications   Medication Dose Route Frequency    acetaminophen (TYLENOL) tablet 650 mg  650 mg Oral Q4H PRN    neomycin-polymyxin B  irrigation solution soln    PRN    lidocaine (PF) (XYLOCAINE) 20 mg/mL (2 %) injection    PRN  sodium hypochlorite (QUARTER STRENGTH DAKIN'S) 0.125% irrigation (bottle)   Topical BID    vancomycin (VANCOCIN) 1000 mg in  ml infusion  1,000 mg IntraVENous Q24H    insulin glargine (LANTUS) injection 40 Units  40 Units SubCUTAneous DAILY    lidocaine (PF) (XYLOCAINE) 10 mg/mL (1 %) injection 0.1 mL  0.1 mL SubCUTAneous PRN    lactated Ringers infusion  50 mL/hr IntraVENous CONTINUOUS    naloxone (NARCAN) injection 0.4 mg  0.4 mg SubCUTAneous EVERY 2 MINUTES AS NEEDED    morphine injection 4 mg  4 mg IntraVENous Q3H PRN    gabapentin (NEURONTIN) capsule 100 mg  100 mg Oral TID    sodium chloride (NS) flush 5-10 mL  5-10 mL IntraVENous PRN    insulin lispro (HUMALOG) injection   SubCUTAneous AC&HS    glucose chewable tablet 16 g  4 Tab Oral PRN    glucagon (GLUCAGEN) injection 1 mg  1 mg IntraMUSCular PRN    piperacillin-tazobactam (ZOSYN) 2.25 g in 0.9% sodium chloride (MBP/ADV) 50 mL MBP  2.25 g IntraVENous Q6H    nicotine (NICODERM CQ) 21 mg/24 hr patch 1 Patch  1 Patch TransDERmal Q24H    heparin (porcine) injection 5,000 Units  5,000 Units SubCUTAneous Q8H    aspirin chewable tablet 81 mg  81 mg Oral DAILY    pravastatin (PRAVACHOL) tablet 40 mg  40 mg Oral QHS    montelukast (SINGULAIR) tablet 10 mg  10 mg Oral DAILY    hydrALAZINE (APRESOLINE) 20 mg/mL injection 10 mg  10 mg IntraVENous Q6H PRN    dextrose 10% infusion 125-250 mL  125-250 mL IntraVENous PRN       Allergies: Patient has no known allergies.     Family History   Problem Relation Age of Onset    Diabetes Mother     Cancer Mother     Heart Attack Father         had 3 hear attacks    Diabetes Father      Social History     Socioeconomic History    Marital status: SINGLE     Spouse name: Not on file    Number of children: Not on file    Years of education: Not on file    Highest education level: Not on file   Occupational History    Not on file   Social Needs    Financial resource strain: Not on file   Juliana-Marva insecurity:     Worry: Not on file     Inability: Not on file    Transportation needs:     Medical: Not on file     Non-medical: Not on file   Tobacco Use    Smoking status: Current Every Day Smoker     Packs/day: 2.00     Years: 38.00     Pack years: 76.00    Smokeless tobacco: Never Used   Substance and Sexual Activity    Alcohol use: No    Drug use: No    Sexual activity: Never   Lifestyle    Physical activity:     Days per week: Not on file     Minutes per session: Not on file    Stress: Not on file   Relationships    Social connections:     Talks on phone: Not on file     Gets together: Not on file     Attends Mandaeism service: Not on file     Active member of club or organization: Not on file     Attends meetings of clubs or organizations: Not on file     Relationship status: Not on file    Intimate partner violence:     Fear of current or ex partner: Not on file     Emotionally abused: Not on file     Physically abused: Not on file     Forced sexual activity: Not on file   Other Topics Concern    Not on file   Social History Narrative    ** Merged History Encounter **          Social History     Tobacco Use   Smoking Status Current Every Day Smoker    Packs/day: 2.00    Years: 38.00    Pack years: 76.00   Smokeless Tobacco Never Used        Temp (24hrs), Av °F (37.8 °C), Min:98 °F (36.7 °C), Max:101.9 °F (38.8 °C)    Visit Vitals  /58   Pulse 91   Temp 100.3 °F (37.9 °C)   Resp 20   Ht 5' 3\" (1.6 m)   Wt 58 kg (127 lb 12.8 oz)   LMP  (LMP Unknown)   SpO2 95%   Breastfeeding? No   BMI 22.64 kg/m²       ROS: 12 point ROS obtained in details. Pertinent positives as mentioned in HPI,   otherwise negative    Physical Exam:    General:          Alert, cooperative, no distress, appears older than stated age. Head:               Normocephalic, without obvious abnormality, atraumatic. Eyes:               Conjunctivae clear, anicteric sclerae.   Nose:               Nares normal. No drainage or sinus tenderness. Throat:             Lips, mucosa, and tongue normal.  No Thrush  Back:               Symmetric  Lungs:             Clear to auscultation bilaterally. No Wheezing or Rhonchi. No rales. Chest wall:      No tenderness or deformity. No Accessory muscle use. Heart:              Regular rate and rhythm,  no murmur, rub or gallop. Abdomen:        Soft, non-tender. Not distended. Bowel sounds normal. No masses  Extremities:     Extremities normal, atraumatic, No cyanosis. No edema. No clubbing; left foot with erythema outlined / tender; quarter sized w/ small amount of residual drainage  Skin:                Texture, turgor normal. No rashes or lesions. Not Jaundiced  Lymph nodes: Cervical, supraclavicular normal.  Psych:             Poor insight. Not depressed. Not anxious or agitated. Neurologic:      No facial asymmetry. No aphasia or slurred speech.  Normal strength, Alert and oriented X 3.        Labs: Results:   Chemistry Recent Labs     10/02/19  0304 10/01/19  0406 09/30/19  1552   GLU 96 232* 437*   * 134* 128*   K 3.9 4.2 5.0    105 96*   CO2 22 22 23   BUN 21* 21* 25*   CREA 1.41* 1.43* 1.58*   CA 7.9* 8.5 9.3   AGAP 7 7 9   BUCR 15 15 16   AP  --   --  114   TP  --   --  8.2   ALB  --   --  3.6   GLOB  --   --  4.6*   AGRAT  --   --  0.8      CBC w/Diff Recent Labs     10/01/19  0406 09/30/19  1552   WBC 14.2* 12.6   RBC 3.98* 4.32   HGB 11.7* 12.9   HCT 33.9* 36.6    388   GRANS 73 73   LYMPH 21 15*   EOS 1 1      Microbiology Recent Labs     10/01/19  0948 09/30/19  1614 09/30/19  1553   CULT MODERATE STAPHYLOCOCCUS SPECIES*  MODERATE POSSIBLE 2ND STAPHYLOCOCCUS SPECIES* CULTURE IN PROGRESS,FURTHER UPDATES TO FOLLOW NO GROWTH 2 DAYS          RADIOLOGY:    All available imaging studies/reports in [facility] for this admission were reviewed    Dr. Baron Pearson, Infectious Disease Specialist  [phone]  October 2, 2019  1:49 PM

## 2019-10-02 NOTE — PROGRESS NOTES
Problem: Falls - Risk of  Goal: *Absence of Falls  Description  Document Hayder Neil Fall Risk and appropriate interventions in the flowsheet. Outcome: Progressing Towards Goal  Note:   Fall Risk Interventions:  Mobility Interventions: Assess mobility with egress test, Bed/chair exit alarm, Communicate number of staff needed for ambulation/transfer, OT consult for ADLs, Patient to call before getting OOB, PT Consult for mobility concerns, PT Consult for assist device competence, Utilize walker, cane, or other assistive device         Medication Interventions: Bed/chair exit alarm, Evaluate medications/consider consulting pharmacy, Patient to call before getting OOB, Teach patient to arise slowly    Elimination Interventions: Bed/chair exit alarm, Call light in reach, Patient to call for help with toileting needs, Stay With Me (per policy), Toilet paper/wipes in reach, Toileting schedule/hourly rounds              Problem: Pressure Injury - Risk of  Goal: *Prevention of pressure injury  Description  Document Robert Scale and appropriate interventions in the flowsheet. Outcome: Progressing Towards Goal  Note:   Pressure Injury Interventions:       Moisture Interventions: Absorbent underpads, Apply protective barrier, creams and emollients, Assess need for specialty bed, Check for incontinence Q2 hours and as needed, Limit adult briefs, Maintain skin hydration (lotion/cream), Minimize layers, Moisture barrier    Activity Interventions: Assess need for specialty bed, Increase time out of bed, Pressure redistribution bed/mattress(bed type), PT/OT evaluation    Mobility Interventions: Assess need for specialty bed, Float heels, HOB 30 degrees or less, Pressure redistribution bed/mattress (bed type), PT/OT evaluation, Turn and reposition approx.  every two hours(pillow and wedges)    Nutrition Interventions: Document food/fluid/supplement intake, Discuss nutritional consult with provider, Offer support with meals,snacks and hydration    Friction and Shear Interventions: Apply protective barrier, creams and emollients, Feet elevated on foot rest, Foam dressings/transparent film/skin sealants, HOB 30 degrees or less, Lift team/patient mobility team, Minimize layers, Transferring/repositioning devices                Problem: Nutrition Deficit  Goal: *Optimize nutritional status  Outcome: Progressing Towards Goal     Problem: Pain  Goal: *Control of Pain  Outcome: Progressing Towards Goal     Problem: Cellulitis Care Plan (Adult)  Goal: *Control of acute pain  Outcome: Progressing Towards Goal  Goal: *Skin integrity maintained  Outcome: Progressing Towards Goal  Goal: *Absence of infection signs and symptoms  Outcome: Progressing Towards Goal

## 2019-10-02 NOTE — H&P
H&P Update:  Bubba Stein was seen and examined. History and physical has been reviewed. The patient has been examined.  There have been no significant clinical changes since the completion of the originally dated History and Physical.

## 2019-10-02 NOTE — PROGRESS NOTES
[facility] Hospitalist Group  Progress Note    Patient: Lisbeth See Age: 60 y.o. : 1960 MR#: [id number] SSN: [id number]  Date: 10/2/2019     Subjective:   Patient seen in PACU. No complaints of pain or discomfort. S/p left foot debridement. Assessment/Plan:   1. Puncture wound of left foot  2. Fever  3. Leukocytosis  4. Hyponatremia  5. Hypocalcemia  6. DM with hyperglycemia. a1c 12.5  7. CKD3  8. Ongoing tobacco use  9. HTN, HLD  10. Gout        Plan  1. Wound debridement today. Dressing and surgical boot. SCD  2. Monitor VS. Tylenol as needed. IV abx zosyn and vanco. Cultures pending  3. Monitor metabolic panel and replete  4. Diabetes management consult. Continue SSI and Lantus  5. Nicotine patch  6. Monitor renal function  7. Ace I held. Monitor BP. Additional Notes:      Case discussed with:  [x]Patient  []Family  [x]Nursing  []Case Management  DVT Prophylaxis:  []Lovenox  [x]Hep SQ  []SCDs  []Coumadin   []On Heparin gtt    Objective:   VS:   Visit Vitals  /58   Pulse 91   Temp 100.3 °F (37.9 °C)   Resp 20   Ht 5' 3\" (1.6 m)   Wt 58 kg (127 lb 12.8 oz)   LMP  (LMP Unknown)   SpO2 95%   Breastfeeding?  No   BMI 22.64 kg/m²      Tmax/24hrs: Temp (24hrs), Av °F (37.8 °C), Min:98 °F (36.7 °C), Max:101.9 °F (38.8 °C)      Intake/Output Summary (Last 24 hours) at 10/2/2019 1359  Last data filed at 10/2/2019 0802  Gross per 24 hour   Intake 1521.5 ml   Output 1400 ml   Net 121.5 ml       General:  Alert, NAD  Cardiovascular:  RRR  Pulmonary:  LSC throughout; respiratory effort WNL  GI:  +BS in all four quadrants, soft, non-tender  Extremities:  No edema; 2+ dorsalis pedis pulses bilaterally  Neuro: alert and oriented  Left foot dressing dry and intact, surgical boot        Labs:    Recent Results (from the past 24 hour(s))   GLUCOSE, POC    Collection Time: 10/01/19  4:11 PM   Result Value Ref Range    Glucose (POC) 354 (H) 70 - 110 mg/dL   GLUCOSE, POC Collection Time: 10/01/19  8:39 PM   Result Value Ref Range    Glucose (POC) 277 (H) 70 - 638 mg/dL   METABOLIC PANEL, BASIC    Collection Time: 10/02/19  3:04 AM   Result Value Ref Range    Sodium 134 (L) 136 - 145 mmol/L    Potassium 3.9 3.5 - 5.5 mmol/L    Chloride 105 100 - 111 mmol/L    CO2 22 21 - 32 mmol/L    Anion gap 7 3.0 - 18 mmol/L    Glucose 96 74 - 99 mg/dL    BUN 21 (H) 7.0 - 18 MG/DL    Creatinine 1.41 (H) 0.6 - 1.3 MG/DL    BUN/Creatinine ratio 15 12 - 20      GFR est AA 46 (L) >60 ml/min/1.73m2    GFR est non-AA 38 (L) >60 ml/min/1.73m2    Calcium 7.9 (L) 8.5 - 10.1 MG/DL   GLUCOSE, POC    Collection Time: 10/02/19  6:11 AM   Result Value Ref Range    Glucose (POC) 141 (H) 70 - 110 mg/dL   GLUCOSE, POC    Collection Time: 10/02/19 10:00 AM   Result Value Ref Range    Glucose (POC) 191 (H) 70 - 110 mg/dL   GLUCOSE, POC    Collection Time: 10/02/19 11:06 AM   Result Value Ref Range    Glucose (POC) 173 (H) 70 - 110 mg/dL   GLUCOSE, POC    Collection Time: 10/02/19  1:28 PM   Result Value Ref Range    Glucose (POC) 76 70 - 110 mg/dL       Signed By: Berlin Das NP     October 2, 2019

## 2019-10-02 NOTE — PROGRESS NOTES
TRANSFER - IN REPORT:    Verbal report received from Salena RN(name) on Echo Pert  being received from [address] [place] [address](unit) for ordered procedure      Report consisted of patients Situation, Background, Assessment and   Recommendations(SBAR). Information from the following report(s) SBAR, Kardex, [facility] and Recent Results was reviewed with the receiving nurse. Opportunity for questions and clarification was provided. Assessment completed upon patients arrival to unit and care assumed.

## 2019-10-02 NOTE — ANESTHESIA POSTPROCEDURE EVALUATION
Procedure(s):  INCISION AND DRAINAGE PUNCTURE WOUND LEFT FOOT. MAC    Anesthesia Post Evaluation      Multimodal analgesia: multimodal analgesia used between 6 hours prior to anesthesia start to PACU discharge  Patient location during evaluation: bedside  Patient participation: complete - patient participated  Level of consciousness: awake and alert  Pain score: 0  Airway patency: patent  Anesthetic complications: no  Cardiovascular status: acceptable  Respiratory status: acceptable  Hydration status: acceptable  Post anesthesia nausea and vomiting:  none      Vitals Value Taken Time   /58 10/2/2019  1:42 PM   Temp 37.9 °C (100.3 °F) 10/2/2019  1:22 PM   Pulse 90 10/2/2019  1:47 PM   Resp 18 10/2/2019  1:47 PM   SpO2 97 % 10/2/2019  1:47 PM   Vitals shown include unvalidated device data.

## 2019-10-02 NOTE — ROUTINE PROCESS
Bedside and Verbal shift change report given to Salena RN (oncoming nurse) by Luisa Irwin RN(offgoing nurse). Report included the following information SBAR, Intake/Output, MAR, Recent Results and Cardiac Rhythm SR tele box# [id number].

## 2019-10-02 NOTE — PERIOP NOTES
TRANSFER - OUT REPORT:    Verbal report given to ERICKA Rosales RN on Lucie Leiva  being transferred to (unit) for routine post - op       Report consisted of patients Situation, Background, Assessment and   Recommendations(SBAR). Information from the following report(s) SBAR, Kardex, Procedure Summary, Intake/Output, MAR and Recent Results was reviewed with the receiving nurse. Lines:   Peripheral IV 10/01/19 Left Wrist (Active)   Site Assessment Clean, dry, & intact 10/2/2019  1:22 PM   Phlebitis Assessment 0 10/2/2019  1:22 PM   Infiltration Assessment 0 10/2/2019  1:22 PM   Dressing Status Clean, dry, & intact 10/2/2019  1:22 PM   Dressing Type Tape;Transparent 10/2/2019  1:22 PM   Hub Color/Line Status Blue; Infusing 10/2/2019  1:22 PM   Action Taken Open ports on tubing capped 10/1/2019  7:52 PM   Alcohol Cap Used Yes 10/1/2019  7:52 PM       Peripheral IV 10/02/19 Anterior;Right Wrist (Active)   Site Assessment Clean, dry, & intact 10/2/2019  1:22 PM   Phlebitis Assessment 0 10/2/2019  1:22 PM   Infiltration Assessment 0 10/2/2019  1:22 PM   Dressing Status Clean, dry, & intact 10/2/2019  1:22 PM   Dressing Type Tape;Transparent 10/2/2019  1:22 PM   Hub Color/Line Status Pink; Infusing 10/2/2019  1:22 PM      Visit Vitals  /57   Pulse 87   Temp 99 °F (37.2 °C)   Resp 21   Ht 5' 3\" (1.6 m)   Wt 58 kg (127 lb 12.8 oz)   SpO2 97%   Breastfeeding? No   BMI 22.64 kg/m²       Opportunity for questions and clarification was provided.       Patient transported with:   Nostalgia Bingo

## 2019-10-02 NOTE — ANESTHESIA PREPROCEDURE EVALUATION
Relevant Problems   No relevant active problems       Anesthetic History   No history of anesthetic complications            Review of Systems / Medical History  Patient summary reviewed and pertinent labs reviewed    Pulmonary                   Neuro/Psych         Psychiatric history     Cardiovascular                  Exercise tolerance: >4 METS     GI/Hepatic/Renal  Within defined limits              Endo/Other    Diabetes: well controlled, type 2         Other Findings              Physical Exam    Airway  Mallampati: III  TM Distance: 4 - 6 cm  Neck ROM: decreased range of motion   Mouth opening: Normal     Cardiovascular    Rhythm: regular  Rate: normal         Dental    Dentition: Edentulous     Pulmonary  Breath sounds clear to auscultation               Abdominal  GI exam deferred       Other Findings            Anesthetic Plan    ASA: 3  Anesthesia type: MAC            Anesthetic plan and risks discussed with: Patient

## 2019-10-03 LAB
ANION GAP SERPL CALC-SCNC: 7 MMOL/L (ref 3–18)
BASOPHILS # BLD: 0 K/UL (ref 0–0.1)
BASOPHILS NFR BLD: 0 % (ref 0–2)
BUN SERPL-MCNC: 13 MG/DL (ref 7–18)
BUN/CREAT SERPL: 10 (ref 12–20)
CALCIUM SERPL-MCNC: 8.7 MG/DL (ref 8.5–10.1)
CHLORIDE SERPL-SCNC: 104 MMOL/L (ref 100–111)
CO2 SERPL-SCNC: 23 MMOL/L (ref 21–32)
CREAT SERPL-MCNC: 1.35 MG/DL (ref 0.6–1.3)
DATE LAST DOSE: ABNORMAL
DIFFERENTIAL METHOD BLD: ABNORMAL
EOSINOPHIL # BLD: 0.2 K/UL (ref 0–0.4)
EOSINOPHIL NFR BLD: 2 % (ref 0–5)
ERYTHROCYTE [DISTWIDTH] IN BLOOD BY AUTOMATED COUNT: 13.9 % (ref 11.6–14.5)
GLUCOSE BLD STRIP.AUTO-MCNC: 200 MG/DL (ref 70–110)
GLUCOSE BLD STRIP.AUTO-MCNC: 237 MG/DL (ref 70–110)
GLUCOSE BLD STRIP.AUTO-MCNC: 95 MG/DL (ref 70–110)
GLUCOSE BLD STRIP.AUTO-MCNC: 99 MG/DL (ref 70–110)
GLUCOSE SERPL-MCNC: 70 MG/DL (ref 74–99)
HCT VFR BLD AUTO: 33 % (ref 35–45)
HGB BLD-MCNC: 11.1 G/DL (ref 12–16)
LYMPHOCYTES # BLD: 2.4 K/UL (ref 0.9–3.6)
LYMPHOCYTES NFR BLD: 19 % (ref 21–52)
MAGNESIUM SERPL-MCNC: 2 MG/DL (ref 1.6–2.6)
MCH RBC QN AUTO: 29.2 PG (ref 24–34)
MCHC RBC AUTO-ENTMCNC: 33.6 G/DL (ref 31–37)
MCV RBC AUTO: 86.8 FL (ref 74–97)
MONOCYTES # BLD: 1.4 K/UL (ref 0.05–1.2)
MONOCYTES NFR BLD: 11 % (ref 3–10)
NEUTS SEG # BLD: 8.2 K/UL (ref 1.8–8)
NEUTS SEG NFR BLD: 68 % (ref 40–73)
PHOSPHATE SERPL-MCNC: 6 MG/DL (ref 2.5–4.9)
PLATELET # BLD AUTO: 396 K/UL (ref 135–420)
PMV BLD AUTO: 10.5 FL (ref 9.2–11.8)
POTASSIUM SERPL-SCNC: 3.9 MMOL/L (ref 3.5–5.5)
RBC # BLD AUTO: 3.8 M/UL (ref 4.2–5.3)
REPORTED DOSE,DOSE: ABNORMAL UNITS
REPORTED DOSE/TIME,TMG: 1600
SODIUM SERPL-SCNC: 134 MMOL/L (ref 136–145)
VANCOMYCIN TROUGH SERPL-MCNC: 9.5 UG/ML (ref 10–20)
WBC # BLD AUTO: 12.2 K/UL (ref 4.6–13.2)

## 2019-10-03 PROCEDURE — 82962 GLUCOSE BLOOD TEST: CPT

## 2019-10-03 PROCEDURE — 74011000258 HC RX REV CODE- 258: Performed by: PODIATRIST

## 2019-10-03 PROCEDURE — 74011250637 HC RX REV CODE- 250/637: Performed by: HOSPITALIST

## 2019-10-03 PROCEDURE — 80048 BASIC METABOLIC PNL TOTAL CA: CPT

## 2019-10-03 PROCEDURE — 36415 COLL VENOUS BLD VENIPUNCTURE: CPT

## 2019-10-03 PROCEDURE — 74011250636 HC RX REV CODE- 250/636: Performed by: HOSPITALIST

## 2019-10-03 PROCEDURE — 80202 ASSAY OF VANCOMYCIN: CPT

## 2019-10-03 PROCEDURE — 74011250637 HC RX REV CODE- 250/637: Performed by: PODIATRIST

## 2019-10-03 PROCEDURE — 65270000029 HC RM PRIVATE

## 2019-10-03 PROCEDURE — 85025 COMPLETE CBC W/AUTO DIFF WBC: CPT

## 2019-10-03 PROCEDURE — 74011636637 HC RX REV CODE- 636/637: Performed by: PODIATRIST

## 2019-10-03 PROCEDURE — 74011250636 HC RX REV CODE- 250/636: Performed by: INTERNAL MEDICINE

## 2019-10-03 PROCEDURE — 87040 BLOOD CULTURE FOR BACTERIA: CPT

## 2019-10-03 PROCEDURE — 83735 ASSAY OF MAGNESIUM: CPT

## 2019-10-03 PROCEDURE — 84100 ASSAY OF PHOSPHORUS: CPT

## 2019-10-03 PROCEDURE — 74011000258 HC RX REV CODE- 258: Performed by: INTERNAL MEDICINE

## 2019-10-03 PROCEDURE — 74011250636 HC RX REV CODE- 250/636: Performed by: PODIATRIST

## 2019-10-03 RX ORDER — VANCOMYCIN/0.9 % SOD CHLORIDE 1.5G/250ML
1500 PLASTIC BAG, INJECTION (ML) INTRAVENOUS EVERY 24 HOURS
Status: DISCONTINUED | OUTPATIENT
Start: 2019-10-04 | End: 2019-10-06

## 2019-10-03 RX ADMIN — GABAPENTIN 100 MG: 100 CAPSULE ORAL at 16:51

## 2019-10-03 RX ADMIN — HYDROCODONE BITARTRATE AND ACETAMINOPHEN 2 TABLET: 5; 325 TABLET ORAL at 06:24

## 2019-10-03 RX ADMIN — INSULIN LISPRO 6 UNITS: 100 INJECTION, SOLUTION INTRAVENOUS; SUBCUTANEOUS at 21:43

## 2019-10-03 RX ADMIN — ASPIRIN 81 MG 81 MG: 81 TABLET ORAL at 09:11

## 2019-10-03 RX ADMIN — HEPARIN SODIUM 5000 UNITS: 5000 INJECTION INTRAVENOUS; SUBCUTANEOUS at 09:11

## 2019-10-03 RX ADMIN — MONTELUKAST 10 MG: 10 TABLET, FILM COATED ORAL at 09:11

## 2019-10-03 RX ADMIN — INSULIN GLARGINE 40 UNITS: 100 INJECTION, SOLUTION SUBCUTANEOUS at 09:11

## 2019-10-03 RX ADMIN — AMPICILLIN SODIUM AND SULBACTAM SODIUM 3 G: 2; 1 INJECTION, POWDER, FOR SOLUTION INTRAMUSCULAR; INTRAVENOUS at 18:09

## 2019-10-03 RX ADMIN — HYDROCODONE BITARTRATE AND ACETAMINOPHEN 1 TABLET: 5; 325 TABLET ORAL at 21:40

## 2019-10-03 RX ADMIN — VANCOMYCIN HYDROCHLORIDE 1000 MG: 10 INJECTION, POWDER, LYOPHILIZED, FOR SOLUTION INTRAVENOUS at 16:00

## 2019-10-03 RX ADMIN — HEPARIN SODIUM 5000 UNITS: 5000 INJECTION INTRAVENOUS; SUBCUTANEOUS at 00:08

## 2019-10-03 RX ADMIN — PRAVASTATIN SODIUM 40 MG: 20 TABLET ORAL at 21:40

## 2019-10-03 RX ADMIN — PIPERACILLIN SODIUM,TAZOBACTAM SODIUM 2.25 G: 2; .25 INJECTION, POWDER, FOR SOLUTION INTRAVENOUS at 04:15

## 2019-10-03 RX ADMIN — GABAPENTIN 100 MG: 100 CAPSULE ORAL at 21:40

## 2019-10-03 RX ADMIN — INSULIN LISPRO 6 UNITS: 100 INJECTION, SOLUTION INTRAVENOUS; SUBCUTANEOUS at 13:15

## 2019-10-03 RX ADMIN — GABAPENTIN 100 MG: 100 CAPSULE ORAL at 09:11

## 2019-10-03 RX ADMIN — HYDROCODONE BITARTRATE AND ACETAMINOPHEN 2 TABLET: 5; 325 TABLET ORAL at 14:29

## 2019-10-03 RX ADMIN — HEPARIN SODIUM 5000 UNITS: 5000 INJECTION INTRAVENOUS; SUBCUTANEOUS at 16:51

## 2019-10-03 RX ADMIN — MORPHINE SULFATE 2 MG: 2 INJECTION, SOLUTION INTRAMUSCULAR; INTRAVENOUS at 00:08

## 2019-10-03 RX ADMIN — PIPERACILLIN SODIUM,TAZOBACTAM SODIUM 2.25 G: 2; .25 INJECTION, POWDER, FOR SOLUTION INTRAVENOUS at 13:14

## 2019-10-03 NOTE — PROGRESS NOTES
Infectious Disease progress Note    Requested by:dr. Darlene Salgado    Reason: left foot puncture wound infection    Current abx Prior abx   Pip/tazo, vancomycin since 9/30/19      Lines:       Assessment :    60 y.o.  female with past medical history of uncontrolled type 2 DM (last hgbA1C 13.3 on 1/25/19), gout and some issues with compliance / developmental delay per PCP records who presented to ed on 9/30/19 with left foot pain following stepping on a refugio nail     MRI foot 10//1/19-  There is subcutaneous emphysema in the forefoot at the level of the proximal phalanges of the first through fifth toes. defect in the plantar soft tissues near the base of the second and third toes. Clinical presentation c/w sepsis (POA) due to left foot cellulitis, infected left foot puncture wound in a patient with uncontrolled type 2 DM. S/p I&D left foot on 10/2/19. Now with gram positive bacteremia - ?bloodstream infection due to left foot infection versus contaminant. Will need to f/u ID and repeat blood cultures to verify. Now with pallor of left second toe. No significant bleeding noted intra op. Recommendations:    1.  disconitnue pip/tazo, start amp/sulbactam. Continue vancomycin. 2. F/u ID of gpc in blood cultures  3. Repeat blood cultures  4. F/u preop, intra op wound cultures  5. F/u vascular surgery recommendations. Advance Care planning:full code: discussed  with patient/surrogate decision maker:       Above plan was discussed in details with patient, dr. Mary Hurley and dr Darlene Salgado. Please call me if any further questions or concerns. Will continue to participate in the care of this patient. HPI:     Patient denies fever, headaches, visual disturbances, sore throat, runny nose, earaches, cp, sob, chills, cough, abdominal pain, diarrhea, burning micturition, pain or weakness in extremities. He denies back pain/flank pain.    home Medication List    Details   colchicine 0.6 mg tablet Take 1 tablet by mouth Q1Hour for gout pain. NOT TO EXCEED 3 TABLETS IN 24 HOURS. Qty: 30 Tab, Refills: 0      hydrOXYzine HCl (ATARAX) 25 mg tablet TAKE 1 TABLET BY MOUTH THREE TIMES DAILY AS NEEDED FOR ITCHING  Qty: 90 Tab, Refills: 0    Associated Diagnoses: Pruritus      pravastatin (PRAVACHOL) 40 mg tablet TAKE ONE TABLET BY MOUTH EVERY NIGHT  Qty: 30 Tab, Refills: 4      metFORMIN (GLUCOPHAGE) 1,000 mg tablet TAKE ONE TABLET BY MOUTH TWICE DAILY WITH MEALS  Qty: 60 Tab, Refills: 4      gabapentin (NEURONTIN) 300 mg capsule Take 1 Cap by mouth three (3) times daily. Qty: 90 Cap, Refills: 4      montelukast (SINGULAIR) 10 mg tablet Take 1 Tab by mouth daily. Qty: 30 Tab, Refills: 5      ergocalciferol (ERGOCALCIFEROL) 50,000 unit capsule Take 1 Cap by mouth every seven (7) days. Qty: 4 Cap, Refills: 5      lidocaine (LIDODERM) 5 % Apply patch to the affected area for 12 hours a day and remove for 12 hours a day. Qty: 1 Package, Refills: 0      insulin aspart protamine/insulin aspart (NOVOLOG MIX 70-30 U-100 INSULN) 100 unit/mL (70-30) injection INJECT 25 UNITS UNDER THE SKIN TWO TIMES A DAY  Qty: 10 mL, Refills: 3    Comments: Before meals      mupirocin (BACTROBAN) 2 % ointment Apply to area affected BID  Qty: 22 g, Refills: 1    Associated Diagnoses: Dermatitis      lisinopril (PRINIVIL, ZESTRIL) 10 mg tablet TAKE 1 TABLET BY MOUTH ONCE DAILY  Qty: 30 Tab, Refills: 5      Insulin Syringe-Needle U-100 1/2 mL 30 gauge x 5/16 syrg INJECT TWICE A DAY  Qty: 100 Syringe, Refills: 5      Blood-Glucose Meter monitoring kit Check BS 3x/day E11.65  Qty: 1 Kit, Refills: 0      Lancets misc Check BS 3x/day E11.65  Qty: 100 Each, Refills: 5      glucose blood VI test strips (ONETOUCH ULTRA TEST) strip Check BS 3x/day E11.65  Qty: 100 Strip, Refills: 5      acetaminophen (TYLENOL) 325 mg tablet Take 2 Tabs by mouth every four (4) hours as needed for Pain.   Qty: 20 Tab, Refills: 0      aspirin 81 mg tablet Take 81 mg by mouth daily. Current Facility-Administered Medications   Medication Dose Route Frequency    Vancomycin Lab Information  1 Each Other Once per day on Thu    acetaminophen (TYLENOL) tablet 650 mg  650 mg Oral Q4H PRN    morphine injection 2 mg  2 mg IntraVENous Q3H PRN    HYDROcodone-acetaminophen (NORCO) 5-325 mg per tablet 1-2 Tab  1-2 Tab Oral Q6H PRN    sodium hypochlorite (QUARTER STRENGTH DAKIN'S) 0.125% irrigation (bottle)   Topical BID    vancomycin (VANCOCIN) 1000 mg in  ml infusion  1,000 mg IntraVENous Q24H    insulin glargine (LANTUS) injection 40 Units  40 Units SubCUTAneous DAILY    naloxone (NARCAN) injection 0.4 mg  0.4 mg SubCUTAneous EVERY 2 MINUTES AS NEEDED    gabapentin (NEURONTIN) capsule 100 mg  100 mg Oral TID    sodium chloride (NS) flush 5-10 mL  5-10 mL IntraVENous PRN    insulin lispro (HUMALOG) injection   SubCUTAneous AC&HS    glucose chewable tablet 16 g  4 Tab Oral PRN    glucagon (GLUCAGEN) injection 1 mg  1 mg IntraMUSCular PRN    piperacillin-tazobactam (ZOSYN) 2.25 g in 0.9% sodium chloride (MBP/ADV) 50 mL MBP  2.25 g IntraVENous Q6H    nicotine (NICODERM CQ) 21 mg/24 hr patch 1 Patch  1 Patch TransDERmal Q24H    heparin (porcine) injection 5,000 Units  5,000 Units SubCUTAneous Q8H    aspirin chewable tablet 81 mg  81 mg Oral DAILY    pravastatin (PRAVACHOL) tablet 40 mg  40 mg Oral QHS    montelukast (SINGULAIR) tablet 10 mg  10 mg Oral DAILY    hydrALAZINE (APRESOLINE) 20 mg/mL injection 10 mg  10 mg IntraVENous Q6H PRN    dextrose 10% infusion 125-250 mL  125-250 mL IntraVENous PRN       Allergies: Patient has no known allergies. Temp (24hrs), Av °F (37.2 °C), Min:97.6 °F (36.4 °C), Max:100.4 °F (38 °C)    Visit Vitals  /72 (BP 1 Location: Left arm, BP Patient Position: Sitting)   Pulse 87   Temp 97.6 °F (36.4 °C)   Resp 18   Ht 5' 3\" (1.6 m)   Wt 56.9 kg (125 lb 7.1 oz)   LMP  (LMP Unknown)   SpO2 93%   Breastfeeding?  No BMI 22.22 kg/m²       ROS: 12 point ROS obtained in details. Pertinent positives as mentioned in HPI,   otherwise negative    Physical Exam:    General:          Alert, cooperative, no distress, appears older than stated age. Head:               Normocephalic, without obvious abnormality, atraumatic. Eyes:               Conjunctivae clear, anicteric sclerae. Nose:               Nares normal. No drainage or sinus tenderness. Throat:             Lips, mucosa, and tongue normal.  No Thrush  Back:               Symmetric  Lungs:             Clear to auscultation bilaterally. No Wheezing or Rhonchi. No rales. Chest wall:      No tenderness or deformity. No Accessory muscle use. Heart:              Regular rate and rhythm,  no murmur, rub or gallop. Abdomen:        Soft, non-tender. Not distended. Bowel sounds normal. No masses  Extremities:     Extremities normal, atraumatic, No cyanosis. No edema. No clubbing; left foot with erythema outlined / tender; quarter sized w/ small amount of residual drainage  Skin:                Texture, turgor normal. No rashes or lesions. Not Jaundiced  Lymph nodes: Cervical, supraclavicular normal.  Psych:             Poor insight. Not depressed. Not anxious or agitated. Neurologic:      No facial asymmetry. No aphasia or slurred speech.  Normal strength, Alert and oriented X 3.        Labs: Results:   Chemistry Recent Labs     10/03/19  0430 10/02/19  0304 10/01/19  0406 09/30/19  1552   GLU 70* 96 232* 437*   * 134* 134* 128*   K 3.9 3.9 4.2 5.0    105 105 96*   CO2 23 22 22 23   BUN 13 21* 21* 25*   CREA 1.35* 1.41* 1.43* 1.58*   CA 8.7 7.9* 8.5 9.3   AGAP 7 7 7 9   BUCR 10* 15 15 16   AP  --   --   --  114   TP  --   --   --  8.2   ALB  --   --   --  3.6   GLOB  --   --   --  4.6*   AGRAT  --   --   --  0.8      CBC w/Diff Recent Labs     10/03/19  0430 10/01/19  0406 09/30/19  1552   WBC 12.2 14.2* 12.6   RBC 3.80* 3.98* 4.32   HGB 11.1* 11.7* 12.9   HCT 33.0* 33.9* 36.6    353 388   GRANS 68 73 73   LYMPH 19* 21 15*   EOS 2 1 1      Microbiology Recent Labs     10/03/19  0430 10/02/19  1305 10/01/19  0948 09/30/19  1614 09/30/19  1553   CULT NO GROWTH AFTER 1 HOUR FEW STAPHYLOCOCCUS SPECIES* MODERATE STAPHYLOCOCCUS AUREUS*  MODERATE POSSIBLE 2ND STAPHYLOCOCCUS AUREUS*  FEW POSSIBLE 3RD STAPHYLOCOCCUS SPECIES* CULTURE IN PROGRESS,FURTHER UPDATES TO FOLLOW NO GROWTH 3 DAYS          RADIOLOGY:    All available imaging studies/reports in [facility] for this admission were reviewed    Dr. Devona Blizzard, Infectious Disease Specialist  [phone]  October 3, 2019  1:49 PM

## 2019-10-03 NOTE — PROGRESS NOTES
PT eval order received and chart reviewed. According to podiatry patient, \"Needs further debridement and drainage, patient is somewhat resistant. MRI to rule out osteitis and /or septic joint. \" Will await results of MRI and weight bearing status to Left lower extremity.  Thank you for this referral. Judson Pina, PT, DPT

## 2019-10-03 NOTE — CDMP QUERY
Patient admitted with  puncture wound left foot. . 
 
Please clarify  if this was an : 
 
        Excisional debridement Non-excisional debridement (versajet, irrigation)   
 
op note-  \"   There was fluctuance and discoloration extending across the ball of the foot. Two transverse semi-elliptical incisions were accomplished around the necrotic puncture wound debriding the devitalized tissue. There was significant necrotic fatty tissue and pus. Cultures were obtained. Wounds were thoroughly debrided back to the deep fascia and deep tissues. There was pus extruding from particularly the third intermetatarsal space from the dorsal tissue. A separate dorsal puncture wound was accomplished and copious amounts of pus was expressed from this area. It was thoroughly debrided back to bleeding tissues Thank you    Merry Bosworth RN   CCDS   x [phone]

## 2019-10-03 NOTE — OP NOTES
[address] [place] [address]   OPERATIVE REPORT    Name:  Memo Price  MR#:   [id number]  :  1960  ACCOUNT #:  [de-identified]  DATE OF SERVICE:  10/02/2019    PREOPERATIVE DIAGNOSIS:  Puncture wound abscess, left forefoot. POSTOPERATIVE DIAGNOSES:  Puncture wound abscess, left forefoot, with intermetatarsal and dorsal foot abscess. PROCEDURE PERFORMED:  Debridement; incision and drainage of plantar abscess, intermetatarsal space, and dorsal foot abscess, all left foot. SURGEON:  Howie Caba DPM.    ASSISTANT:  deonte    ANESTHESIA:  MAC.    COMPLICATIONS:  none. SPECIMENS REMOVED:  toe. IMPLANTS:  none. ESTIMATED BLOOD LOSS:  0.    PROCEDURE:  On 10/02/2019, the patient was placed on the operating room table in supine position. After adequate induction of MAC anesthesia, the left lower extremity was prepped and draped in the usual sterile fashion. Attention was directed to the plantar aspect of the foot. There was fluctuance and discoloration extending across the ball of the foot. Two transverse semi-elliptical incisions were accomplished around the necrotic puncture wound debriding the devitalized tissue. There was significant necrotic fatty tissue and pus. Cultures were obtained. Wounds were thoroughly debrided back to the deep fascia and deep tissues. There was pus extruding from particularly the third intermetatarsal space from the dorsal tissue. A separate dorsal puncture wound was accomplished and copious amounts of pus was expressed from this area. It was thoroughly debrided back to bleeding tissues and irrigated with antibiotic solution and packed with gauze packing and a soft dressing. The patient tolerated the procedure and anesthesia well with vital signs stable throughout. Advised close observation for further deeper infection due to the nature of this closed puncture wound and also concerns with early dysvascularity to the central toes.       Rebekah Nazario JAMI      [id number]  D:  10/02/2019 13:28  T:  10/03/2019 10:23  JOB #:  [id number]  CC:  Julissa Hickey DPM

## 2019-10-03 NOTE — DIABETES MGMT
GLYCEMIC CONTROL AND NUTRITION    Assessment/Recommendations:  Fasting lab glucose this am 70 mg/dl   Recommend decreasing lantus to 35 units daily  Continue corrective insulin coverage as ordered  Will continue inpatient monitoring. Most recent blood glucose values:  Results for Marisel Yeh (MRN [id number]) as of 10/3/2019 13:07   Ref. Range 10/2/2019 15:05 10/2/2019 16:04 10/2/2019 21:38 10/3/2019 06:54 10/3/2019 11:41   GLUCOSE,FAST - POC Latest Ref Range: 70 - 110 mg/dL 90 98 245 (H) 95 237 (H)     Current A1C of 12.5 % is equivalent to average blood glucose of 312 mg/dl over the past 2-3 months.     Current hospital diabetes medications:   Lantus 40 units daily  Lispro corrective insulin coverage AC&HS  Previous day's insulin requirements:   Lantus 40 units  Lispro 9 units  Home diabetes medications:  Metformin 1,000 mg twice daily with meals  Novolog 70/30 insulin 25 units BID  Diet:    Diabetic consistent carb with supplements  Education:  __x_Refer to Diabetes Education Record             ____Education not indicated at this time      Trupti Cortes [facility] CDE  Ext [phone]

## 2019-10-03 NOTE — DIABETES MGMT
Diabetes Patient/Family Education Record  Factors That  May Influence Patients Ability  to Learn or  Comply with Recommendations   []   Language barrier    []   Cultural needs   []   Motivation    [x]   Cognitive limitation    []   Physical   []   Education    []   Physiological factors   []   Hearing/vision/speaking impairment   []   Nondenominational beliefs    []   Financial factors   []  Other:   []  No factors identified at this time. Person Instructed:   [x]   Patient   []   Family   []  Other     Preference for Learning:   [x]   Verbal   [x]   Written   []  Demonstration     Level of Comprehension & Competence:    []  Good                                      [x] Fair                                     []  Poor                             [x]  Needs Reinforcement   [x]  Teachback completed    Education Component:   [x]  Medication management, including how to administer insulin (if appropriate) and potential medication interactions   · Niece verified that pt taking insulin 70/30 25 units twice daily  · Pt states that she uses vial/syringe method and that her son's girlfriend gives her insulin injections in her arm. Girlfriend is in the healthcare field. · Pt childlike in her speech and mannerisms. []  Nutritional management -obtain usual meal pattern   [x]  Exercise   · Lower extremity exercise limited currently related to left foot wound   [x]  Signs, symptoms, and treatment of hyperglycemia and hypoglycemia   [x] Prevention, recognition and treatment of hyperglycemia and hypoglycemia   [x]  Importance of blood glucose monitoring and how to obtain a blood glucose meter   · State she has a meter at home  · Spoke with pt's niece, she states pt's son's girlfriend assist pt with her BG monitoring and her injections.    []  Instruction on use of the blood glucose meter   [x]  Discuss the importance of HbA1C monitoring   · States she sees her doctor every 3 months  · 12.5%   [x]  Sick day guidelines   [x]  Proper use and disposal of lancets, needles, syringes or insulin pens (if appropriate)   [x]  Potential long-term complications (retinopathy, kidney disease, neuropathy, foot care)   [x] Information about whom to contact in case of emergency or for more information    [x]  Goal:  Patient/family will demonstrate understanding of Diabetes Self Management Skills by: (date) _______  Plan for post-discharge education or self-management support:    [x] Outpatient class schedule provided            [] Patient Declined    [] Scheduled for outpatient classes (date) _______  Verify:  Does patient understand how diabetes medications work? yes  Does patient know what their most recent A1c is? Yes. Reviewed with pt. Does patient monitor glucose at home? Pt states she her son's girlfriend checks her blood sugar daily  Does patient have difficulty obtaining diabetes medications or testing supplies?  No issues voiced

## 2019-10-03 NOTE — PROGRESS NOTES
Seen at bedside awake and alert. Second and third toes are dusky. Discussed with Dr Bell Rivera. Consult for vascular compromise. Stable after surgery.

## 2019-10-03 NOTE — ROUTINE PROCESS
Bedside and Verbal shift change report given to Sam Priest (oncoming nurse) by Ruth Gordon RN 
(offgoing nurse).  Report included the following information Kardex, Intake/Output, MAR, Recent Results and Cardiac Rhythm SR.

## 2019-10-03 NOTE — CONSULTS
[facility]    Name:  Luisa Mancini  MR#:   [id number]  :  1960  ACCOUNT #:  [de-identified]  DATE OF SERVICE:  10/03/2019    HISTORY OF PRESENT ILLNESS:  The patient is a 60year old, admitted to the hospital after stepping on a nail. She has a deep space infection. She has been seen by Dr. Jay Daniel who also felt she has an ischemic appearance to the toes on her left foot. PAST MEDICAL HISTORY:  She is treated for the history of depression, diabetes, bladder issues and bladder infection. PAST SURGICAL HISTORY:  Hysterectomy, bladder surgery, and just had an incision and drainage of her left foot. SOCIAL HISTORY:  Current everyday smoker, two packs a day for 38 years. Denies alcohol. FAMILY HISTORY:  Significant for cancer and diabetes. ALLERGIES:  NONE. HOME MEDICATIONS:  Insulin, aspirin, colchicine, Neurontin, Atarax, Prinivil, Glucophage, Singulair, Bactroban, and Pravachol. REVIEW OF SYSTEMS:  NEUROLOGIC:  Denies seizure or stroke. ENDOCRINE:  Positive for diabetes. PULMONARY:  Chronic tobacco user. CARDIAC:  Hypertension. GI:  Denies constipation or diarrhea. :  Denies history of dialysis. She has bladder issues. GYN:  History of hysterectomy. VASCULAR:  As above. PSYCHIATRIC:  Positive for depression. PHYSICAL EXAMINATION:  GENERAL:  I saw her at the bedside. She is here with her . VITAL SIGNS:  Her T-max is 101.9. Her current temperature is 97.6. Blood pressure 172/72. Pulse rate is 87. HEENT:  Head is normocephalic and atraumatic. NECK:  No JVD. CHEST:  Clear. CARDIAC:  Regular. ABDOMEN:  Soft, flat, and nontender. EXTREMITIES:  Her right lower extremity appears normal.  There is no swelling. There are palpable pulses. On the left lower extremity, there is a surgical bandage, but I do see her dusky toes. I did not appreciate pulses because of the fresh postop dressing.     DIAGNOSIS AND PLAN:  Possible peripheral arterial disease of the left leg. We will check a Doppler study. It has been ordered and pending. I will follow through with these results.       Susy Rizvi DO      [id number]  D:  10/03/2019 13:55  T:  10/03/2019 19:35  JOB #:  [id number]

## 2019-10-03 NOTE — PROGRESS NOTES
OT order received and chart reviewed. Per podiatry, patient \"Needs further debridement and drainage, patient is somewhat resistant. MRI to rule out osteitis and /or septic joint. \" Will await results of MRI and will need weight bearing status to Left lower extremity in order for full participation in OT evalaution.        Thank you for this referral,   Javed Jackman MS, OTR/L

## 2019-10-03 NOTE — PROGRESS NOTES
[facility] Hospitalist Group  Progress Note    Patient: Yusra Leon Age: 60 y.o. : 1960 MR#: [id number] SSN: [id number]  Date: 10/3/2019     Subjective:   Left foot pain is minimal. Left foot pain more aggravated while ambulating. She ambulates with the surgical shoe and heel walking without surgical shoe. Able to move her toes with minimal pain. Assessment/Plan:   1. Puncture wound of left foot  2. Fever. resolved  3. Leukocytosis, resolved  4. Hyponatremia, mild  5. Hypocalcemia, resolved  6. GPC bacteremia   7. DM with hyperglycemia. a1c 12.5  8. CKD3  9. Ongoing tobacco use  10. HTN, HLD  11. Gout    Plan  1. Podiatry recommendations advise close observation for further deeper infection d/t the nature of this closed puncture wound and also concerns with early dysvascularity of central toes. 2. ID consult and recommendations continue pip/leah, vanco. Follow up ID of GPC in blood cultures. Follow up preo, intra op wound culture. Will finalize abx in a few days based on wound cultures, blood cultures and clinical course  3. Monitor metabolic and renal function  4. Continue SSI and Lantus. Diabetes management and education  5. Monitor BP. Lisinopril held this admission    Additional Notes:      Case discussed with:  [x]Patient  []Family  [x]Nursing  []Case Management  DVT Prophylaxis:  []Lovenox  []Hep SQ  [x]SCDs  []Coumadin   []On Heparin gtt    Objective:   VS:   Visit Vitals  /65 (BP 1 Location: Left arm, BP Patient Position: At rest)   Pulse 90   Temp 99.4 °F (37.4 °C)   Resp 16   Ht 5' 3\" (1.6 m)   Wt 56.9 kg (125 lb 7.1 oz)   LMP  (LMP Unknown)   SpO2 96%   Breastfeeding?  No   BMI 22.22 kg/m²      Tmax/24hrs: Temp (24hrs), Av.6 °F (37.6 °C), Min:98.1 °F (36.7 °C), Max:100.9 °F (38.3 °C)      Intake/Output Summary (Last 24 hours) at 10/3/2019 1034  Last data filed at 10/3/2019 6810  Gross per 24 hour   Intake 1200 ml   Output 2300 ml   Net -1100 ml General:  Alert, NAD  Cardiovascular:  RRR  Pulmonary:  LSC throughout; respiratory effort WNL  GI:  +BS in all four quadrants, soft, non-tender  Extremities:  No edema; 2+ dorsalis pedis pulses bilaterally  Neuro: flat affect, alert andoriented  Left foot dressing with shadowing/bloody drainage. Second toe ecchymotic vs discoloration?     Labs:    Recent Results (from the past 24 hour(s))   GLUCOSE, POC    Collection Time: 10/02/19 11:06 AM   Result Value Ref Range    Glucose (POC) 173 (H) 70 - 110 mg/dL   CULTURE, SURGICAL WOUND W GRAM STAIN    Collection Time: 10/02/19  1:05 PM   Result Value Ref Range    Special Requests: LEFT  FOOT        GRAM STAIN RARE WBC'S      GRAM STAIN NO ORGANISMS SEEN      Culture result: PENDING    GLUCOSE, POC    Collection Time: 10/02/19  1:28 PM   Result Value Ref Range    Glucose (POC) 76 70 - 110 mg/dL   GLUCOSE, POC    Collection Time: 10/02/19  3:05 PM   Result Value Ref Range    Glucose (POC) 90 70 - 110 mg/dL   GLUCOSE, POC    Collection Time: 10/02/19  4:04 PM   Result Value Ref Range    Glucose (POC) 98 70 - 110 mg/dL   GLUCOSE, POC    Collection Time: 10/02/19  9:38 PM   Result Value Ref Range    Glucose (POC) 245 (H) 70 - 088 mg/dL   METABOLIC PANEL, BASIC    Collection Time: 10/03/19  4:30 AM   Result Value Ref Range    Sodium 134 (L) 136 - 145 mmol/L    Potassium 3.9 3.5 - 5.5 mmol/L    Chloride 104 100 - 111 mmol/L    CO2 23 21 - 32 mmol/L    Anion gap 7 3.0 - 18 mmol/L    Glucose 70 (L) 74 - 99 mg/dL    BUN 13 7.0 - 18 MG/DL    Creatinine 1.35 (H) 0.6 - 1.3 MG/DL    BUN/Creatinine ratio 10 (L) 12 - 20      GFR est AA 48 (L) >60 ml/min/1.73m2    GFR est non-AA 40 (L) >60 ml/min/1.73m2    Calcium 8.7 8.5 - 10.1 MG/DL   CBC WITH AUTOMATED DIFF    Collection Time: 10/03/19  4:30 AM   Result Value Ref Range    WBC 12.2 4.6 - 13.2 K/uL    RBC 3.80 (L) 4.20 - 5.30 M/uL    HGB 11.1 (L) 12.0 - 16.0 g/dL    HCT 33.0 (L) 35.0 - 45.0 %    MCV 86.8 74.0 - 97.0 FL    MCH 29.2 24.0 - 34.0 PG    MCHC 33.6 31.0 - 37.0 g/dL    RDW 13.9 11.6 - 14.5 %    PLATELET 652 434 - 651 K/uL    MPV 10.5 9.2 - 11.8 FL    NEUTROPHILS 68 40 - 73 %    LYMPHOCYTES 19 (L) 21 - 52 %    MONOCYTES 11 (H) 3 - 10 %    EOSINOPHILS 2 0 - 5 %    BASOPHILS 0 0 - 2 %    ABS. NEUTROPHILS 8.2 (H) 1.8 - 8.0 K/UL    ABS. LYMPHOCYTES 2.4 0.9 - 3.6 K/UL    ABS. MONOCYTES 1.4 (H) 0.05 - 1.2 K/UL    ABS. EOSINOPHILS 0.2 0.0 - 0.4 K/UL    ABS.  BASOPHILS 0.0 0.0 - 0.1 K/UL    DF AUTOMATED     MAGNESIUM    Collection Time: 10/03/19  4:30 AM   Result Value Ref Range    Magnesium 2.0 1.6 - 2.6 mg/dL   PHOSPHORUS    Collection Time: 10/03/19  4:30 AM   Result Value Ref Range    Phosphorus 6.0 (H) 2.5 - 4.9 MG/DL   CULTURE, BLOOD    Collection Time: 10/03/19  4:30 AM   Result Value Ref Range    Special Requests: NO SPECIAL REQUESTS      Culture result: NO GROWTH AFTER 1 HOUR     GLUCOSE, POC    Collection Time: 10/03/19  6:54 AM   Result Value Ref Range    Glucose (POC) 95 70 - 110 mg/dL       Signed By: Woodard Aschoff, NP     October 3, 2019

## 2019-10-03 NOTE — PROGRESS NOTES
Spoke with son Emiliano Washington via phone [phone] and updated on plan of care. Concerns about how mom's plan of care. Updated and answered all questions and concerns that Mr. Jacob Contreras had. He was satisfied with information. Updated patient and informed of speaking with son for updates.

## 2019-10-03 NOTE — PROGRESS NOTES
Problem: Falls - Risk of  Goal: *Absence of Falls  Description  Document Bishop Gibson Fall Risk and appropriate interventions in the flowsheet.   Outcome: Progressing Towards Goal  Note:   Fall Risk Interventions:  Mobility Interventions: Patient to call before getting OOB    Medication Interventions: Patient to call before getting OOB    Elimination Interventions: Call light in reach       Problem: Pain  Goal: *Control of Pain  Outcome: Progressing Towards Goal

## 2019-10-04 ENCOUNTER — APPOINTMENT (OUTPATIENT)
Dept: VASCULAR SURGERY | Age: 59
DRG: 305 | End: 2019-10-04
Attending: HOSPITALIST
Payer: MEDICAID

## 2019-10-04 LAB
ANION GAP SERPL CALC-SCNC: 6 MMOL/L (ref 3–18)
BACTERIA SPEC CULT: ABNORMAL
BUN SERPL-MCNC: 11 MG/DL (ref 7–18)
BUN/CREAT SERPL: 9 (ref 12–20)
CALCIUM SERPL-MCNC: 8.3 MG/DL (ref 8.5–10.1)
CHLORIDE SERPL-SCNC: 105 MMOL/L (ref 100–111)
CO2 SERPL-SCNC: 24 MMOL/L (ref 21–32)
CREAT SERPL-MCNC: 1.29 MG/DL (ref 0.6–1.3)
GLUCOSE BLD STRIP.AUTO-MCNC: 157 MG/DL (ref 70–110)
GLUCOSE BLD STRIP.AUTO-MCNC: 172 MG/DL (ref 70–110)
GLUCOSE BLD STRIP.AUTO-MCNC: 253 MG/DL (ref 70–110)
GLUCOSE BLD STRIP.AUTO-MCNC: 71 MG/DL (ref 70–110)
GLUCOSE BLD STRIP.AUTO-MCNC: 92 MG/DL (ref 70–110)
GLUCOSE SERPL-MCNC: 69 MG/DL (ref 74–99)
GRAM STN SPEC: ABNORMAL
LEFT ABI: 0.93
LEFT ANTERIOR TIBIAL: 117 MMHG
LEFT ARM BP: 130 MMHG
LEFT CALF PRESSURE: 139 MMHG
LEFT POSTERIOR TIBIAL: 128 MMHG
POTASSIUM SERPL-SCNC: 3.6 MMOL/L (ref 3.5–5.5)
RIGHT ABI: 0.82
RIGHT ANTERIOR TIBIAL: 107 MMHG
RIGHT ARM BP: 137 MMHG
RIGHT CALF PRESSURE: 120 MMHG
RIGHT POSTERIOR TIBIAL: 113 MMHG
SERVICE CMNT-IMP: ABNORMAL
SERVICE CMNT-IMP: ABNORMAL
SODIUM SERPL-SCNC: 135 MMOL/L (ref 136–145)

## 2019-10-04 PROCEDURE — 93923 UPR/LXTR ART STDY 3+ LVLS: CPT

## 2019-10-04 PROCEDURE — 97165 OT EVAL LOW COMPLEX 30 MIN: CPT

## 2019-10-04 PROCEDURE — 74011250637 HC RX REV CODE- 250/637: Performed by: PODIATRIST

## 2019-10-04 PROCEDURE — 74011250636 HC RX REV CODE- 250/636: Performed by: INTERNAL MEDICINE

## 2019-10-04 PROCEDURE — 97535 SELF CARE MNGMENT TRAINING: CPT

## 2019-10-04 PROCEDURE — 74011636637 HC RX REV CODE- 636/637: Performed by: PODIATRIST

## 2019-10-04 PROCEDURE — 80048 BASIC METABOLIC PNL TOTAL CA: CPT

## 2019-10-04 PROCEDURE — 74011250637 HC RX REV CODE- 250/637: Performed by: HOSPITALIST

## 2019-10-04 PROCEDURE — 74011000258 HC RX REV CODE- 258: Performed by: INTERNAL MEDICINE

## 2019-10-04 PROCEDURE — 74011000250 HC RX REV CODE- 250: Performed by: INTERNAL MEDICINE

## 2019-10-04 PROCEDURE — 65270000029 HC RM PRIVATE

## 2019-10-04 PROCEDURE — 74011250636 HC RX REV CODE- 250/636: Performed by: PODIATRIST

## 2019-10-04 PROCEDURE — 36415 COLL VENOUS BLD VENIPUNCTURE: CPT

## 2019-10-04 PROCEDURE — 97161 PT EVAL LOW COMPLEX 20 MIN: CPT

## 2019-10-04 PROCEDURE — 97116 GAIT TRAINING THERAPY: CPT

## 2019-10-04 PROCEDURE — 82962 GLUCOSE BLOOD TEST: CPT

## 2019-10-04 RX ADMIN — GABAPENTIN 100 MG: 100 CAPSULE ORAL at 22:12

## 2019-10-04 RX ADMIN — ASPIRIN 81 MG 81 MG: 81 TABLET ORAL at 10:04

## 2019-10-04 RX ADMIN — INSULIN GLARGINE 40 UNITS: 100 INJECTION, SOLUTION SUBCUTANEOUS at 09:00

## 2019-10-04 RX ADMIN — HEPARIN SODIUM 5000 UNITS: 5000 INJECTION INTRAVENOUS; SUBCUTANEOUS at 00:30

## 2019-10-04 RX ADMIN — HYDROCODONE BITARTRATE AND ACETAMINOPHEN 2 TABLET: 5; 325 TABLET ORAL at 22:12

## 2019-10-04 RX ADMIN — AMPICILLIN SODIUM AND SULBACTAM SODIUM 3 G: 2; 1 INJECTION, POWDER, FOR SOLUTION INTRAMUSCULAR; INTRAVENOUS at 00:29

## 2019-10-04 RX ADMIN — HEPARIN SODIUM 5000 UNITS: 5000 INJECTION INTRAVENOUS; SUBCUTANEOUS at 16:49

## 2019-10-04 RX ADMIN — HYDROCODONE BITARTRATE AND ACETAMINOPHEN 1 TABLET: 5; 325 TABLET ORAL at 06:28

## 2019-10-04 RX ADMIN — HYDROCODONE BITARTRATE AND ACETAMINOPHEN 2 TABLET: 5; 325 TABLET ORAL at 13:07

## 2019-10-04 RX ADMIN — MONTELUKAST 10 MG: 10 TABLET, FILM COATED ORAL at 10:04

## 2019-10-04 RX ADMIN — CLINDAMYCIN 600 MG: 150 INJECTION, SOLUTION INTRAMUSCULAR; INTRAVENOUS at 16:49

## 2019-10-04 RX ADMIN — VANCOMYCIN HYDROCHLORIDE 1500 MG: 10 INJECTION, POWDER, LYOPHILIZED, FOR SOLUTION INTRAVENOUS at 13:01

## 2019-10-04 RX ADMIN — AMPICILLIN SODIUM AND SULBACTAM SODIUM 3 G: 2; 1 INJECTION, POWDER, FOR SOLUTION INTRAMUSCULAR; INTRAVENOUS at 06:28

## 2019-10-04 RX ADMIN — PRAVASTATIN SODIUM 40 MG: 20 TABLET ORAL at 22:12

## 2019-10-04 RX ADMIN — AMPICILLIN SODIUM AND SULBACTAM SODIUM 3 G: 2; 1 INJECTION, POWDER, FOR SOLUTION INTRAMUSCULAR; INTRAVENOUS at 13:00

## 2019-10-04 RX ADMIN — GABAPENTIN 100 MG: 100 CAPSULE ORAL at 16:50

## 2019-10-04 RX ADMIN — DAKIN'S SOLUTION 0.125% (QUARTER STRENGTH): 0.12 SOLUTION at 17:26

## 2019-10-04 RX ADMIN — HEPARIN SODIUM 5000 UNITS: 5000 INJECTION INTRAVENOUS; SUBCUTANEOUS at 10:04

## 2019-10-04 RX ADMIN — GABAPENTIN 100 MG: 100 CAPSULE ORAL at 10:04

## 2019-10-04 RX ADMIN — INSULIN LISPRO 9 UNITS: 100 INJECTION, SOLUTION INTRAVENOUS; SUBCUTANEOUS at 12:39

## 2019-10-04 NOTE — PROGRESS NOTES
Infectious Disease progress Note    Requested by:dr. Daphney Joseph    Reason: left foot puncture wound infection    Current abx Prior abx   vancomycin since 9/30/19  Amp/sulbactam since 10/3 Pip/tazo 9/30-10/3     Lines:       Assessment :    60 y.o.  female with past medical history of uncontrolled type 2 DM (last hgbA1C 13.3 on 1/25/19), gout and some issues with compliance / developmental delay per PCP records who presented to ed on 9/30/19 with left foot pain following stepping on a refugio nail     MRI foot 10//1/19-  There is subcutaneous emphysema in the forefoot at the level of the proximal phalanges of the first through fifth toes. defect in the plantar soft tissues near the base of the second and third toes. Clinical presentation c/w sepsis (POA) due to left foot cellulitis, infected left foot puncture wound in a patient with uncontrolled type 2 DM. S/p I&D left foot on 10/2/19. Significant necrotic fatty tissue noted intra op. Intra op cultures 10/2- staph aureus. preop wound cultures 10/1/19- MRSA. Now with anaerobic gram positive bacteremia on 9/30/19 - ?bloodstream infection due to left foot infection versus contaminant. Will need to f/u ID to verify. Repeat b;ppd cultures 10/3 negative. Now with pallor of left second toe. No significant bleeding noted intra op. Vascular surgery consult appreciated. No significant PAD. Still with significant warmth/erythema left foot, leg leg. ?infection due to PVL toxin producing MRSA. Will broaden coverage to include this possibility and monitor clinically. D/w podiatrist. Josesito Calloway with this. Recommendations:    1. D/c amp/sulbactam. Start clindamycin. continue vancomycin (reluctant to use clindamycin monotherapy since mrsa resistant to erythromycin. Risk of MLSB mediated inducible clindamycin resistance while on treatment)  2. F/u Repeat blood cultures  3. F/u podiatry recommendations.      Advance Care planning:full code: discussed with patient/surrogate decision maker:       Above plan was discussed in details with patient, dr. Jairon Huff and dr Tal Cervantes. Please call me if any further questions or concerns. Will continue to participate in the care of this patient. HPI:     Patient denies fever, headaches, visual disturbances, sore throat, runny nose, earaches, cp, sob, chills, cough, abdominal pain, diarrhea, burning micturition, pain or weakness in extremities. He denies back pain/flank pain. home Medication List    Details   colchicine 0.6 mg tablet Take 1 tablet by mouth Q1Hour for gout pain. NOT TO EXCEED 3 TABLETS IN 24 HOURS. Qty: 30 Tab, Refills: 0      hydrOXYzine HCl (ATARAX) 25 mg tablet TAKE 1 TABLET BY MOUTH THREE TIMES DAILY AS NEEDED FOR ITCHING  Qty: 90 Tab, Refills: 0    Associated Diagnoses: Pruritus      pravastatin (PRAVACHOL) 40 mg tablet TAKE ONE TABLET BY MOUTH EVERY NIGHT  Qty: 30 Tab, Refills: 4      metFORMIN (GLUCOPHAGE) 1,000 mg tablet TAKE ONE TABLET BY MOUTH TWICE DAILY WITH MEALS  Qty: 60 Tab, Refills: 4      gabapentin (NEURONTIN) 300 mg capsule Take 1 Cap by mouth three (3) times daily. Qty: 90 Cap, Refills: 4      montelukast (SINGULAIR) 10 mg tablet Take 1 Tab by mouth daily. Qty: 30 Tab, Refills: 5      ergocalciferol (ERGOCALCIFEROL) 50,000 unit capsule Take 1 Cap by mouth every seven (7) days. Qty: 4 Cap, Refills: 5      lidocaine (LIDODERM) 5 % Apply patch to the affected area for 12 hours a day and remove for 12 hours a day.   Qty: 1 Package, Refills: 0      insulin aspart protamine/insulin aspart (NOVOLOG MIX 70-30 U-100 INSULN) 100 unit/mL (70-30) injection INJECT 25 UNITS UNDER THE SKIN TWO TIMES A DAY  Qty: 10 mL, Refills: 3    Comments: Before meals      mupirocin (BACTROBAN) 2 % ointment Apply to area affected BID  Qty: 22 g, Refills: 1    Associated Diagnoses: Dermatitis      lisinopril (PRINIVIL, ZESTRIL) 10 mg tablet TAKE 1 TABLET BY MOUTH ONCE DAILY  Qty: 30 Tab, Refills: 5      Insulin Syringe-Needle U-100 1/2 mL 30 gauge x 5/16 syrg INJECT TWICE A DAY  Qty: 100 Syringe, Refills: 5      Blood-Glucose Meter monitoring kit Check BS 3x/day E11.65  Qty: 1 Kit, Refills: 0      Lancets misc Check BS 3x/day E11.65  Qty: 100 Each, Refills: 5      glucose blood VI test strips (ONETOUCH ULTRA TEST) strip Check BS 3x/day E11.65  Qty: 100 Strip, Refills: 5      acetaminophen (TYLENOL) 325 mg tablet Take 2 Tabs by mouth every four (4) hours as needed for Pain. Qty: 20 Tab, Refills: 0      aspirin 81 mg tablet Take 81 mg by mouth daily.                Current Facility-Administered Medications   Medication Dose Route Frequency    ampicillin-sulbactam (UNASYN) 3 g in 0.9% sodium chloride (MBP/ADV) 100 mL MBP  3 g IntraVENous Q6H    vancomycin (VANCOCIN) 1500 mg in  ml infusion  1,500 mg IntraVENous Q24H    acetaminophen (TYLENOL) tablet 650 mg  650 mg Oral Q4H PRN    morphine injection 2 mg  2 mg IntraVENous Q3H PRN    HYDROcodone-acetaminophen (NORCO) 5-325 mg per tablet 1-2 Tab  1-2 Tab Oral Q6H PRN    sodium hypochlorite (QUARTER STRENGTH DAKIN'S) 0.125% irrigation (bottle)   Topical BID    insulin glargine (LANTUS) injection 40 Units  40 Units SubCUTAneous DAILY    naloxone (NARCAN) injection 0.4 mg  0.4 mg SubCUTAneous EVERY 2 MINUTES AS NEEDED    gabapentin (NEURONTIN) capsule 100 mg  100 mg Oral TID    sodium chloride (NS) flush 5-10 mL  5-10 mL IntraVENous PRN    insulin lispro (HUMALOG) injection   SubCUTAneous AC&HS    glucose chewable tablet 16 g  4 Tab Oral PRN    glucagon (GLUCAGEN) injection 1 mg  1 mg IntraMUSCular PRN    nicotine (NICODERM CQ) 21 mg/24 hr patch 1 Patch  1 Patch TransDERmal Q24H    heparin (porcine) injection 5,000 Units  5,000 Units SubCUTAneous Q8H    aspirin chewable tablet 81 mg  81 mg Oral DAILY    pravastatin (PRAVACHOL) tablet 40 mg  40 mg Oral QHS    montelukast (SINGULAIR) tablet 10 mg  10 mg Oral DAILY    hydrALAZINE (APRESOLINE) 20 mg/mL injection 10 mg  10 mg IntraVENous Q6H PRN    dextrose 10% infusion 125-250 mL  125-250 mL IntraVENous PRN       Allergies: Patient has no known allergies. Temp (24hrs), Av.8 °F (37.1 °C), Min:98 °F (36.7 °C), Max:99.2 °F (37.3 °C)    Visit Vitals  /82 (BP 1 Location: Left arm, BP Patient Position: At rest)   Pulse 85   Temp 98 °F (36.7 °C)   Resp 15   Ht 5' 3\" (1.6 m)   Wt 59.9 kg (132 lb)   LMP  (LMP Unknown)   SpO2 98%   Breastfeeding? No   BMI 23.38 kg/m²       ROS: 12 point ROS obtained in details. Pertinent positives as mentioned in HPI,   otherwise negative    Physical Exam:    General:          Alert, cooperative, no distress, appears older than stated age. Head:               Normocephalic, without obvious abnormality, atraumatic. Eyes:               Conjunctivae clear, anicteric sclerae. Nose:               Nares normal. No drainage or sinus tenderness. Throat:             Lips, mucosa, and tongue normal.  No Thrush  Back:               Symmetric  Lungs:             Clear to auscultation bilaterally. No Wheezing or Rhonchi. No rales. Chest wall:      No tenderness or deformity. No Accessory muscle use. Heart:              Regular rate and rhythm,  no murmur, rub or gallop. Abdomen:        Soft, non-tender. Not distended. Bowel sounds normal. No masses  Extremities:     Extremities normal, atraumatic, No cyanosis. No edema. No clubbing; left foot with erythema outlined / tender; quarter sized w/ small amount of residual drainage  Skin:                Texture, turgor normal. No rashes or lesions. Not Jaundiced  Lymph nodes: Cervical, supraclavicular normal.  Psych:             Poor insight. Not depressed. Not anxious or agitated. Neurologic:      No facial asymmetry. No aphasia or slurred speech.  Normal strength, Alert and oriented X 3.        Labs: Results:   Chemistry Recent Labs     10/04/19  0425 10/03/19  0430 10/02/19  0304   GLU 69* 70* 96   * 134* 134*   K 3.6 3.9 3.9    104 105   CO2 24 23 22   BUN 11 13 21*   CREA 1.29 1.35* 1.41*   CA 8.3* 8.7 7.9*   AGAP 6 7 7   BUCR 9* 10* 15      CBC w/Diff Recent Labs     10/03/19  0430   WBC 12.2   RBC 3.80*   HGB 11.1*   HCT 33.0*      GRANS 68   LYMPH 19*   EOS 2      Microbiology Recent Labs     10/03/19  0430 10/02/19  1305   CULT NO GROWTH 1 DAY FEW STAPHYLOCOCCUS AUREUS*  FEW POSSIBLE 2ND STAPHYLOCOCCUS AUREUS*          RADIOLOGY:    All available imaging studies/reports in [facility] for this admission were reviewed    Dr. Juju Hernandez, Infectious Disease Specialist  [phone]  October 4, 2019  1:49 PM

## 2019-10-04 NOTE — PROGRESS NOTES
Cisco Linder from [employer] called to check up on pt.  1600:  PT/OT recommending home health vs snf. Discussed with pt and pt stated she is not going anywhere but home. Gave pt list snf. Case Management is following.     Harley Salazar, BSN RN  Care Management  Pager: [phone]

## 2019-10-04 NOTE — PROGRESS NOTES
Pt had arterial studies today  Good perfusion  No need for vascular intervention, which I explained to patient    Also seen by podiatry  Second toe remains discolored but warm. Inflammation and eccymosis dorsum of foot. Necrosis plantar tissue edge. No pus. Start wound care, continue antibiotics. Made need further surgery with irrigation and or toe amputation by Monday if it dose not improve with current treatment.

## 2019-10-04 NOTE — PROGRESS NOTES
Seen at bedside awake and alert. Less pain. Inspected foot. Second toe discolored but warm. Inflammation and eccymosis dorsum of foot. Necrosis plantar tissue edge. No pus. Start wound care, continue antibiotics. Made need further surgery with irrigation and or toe amputation by Monday if it dose not improve with current treatment.

## 2019-10-04 NOTE — PROGRESS NOTES
[facility]  Progress Note    Patient: Lauri Daily Age: 60 y.o. : 1960 MR#: [id number] SSN: [id number]  Date: 10/4/2019     Subjective:     Pt feels lot better, Left foot pain is minimal.   Pt having some family issues, which is bothering her  Pt says her son is very nasty and she doesn't want us to communicate with him but she wants us to call daughter Genoveva [phone]. D/w  Genoveva in detail explained about her plan care, she wants us call her if any questions and her brother doesn't make discissions          Assessment/Plan:   1. Puncture wound of left foot with abscess s/p debridement and I&D 10/2/19, MRSA in Cx: D/w ID and Podiatry, plan to cont IV Abx, wound care and observation over weekend, may need more Sx ? Need amputation 2 nd toe. 2. Sepsis, POA due to # 1:   3. GPC bacteremia: repeat Cx neg  4. Mild cyanotic/dusky L 2nd toe: Vas Sx in put noted, duplex neg   4. DM with hyperglycemia. a1c 12.5: Continue SSI and Lantus  5. CKD3: Crt stable   6. Ongoing tobacco use, adv on cessation   7. HTN: BP stable off med's   8. HLD  9. Gout  D/w pt and family in detail     Case discussed with:  [x]Patient  [x]Family  [x]Nursing  []Case Management  DVT Prophylaxis:  []Lovenox  [x]Hep SQ  [x]SCDs  []Coumadin   []On Heparin gtt    Objective:   VS:   Visit Vitals  /82 (BP 1 Location: Left arm, BP Patient Position: At rest)   Pulse 85   Temp 98 °F (36.7 °C)   Resp 15   Ht 5' 3\" (1.6 m)   Wt 59.9 kg (132 lb)   LMP  (LMP Unknown)   SpO2 98%   Breastfeeding?  No   BMI 23.38 kg/m²      Tmax/24hrs: Temp (24hrs), Av.8 °F (37.1 °C), Min:98 °F (36.7 °C), Max:99.2 °F (37.3 °C)      Intake/Output Summary (Last 24 hours) at 10/4/2019 1540  Last data filed at 10/4/2019 1430  Gross per 24 hour   Intake 2160 ml   Output 2630 ml   Net -470 ml       General:  Alert, NAD  Cardiovascular:  RRR  Pulmonary: B/L clear, no wheezing  GI:  + BS, soft, non-tender  Extremities:  No edema  Neuro: AAOx3, moves all ext   Left foot dressing, clean.  Second toe dusky     Labs:    Recent Results (from the past 24 hour(s))   VANCOMYCIN, TROUGH    Collection Time: 10/03/19  4:10 PM   Result Value Ref Range    Vancomycin,trough 9.5 (L) 10.0 - 20.0 ug/mL    Reported dose date: 20191002      Reported dose time: 1600      Reported dose: 1000MG UNITS   GLUCOSE, POC    Collection Time: 10/03/19  4:27 PM   Result Value Ref Range    Glucose (POC) 99 70 - 110 mg/dL   GLUCOSE, POC    Collection Time: 10/03/19  9:42 PM   Result Value Ref Range    Glucose (POC) 200 (H) 70 - 588 mg/dL   METABOLIC PANEL, BASIC    Collection Time: 10/04/19  4:25 AM   Result Value Ref Range    Sodium 135 (L) 136 - 145 mmol/L    Potassium 3.6 3.5 - 5.5 mmol/L    Chloride 105 100 - 111 mmol/L    CO2 24 21 - 32 mmol/L    Anion gap 6 3.0 - 18 mmol/L    Glucose 69 (L) 74 - 99 mg/dL    BUN 11 7.0 - 18 MG/DL    Creatinine 1.29 0.6 - 1.3 MG/DL    BUN/Creatinine ratio 9 (L) 12 - 20      GFR est AA 51 (L) >60 ml/min/1.73m2    GFR est non-AA 42 (L) >60 ml/min/1.73m2    Calcium 8.3 (L) 8.5 - 10.1 MG/DL   GLUCOSE, POC    Collection Time: 10/04/19  6:51 AM   Result Value Ref Range    Glucose (POC) 71 70 - 110 mg/dL   GLUCOSE, POC    Collection Time: 10/04/19  7:33 AM   Result Value Ref Range    Glucose (POC) 172 (H) 70 - 110 mg/dL   LOWER EXT ART PVR MULT LEVEL SEG PRESSURES    Collection Time: 10/04/19  9:14 AM   Result Value Ref Range    Left arm  mmHg    Left calf pressure 139 mmHg    Left posterior tibial 128 mmHg    Left anterior tibial 117 mmHg    Right arm  mmHg    Right calf pressure 120 mmHg    Right posterior tibial 113 mmHg    Right anterior tibial 107 mmHg    Left JUAN 0.93     Right JUAN 0.82    GLUCOSE, POC    Collection Time: 10/04/19 12:03 PM   Result Value Ref Range    Glucose (POC) 253 (H) 70 - 110 mg/dL       Signed By: Jennifer Zaldivar MD     October 4, 2019

## 2019-10-04 NOTE — PROGRESS NOTES
Problem: Mobility Impaired (Adult and Pediatric)  Goal: *Acute Goals and Plan of Care (Insert Text)  Description  Physical Therapy Goals  Initiated 10/4/2019 and to be accomplished within 7 day(s)  1. Patient will move from supine to sit and sit to supine , scoot up and down and roll side to side in bed with supervision/set-up. 2.  Patient will transfer from bed to chair and chair to bed with supervision/set-up using the least restrictive device. 3.  Patient will perform sit to stand with supervision/set-up. 4.  Patient will ambulate with supervision/set-up for >150 feet with the least restrictive device. 5.  Patient will negotiate 4-10 steps with 1-2 HR with contact guard. PLOF: Patient reports she will be staying with her sister upon discharge and that her sister will be home with her all day. Sister's home is 2 story with bathroom upstairs and reports she will ask her sister about staying on first floor. Patient was independent PTA. Outcome: Progressing Towards Goal   PHYSICAL THERAPY EVALUATION    Patient: Mandy Navas (55 y.o. female)  Date: 10/4/2019  Primary Diagnosis: Cellulitis and abscess of foot [L03.119, L02.619]  Procedure(s) (LRB):  INCISION AND DRAINAGE PUNCTURE WOUND LEFT FOOT (Left) 2 Days Post-Op   Precautions:  PWB(through heel with post op shoe LLE)    ASSESSMENT :  Patient is 64yo F admitted to hospital for cellulitis of Left foot after stepping on nail. Patient underwent I&D may need further toe amputation per podiatry. Patient is alert and agreeable to therapy today and educated on need for post op shoe and educated on weight bearing status and walker use. Patient demos fair to poor carryover as she states, \"I think I could do better without the walker. \" when preparing to stand. Again re-enforced need for walker in order to maintain PWB through right heel. Patient then ambulated 35ft in room and demos good ability to keep forefoot off ground.  Patient is impulsive at times and requires cues to slow pace and requires direction to attend to task which is diminished. Patient transferred to supine in bed at conclusion of session and handoff performed to nursing for change dressing to left foot. Patient will benefit from skilled intervention to address the above impairments. Patient's rehabilitation potential is considered to be Good  Factors which may influence rehabilitation potential include:   ? None noted  ? Mental ability/status  ? Medical condition  ? Home/family situation and support systems  ? Safety awareness  ? Pain tolerance/management  ? Other:      PLAN :  Recommendations and Planned Interventions:   ?           Bed Mobility Training             ? Neuromuscular Re-Education  ? Transfer Training                   ? Orthotic/Prosthetic Training  ? Gait Training                          ? Modalities  ? Therapeutic Exercises           ? Edema Management/Control  ? Therapeutic Activities            ? Family Training/Education  ? Patient Education  ? Other (comment):    Frequency/Duration: Patient will be followed by physical therapy 1-2 times per day/4-7 days per week to address goals. Discharge Recommendations: Home Health with 24/7 supervision vs SNF (pending progress)  Further Equipment Recommendations for Discharge: bedside commode and rolling walker     SUBJECTIVE:   Patient stated Wild Mastreson can do all this already.  Patient demos decreased insight into deficits and how mobility is affected by change in weight bearing status.      OBJECTIVE DATA SUMMARY:     Past Medical History:   Diagnosis Date    Bladder infection     Depression     Diabetes ([address]     Difficulty walking     DM (diabetes mellitus) ([facility] [address] 6/22/2012    Ill-defined condition     leaking bladder;high cholesterol    Proteinuria 10/1/2019    RX  lisinopril for proteinuria    Puncture wound of foot, left, complicated 6/86/1109     Past Surgical History:   Procedure Laterality Date    HX GYN      hysterectomy    HX OTHER SURGICAL      bladder surgery     Barriers to Learning/Limitations: None  Compensate with: N/A  Home Situation:  Home Situation  Home Environment: Apartment  # Steps to Enter: 0  One/Two Story Residence: One story  Living Alone: No  Support Systems: Child(bev)  Patient Expects to be Discharged to[de-identified] Apartment  Current DME Used/Available at Home: None  Critical Behavior:  Neurologic State: Alert  Orientation Level: Oriented X4  Cognition: Impulsive; Follows commands  Safety/Judgement: Fall prevention  Strength:    Strength: Generally decreased, functional(BLE )   Tone & Sensation:   Tone: Normal(BLE )    Sensation: Intact(BLE )   Range Of Motion:  AROM: Within functional limits(BLE )   Functional Mobility:  Bed Mobility:   Supine to Sit: Supervision  Sit to Supine: Supervision  Scooting: Supervision  Transfers:  Sit to Stand: Contact guard assistance  Stand to Sit: Contact guard assistance   Balance:   Sitting: Intact  Standing: Impaired; With support  Standing - Static: Good  Standing - Dynamic : Fair  Ambulation/Gait Training:  Distance (ft): 35 Feet (ft)  Assistive Device: Walker, rolling  Ambulation - Level of Assistance: Contact guard assistance  Gait Abnormalities: Decreased step clearance   Left Side Weight Bearing: Heel;Partial (%)(with post op shoe)  Base of Support: Narrowed   Speed/Amelia: Slow  Step Length: Left shortened;Right shortened   Interventions: Verbal cues; Visual/Demos  Pain:  Pain level pre-treatment: 9/10   Pain level post-treatment: 9/10   Nursing notified    Activity Tolerance:   Patient   Please refer to the flowsheet for vital signs taken during this treatment. After treatment:   ?         Patient left in no apparent distress sitting up in chair  ? Patient left in no apparent distress in bed  ? Call bell left within reach  ? Nursing notified  ? Caregiver present  ? Bed alarm activated  ? SCDs applied    COMMUNICATION/EDUCATION:   ?         Role of Physical Therapy in the acute care setting. ?         Fall prevention education was provided and the patient/caregiver indicated understanding. ? Patient/family have participated as able in goal setting and plan of care. ?         Patient/family agree to work toward stated goals and plan of care. ?         Patient understands intent and goals of therapy, but is neutral about his/her participation. ? Patient is unable to participate in goal setting/plan of care: ongoing with therapy staff. ?         Other:     Thank you for this referral.  Carlos Simon, PT   Time Calculation: 23 mins      Eval Complexity: History: MEDIUM  Complexity : 1-2 comorbidities / personal factors will impact the outcome/ POC Exam:LOW Complexity : 1-2 Standardized tests and measures addressing body structure, function, activity limitation and / or participation in recreation  Presentation: LOW Complexity : Stable, uncomplicated  Clinical Decision Making:Low Complexity    Overall Complexity:LOW

## 2019-10-04 NOTE — ROUTINE PROCESS
Bedside and Verbal shift change report given to Kenzie Jasso (oncoming nurse) by Trish Choudhury RN 
(offgoing nurse).  Report included the following information Kardex, Intake/Output, MAR, Recent Results and Cardiac Rhythm SR.

## 2019-10-04 NOTE — PROGRESS NOTES
Problem: Self Care Deficits Care Plan (Adult)  Goal: *Acute Goals and Plan of Care (Insert Text)  Description  Occupational Therapy Goals  Initiated 10/4/2019 within 7 day(s). 1.  Patient will perform lower body dressing with modified independence. 2.  Patient will perform toileting with modified independence. 3.  Patient will perform toilet transfer with modified independence following weightbearing precautions. 4.  Patient will perform a functional activity in standing with modified independence for 3 minutes, with G standing balance. Prior Level of Function: Pt reports she was (I) with basic self-care/ADLs and functional mobility PTA. Outcome: Progressing Towards Goal   OCCUPATIONAL THERAPY EVALUATION    Patient: Lizzie Payton (54 y.o. female)  Date: 10/4/2019  Primary Diagnosis: Cellulitis and abscess of foot [L03.119, L02.619]  Procedure(s) (LRB):  INCISION AND DRAINAGE PUNCTURE WOUND LEFT FOOT (Left) 2 Days Post-Op   Precautions: Falls; PWB through L heel      ASSESSMENT :  Pt cleared to participate in OT evaluation by RN. Upon entering room, pt supine with HOB elevated, alert, and agreeable to therapy session with her brother present. Based on the objective data described below, the patient presents with increased pain, decreased functional balance, and decreased functional mobility, affecting her safety and performance in basic self-care/ADL tasks. Pt was educated on PWB through L heel precautions with pt demo good understanding. Pt is able to perform bed mobility independently to participate in self-care. Sitting EOB, pt was educated on donning post-op shoe; at the end of the session pt is able to doff shoe with SBA using cross legs method. Pt requires CGA for sit<>stand using RW. Pt declined to use the bathroom but agreeable to ambulating in room ~1 minute to simulate bathroom mobility for toilet transfers. Pt safely returned to bed.  Educated pt on the role of OT, evaluation process, PWB precautions, walker management,  and goals for therapy with pt demonstrating good understanding. The pt will benefit from further OT services, in order to maximize her ADL performance and decrease the risk for complications associated with decreased functional activity. At the end of the session, pt left sitting up in recliner, call-bell in reach, with all needs met. Notified pt's RN post-session. Patient will benefit from skilled intervention to address the above impairments. Patient's rehabilitation potential is considered to be Good  Factors which may influence rehabilitation potential include:   ? None noted  ? Mental ability/status  ? Medical condition  ? Home/family situation and support systems  ? Safety awareness  ? Pain tolerance/management  ? Other:      PLAN :  Recommendations and Planned Interventions:   ?               Self Care Training                  ? Therapeutic Activities  ? Functional Mobility Training   ? Cognitive Retraining  ? Therapeutic Exercises           ? Endurance Activities  ? Balance Training                    ? Neuromuscular Re-Education  ? Visual/Perceptual Training     ? Home Safety Training  ? Patient Education                   ? Family Training/Education  ? Other (comment):    Frequency/Duration: Patient will be followed by occupational therapy 1-2 times per day/4-7 days per week to address goals. Discharge Recommendations: Home Health with 24/7 Supervision vs SNF pending progress  Further Equipment Recommendations for Discharge: Shower chair and grab bars to decrease the risk of falls. SUBJECTIVE:   Patient stated i'm just in a little pain.  (pt then stated \"9\"/10)    OBJECTIVE DATA SUMMARY:     Past Medical History:   Diagnosis Date    Bladder infection     Depression     Diabetes ([facility]     Difficulty walking     DM (diabetes mellitus) ([facility] 6/22/2012    Ill-defined condition     leaking bladder;high cholesterol    Proteinuria 10/1/2019    RX  lisinopril for proteinuria    Puncture wound of foot, left, complicated 1/31/3279     Past Surgical History:   Procedure Laterality Date    HX GYN      hysterectomy    HX OTHER SURGICAL      bladder surgery     Barriers to Learning/Limitations: None  Compensate with: visual, verbal, tactile, kinesthetic cues/model    Home Situation:   Home Situation  Home Environment: Apartment  # Steps to Enter: 0  One/Two Story Residence: One story  Living Alone: No  Support Systems: Child(bev)  Patient Expects to be Discharged to[de-identified] Apartment  Current DME Used/Available at Home: None  ? Right hand dominant   ? Left hand dominant    Cognitive/Behavioral Status:  Neurologic State: Alert  Orientation Level: Oriented X4  Cognition: Impulsive; Follows commands  Safety/Judgement: Fall prevention    Skin: Visible skin appeared intact. Edema: None noted    Coordination: BUE  Coordination: Within functional limits  Fine Motor Skills-Upper: Left Intact; Right Intact    Gross Motor Skills-Upper: Left Intact; Right Intact    Balance:  Sitting: Intact  Standing: Impaired; With support  Standing - Static: Good  Standing - Dynamic : Fair    Strength: BUE  Strength:  Within functional limits    Tone & Sensation: BUE  Tone: Normal  Sensation: Intact      Range of Motion: BUE  AROM: Within functional limits        Functional Mobility and Transfers for ADLs:  Bed Mobility:  Supine to Sit: Modified independent  Sit to Supine: Modified independent  Scooting: Modified independent  Transfers:  Sit to Stand: Contact guard assistance  Stand to Sit: Contact guard assistance      ADL Assessment:   Feeding: Independent    Oral Facial Hygiene/Grooming: Independent    Bathing: Stand-by assistance    Upper Body Dressing: Independent    Lower Body Dressing: Stand-by assistance    Toileting: Contact guard assistance      ADL Intervention:  Lower Body Dressing Assistance  Dressing Assistance: Stand-by assistance  Shoes with Velcro: Stand-by assistance (pt was educated on donning post-op shoe; pt is able to doff post-op shoe with SBA)  Leg Crossed Method Used: Yes    Cognitive Retraining  Safety/Judgement: Fall prevention      Pain:  Pain level pre-treatment: 9/10   Pain level post-treatment: 9/10   Pain Intervention(s): Medication (see MAR); Rest, Ice, Repositioning   Response to intervention: Nurse notified, See doc flow    Activity Tolerance:   Fair    Please refer to the flowsheet for vital signs taken during this treatment. After treatment:   ? Patient left in no apparent distress sitting up in chair  ? Patient left in no apparent distress in bed  ? Call bell left within reach  ? Nursing notified and present  ? Brother present  ? Bed alarm activated    COMMUNICATION/EDUCATION:   ? Role of Occupational Therapy in the acute care setting  ? Home safety education was provided and the patient/caregiver indicated understanding. ? Patient/family have participated as able in goal setting and plan of care. ? Patient/family agree to work toward stated goals and plan of care. ? Patient understands intent and goals of therapy, but is neutral about his/her participation. ? Patient is unable to participate in goal setting and plan of care. Thank you for this referral.  Sam Hopson MS, OTR/L  Time Calculation: 23 mins    Eval Complexity: History: MEDIUM Complexity : Expanded review of history including physical, cognitive and psychosocial  history ; Examination: LOW Complexity : 1-3 performance deficits relating to physical, cognitive , or psychosocial skils that result in activity limitations and / or participation restrictions ;    Decision Making:LOW Complexity : No comorbidities that affect functional and no verbal or physical assistance needed to complete eval tasks

## 2019-10-04 NOTE — PROGRESS NOTES
Problem: Falls - Risk of  Goal: *Absence of Falls  Description  Document Herminia Meyer Fall Risk and appropriate interventions in the flowsheet. Outcome: Progressing Towards Goal  Note:   Fall Risk Interventions:  Mobility Interventions: Assess mobility with egress test, Bed/chair exit alarm, Communicate number of staff needed for ambulation/transfer, OT consult for ADLs, Patient to call before getting OOB, PT Consult for mobility concerns, Utilize walker, cane, or other assistive device         Medication Interventions: Bed/chair exit alarm, Evaluate medications/consider consulting pharmacy, Patient to call before getting OOB, Teach patient to arise slowly    Elimination Interventions: Bed/chair exit alarm, Call light in reach, Patient to call for help with toileting needs, Stay With Me (per policy), Toilet paper/wipes in reach, Toileting schedule/hourly rounds              Problem: Pressure Injury - Risk of  Goal: *Prevention of pressure injury  Description  Document Robert Scale and appropriate interventions in the flowsheet. Outcome: Progressing Towards Goal  Note:   Pressure Injury Interventions:       Moisture Interventions: Apply protective barrier, creams and emollients, Assess need for specialty bed, Check for incontinence Q2 hours and as needed, Limit adult briefs, Maintain skin hydration (lotion/cream), Minimize layers, Moisture barrier    Activity Interventions: Increase time out of bed, Pressure redistribution bed/mattress(bed type), PT/OT evaluation    Mobility Interventions: Assess need for specialty bed, Float heels, Pressure redistribution bed/mattress (bed type), HOB 30 degrees or less, PT/OT evaluation, Turn and reposition approx.  every two hours(pillow and wedges)    Nutrition Interventions: Document food/fluid/supplement intake, Discuss nutritional consult with provider, Offer support with meals,snacks and hydration    Friction and Shear Interventions: Apply protective barrier, creams and emollients, Feet elevated on foot rest, Foam dressings/transparent film/skin sealants, HOB 30 degrees or less, Lift team/patient mobility team, Minimize layers                Problem: Nutrition Deficit  Goal: *Optimize nutritional status  Outcome: Progressing Towards Goal     Problem: Pain  Goal: *Control of Pain  Outcome: Progressing Towards Goal     Problem: Cellulitis Care Plan (Adult)  Goal: *Control of acute pain  Outcome: Progressing Towards Goal  Goal: *Skin integrity maintained  Outcome: Progressing Towards Goal  Goal: *Absence of infection signs and symptoms  Outcome: Progressing Towards Goal     Problem: General Infection Care Plan (Adult and Pediatric)  Goal: Improvement in signs and symptoms of infection  Outcome: Progressing Towards Goal

## 2019-10-04 NOTE — PROGRESS NOTES
0800:  Report received, care assumed. Complete assessment performed. AAOx4. Denies pain, SOB or discomfort. Left foot elevated on pillow with post op surgical dressing dry and intact. Breakfast served. Monitor. +MRSA results noted wound left foot from 10/01/19 testing. Contact Isolation initiated now per protocol. See critical result flowsheet for provider notification details. Patient informed of Isolation Protocol to be started and rationale; pt verbalizes understanding all info. 1930:  Bedside and Verbal shift change report given to MIKO Mittal (oncoming nurse) by Kelsy Lafleur RN(offgoing nurse). Report included the following information SBAR, Kardex, Intake/Output, MAR, Accordion, Recent Results, Cardiac Rhythm NSR. and Alarm Parameters .

## 2019-10-05 ENCOUNTER — ANESTHESIA EVENT (OUTPATIENT)
Dept: SURGERY | Age: 59
DRG: 305 | End: 2019-10-05
Payer: MEDICAID

## 2019-10-05 LAB
ANION GAP SERPL CALC-SCNC: 6 MMOL/L (ref 3–18)
BASOPHILS # BLD: 0 K/UL (ref 0–0.1)
BASOPHILS NFR BLD: 0 % (ref 0–2)
BUN SERPL-MCNC: 12 MG/DL (ref 7–18)
BUN/CREAT SERPL: 10 (ref 12–20)
CALCIUM SERPL-MCNC: 7.9 MG/DL (ref 8.5–10.1)
CHLORIDE SERPL-SCNC: 103 MMOL/L (ref 100–111)
CO2 SERPL-SCNC: 25 MMOL/L (ref 21–32)
CREAT SERPL-MCNC: 1.16 MG/DL (ref 0.6–1.3)
DIFFERENTIAL METHOD BLD: ABNORMAL
EOSINOPHIL # BLD: 0.3 K/UL (ref 0–0.4)
EOSINOPHIL NFR BLD: 4 % (ref 0–5)
ERYTHROCYTE [DISTWIDTH] IN BLOOD BY AUTOMATED COUNT: 13.6 % (ref 11.6–14.5)
GLUCOSE BLD STRIP.AUTO-MCNC: 112 MG/DL (ref 70–110)
GLUCOSE BLD STRIP.AUTO-MCNC: 240 MG/DL (ref 70–110)
GLUCOSE BLD STRIP.AUTO-MCNC: 254 MG/DL (ref 70–110)
GLUCOSE BLD STRIP.AUTO-MCNC: 260 MG/DL (ref 70–110)
GLUCOSE BLD STRIP.AUTO-MCNC: 72 MG/DL (ref 70–110)
GLUCOSE SERPL-MCNC: 66 MG/DL (ref 74–99)
HCT VFR BLD AUTO: 28.5 % (ref 35–45)
HGB BLD-MCNC: 9.6 G/DL (ref 12–16)
LYMPHOCYTES # BLD: 2.1 K/UL (ref 0.9–3.6)
LYMPHOCYTES NFR BLD: 23 % (ref 21–52)
MCH RBC QN AUTO: 28.8 PG (ref 24–34)
MCHC RBC AUTO-ENTMCNC: 33.7 G/DL (ref 31–37)
MCV RBC AUTO: 85.6 FL (ref 74–97)
MONOCYTES # BLD: 1 K/UL (ref 0.05–1.2)
MONOCYTES NFR BLD: 11 % (ref 3–10)
NEUTS SEG # BLD: 5.6 K/UL (ref 1.8–8)
NEUTS SEG NFR BLD: 62 % (ref 40–73)
PLATELET # BLD AUTO: 404 K/UL (ref 135–420)
PMV BLD AUTO: 9.9 FL (ref 9.2–11.8)
POTASSIUM SERPL-SCNC: 3.7 MMOL/L (ref 3.5–5.5)
RBC # BLD AUTO: 3.33 M/UL (ref 4.2–5.3)
SODIUM SERPL-SCNC: 134 MMOL/L (ref 136–145)
WBC # BLD AUTO: 9 K/UL (ref 4.6–13.2)

## 2019-10-05 PROCEDURE — 74011250637 HC RX REV CODE- 250/637: Performed by: PODIATRIST

## 2019-10-05 PROCEDURE — 74011000258 HC RX REV CODE- 258: Performed by: INTERNAL MEDICINE

## 2019-10-05 PROCEDURE — 74011636637 HC RX REV CODE- 636/637: Performed by: PODIATRIST

## 2019-10-05 PROCEDURE — 65270000029 HC RM PRIVATE

## 2019-10-05 PROCEDURE — 74011250636 HC RX REV CODE- 250/636: Performed by: HOSPITALIST

## 2019-10-05 PROCEDURE — 82962 GLUCOSE BLOOD TEST: CPT

## 2019-10-05 PROCEDURE — 80048 BASIC METABOLIC PNL TOTAL CA: CPT

## 2019-10-05 PROCEDURE — 74011000250 HC RX REV CODE- 250: Performed by: INTERNAL MEDICINE

## 2019-10-05 PROCEDURE — 74011250637 HC RX REV CODE- 250/637: Performed by: INTERNAL MEDICINE

## 2019-10-05 PROCEDURE — 74011250636 HC RX REV CODE- 250/636: Performed by: PODIATRIST

## 2019-10-05 PROCEDURE — 85025 COMPLETE CBC W/AUTO DIFF WBC: CPT

## 2019-10-05 PROCEDURE — 74011250636 HC RX REV CODE- 250/636: Performed by: INTERNAL MEDICINE

## 2019-10-05 PROCEDURE — 36415 COLL VENOUS BLD VENIPUNCTURE: CPT

## 2019-10-05 PROCEDURE — 74011000250 HC RX REV CODE- 250: Performed by: PODIATRIST

## 2019-10-05 RX ORDER — POLYETHYLENE GLYCOL 3350 17 G/17G
17 POWDER, FOR SOLUTION ORAL ONCE
Status: DISPENSED | OUTPATIENT
Start: 2019-10-05 | End: 2019-10-06

## 2019-10-05 RX ORDER — DOCUSATE SODIUM 100 MG/1
100 CAPSULE, LIQUID FILLED ORAL
Status: DISCONTINUED | OUTPATIENT
Start: 2019-10-05 | End: 2019-10-09 | Stop reason: HOSPADM

## 2019-10-05 RX ORDER — FACIAL-BODY WIPES
10 EACH TOPICAL DAILY PRN
Status: DISCONTINUED | OUTPATIENT
Start: 2019-10-05 | End: 2019-10-09 | Stop reason: HOSPADM

## 2019-10-05 RX ORDER — POLYETHYLENE GLYCOL 3350 17 G/17G
17 POWDER, FOR SOLUTION ORAL DAILY
Status: DISCONTINUED | OUTPATIENT
Start: 2019-10-06 | End: 2019-10-09 | Stop reason: HOSPADM

## 2019-10-05 RX ADMIN — INSULIN LISPRO 6 UNITS: 100 INJECTION, SOLUTION INTRAVENOUS; SUBCUTANEOUS at 11:53

## 2019-10-05 RX ADMIN — CLINDAMYCIN 600 MG: 150 INJECTION, SOLUTION INTRAMUSCULAR; INTRAVENOUS at 00:58

## 2019-10-05 RX ADMIN — DAKIN'S SOLUTION 0.125% (QUARTER STRENGTH): 0.12 SOLUTION at 09:34

## 2019-10-05 RX ADMIN — Medication 1 CAPSULE: at 09:00

## 2019-10-05 RX ADMIN — MORPHINE SULFATE 2 MG: 2 INJECTION, SOLUTION INTRAMUSCULAR; INTRAVENOUS at 11:58

## 2019-10-05 RX ADMIN — GABAPENTIN 100 MG: 100 CAPSULE ORAL at 18:09

## 2019-10-05 RX ADMIN — MONTELUKAST 10 MG: 10 TABLET, FILM COATED ORAL at 09:30

## 2019-10-05 RX ADMIN — DAKIN'S SOLUTION 0.125% (QUARTER STRENGTH): 0.12 SOLUTION at 09:00

## 2019-10-05 RX ADMIN — HEPARIN SODIUM 5000 UNITS: 5000 INJECTION INTRAVENOUS; SUBCUTANEOUS at 00:58

## 2019-10-05 RX ADMIN — Medication 16 G: at 07:06

## 2019-10-05 RX ADMIN — CLINDAMYCIN 600 MG: 150 INJECTION, SOLUTION INTRAMUSCULAR; INTRAVENOUS at 18:08

## 2019-10-05 RX ADMIN — PRAVASTATIN SODIUM 40 MG: 20 TABLET ORAL at 22:25

## 2019-10-05 RX ADMIN — INSULIN GLARGINE 40 UNITS: 100 INJECTION, SOLUTION SUBCUTANEOUS at 09:33

## 2019-10-05 RX ADMIN — HEPARIN SODIUM 5000 UNITS: 5000 INJECTION INTRAVENOUS; SUBCUTANEOUS at 09:32

## 2019-10-05 RX ADMIN — INSULIN LISPRO 9 UNITS: 100 INJECTION, SOLUTION INTRAVENOUS; SUBCUTANEOUS at 22:25

## 2019-10-05 RX ADMIN — GABAPENTIN 100 MG: 100 CAPSULE ORAL at 09:30

## 2019-10-05 RX ADMIN — CLINDAMYCIN 600 MG: 150 INJECTION, SOLUTION INTRAMUSCULAR; INTRAVENOUS at 09:33

## 2019-10-05 RX ADMIN — GABAPENTIN 100 MG: 100 CAPSULE ORAL at 22:23

## 2019-10-05 RX ADMIN — ASPIRIN 81 MG 81 MG: 81 TABLET ORAL at 09:31

## 2019-10-05 RX ADMIN — VANCOMYCIN HYDROCHLORIDE 1500 MG: 10 INJECTION, POWDER, LYOPHILIZED, FOR SOLUTION INTRAVENOUS at 13:00

## 2019-10-05 NOTE — PROGRESS NOTES
[facility] Hospitalist Group  Progress Note    Patient: Felicia Hernandez Age: 60 y.o. : 1960 MR#: [id number] SSN: [id number]  Date: 10/5/2019     Subjective:     Pt feels lot better, pain well controlled. Assessment/Plan:   1. Puncture wound of left foot with abscess s/p debridement and I&D 10/2/19, MRSA in Cx: D/w ID and Podiatry, plan to cont clinida and vancomycin per ID. Cont wound care and observation over weekend, may need more Sx ? Need amputation 2 nd toe. 2. Sepsis, POA due to # 1:   3. PEPTOSTREPTOCOCCUS bacteremia: repeat Cx neg, possible contaminant   4. Mild cyanotic/dusky L 2nd toe: Vas Sx in put noted, duplex neg   4. DM with hyperglycemia. a1c 12.5: Continue SSI and Lantus  5. CKD3: Crt stable   6. Ongoing tobacco use, adv on cessation   7. HTN: BP stable off med's   8. HLD  9. Gout  D/w pt and Daughter Diane Copper [phone] in detail. Case discussed with:  [x]Patient  [x]Family  [x]Nursing  []Case Management  DVT Prophylaxis:  []Lovenox  [x]Hep SQ  [x]SCDs  []Coumadin   []On Heparin gtt    Objective:   VS:   Visit Vitals  /87 (BP 1 Location: Left arm, BP Patient Position: At rest)   Pulse 83   Temp 98.6 °F (37 °C)   Resp 18   Ht 5' 3\" (1.6 m)   Wt 60.6 kg (133 lb 11.2 oz)   LMP  (LMP Unknown)   SpO2 96%   Breastfeeding? No   BMI 23.68 kg/m²      Tmax/24hrs: Temp (24hrs), Av.5 °F (36.9 °C), Min:97.5 °F (36.4 °C), Max:98.8 °F (37.1 °C)      Intake/Output Summary (Last 24 hours) at 10/5/2019 1209  Last data filed at 10/5/2019 0420  Gross per 24 hour   Intake 1100 ml   Output 1500 ml   Net -400 ml       General:  Alert, NAD  Cardiovascular:  RRR  Pulmonary: B/L clear, no wheezing  GI:  + BS, soft, non-tender  Extremities:  No edema  Neuro: AAOx3, moves all ext   Left foot dressing, clean.  Second toe looks slightly better, less dusky     Labs:    Recent Results (from the past 24 hour(s))   GLUCOSE, POC    Collection Time: 10/04/19  3:48 PM   Result Value Ref Range    Glucose (POC) 92 70 - 110 mg/dL   GLUCOSE, POC    Collection Time: 10/04/19  9:25 PM   Result Value Ref Range    Glucose (POC) 157 (H) 70 - 778 mg/dL   METABOLIC PANEL, BASIC    Collection Time: 10/05/19  2:20 AM   Result Value Ref Range    Sodium 134 (L) 136 - 145 mmol/L    Potassium 3.7 3.5 - 5.5 mmol/L    Chloride 103 100 - 111 mmol/L    CO2 25 21 - 32 mmol/L    Anion gap 6 3.0 - 18 mmol/L    Glucose 66 (L) 74 - 99 mg/dL    BUN 12 7.0 - 18 MG/DL    Creatinine 1.16 0.6 - 1.3 MG/DL    BUN/Creatinine ratio 10 (L) 12 - 20      GFR est AA 58 (L) >60 ml/min/1.73m2    GFR est non-AA 48 (L) >60 ml/min/1.73m2    Calcium 7.9 (L) 8.5 - 10.1 MG/DL   CBC WITH AUTOMATED DIFF    Collection Time: 10/05/19  2:20 AM   Result Value Ref Range    WBC 9.0 4.6 - 13.2 K/uL    RBC 3.33 (L) 4.20 - 5.30 M/uL    HGB 9.6 (L) 12.0 - 16.0 g/dL    HCT 28.5 (L) 35.0 - 45.0 %    MCV 85.6 74.0 - 97.0 FL    MCH 28.8 24.0 - 34.0 PG    MCHC 33.7 31.0 - 37.0 g/dL    RDW 13.6 11.6 - 14.5 %    PLATELET 255 968 - 927 K/uL    MPV 9.9 9.2 - 11.8 FL    NEUTROPHILS 62 40 - 73 %    LYMPHOCYTES 23 21 - 52 %    MONOCYTES 11 (H) 3 - 10 %    EOSINOPHILS 4 0 - 5 %    BASOPHILS 0 0 - 2 %    ABS. NEUTROPHILS 5.6 1.8 - 8.0 K/UL    ABS. LYMPHOCYTES 2.1 0.9 - 3.6 K/UL    ABS. MONOCYTES 1.0 0.05 - 1.2 K/UL    ABS. EOSINOPHILS 0.3 0.0 - 0.4 K/UL    ABS.  BASOPHILS 0.0 0.0 - 0.1 K/UL    DF AUTOMATED     GLUCOSE, POC    Collection Time: 10/05/19  6:55 AM   Result Value Ref Range    Glucose (POC) 72 70 - 110 mg/dL   GLUCOSE, POC    Collection Time: 10/05/19  8:16 AM   Result Value Ref Range    Glucose (POC) 260 (H) 70 - 110 mg/dL   GLUCOSE, POC    Collection Time: 10/05/19 11:11 AM   Result Value Ref Range    Glucose (POC) 240 (H) 70 - 110 mg/dL       Signed By: Sloane Odom MD     October 5, 2019

## 2019-10-05 NOTE — PROGRESS NOTES
Am glucose from lab 66, applesauce and juice administered, pt declined other alternatives. Recheck blood glucose to be 72, per CNA, 16g of glucose chewable administered. Bedside shift change report given to LUCÍA Muller RN (oncoming nurse) by Mia Washington RN (offgoing nurse). Report included the following information SBAR, Procedure Summary, Intake/Output, MAR and Recent Results.

## 2019-10-06 LAB
ANION GAP SERPL CALC-SCNC: 6 MMOL/L (ref 3–18)
BACTERIA SPEC ANAEROBE CULT: NORMAL
BACTERIA SPEC CULT: NORMAL
BUN SERPL-MCNC: 14 MG/DL (ref 7–18)
BUN/CREAT SERPL: 13 (ref 12–20)
CALCIUM SERPL-MCNC: 8.7 MG/DL (ref 8.5–10.1)
CHLORIDE SERPL-SCNC: 106 MMOL/L (ref 100–111)
CO2 SERPL-SCNC: 25 MMOL/L (ref 21–32)
CREAT SERPL-MCNC: 1.12 MG/DL (ref 0.6–1.3)
DATE LAST DOSE: NORMAL
GLUCOSE BLD STRIP.AUTO-MCNC: 119 MG/DL (ref 70–110)
GLUCOSE BLD STRIP.AUTO-MCNC: 174 MG/DL (ref 70–110)
GLUCOSE BLD STRIP.AUTO-MCNC: 225 MG/DL (ref 70–110)
GLUCOSE BLD STRIP.AUTO-MCNC: 225 MG/DL (ref 70–110)
GLUCOSE BLD STRIP.AUTO-MCNC: 87 MG/DL (ref 70–110)
GLUCOSE SERPL-MCNC: 53 MG/DL (ref 74–99)
POTASSIUM SERPL-SCNC: 3.9 MMOL/L (ref 3.5–5.5)
REPORTED DOSE,DOSE: 1500 UNITS
REPORTED DOSE/TIME,TMG: 1300
SERVICE CMNT-IMP: NORMAL
SODIUM SERPL-SCNC: 137 MMOL/L (ref 136–145)
SPECIMEN SOURCE: NORMAL
VANCOMYCIN TROUGH SERPL-MCNC: 13.9 UG/ML (ref 10–20)

## 2019-10-06 PROCEDURE — 80202 ASSAY OF VANCOMYCIN: CPT

## 2019-10-06 PROCEDURE — 74011250636 HC RX REV CODE- 250/636: Performed by: HOSPITALIST

## 2019-10-06 PROCEDURE — 74011250637 HC RX REV CODE- 250/637: Performed by: PODIATRIST

## 2019-10-06 PROCEDURE — 74011250636 HC RX REV CODE- 250/636: Performed by: PODIATRIST

## 2019-10-06 PROCEDURE — 65270000029 HC RM PRIVATE

## 2019-10-06 PROCEDURE — 74011250637 HC RX REV CODE- 250/637: Performed by: INTERNAL MEDICINE

## 2019-10-06 PROCEDURE — 80048 BASIC METABOLIC PNL TOTAL CA: CPT

## 2019-10-06 PROCEDURE — 74011250637 HC RX REV CODE- 250/637: Performed by: HOSPITALIST

## 2019-10-06 PROCEDURE — 74011250636 HC RX REV CODE- 250/636: Performed by: INTERNAL MEDICINE

## 2019-10-06 PROCEDURE — 74011636637 HC RX REV CODE- 636/637: Performed by: PODIATRIST

## 2019-10-06 PROCEDURE — 74011000258 HC RX REV CODE- 258: Performed by: INTERNAL MEDICINE

## 2019-10-06 PROCEDURE — 36415 COLL VENOUS BLD VENIPUNCTURE: CPT

## 2019-10-06 PROCEDURE — 74011000250 HC RX REV CODE- 250: Performed by: INTERNAL MEDICINE

## 2019-10-06 PROCEDURE — 82962 GLUCOSE BLOOD TEST: CPT

## 2019-10-06 RX ORDER — INSULIN GLARGINE 100 [IU]/ML
36 INJECTION, SOLUTION SUBCUTANEOUS DAILY
Status: DISCONTINUED | OUTPATIENT
Start: 2019-10-07 | End: 2019-10-08

## 2019-10-06 RX ORDER — VANCOMYCIN/0.9 % SOD CHLORIDE 1 G/100 ML
1000 PLASTIC BAG, INJECTION (ML) INTRAVENOUS
Status: DISCONTINUED | OUTPATIENT
Start: 2019-10-06 | End: 2019-10-09 | Stop reason: HOSPADM

## 2019-10-06 RX ORDER — VANCOMYCIN/0.9 % SOD CHLORIDE 1.5G/250ML
1500 PLASTIC BAG, INJECTION (ML) INTRAVENOUS EVERY 24 HOURS
Status: DISCONTINUED | OUTPATIENT
Start: 2019-10-06 | End: 2019-10-06

## 2019-10-06 RX ORDER — VANCOMYCIN HYDROCHLORIDE
1250
Status: DISCONTINUED | OUTPATIENT
Start: 2019-10-06 | End: 2019-10-06

## 2019-10-06 RX ORDER — LISINOPRIL 10 MG/1
10 TABLET ORAL DAILY
Status: DISCONTINUED | OUTPATIENT
Start: 2019-10-06 | End: 2019-10-09 | Stop reason: HOSPADM

## 2019-10-06 RX ADMIN — MONTELUKAST 10 MG: 10 TABLET, FILM COATED ORAL at 09:03

## 2019-10-06 RX ADMIN — CLINDAMYCIN 600 MG: 150 INJECTION, SOLUTION INTRAMUSCULAR; INTRAVENOUS at 09:24

## 2019-10-06 RX ADMIN — INSULIN LISPRO 6 UNITS: 100 INJECTION, SOLUTION INTRAVENOUS; SUBCUTANEOUS at 11:39

## 2019-10-06 RX ADMIN — INSULIN GLARGINE 40 UNITS: 100 INJECTION, SOLUTION SUBCUTANEOUS at 09:04

## 2019-10-06 RX ADMIN — MORPHINE SULFATE 2 MG: 2 INJECTION, SOLUTION INTRAMUSCULAR; INTRAVENOUS at 03:31

## 2019-10-06 RX ADMIN — ASPIRIN 81 MG 81 MG: 81 TABLET ORAL at 09:03

## 2019-10-06 RX ADMIN — HYDRALAZINE HYDROCHLORIDE 10 MG: 20 INJECTION, SOLUTION INTRAMUSCULAR; INTRAVENOUS at 00:10

## 2019-10-06 RX ADMIN — Medication 1 CAPSULE: at 09:03

## 2019-10-06 RX ADMIN — Medication 16 G: at 03:31

## 2019-10-06 RX ADMIN — CLINDAMYCIN 600 MG: 150 INJECTION, SOLUTION INTRAMUSCULAR; INTRAVENOUS at 23:44

## 2019-10-06 RX ADMIN — CLINDAMYCIN 600 MG: 150 INJECTION, SOLUTION INTRAMUSCULAR; INTRAVENOUS at 16:58

## 2019-10-06 RX ADMIN — DAKIN'S SOLUTION 0.125% (QUARTER STRENGTH): 0.12 SOLUTION at 09:00

## 2019-10-06 RX ADMIN — HYDROCODONE BITARTRATE AND ACETAMINOPHEN 1 TABLET: 5; 325 TABLET ORAL at 21:34

## 2019-10-06 RX ADMIN — CLINDAMYCIN 600 MG: 150 INJECTION, SOLUTION INTRAMUSCULAR; INTRAVENOUS at 00:10

## 2019-10-06 RX ADMIN — GABAPENTIN 100 MG: 100 CAPSULE ORAL at 09:03

## 2019-10-06 RX ADMIN — DAKIN'S SOLUTION 0.125% (QUARTER STRENGTH): 0.12 SOLUTION at 18:00

## 2019-10-06 RX ADMIN — VANCOMYCIN HYDROCHLORIDE 1000 MG: 10 INJECTION, POWDER, LYOPHILIZED, FOR SOLUTION INTRAVENOUS at 14:15

## 2019-10-06 RX ADMIN — GABAPENTIN 100 MG: 100 CAPSULE ORAL at 21:34

## 2019-10-06 RX ADMIN — INSULIN LISPRO 3 UNITS: 100 INJECTION, SOLUTION INTRAVENOUS; SUBCUTANEOUS at 09:03

## 2019-10-06 RX ADMIN — DAKIN'S SOLUTION 0.125% (QUARTER STRENGTH): 0.12 SOLUTION at 09:29

## 2019-10-06 RX ADMIN — DAKIN'S SOLUTION 0.125% (QUARTER STRENGTH): 0.12 SOLUTION at 17:38

## 2019-10-06 RX ADMIN — LISINOPRIL 10 MG: 10 TABLET ORAL at 14:15

## 2019-10-06 RX ADMIN — INSULIN LISPRO 6 UNITS: 100 INJECTION, SOLUTION INTRAVENOUS; SUBCUTANEOUS at 21:34

## 2019-10-06 RX ADMIN — PRAVASTATIN SODIUM 40 MG: 20 TABLET ORAL at 21:34

## 2019-10-06 RX ADMIN — GABAPENTIN 100 MG: 100 CAPSULE ORAL at 16:57

## 2019-10-06 NOTE — PROGRESS NOTES
[facility] Hospitalist Group  Progress Note    Patient: Gordon Irving Age: 60 y.o. : 1960 MR#: [id number] SSN: [id number]  Date: 10/6/2019     Subjective:     Pt feels ok, no new comlaints, pain well controlled. BS low around 2 am     Assessment/Plan:   1. Puncture wound of left foot with abscess s/p debridement and I&D 10/2/19, MRSA in Cx: D/w ID and Podiatry, plan to cont clinida and vancomycin per ID. Cont wound care and observation over weekend, may need more Sx ? Need amputation 2 nd toe. 2. Sepsis, POA due to # 1:   3. PEPTOSTREPTOCOCCUS bacteremia: repeat Cx neg, possible contaminant   4. Mild cyanotic/dusky L 2nd toe: Vas Sx in put noted, duplex neg   4. DM 2: BS low in am: Continue SSI and decrease Lantus, add bed time snack. D/w Pt   5. CKD3: Crt stable   6. Ongoing tobacco use, adv on cessation   7. HTN: BP high, will resume ACEi    8. HLD  9. Gout  D/w pt  Daughter Gail Choudhary [phone]    Case discussed with:  [x]Patient  []Family  [x]Nursing  []Case Management  DVT Prophylaxis:  []Lovenox  [x]Hep SQ  [x]SCDs  []Coumadin   []On Heparin gtt    Objective:   VS:   Visit Vitals  /80 (BP 1 Location: Right arm, BP Patient Position: At rest)   Pulse 81   Temp 98.4 °F (36.9 °C)   Resp 17   Ht 5' 3\" (1.6 m)   Wt 60.6 kg (133 lb 11.2 oz)   LMP  (LMP Unknown)   SpO2 99%   Breastfeeding? No   BMI 23.68 kg/m²      Tmax/24hrs: Temp (24hrs), Av.8 °F (36.6 °C), Min:96.1 °F (35.6 °C), Max:98.4 °F (36.9 °C)      Intake/Output Summary (Last 24 hours) at 10/6/2019 1329  Last data filed at 10/6/2019 1012  Gross per 24 hour   Intake 940 ml   Output 1300 ml   Net -360 ml       General:  Alert, NAD  Cardiovascular:  RRR  Pulmonary: B/L clear, no wheezing  GI:  + BS, soft, non-tender  Extremities:  No edema  Neuro: AAOx3, moves all ext   Left foot dressing, clean.  Second toe still looks slightly looks dusky     Labs:    Recent Results (from the past 24 hour(s))   GLUCOSE, POC Collection Time: 10/05/19  3:35 PM   Result Value Ref Range    Glucose (POC) 112 (H) 70 - 110 mg/dL   GLUCOSE, POC    Collection Time: 10/05/19  9:09 PM   Result Value Ref Range    Glucose (POC) 254 (H) 70 - 520 mg/dL   METABOLIC PANEL, BASIC    Collection Time: 10/06/19  1:47 AM   Result Value Ref Range    Sodium 137 136 - 145 mmol/L    Potassium 3.9 3.5 - 5.5 mmol/L    Chloride 106 100 - 111 mmol/L    CO2 25 21 - 32 mmol/L    Anion gap 6 3.0 - 18 mmol/L    Glucose 53 (LL) 74 - 99 mg/dL    BUN 14 7.0 - 18 MG/DL    Creatinine 1.12 0.6 - 1.3 MG/DL    BUN/Creatinine ratio 13 12 - 20      GFR est AA >60 >60 ml/min/1.73m2    GFR est non-AA 50 (L) >60 ml/min/1.73m2    Calcium 8.7 8.5 - 10.1 MG/DL   GLUCOSE, POC    Collection Time: 10/06/19  3:35 AM   Result Value Ref Range    Glucose (POC) 119 (H) 70 - 110 mg/dL   GLUCOSE, POC    Collection Time: 10/06/19  7:31 AM   Result Value Ref Range    Glucose (POC) 174 (H) 70 - 110 mg/dL   GLUCOSE, POC    Collection Time: 10/06/19 11:26 AM   Result Value Ref Range    Glucose (POC) 225 (H) 70 - 110 mg/dL   Garg[place], TROUGH    Collection Time: 10/06/19 12:19 PM   Result Value Ref Range    Vancomycin,trough 13.9 10.0 - 20.0 ug/mL    Reported dose date: [id number]      Reported dose time: 1300      Reported dose: 1500 UNITS       Signed By: Juju Latham MD     October 6, 2019

## 2019-10-06 NOTE — PROGRESS NOTES
AOx3, no apparent distress, playing games on the phone, pleasant, dressing to L foot clean, dry and intact. Voiced no complaints at this time, bed in low position and call bell within reach.

## 2019-10-06 NOTE — ROUTINE PROCESS
Bedside and Verbal shift change report given to MIKO Mcintyre (oncoming nurse) by Karissa Cowan (offgoing nurse). Report included the following information SBAR, Kardex, Intake/Output and MAR.

## 2019-10-06 NOTE — PROGRESS NOTES
2300: Attempted to perform second wound drg change on L foot but patient refused to allow drg change. Patient stating \"Oh no, you're not doing that again today. I'm too tired. \" Current drg clean, dry, intact. Will notify AM nurse. 0000: Patient refused SQ heparin. 0315: critical glucose 53. Glucose tabs and grape juice given. POC glucose check 119. Will continue to monitor. 0630: TRANSFER - OUT REPORT:    Verbal report given to [address] [place] [address] (name) on Cadogan Ovens  being transferred to  (unit) for routine progression of care       Report consisted of patients Situation, Background, Assessment and   Recommendations(SBAR). Information from the following report(s) SBAR, Kardex, Intake/Output, MAR and Recent Results was reviewed with the receiving nurse. Lines:   Peripheral IV 10/04/19 Anterior; Left Forearm (Active)   Site Assessment Clean, dry, & intact 10/5/2019  8:16 PM   Phlebitis Assessment 0 10/5/2019  8:16 PM   Infiltration Assessment 0 10/5/2019  8:16 PM   Dressing Status Clean, dry, & intact 10/5/2019  8:16 PM   Dressing Type Transparent 10/5/2019  8:16 PM   Hub Color/Line Status Yellow 10/5/2019  8:16 PM   Action Taken Open ports on tubing capped 10/5/2019  4:54 AM   Alcohol Cap Used Yes 10/5/2019  8:16 PM        Opportunity for questions and clarification was provided.       Patient transported with:   Registered Nurse

## 2019-10-06 NOTE — PROGRESS NOTES
End of Shift Note     Bedside and verbal shift change report given to Caio Mancilla RN (On coming nurse) by MIKO Alvarez (Off going nurse).   Report included the following information:Procedure Summary, Intake/Output, MAR, Recent Results, Med Rec Status, and SBAR  (Situation, Background, Assessment and Recommendations)    SBAR Recommendations:     Issues for Provider to address         Activity This Shift      [] Resting in bed   [] TDWB   [] Dangled     [] Chair Ambulated to     [] Bathroom     [x] BSC     [] Refused Ambulated In    [] Room    [] Hallway    [] Bedrest ordered   Bladder Scan  Voiding Status [] Yes  [x] Void [x] No  [] Byrne [] N/A  [] I&O Cath   Blood Sugars Managed [x] Yes [] No [] N/A   Bowel Movement  Passing Gas [] Yes  [] Yes [] No  [] No    CHG Bath Done     Before Surgery     After Surgery   [x] Yes  [] Yes   [] No  [] No   [] N/A  [] N/A   Drain Removed [] Yes [] No [x] N/A   Dressing Checked  Dressing Changed [x] Yes  [] Yes [] No  [x] No [] N/A  [] N/A   Nausea/Vomiting [] Yes [x] No    Ice Packs Changed [] Yes [] No [x] N/A   Incentive Spirometer  [x] Yes [] No Volume    SCD Pumps On Bilaterally  Ankle Pumping  [x] Yes  [] Yes [] Yes  [] No [] N/A  [] N/A    Telemetry Monitoring [x] Yes [] No Rhythm    Up to Chair for Meals [] Yes [x] No [] N/A

## 2019-10-06 NOTE — ROUTINE PROCESS
Bedside and Verbal shift change report given to Nellie Salguero RN (oncoming nurse) by Mary Vallejo (offgoing nurse). Report included the following information SBAR, Kardex and MAR.

## 2019-10-07 ENCOUNTER — ANESTHESIA (OUTPATIENT)
Dept: SURGERY | Age: 59
DRG: 305 | End: 2019-10-07
Payer: MEDICAID

## 2019-10-07 LAB
BACTERIA SPEC CULT: ABNORMAL
GLUCOSE BLD STRIP.AUTO-MCNC: 109 MG/DL (ref 70–110)
GLUCOSE BLD STRIP.AUTO-MCNC: 199 MG/DL (ref 70–110)
GLUCOSE BLD STRIP.AUTO-MCNC: 71 MG/DL (ref 70–110)
GLUCOSE BLD STRIP.AUTO-MCNC: 84 MG/DL (ref 70–110)
GLUCOSE BLD STRIP.AUTO-MCNC: 92 MG/DL (ref 70–110)
GRAM STN SPEC: ABNORMAL
GRAM STN SPEC: ABNORMAL
SERVICE CMNT-IMP: ABNORMAL

## 2019-10-07 PROCEDURE — 74011000250 HC RX REV CODE- 250: Performed by: INTERNAL MEDICINE

## 2019-10-07 PROCEDURE — 77030016037 HC MANFLD MULTI QUES -B: Performed by: ANESTHESIOLOGY

## 2019-10-07 PROCEDURE — 88304 TISSUE EXAM BY PATHOLOGIST: CPT

## 2019-10-07 PROCEDURE — 74011250636 HC RX REV CODE- 250/636: Performed by: HOSPITALIST

## 2019-10-07 PROCEDURE — 74011250637 HC RX REV CODE- 250/637: Performed by: INTERNAL MEDICINE

## 2019-10-07 PROCEDURE — 76010000149 HC OR TIME 1 TO 1.5 HR: Performed by: PODIATRIST

## 2019-10-07 PROCEDURE — 74011250636 HC RX REV CODE- 250/636

## 2019-10-07 PROCEDURE — 87075 CULTR BACTERIA EXCEPT BLOOD: CPT

## 2019-10-07 PROCEDURE — 77030013708 HC HNDPC SUC IRR PULS STRY –B: Performed by: PODIATRIST

## 2019-10-07 PROCEDURE — 87070 CULTURE OTHR SPECIMN AEROBIC: CPT

## 2019-10-07 PROCEDURE — 77030006773 HC BLD SAW OSC BRSM -A: Performed by: PODIATRIST

## 2019-10-07 PROCEDURE — 82962 GLUCOSE BLOOD TEST: CPT

## 2019-10-07 PROCEDURE — 74011636637 HC RX REV CODE- 636/637: Performed by: PODIATRIST

## 2019-10-07 PROCEDURE — 76060000033 HC ANESTHESIA 1 TO 1.5 HR: Performed by: PODIATRIST

## 2019-10-07 PROCEDURE — 77030013179 HC SHOE PSTOP OPN DJOR -A: Performed by: PODIATRIST

## 2019-10-07 PROCEDURE — 74011000250 HC RX REV CODE- 250

## 2019-10-07 PROCEDURE — 88311 DECALCIFY TISSUE: CPT

## 2019-10-07 PROCEDURE — 74011250636 HC RX REV CODE- 250/636: Performed by: NURSE ANESTHETIST, CERTIFIED REGISTERED

## 2019-10-07 PROCEDURE — 74011250636 HC RX REV CODE- 250/636: Performed by: PODIATRIST

## 2019-10-07 PROCEDURE — 65270000029 HC RM PRIVATE

## 2019-10-07 PROCEDURE — 74011250637 HC RX REV CODE- 250/637: Performed by: PODIATRIST

## 2019-10-07 PROCEDURE — 77030040361 HC SLV COMPR DVT MDII -B: Performed by: PODIATRIST

## 2019-10-07 PROCEDURE — 77030002986 HC SUT PROL J&J -A: Performed by: PODIATRIST

## 2019-10-07 PROCEDURE — 74011250637 HC RX REV CODE- 250/637: Performed by: HOSPITALIST

## 2019-10-07 PROCEDURE — 77030019934 HC DRSG VAC ASST KCON -B: Performed by: PODIATRIST

## 2019-10-07 PROCEDURE — 77030040922 HC BLNKT HYPOTHRM STRY -A: Performed by: PODIATRIST

## 2019-10-07 PROCEDURE — 97110 THERAPEUTIC EXERCISES: CPT

## 2019-10-07 PROCEDURE — 88305 TISSUE EXAM BY PATHOLOGIST: CPT

## 2019-10-07 PROCEDURE — 74011636637 HC RX REV CODE- 636/637: Performed by: HOSPITALIST

## 2019-10-07 PROCEDURE — 77030018836 HC SOL IRR NACL ICUM -A: Performed by: PODIATRIST

## 2019-10-07 PROCEDURE — 74011250636 HC RX REV CODE- 250/636: Performed by: INTERNAL MEDICINE

## 2019-10-07 PROCEDURE — 0Y6N0ZB DETACHMENT AT LEFT FOOT, PARTIAL 2ND RAY, OPEN APPROACH: ICD-10-PCS | Performed by: PODIATRIST

## 2019-10-07 PROCEDURE — 76210000063 HC OR PH I REC FIRST 0.5 HR: Performed by: PODIATRIST

## 2019-10-07 PROCEDURE — 74011250637 HC RX REV CODE- 250/637: Performed by: NURSE ANESTHETIST, CERTIFIED REGISTERED

## 2019-10-07 PROCEDURE — 77030019952 HC CANSTR VAC ASST KCON -B: Performed by: PODIATRIST

## 2019-10-07 PROCEDURE — 74011000258 HC RX REV CODE- 258: Performed by: INTERNAL MEDICINE

## 2019-10-07 PROCEDURE — 74011000250 HC RX REV CODE- 250: Performed by: PODIATRIST

## 2019-10-07 RX ORDER — FENTANYL CITRATE 50 UG/ML
INJECTION, SOLUTION INTRAMUSCULAR; INTRAVENOUS AS NEEDED
Status: DISCONTINUED | OUTPATIENT
Start: 2019-10-07 | End: 2019-10-07 | Stop reason: HOSPADM

## 2019-10-07 RX ORDER — SODIUM CHLORIDE, SODIUM LACTATE, POTASSIUM CHLORIDE, CALCIUM CHLORIDE 600; 310; 30; 20 MG/100ML; MG/100ML; MG/100ML; MG/100ML
75 INJECTION, SOLUTION INTRAVENOUS CONTINUOUS
Status: DISCONTINUED | OUTPATIENT
Start: 2019-10-07 | End: 2019-10-07 | Stop reason: HOSPADM

## 2019-10-07 RX ORDER — LIDOCAINE HYDROCHLORIDE 20 MG/ML
INJECTION, SOLUTION EPIDURAL; INFILTRATION; INTRACAUDAL; PERINEURAL AS NEEDED
Status: DISCONTINUED | OUTPATIENT
Start: 2019-10-07 | End: 2019-10-07 | Stop reason: HOSPADM

## 2019-10-07 RX ORDER — FAMOTIDINE 20 MG/1
20 TABLET, FILM COATED ORAL ONCE
Status: COMPLETED | OUTPATIENT
Start: 2019-10-07 | End: 2019-10-07

## 2019-10-07 RX ORDER — LIDOCAINE HYDROCHLORIDE 10 MG/ML
0.1 INJECTION, SOLUTION EPIDURAL; INFILTRATION; INTRACAUDAL; PERINEURAL AS NEEDED
Status: DISCONTINUED | OUTPATIENT
Start: 2019-10-07 | End: 2019-10-07 | Stop reason: HOSPADM

## 2019-10-07 RX ORDER — ONDANSETRON 2 MG/ML
4 INJECTION INTRAMUSCULAR; INTRAVENOUS ONCE
Status: CANCELLED | OUTPATIENT
Start: 2019-10-07 | End: 2019-10-07

## 2019-10-07 RX ORDER — PROPOFOL 10 MG/ML
INJECTION, EMULSION INTRAVENOUS AS NEEDED
Status: DISCONTINUED | OUTPATIENT
Start: 2019-10-07 | End: 2019-10-07 | Stop reason: HOSPADM

## 2019-10-07 RX ORDER — DEXTROSE 50 % IN WATER (D50W) INTRAVENOUS SYRINGE
25-50 AS NEEDED
Status: CANCELLED | OUTPATIENT
Start: 2019-10-07

## 2019-10-07 RX ORDER — DIPHENHYDRAMINE HYDROCHLORIDE 50 MG/ML
12.5 INJECTION, SOLUTION INTRAMUSCULAR; INTRAVENOUS
Status: CANCELLED | OUTPATIENT
Start: 2019-10-07

## 2019-10-07 RX ORDER — PROPOFOL 10 MG/ML
INJECTION, EMULSION INTRAVENOUS
Status: DISCONTINUED | OUTPATIENT
Start: 2019-10-07 | End: 2019-10-07 | Stop reason: HOSPADM

## 2019-10-07 RX ORDER — SODIUM CHLORIDE, SODIUM LACTATE, POTASSIUM CHLORIDE, CALCIUM CHLORIDE 600; 310; 30; 20 MG/100ML; MG/100ML; MG/100ML; MG/100ML
50 INJECTION, SOLUTION INTRAVENOUS CONTINUOUS
Status: CANCELLED | OUTPATIENT
Start: 2019-10-07

## 2019-10-07 RX ORDER — GLYCOPYRROLATE 0.2 MG/ML
INJECTION INTRAMUSCULAR; INTRAVENOUS AS NEEDED
Status: DISCONTINUED | OUTPATIENT
Start: 2019-10-07 | End: 2019-10-07 | Stop reason: HOSPADM

## 2019-10-07 RX ORDER — SODIUM CHLORIDE 0.9 % (FLUSH) 0.9 %
5-40 SYRINGE (ML) INJECTION EVERY 8 HOURS
Status: DISCONTINUED | OUTPATIENT
Start: 2019-10-07 | End: 2019-10-07 | Stop reason: HOSPADM

## 2019-10-07 RX ORDER — MAGNESIUM SULFATE 100 %
4 CRYSTALS MISCELLANEOUS AS NEEDED
Status: CANCELLED | OUTPATIENT
Start: 2019-10-07

## 2019-10-07 RX ORDER — NALOXONE HYDROCHLORIDE 0.4 MG/ML
0.04 INJECTION, SOLUTION INTRAMUSCULAR; INTRAVENOUS; SUBCUTANEOUS AS NEEDED
Status: CANCELLED | OUTPATIENT
Start: 2019-10-07

## 2019-10-07 RX ORDER — HYDROMORPHONE HYDROCHLORIDE 2 MG/ML
0.5 INJECTION, SOLUTION INTRAMUSCULAR; INTRAVENOUS; SUBCUTANEOUS
Status: CANCELLED | OUTPATIENT
Start: 2019-10-07

## 2019-10-07 RX ORDER — INSULIN LISPRO 100 [IU]/ML
INJECTION, SOLUTION INTRAVENOUS; SUBCUTANEOUS ONCE
Status: CANCELLED | OUTPATIENT
Start: 2019-10-07 | End: 2019-10-08

## 2019-10-07 RX ORDER — MIDAZOLAM HYDROCHLORIDE 1 MG/ML
INJECTION, SOLUTION INTRAMUSCULAR; INTRAVENOUS AS NEEDED
Status: DISCONTINUED | OUTPATIENT
Start: 2019-10-07 | End: 2019-10-07 | Stop reason: HOSPADM

## 2019-10-07 RX ORDER — SODIUM CHLORIDE 0.9 % (FLUSH) 0.9 %
5-40 SYRINGE (ML) INJECTION AS NEEDED
Status: DISCONTINUED | OUTPATIENT
Start: 2019-10-07 | End: 2019-10-07 | Stop reason: HOSPADM

## 2019-10-07 RX ORDER — ONDANSETRON 2 MG/ML
INJECTION INTRAMUSCULAR; INTRAVENOUS AS NEEDED
Status: DISCONTINUED | OUTPATIENT
Start: 2019-10-07 | End: 2019-10-07 | Stop reason: HOSPADM

## 2019-10-07 RX ORDER — ALBUTEROL SULFATE 0.83 MG/ML
2.5 SOLUTION RESPIRATORY (INHALATION) AS NEEDED
Status: CANCELLED | OUTPATIENT
Start: 2019-10-07

## 2019-10-07 RX ADMIN — INSULIN GLARGINE 36 UNITS: 100 INJECTION, SOLUTION SUBCUTANEOUS at 09:05

## 2019-10-07 RX ADMIN — FENTANYL CITRATE 50 MCG: 50 INJECTION, SOLUTION INTRAMUSCULAR; INTRAVENOUS at 13:30

## 2019-10-07 RX ADMIN — HYDROCODONE BITARTRATE AND ACETAMINOPHEN 2 TABLET: 5; 325 TABLET ORAL at 20:58

## 2019-10-07 RX ADMIN — GABAPENTIN 100 MG: 100 CAPSULE ORAL at 09:05

## 2019-10-07 RX ADMIN — FENTANYL CITRATE 25 MCG: 50 INJECTION, SOLUTION INTRAMUSCULAR; INTRAVENOUS at 14:09

## 2019-10-07 RX ADMIN — GLYCOPYRROLATE 0.2 MG: 0.2 INJECTION INTRAMUSCULAR; INTRAVENOUS at 13:02

## 2019-10-07 RX ADMIN — MIDAZOLAM HYDROCHLORIDE 2 MG: 1 INJECTION, SOLUTION INTRAMUSCULAR; INTRAVENOUS at 13:02

## 2019-10-07 RX ADMIN — ONDANSETRON 4 MG: 2 INJECTION INTRAMUSCULAR; INTRAVENOUS at 13:15

## 2019-10-07 RX ADMIN — FAMOTIDINE 20 MG: 20 TABLET ORAL at 09:05

## 2019-10-07 RX ADMIN — PROPOFOL 140 MCG/KG/MIN: 10 INJECTION, EMULSION INTRAVENOUS at 13:04

## 2019-10-07 RX ADMIN — SODIUM CHLORIDE, SODIUM LACTATE, POTASSIUM CHLORIDE, AND CALCIUM CHLORIDE 75 ML/HR: 600; 310; 30; 20 INJECTION, SOLUTION INTRAVENOUS at 07:00

## 2019-10-07 RX ADMIN — FENTANYL CITRATE 50 MCG: 50 INJECTION, SOLUTION INTRAMUSCULAR; INTRAVENOUS at 13:02

## 2019-10-07 RX ADMIN — HEPARIN SODIUM 5000 UNITS: 5000 INJECTION INTRAVENOUS; SUBCUTANEOUS at 19:11

## 2019-10-07 RX ADMIN — MONTELUKAST 10 MG: 10 TABLET, FILM COATED ORAL at 09:05

## 2019-10-07 RX ADMIN — VANCOMYCIN HYDROCHLORIDE 1000 MG: 10 INJECTION, POWDER, LYOPHILIZED, FOR SOLUTION INTRAVENOUS at 10:14

## 2019-10-07 RX ADMIN — Medication 1 CAPSULE: at 09:05

## 2019-10-07 RX ADMIN — ASPIRIN 81 MG 81 MG: 81 TABLET ORAL at 09:05

## 2019-10-07 RX ADMIN — LISINOPRIL 10 MG: 10 TABLET ORAL at 09:05

## 2019-10-07 RX ADMIN — MORPHINE SULFATE 2 MG: 2 INJECTION, SOLUTION INTRAMUSCULAR; INTRAVENOUS at 19:09

## 2019-10-07 RX ADMIN — PRAVASTATIN SODIUM 40 MG: 20 TABLET ORAL at 21:03

## 2019-10-07 RX ADMIN — DAKIN'S SOLUTION 0.125% (QUARTER STRENGTH): 0.12 SOLUTION at 09:00

## 2019-10-07 RX ADMIN — LIDOCAINE HYDROCHLORIDE 100 MG: 20 INJECTION, SOLUTION EPIDURAL; INFILTRATION; INTRACAUDAL; PERINEURAL at 13:15

## 2019-10-07 RX ADMIN — SODIUM CHLORIDE, SODIUM LACTATE, POTASSIUM CHLORIDE, AND CALCIUM CHLORIDE: 600; 310; 30; 20 INJECTION, SOLUTION INTRAVENOUS at 13:02

## 2019-10-07 RX ADMIN — INSULIN LISPRO 3 UNITS: 100 INJECTION, SOLUTION INTRAVENOUS; SUBCUTANEOUS at 21:03

## 2019-10-07 RX ADMIN — PROPOFOL 100 MG: 10 INJECTION, EMULSION INTRAVENOUS at 13:15

## 2019-10-07 RX ADMIN — Medication 20 ML: at 07:00

## 2019-10-07 RX ADMIN — CLINDAMYCIN 600 MG: 150 INJECTION, SOLUTION INTRAMUSCULAR; INTRAVENOUS at 09:06

## 2019-10-07 RX ADMIN — FENTANYL CITRATE 100 MCG: 50 INJECTION, SOLUTION INTRAMUSCULAR; INTRAVENOUS at 13:15

## 2019-10-07 RX ADMIN — GABAPENTIN 100 MG: 100 CAPSULE ORAL at 19:11

## 2019-10-07 RX ADMIN — DAKIN'S SOLUTION 0.125% (QUARTER STRENGTH): 0.12 SOLUTION at 10:17

## 2019-10-07 RX ADMIN — FENTANYL CITRATE 25 MCG: 50 INJECTION, SOLUTION INTRAMUSCULAR; INTRAVENOUS at 13:53

## 2019-10-07 NOTE — PROGRESS NOTES
TRANSFER - IN REPORT:    Verbal report received from [address] RN(name) on Faheem Wooten  being received from [address] [place] [address](unit) for ordered procedure      Report consisted of patients Situation, Background, Assessment and   Recommendations(SBAR). Information from the following report(s) SBAR, Kardex, [facility] and Recent Results was reviewed with the receiving nurse. Opportunity for questions and clarification was provided. Assessment completed upon patients arrival to unit and care assumed.

## 2019-10-07 NOTE — PROGRESS NOTES
[facility] Hospitalist Group  Progress Note    Patient: Dontae Saleem Age: 60 y.o. : 1960 MR#: [id number] SSN: [id number]  Date: 10/7/2019     Subjective:     Pt feels ok, no CP or SOB. Assessment/Plan:   1. Puncture wound of left foot with abscess s/p debridement and I&D 10/2/19, s/p amputation 2 nd toe and debridement 10/7. MRSA in Cx: D/w ID and Podiatry, plan to cont vancomycin per ID. Cont wound care and vac.  2. Sepsis, POA due to # 1: better   3. PEPTOSTREPTOCOCCUS bacteremia: repeat Cx neg, possible contaminant   4. Mild cyanotic/dusky L 2nd toe: Vas Sx in put noted, duplex neg   4. DM 2: BS better: Continue SSI and Lantus, cont bed time snack. 5. CKD3: Crt stable   6. Ongoing tobacco use, adv on cessation   7. HTN: BP better, will cont ACEi    8. HLD  9. Gout  Pt refused labs, will order Labs in am  D/w pt and Daughter Nadine Banerjee [phone] in detail about above plan and dispo to SNF  D/w CM    Case discussed with:  [x]Patient  [x]Family  [x]Nursing  []Case Management  DVT Prophylaxis:  []Lovenox  [x]Hep SQ  [x]SCDs  []Coumadin   []On Heparin gtt    Objective:   VS:   Visit Vitals  /69   Pulse 85   Temp 98.4 °F (36.9 °C)   Resp 17   Ht 5' 3\" (1.6 m)   Wt 58.1 kg (128 lb)   LMP  (LMP Unknown)   SpO2 97%   Breastfeeding? No   BMI 22.67 kg/m²      Tmax/24hrs: Temp (24hrs), Av °F (36.7 °C), Min:97.5 °F (36.4 °C), Max:98.4 °F (36.9 °C)      Intake/Output Summary (Last 24 hours) at 10/7/2019 1643  Last data filed at 10/7/2019 1332  Gross per 24 hour   Intake 360 ml   Output 2000 ml   Net -1640 ml       General:  Alert, NAD  Cardiovascular:  RRR  Pulmonary: B/L clear, no wheezing  GI:  + BS, soft, non-tender  Extremities:  No edema  Neuro: AAOx3, moves all ext   Left foot wound vac, 2nd toe amputation, clean.  Slightly warm      Labs:    Recent Results (from the past 24 hour(s))   GLUCOSE, POC    Collection Time: 10/06/19  9:11 PM   Result Value Ref Range    Glucose (POC) 225 (H) 70 - 110 mg/dL   GLUCOSE, POC    Collection Time: 10/07/19  5:25 AM   Result Value Ref Range    Glucose (POC) 109 70 - 110 mg/dL   GLUCOSE, POC    Collection Time: 10/07/19 10:10 AM   Result Value Ref Range    Glucose (POC) 92 70 - 110 mg/dL   GLUCOSE, POC    Collection Time: 10/07/19 12:43 PM   Result Value Ref Range    Glucose (POC) 84 70 - 110 mg/dL   GLUCOSE, POC    Collection Time: 10/07/19  2:29 PM   Result Value Ref Range    Glucose (POC) 71 70 - 110 mg/dL       Signed By: Armando Hilario MD     October 7, 2019

## 2019-10-07 NOTE — PROGRESS NOTES
Problem: Mobility Impaired (Adult and Pediatric)  Goal: *Acute Goals and Plan of Care (Insert Text)  Description  Physical Therapy Goals  Initiated 10/4/2019 and to be accomplished within 7 day(s)  1. Patient will move from supine to sit and sit to supine , scoot up and down and roll side to side in bed with supervision/set-up. 2.  Patient will transfer from bed to chair and chair to bed with supervision/set-up using the least restrictive device. 3.  Patient will perform sit to stand with supervision/set-up. 4.  Patient will ambulate with supervision/set-up for >150 feet with the least restrictive device. 5.  Patient will negotiate 4-10 steps with 1-2 HR with contact guard. PLOF: Patient reports she will be staying with her sister upon discharge and that her sister will be home with her all day. Sister's home is 2 story with bathroom upstairs and reports she will ask her sister about staying on first floor. Patient was independent PTA. Outcome: Progressing Towards Goal     PHYSICAL THERAPY TREATMENT    Patient: Naomie Myers (84 y.o. female)  Date: 10/7/2019  Diagnosis: Cellulitis and abscess of foot [L03.119, L02.619] Puncture wound of foot, left, complicated  Procedure(s) (LRB):  DEBRIDEMENT LEFT FOOT (Left) Day of Surgery  Precautions: PWB(through heel with post op shoe LLE)      ASSESSMENT:  Pt found supine HOB elevated willing to participate w/ therapy this visit limited by increased nausea. Prior to this tx, 1st attempt made at 36 asking to return for follow up at later time 2/2 increased fatigue and lethargy as pt just woke up. During this tx, pt educated on importance of cont mobility and OOB to chair. Pt voiced understanding, however 2/2 increased nausea and fear of increasing symptoms w/ mobility, pt deferred OOB activity this visit.  Pt was agreeable to instruction of HEP in bed to maintain general strength as well as strength to increase complaince of maintaining PWBing through heel by strengthening DF and hip flexors on LLE. While instructing pt, pt performed long sit leaning to toes performing self instructed HS stretch in long sit. Pt returned to supine and left in room w/ all needs in reach. Will cont to follow up as appropriate to further ensure safety and compliance to precautions w/ mobility. Progression toward goals: good   ? Improving appropriately and progressing toward goals  ? Improving slowly and progressing toward goals  ? Not making progress toward goals and plan of care will be adjusted     PLAN:  Patient continues to benefit from skilled intervention to address the above impairments. Continue treatment per established plan of care. Discharge Recommendations:  Home Health w/ 24/7 assist vs Skilled Nursing Facility  Further Equipment Recommendations for Discharge:  bedside commode and rolling walker     SUBJECTIVE:   Patient stated I just don't feel I should get up right now because it will increase my nausea.     OBJECTIVE DATA SUMMARY:   Critical Behavior:  Neurologic State: Alert  Orientation Level: Oriented X4  Cognition: Appropriate for age attention/concentration  Safety/Judgement: Fall prevention  Functional Mobility Training:  Bed Mobility:  Supine to Sit: Supervision(long sit )  Sit to Supine: Supervision  Balance:  Sitting: Intact    Therapeutic Exercises:       EXERCISE   Sets   Reps   Active Active Assist   Passive Self ROM   Comments   Ankle Pumps 1 10  ? ? ? ?    Quad Sets/Glut Sets 1 10  ? ? ? ? Hold for 5 secs   Hamstring Sets   ? ? ? ? Short Arc Quads   ? ? ? ? Heel Slides 1 10 ? ? ? ? Straight Leg Raises 1 10 ? ? ? ? Hip Add   ? ? ? ? Hold for 5 secs, w/ pillow squeeze   Long Arc Quads   ? ? ? ? Seated Marching   ? ? ? ? Standing Marching   ? ? ? ?       ? ? ? ? Pain:  Did not voice pain level    Activity Tolerance:   Good   Please refer to the flowsheet for vital signs taken during this treatment.   After treatment:   ? Patient left in no apparent distress sitting up in chair  ? Patient left in no apparent distress in bed  ? Call bell left within reach  ? Nursing notified  ? Caregiver present  ? Bed alarm activated  ? SCDs applied      COMMUNICATION/EDUCATION:   ?         Role of Physical Therapy in the acute care setting. ?         Fall prevention education was provided and the patient/caregiver indicated understanding. ? Patient/family have participated as able in working toward goals and plan of care. ?         Patient/family agree to work toward stated goals and plan of care. ?         Patient understands intent and goals of therapy, but is neutral about his/her participation. ? Patient is unable to participate in stated goals/plan of care: ongoing with therapy staff.   ?         Other:        Estefanía Yeboah, SHANNON   Time Calculation: 10 mins

## 2019-10-07 NOTE — PROGRESS NOTES
End of Shift Note     Bedside and verbal shift change report given to Dilan Gupta RN (On coming nurse) by Sudha Warren RN (Off going nurse).   Report included the following information:Procedure Summary, Intake/Output, MAR, Recent Results, Med Rec Status, and SBAR  (Situation, Background, Assessment and Recommendations)    SBAR Recommendations:     Issues for Provider to address         Activity This Shift      [] Resting in bed   [] TDWB   [] Dangled     [] Chair Ambulated to     [] Bathroom     [x] BSC     [] Refused Ambulated In    [] Room    [] Hallway    [] Bedrest ordered   Bladder Scan  Voiding Status [] Yes  [x] Void [x] No  [] Byrne [] N/A  [] I&O Cath   Blood Sugars Managed [x] Yes [] No [] N/A   Bowel Movement  Passing Gas [] Yes  [] Yes [] No  [] No    CHG Bath Done     Before Surgery     After Surgery   [x] Yes  [] Yes   [] No  [] No   [] N/A  [] N/A   Drain Removed [] Yes [] No [x] N/A   Dressing Checked  Dressing Changed [x] Yes  [] Yes [] No  [x] No [] N/A  [] N/A   Nausea/Vomiting [] Yes [x] No    Ice Packs Changed [] Yes [] No [x] N/A   Incentive Spirometer  [x] Yes [] No Volume    SCD Pumps On Bilaterally  Ankle Pumping  [x] Yes  [] Yes [] Yes  [] No [] N/A  [] N/A    Telemetry Monitoring [x] Yes [] No Rhythm    Up to Chair for Meals [] Yes [x] No [] N/A

## 2019-10-07 NOTE — PROGRESS NOTES
Infectious Disease progress Note    Requested by:dr. Catherine Mccarty    Reason: left foot puncture wound infection    Current abx Prior abx   vancomycin since 9/30/19  Clindamycin since 10/4/19   Pip/tazo 9/30-10/3  Amp/sulbactam  10/3-10/4     Lines:       Assessment :    60 y.o.  female with past medical history of uncontrolled type 2 DM (last hgbA1C 13.3 on 1/25/19), gout and some issues with compliance / developmental delay per PCP records who presented to ed on 9/30/19 with left foot pain following stepping on a refugio nail     MRI foot 10//1/19-  There is subcutaneous emphysema in the forefoot at the level of the proximal phalanges of the first through fifth toes. defect in the plantar soft tissues near the base of the second and third toes. Clinical presentation c/w sepsis (POA) due to left foot cellulitis, infected left foot puncture wound in a patient with uncontrolled type 2 DM. S/p I&D left foot on 10/2/19. Significant necrotic fatty tissue noted intra op. Intra op cultures 10/2- staph aureus. preop wound cultures 10/1/19- MRSA. Now with peptostreptococcus bacteremia on 9/30/19 bloodstream infection due to left foot infection . Repeat blood cultures 10/3 negative. ischemic left second toe. S/p amputation 10/6/19. Biopsy of clean bone margins left second metatarsal sent. No purulence noted intra op. Vascular surgery consult appreciated. No significant PAD. Recommendations:    1. D/c  clindamycin. continue vancomycin till 11/22/19 if discharged to snf. Recommend daptomycin iv till 11/22/19 if discharged home (once daily dosing more feasible as outpatient). 2. picc line for outpatient iv antibiotics  3. F/u podiatry recommendations. Tentative home iv abx orders written. Advance Care planning:full code: discussed  with patient/surrogate decision maker:       Above plan was discussed in details with patient, dr. Sowmya Kebede and dr Catherine Mccarty.  Please call me if any further questions or concerns. Will continue to participate in the care of this patient. HPI:     Patient denies fever, headaches, visual disturbances, sore throat, runny nose, earaches, cp, sob, chills, cough, abdominal pain, diarrhea, burning micturition, pain or weakness in extremities. He denies back pain/flank pain. home Medication List    Details   colchicine 0.6 mg tablet Take 1 tablet by mouth Q1Hour for gout pain. NOT TO EXCEED 3 TABLETS IN 24 HOURS. Qty: 30 Tab, Refills: 0      hydrOXYzine HCl (ATARAX) 25 mg tablet TAKE 1 TABLET BY MOUTH THREE TIMES DAILY AS NEEDED FOR ITCHING  Qty: 90 Tab, Refills: 0    Associated Diagnoses: Pruritus      pravastatin (PRAVACHOL) 40 mg tablet TAKE ONE TABLET BY MOUTH EVERY NIGHT  Qty: 30 Tab, Refills: 4      metFORMIN (GLUCOPHAGE) 1,000 mg tablet TAKE ONE TABLET BY MOUTH TWICE DAILY WITH MEALS  Qty: 60 Tab, Refills: 4      gabapentin (NEURONTIN) 300 mg capsule Take 1 Cap by mouth three (3) times daily. Qty: 90 Cap, Refills: 4      montelukast (SINGULAIR) 10 mg tablet Take 1 Tab by mouth daily. Qty: 30 Tab, Refills: 5      ergocalciferol (ERGOCALCIFEROL) 50,000 unit capsule Take 1 Cap by mouth every seven (7) days. Qty: 4 Cap, Refills: 5      lidocaine (LIDODERM) 5 % Apply patch to the affected area for 12 hours a day and remove for 12 hours a day.   Qty: 1 Package, Refills: 0      insulin aspart protamine/insulin aspart (NOVOLOG MIX 70-30 U-100 INSULN) 100 unit/mL (70-30) injection INJECT 25 UNITS UNDER THE SKIN TWO TIMES A DAY  Qty: 10 mL, Refills: 3    Comments: Before meals      mupirocin (BACTROBAN) 2 % ointment Apply to area affected BID  Qty: 22 g, Refills: 1    Associated Diagnoses: Dermatitis      lisinopril (PRINIVIL, ZESTRIL) 10 mg tablet TAKE 1 TABLET BY MOUTH ONCE DAILY  Qty: 30 Tab, Refills: 5      Insulin Syringe-Needle U-100 1/2 mL 30 gauge x 5/16 syrg INJECT TWICE A DAY  Qty: 100 Syringe, Refills: 5      Blood-Glucose Meter monitoring kit Check BS 3x/day E11.65  Qty: 1 Kit, Refills: 0      Lancets misc Check BS 3x/day E11.65  Qty: 100 Each, Refills: 5      glucose blood VI test strips (ONETOUCH ULTRA TEST) strip Check BS 3x/day E11.65  Qty: 100 Strip, Refills: 5      acetaminophen (TYLENOL) 325 mg tablet Take 2 Tabs by mouth every four (4) hours as needed for Pain. Qty: 20 Tab, Refills: 0      aspirin 81 mg tablet Take 81 mg by mouth daily. Current Facility-Administered Medications   Medication Dose Route Frequency    [START ON 10/8/2019] Vancomycin TROUGH level to be drawn 10/8 at 20:30 -- BEFORE giving the next dose due at 21:00. Thanks!    Other ONCE    insulin glargine (LANTUS) injection 36 Units  36 Units SubCUTAneous DAILY    lisinopril (PRINIVIL, ZESTRIL) tablet 10 mg  10 mg Oral DAILY    vancomycin (VANCOCIN) 1000 mg in  ml infusion  1,000 mg IntraVENous Q18H    polyethylene glycol (MIRALAX) packet 17 g  17 g Oral DAILY    docusate sodium (COLACE) capsule 100 mg  100 mg Oral BID PRN    bisacodyl (DULCOLAX) suppository 10 mg  10 mg Rectal DAILY PRN    clindamycin phosphate (CLEOCIN) 600 mg in 0.9% sodium chloride 100 mL IVPB  600 mg IntraVENous Q8H    lactobacillus sp. 50 billion cpu (BIO-K PLUS) capsule 1 Cap  1 Cap Oral DAILY    acetaminophen (TYLENOL) tablet 650 mg  650 mg Oral Q4H PRN    morphine injection 2 mg  2 mg IntraVENous Q3H PRN    HYDROcodone-acetaminophen (NORCO) 5-325 mg per tablet 1-2 Tab  1-2 Tab Oral Q6H PRN    naloxone (NARCAN) injection 0.4 mg  0.4 mg SubCUTAneous EVERY 2 MINUTES AS NEEDED    gabapentin (NEURONTIN) capsule 100 mg  100 mg Oral TID    sodium chloride (NS) flush 5-10 mL  5-10 mL IntraVENous PRN    insulin lispro (HUMALOG) injection   SubCUTAneous AC&HS    glucose chewable tablet 16 g  4 Tab Oral PRN    glucagon (GLUCAGEN) injection 1 mg  1 mg IntraMUSCular PRN    nicotine (NICODERM CQ) 21 mg/24 hr patch 1 Patch  1 Patch TransDERmal Q24H    heparin (porcine) injection 5,000 Units  5,000 Units SubCUTAneous Q8H    aspirin chewable tablet 81 mg  81 mg Oral DAILY    pravastatin (PRAVACHOL) tablet 40 mg  40 mg Oral QHS    montelukast (SINGULAIR) tablet 10 mg  10 mg Oral DAILY    hydrALAZINE (APRESOLINE) 20 mg/mL injection 10 mg  10 mg IntraVENous Q6H PRN    dextrose 10% infusion 125-250 mL  125-250 mL IntraVENous PRN       Allergies: Patient has no known allergies. Temp (24hrs), Av °F (36.7 °C), Min:97.5 °F (36.4 °C), Max:98.4 °F (36.9 °C)    Visit Vitals  /69   Pulse 85   Temp 98.4 °F (36.9 °C)   Resp 17   Ht 5' 3\" (1.6 m)   Wt 58.1 kg (128 lb)   LMP  (LMP Unknown)   SpO2 97%   Breastfeeding? No   BMI 22.67 kg/m²       ROS: 12 point ROS obtained in details. Pertinent positives as mentioned in HPI,   otherwise negative    Physical Exam:    General:          Alert, cooperative, no distress, appears older than stated age. Head:               Normocephalic, without obvious abnormality, atraumatic. Eyes:               Conjunctivae clear, anicteric sclerae. Nose:               Nares normal. No drainage or sinus tenderness. Throat:             Lips, mucosa, and tongue normal.  No Thrush  Back:               Symmetric  Lungs:             Clear to auscultation bilaterally. No Wheezing or Rhonchi. No rales. Chest wall:      No tenderness or deformity. No Accessory muscle use. Heart:              Regular rate and rhythm,  no murmur, rub or gallop. Abdomen:        Soft, non-tender. Not distended. Bowel sounds normal. No masses  Extremities:     Extremities normal, atraumatic, No cyanosis. No edema. No clubbing; left foot with erythema outlined / tender; quarter sized w/ small amount of residual drainage  Skin:                Texture, turgor normal. No rashes or lesions. Not Jaundiced  Lymph nodes: Cervical, supraclavicular normal.  Psych:             Poor insight. Not depressed. Not anxious or agitated. Neurologic:      No facial asymmetry. No aphasia or slurred speech.  Normal strength, Alert and oriented X 3.        Labs: Results:   Chemistry Recent Labs     10/06/19  0147 10/05/19  0220   GLU 53* 66*    134*   K 3.9 3.7    103   CO2 25 25   BUN 14 12   CREA 1.12 1.16   CA 8.7 7.9*   AGAP 6 6   BUCR 13 10*      CBC w/Diff Recent Labs     10/05/19  0220   WBC 9.0   RBC 3.33*   HGB 9.6*   HCT 28.5*      GRANS 62   LYMPH 23   EOS 4      Microbiology No results for input(s): CULT in the last 72 hours.        RADIOLOGY:    All available imaging studies/reports in [facility] for this admission were reviewed    Dr. Wilfredo Zhu, Infectious Disease Specialist  [phone]  October 7, 2019  1:49 PM

## 2019-10-07 NOTE — ROUTINE PROCESS
Received verbal report from [address] [place], [place] [address]edilberto Hernandes RN including NICHOLAS, MAR, and Jay Jay. Patient off unit in surgery, will assess when she returns to the floor.

## 2019-10-07 NOTE — BRIEF OP NOTE
BRIEF OPERATIVE NOTE    Date of Procedure: 10/7/2019   Preoperative Diagnosis: Puncture wound abscess, left forefoot, with intermetatarsal and dorsal foot absces  Postoperative Diagnosis: Puncture wound abscess, left forefoot, with intermetatarsal and dorsal foot absces    Procedure(s):  DEBRIDEMENT LEFT FOOT and amputation second toe left foot  Surgeon(s) and Role:     Daphne Navarro DPM - Primary         Surgical Assistant: Casey Ornelas    Surgical Staff:  Circ-1: Karin Hilario RN  Scrub Tech-1: Marah Ruano  Surg Asst-1: Armida Neal  Event Time In Time Out   Incision Start 10/07/2019 1322    Incision Close       Anesthesia: MAC   Estimated Blood Loss: 0  Specimens:   ID Type Source Tests Collected by Time Destination   1 : second toe left foot Preservative Foot, left  [facility] 10/7/2019 1339 Pathology   2 : clean margins Preservative Foot, left  [facility] 10/7/2019 1339 Pathology   1 : second toe Wound Foot, left [facility]OB[facility] 10/7/2019 1340 Microbiology   2 : clean margins Wound Foot, left CULTURE, ANAEROBIC, CULTURE, [address] [facility] [address], [facility] 10/7/2019 1340 Microbiology      Findings: gangrene, osteitis   Complications: none  Implants: * No implants in log *

## 2019-10-07 NOTE — ANESTHESIA POSTPROCEDURE EVALUATION
Procedure(s):  DEBRIDEMENT LEFT FOOT and amputation second toe left foot. general    Anesthesia Post Evaluation      Multimodal analgesia: multimodal analgesia used between 6 hours prior to anesthesia start to PACU discharge  Patient location during evaluation: bedside  Patient participation: complete - patient participated  Level of consciousness: awake  Pain management: adequate  Airway patency: patent  Anesthetic complications: no  Cardiovascular status: stable  Respiratory status: acceptable  Hydration status: acceptable  Post anesthesia nausea and vomiting:  controlled      Vitals Value Taken Time   /78 10/7/2019  2:39 PM   Temp 36.6 °C (97.8 °F) 10/7/2019  2:20 PM   Pulse 87 10/7/2019  2:40 PM   Resp 15 10/7/2019  2:40 PM   SpO2 99 % 10/7/2019  2:40 PM   Vitals shown include unvalidated device data.

## 2019-10-07 NOTE — WOUND CARE
Physical Exam  
Room 503: wound measurements placed into [employer]. Paged Walt Calloway CM to notify of need for home wound vac machine.  
Samuel Medeiros BSN, RN, [facility] [place], [address]

## 2019-10-07 NOTE — ANESTHESIA PREPROCEDURE EVALUATION
Relevant Problems   No relevant active problems       Anesthetic History   No history of anesthetic complications            Review of Systems / Medical History  Patient summary reviewed and pertinent labs reviewed    Pulmonary  Within defined limits                 Neuro/Psych   Within defined limits           Cardiovascular    Hypertension              Exercise tolerance: >4 METS     GI/Hepatic/Renal  Within defined limits              Endo/Other    Diabetes: type 2         Other Findings              Physical Exam    Airway  Mallampati: II  TM Distance: 4 - 6 cm  Neck ROM: normal range of motion   Mouth opening: Normal     Cardiovascular  Regular rate and rhythm,  S1 and S2 normal,  no murmur, click, rub, or gallop  Rhythm: regular  Rate: normal         Dental    Dentition: Edentulous     Pulmonary  Breath sounds clear to auscultation               Abdominal  GI exam deferred       Other Findings            Anesthetic Plan    ASA: 3  Anesthesia type: general          Induction: Intravenous  Anesthetic plan and risks discussed with: Patient

## 2019-10-07 NOTE — ROUTINE PROCESS
TRANSFER - OUT REPORT: 
 
Verbal report given to Luz on Kecia Richardson  being transferred to room 460 for routine progression of care Report consisted of patients Situation, Background, Assessment and  
Recommendations(SBAR). Information from the following report(s) SBAR, OR Summary, Intake/Output, MAR and Recent Results was reviewed with the receiving nurse. Lines:  
Peripheral IV 10/04/19 Anterior; Left Forearm (Active) Site Assessment Clean, dry, & intact 10/7/2019  8:00 AM  
Phlebitis Assessment 0 10/7/2019  8:00 AM  
Infiltration Assessment 0 10/7/2019  8:00 AM  
Dressing Status Clean, dry, & intact 10/7/2019  8:00 AM  
Dressing Type Transparent;Tape 10/7/2019  8:00 AM  
Hub Color/Line Status Yellow; Infusing 10/7/2019  8:00 AM  
Action Taken Open ports on tubing capped 10/5/2019  4:54 AM  
Alcohol Cap Used Yes 10/7/2019  8:00 AM  
   
Peripheral IV 10/07/19 Posterior;Right Forearm (Active) Site Assessment Clean, dry, & intact 10/7/2019  2:20 PM  
Phlebitis Assessment 0 10/7/2019  2:20 PM  
Infiltration Assessment 0 10/7/2019  2:20 PM  
Dressing Status Clean, dry, & intact 10/7/2019  2:20 PM  
Dressing Type Tape;Transparent 10/7/2019  2:20 PM  
Hub Color/Line Status Pink; Infusing 10/7/2019  2:20 PM  
  
 
Opportunity for questions and clarification was provided. Patient transported with: 
 [employer]

## 2019-10-08 ENCOUNTER — APPOINTMENT (OUTPATIENT)
Dept: INTERVENTIONAL RADIOLOGY/VASCULAR | Age: 59
DRG: 305 | End: 2019-10-08
Attending: INTERNAL MEDICINE
Payer: MEDICAID

## 2019-10-08 LAB
ANION GAP SERPL CALC-SCNC: 5 MMOL/L (ref 3–18)
BUN SERPL-MCNC: 12 MG/DL (ref 7–18)
BUN/CREAT SERPL: 10 (ref 12–20)
CALCIUM SERPL-MCNC: 8.6 MG/DL (ref 8.5–10.1)
CHLORIDE SERPL-SCNC: 105 MMOL/L (ref 100–111)
CO2 SERPL-SCNC: 26 MMOL/L (ref 21–32)
CREAT SERPL-MCNC: 1.25 MG/DL (ref 0.6–1.3)
DATE LAST DOSE: NORMAL
ERYTHROCYTE [DISTWIDTH] IN BLOOD BY AUTOMATED COUNT: 14.2 % (ref 11.6–14.5)
GLUCOSE BLD STRIP.AUTO-MCNC: 254 MG/DL (ref 70–110)
GLUCOSE BLD STRIP.AUTO-MCNC: 305 MG/DL (ref 70–110)
GLUCOSE BLD STRIP.AUTO-MCNC: 82 MG/DL (ref 70–110)
GLUCOSE BLD STRIP.AUTO-MCNC: 82 MG/DL (ref 70–110)
GLUCOSE SERPL-MCNC: 65 MG/DL (ref 74–99)
HCT VFR BLD AUTO: 30.5 % (ref 35–45)
HGB BLD-MCNC: 10.2 G/DL (ref 12–16)
MCH RBC QN AUTO: 28.7 PG (ref 24–34)
MCHC RBC AUTO-ENTMCNC: 33.4 G/DL (ref 31–37)
MCV RBC AUTO: 85.9 FL (ref 74–97)
PLATELET # BLD AUTO: 575 K/UL (ref 135–420)
PMV BLD AUTO: 9.7 FL (ref 9.2–11.8)
POTASSIUM SERPL-SCNC: 3.8 MMOL/L (ref 3.5–5.5)
RBC # BLD AUTO: 3.55 M/UL (ref 4.2–5.3)
REPORTED DOSE,DOSE: NORMAL UNITS
REPORTED DOSE/TIME,TMG: 300
SODIUM SERPL-SCNC: 136 MMOL/L (ref 136–145)
VANCOMYCIN TROUGH SERPL-MCNC: 16.5 UG/ML (ref 10–20)
WBC # BLD AUTO: 10.3 K/UL (ref 4.6–13.2)

## 2019-10-08 PROCEDURE — 74011250637 HC RX REV CODE- 250/637: Performed by: INTERNAL MEDICINE

## 2019-10-08 PROCEDURE — 77001 FLUOROGUIDE FOR VEIN DEVICE: CPT

## 2019-10-08 PROCEDURE — 74011250637 HC RX REV CODE- 250/637: Performed by: PODIATRIST

## 2019-10-08 PROCEDURE — 74011250636 HC RX REV CODE- 250/636: Performed by: PODIATRIST

## 2019-10-08 PROCEDURE — 85027 COMPLETE CBC AUTOMATED: CPT

## 2019-10-08 PROCEDURE — 74011636637 HC RX REV CODE- 636/637: Performed by: PODIATRIST

## 2019-10-08 PROCEDURE — 02HV33Z INSERTION OF INFUSION DEVICE INTO SUPERIOR VENA CAVA, PERCUTANEOUS APPROACH: ICD-10-PCS | Performed by: RADIOLOGY

## 2019-10-08 PROCEDURE — 65270000029 HC RM PRIVATE

## 2019-10-08 PROCEDURE — 80048 BASIC METABOLIC PNL TOTAL CA: CPT

## 2019-10-08 PROCEDURE — 36415 COLL VENOUS BLD VENIPUNCTURE: CPT

## 2019-10-08 PROCEDURE — 82962 GLUCOSE BLOOD TEST: CPT

## 2019-10-08 PROCEDURE — 80202 ASSAY OF VANCOMYCIN: CPT

## 2019-10-08 RX ORDER — INSULIN GLARGINE 100 [IU]/ML
35 INJECTION, SOLUTION SUBCUTANEOUS DAILY
Status: DISCONTINUED | OUTPATIENT
Start: 2019-10-09 | End: 2019-10-09 | Stop reason: HOSPADM

## 2019-10-08 RX ORDER — HYDROCODONE BITARTRATE AND ACETAMINOPHEN 5; 325 MG/1; MG/1
1 TABLET ORAL
Status: DISCONTINUED | OUTPATIENT
Start: 2019-10-08 | End: 2019-10-09 | Stop reason: HOSPADM

## 2019-10-08 RX ADMIN — VANCOMYCIN HYDROCHLORIDE 1000 MG: 10 INJECTION, POWDER, LYOPHILIZED, FOR SOLUTION INTRAVENOUS at 04:15

## 2019-10-08 RX ADMIN — MONTELUKAST 10 MG: 10 TABLET, FILM COATED ORAL at 11:39

## 2019-10-08 RX ADMIN — INSULIN GLARGINE 36 UNITS: 100 INJECTION, SOLUTION SUBCUTANEOUS at 11:41

## 2019-10-08 RX ADMIN — GABAPENTIN 100 MG: 100 CAPSULE ORAL at 21:59

## 2019-10-08 RX ADMIN — HYDROCODONE BITARTRATE AND ACETAMINOPHEN 1 TABLET: 5; 325 TABLET ORAL at 19:35

## 2019-10-08 RX ADMIN — GABAPENTIN 100 MG: 100 CAPSULE ORAL at 17:18

## 2019-10-08 RX ADMIN — HYDROCODONE BITARTRATE AND ACETAMINOPHEN 2 TABLET: 5; 325 TABLET ORAL at 11:51

## 2019-10-08 RX ADMIN — Medication 1 CAPSULE: at 11:40

## 2019-10-08 RX ADMIN — HEPARIN SODIUM 5000 UNITS: 5000 INJECTION INTRAVENOUS; SUBCUTANEOUS at 20:17

## 2019-10-08 RX ADMIN — VANCOMYCIN HYDROCHLORIDE 1000 MG: 10 INJECTION, POWDER, LYOPHILIZED, FOR SOLUTION INTRAVENOUS at 21:58

## 2019-10-08 RX ADMIN — ASPIRIN 81 MG 81 MG: 81 TABLET ORAL at 11:39

## 2019-10-08 RX ADMIN — PRAVASTATIN SODIUM 40 MG: 20 TABLET ORAL at 21:59

## 2019-10-08 RX ADMIN — INSULIN LISPRO 9 UNITS: 100 INJECTION, SOLUTION INTRAVENOUS; SUBCUTANEOUS at 21:59

## 2019-10-08 RX ADMIN — GABAPENTIN 100 MG: 100 CAPSULE ORAL at 11:39

## 2019-10-08 RX ADMIN — HEPARIN SODIUM 5000 UNITS: 5000 INJECTION INTRAVENOUS; SUBCUTANEOUS at 03:17

## 2019-10-08 RX ADMIN — Medication 10 ML: at 21:59

## 2019-10-08 RX ADMIN — HEPARIN SODIUM 5000 UNITS: 5000 INJECTION INTRAVENOUS; SUBCUTANEOUS at 11:40

## 2019-10-08 RX ADMIN — INSULIN LISPRO 12 UNITS: 100 INJECTION, SOLUTION INTRAVENOUS; SUBCUTANEOUS at 14:28

## 2019-10-08 RX ADMIN — LISINOPRIL 10 MG: 10 TABLET ORAL at 11:41

## 2019-10-08 RX ADMIN — MORPHINE SULFATE 2 MG: 2 INJECTION, SOLUTION INTRAMUSCULAR; INTRAVENOUS at 03:15

## 2019-10-08 NOTE — PROGRESS NOTES
conducted a Follow up consultation and Spiritual Assessment for Fawad Maldonado, who is a 60 y.o.,female. The  provided the following Interventions:  Continued the relationship of care and support. Listened empathically. Offered prayer and assurance of continued prayer on patients behalf. Chart reviewed. The following outcomes were achieved:  Patient expressed gratitude for 's visit. Assessment:  There are no further spiritual or Congregational issues which require Spiritual Care Services interventions at this time. Plan:  Chaplains will continue to follow and will provide pastoral care on an as needed/requested basis.  recommends bedside caregivers page  on duty if patient shows signs of acute spiritual or emotional distress.        [id number] [facility]   [phone]

## 2019-10-08 NOTE — PROGRESS NOTES
Occupational Therapy Note    Patient: Naomie Myers (78 y.o. female)  Date: 10/8/2019  Diagnosis: Cellulitis and abscess of foot [L03.119, L02.619] Puncture wound of foot, left, complicated  Procedure(s) (LRB):  DEBRIDEMENT LEFT FOOT and amputation second toe left foot (Left) 1 Day Post-Op  Precautions: PWB(through heel with post op shoe LLE)  Chart, occupational therapy assessment, plan of care, and goals were reviewed. Occupational Therapy treatment attempted. Patient is unable to participate due to:  []  Nausea/vomiting  []  Eating  []  Pain  []  Pt lethargic  []  Off Unit  [x] Other:  Attempt x1 pt is off unit (09:06)  Attempt x2 pt is eating breakfast (10:07)    Pt's WB s/p open amputation of second toe and distal second metatarsal clarified: PWB LLE through heel with post op shoe  Will f/u later as schedule allows. Thank you.     SHOSHANA Martin/BELLA

## 2019-10-08 NOTE — PROGRESS NOTES
Problem: Diabetes Self-Management  Goal: *Disease process and treatment process  Description  Define diabetes and identify own type of diabetes; list 3 options for treating diabetes. Outcome: Progressing Towards Goal  Goal: *Incorporating nutritional management into lifestyle  Description  Describe effect of type, amount and timing of food on blood glucose; list 3 methods for planning meals. Outcome: Progressing Towards Goal  Goal: *Incorporating physical activity into lifestyle  Description  State effect of exercise on blood glucose levels. Outcome: Progressing Towards Goal  Goal: *Developing strategies to promote health/change behavior  Description  Define the ABC's of diabetes; identify appropriate screenings, schedule and personal plan for screenings. Outcome: Progressing Towards Goal  Goal: *Using medications safely  Description  State effect of diabetes medications on diabetes; name diabetes medication taking, action and side effects. Outcome: Progressing Towards Goal  Goal: *Monitoring blood glucose, interpreting and using results  Description  Identify recommended blood glucose targets  and personal targets. Outcome: Progressing Towards Goal  Goal: *Prevention, detection, treatment of acute complications  Description  List symptoms of hyper- and hypoglycemia; describe how to treat low blood sugar and actions for lowering  high blood glucose level. Outcome: Progressing Towards Goal  Goal: *Prevention, detection and treatment of chronic complications  Description  Define the natural course of diabetes and describe the relationship of blood glucose levels to long term complications of diabetes.   Outcome: Progressing Towards Goal  Goal: *Developing strategies to address psychosocial issues  Description  Describe feelings about living with diabetes; identify support needed and support network  Outcome: Progressing Towards Goal  Goal: *Insulin pump training  Outcome: Progressing Towards Goal  Goal: *Sick day guidelines  Outcome: Progressing Towards Goal  Goal: *Patient Specific Goal (EDIT GOAL, INSERT TEXT)  Outcome: Progressing Towards Goal     Problem: Patient Education: Go to Patient Education Activity  Goal: Patient/Family Education  Outcome: Progressing Towards Goal     Problem: Falls - Risk of  Goal: *Absence of Falls  Description  Document Angel Bryant Fall Risk and appropriate interventions in the flowsheet. Outcome: Progressing Towards Goal  Note:   Fall Risk Interventions:  Mobility Interventions: Assess mobility with egress test, Bed/chair exit alarm, Communicate number of staff needed for ambulation/transfer, OT consult for ADLs, Patient to call before getting OOB, PT Consult for mobility concerns, PT Consult for assist device competence, Strengthening exercises (ROM-active/passive), Utilize walker, cane, or other assistive device         Medication Interventions: Assess postural VS orthostatic hypotension, Bed/chair exit alarm, Evaluate medications/consider consulting pharmacy, Patient to call before getting OOB, Teach patient to arise slowly    Elimination Interventions: Bed/chair exit alarm, Call light in reach, Elevated toilet seat, Patient to call for help with toileting needs, Stay With Me (per policy), Toilet paper/wipes in reach, Toileting schedule/hourly rounds    History of Falls Interventions: Bed/chair exit alarm, Consult care management for discharge planning, Door open when patient unattended, Evaluate medications/consider consulting pharmacy, Vital signs minimum Q4HRs X 24 hrs (comment for end date)         Problem: Patient Education: Go to Patient Education Activity  Goal: Patient/Family Education  Outcome: Progressing Towards Goal     Problem: Pressure Injury - Risk of  Goal: *Prevention of pressure injury  Description  Document Robert Scale and appropriate interventions in the flowsheet.   Outcome: Progressing Towards Goal  Note:   Pressure Injury Interventions:  Sensory Interventions: Assess changes in LOC, Chair cushion, Discuss PT/OT consult with provider, Keep linens dry and wrinkle-free, Maintain/enhance activity level, Minimize linen layers, Monitor skin under medical devices, Pad between skin to skin, Pressure redistribution bed/mattress (bed type)    Moisture Interventions: Absorbent underpads, Apply protective barrier, creams and emollients, Assess need for specialty bed, Check for incontinence Q2 hours and as needed, Limit adult briefs, Maintain skin hydration (lotion/cream), Minimize layers, Moisture barrier, Offer toileting Q_hr    Activity Interventions: Pressure redistribution bed/mattress(bed type), PT/OT evaluation    Mobility Interventions: HOB 30 degrees or less, Pressure redistribution bed/mattress (bed type), PT/OT evaluation    Nutrition Interventions: Document food/fluid/supplement intake    Friction and Shear Interventions: HOB 30 degrees or less, Minimize layers                Problem: Patient Education: Go to Patient Education Activity  Goal: Patient/Family Education  Outcome: Progressing Towards Goal     Problem: Nutrition Deficit  Goal: *Optimize nutritional status  Outcome: Progressing Towards Goal     Problem: Pain  Goal: *Control of Pain  Outcome: Progressing Towards Goal  Goal: *PALLIATIVE CARE:  Alleviation of Pain  Outcome: Progressing Towards Goal     Problem: Cellulitis Care Plan (Adult)  Goal: *Control of acute pain  Outcome: Progressing Towards Goal  Goal: *Skin integrity maintained  Outcome: Progressing Towards Goal  Goal: *Absence of infection signs and symptoms  Outcome: Progressing Towards Goal     Problem: General Infection Care Plan (Adult and Pediatric)  Goal: Improvement in signs and symptoms of infection  Outcome: Progressing Towards Goal     Problem: Risk for Spread of Infection  Goal: Prevent transmission of infectious organism to others  Description  Prevent the transmission of infectious organisms to other patients, staff members, and visitors.   Outcome: Progressing Towards Goal     Problem: Patient Education:  Go to Education Activity  Goal: Patient/Family Education  Outcome: Progressing Towards Goal     Problem: Patient Education: Go to Patient Education Activity  Goal: Patient/Family Education  Outcome: Progressing Towards Goal     Problem: Patient Education: Go to Patient Education Activity  Goal: Patient/Family Education  Outcome: Progressing Towards Goal     Problem: Amputation  Goal: *Progressive mobility and function  Outcome: Progressing Towards Goal  Goal: *Optimal pain control at patient's stated goal  Outcome: Progressing Towards Goal  Goal: *Absence of infection signs and symptoms  Outcome: Progressing Towards Goal  Goal: *Optimal residual limb healing  Outcome: Progressing Towards Goal     Problem: Patient Education: Go to Patient Education Activity  Goal: Patient/Family Education  Outcome: Progressing Towards Goal

## 2019-10-08 NOTE — OP NOTES
[address] [place] [address]   OPERATIVE REPORT    Name:  Henna Jones  MR#:   [id number]  :  1960  ACCOUNT #:  [de-identified]  DATE OF SERVICE:  10/07/2019    PREOPERATIVE DIAGNOSIS:  Gangrene and infection of second toe and possibly second metatarsal.    POSTOPERATIVE DIAGNOSIS:  Gangrene and infection of second toe and possibly second metatarsal.    PROCEDURE PERFORMED:  Open amputation of second toe and distal second metatarsal wound irrigation, partial wound closure, and application of wound VAC, left foot. SURGEON:  Slick Sarkar DPM    ASSISTANT:  Bailey Manzano. ANESTHESIA:  MAC.    COMPLICATIONS:  No complications. SPECIMENS REMOVED:  Toe. IMPLANTS:  none    ESTIMATED BLOOD LOSS:  Zero. PROCEDURE:  On 10/07/2019, the patient was placed on the operating room table in supine position. After adequate induction of MAC anesthesia, left lower extremity was prepped and draped in the usual sterile fashion. Attention was directed to the left second toe, was ischemic with necrotic tissue at its base, purplish discoloration at its distal aspect. No pus or odor noted. Wound edges were devitalized. Two semi-elliptical incisions were accomplished ellipsing the toe completely, disarticulating it, sent to pathology and culture. Wound edges were debrided back to healthy tissue with good bleeding tissue. Metatarsal head was suspicious for infection. It was resected in toto and sent for pathology and culture. Wound was thoroughly irrigated with Pulsavac antibiotic solution. Small blood vessels were bovied as necessary. Partial closure both dorsal and plantar lateral with Prolene sutures accomplished and a wound VAC was applied. Good perfusion to the great toe and third toe was noted. The remaining portion of toe was viable. The patient tolerated the procedure and anesthesia well with vital signs stable throughout, transferred to the recovery room in stable condition.       Michelle Hay JAMI      [id number]  D:  10/07/2019 14:05  T:  10/08/2019 1:53  JOB #:  [id number]  CC:  Logan Humphrey DPM

## 2019-10-08 NOTE — PROGRESS NOTES
Infectious Disease progress Note    Requested by:dr. Lissa Cedeño    Reason: left foot puncture wound infection    Current abx Prior abx   vancomycin since 9/30/19  Clindamycin since 10/4/19   Pip/tazo 9/30-10/3  Amp/sulbactam  10/3-10/4     Lines:       Assessment :    60 y.o.  female with past medical history of uncontrolled type 2 DM (last hgbA1C 13.3 on 1/25/19), gout and some issues with compliance / developmental delay per PCP records who presented to ed on 9/30/19 with left foot pain following stepping on a refugio nail     MRI foot 10//1/19-  There is subcutaneous emphysema in the forefoot at the level of the proximal phalanges of the first through fifth toes. defect in the plantar soft tissues near the base of the second and third toes. Clinical presentation c/w sepsis (POA) due to left foot cellulitis, infected left foot puncture wound in a patient with uncontrolled type 2 DM. S/p I&D left foot on 10/2/19. Significant necrotic fatty tissue noted intra op. Intra op cultures 10/2- staph aureus. preop wound cultures 10/1/19- MRSA. Now with peptostreptococcus bacteremia on 9/30/19 bloodstream infection due to left foot infection . Repeat blood cultures 10/3 negative. ischemic left second toe. S/p amputation 10/6/19. Biopsy of clean bone margins left second metatarsal sent. No purulence noted intra op. Vascular surgery consult appreciated. No significant PAD. Recommendations:    1. D/c  clindamycin. continue vancomycin till 11/22/19 if discharged to snf. Recommend daptomycin iv till 11/22/19 if discharged home (once daily dosing more feasible as outpatient). 2. picc line for outpatient iv antibiotics  3. F/u podiatry recommendations. 4.f/u bone biopsy of clean margins    Tentative home iv abx orders written.      Advance Care planning:full code: discussed  with patient/surrogate decision maker:       Above plan was discussed in details with patient, dr. Ana Maria Butler and  finesse. Please call me if any further questions or concerns. Will continue to participate in the care of this patient. HPI:     Patient denies fever, headaches, visual disturbances, sore throat, runny nose, earaches, cp, sob, chills, cough, abdominal pain, diarrhea, burning micturition, pain or weakness in extremities. He denies back pain/flank pain. home Medication List    Details   colchicine 0.6 mg tablet Take 1 tablet by mouth Q1Hour for gout pain. NOT TO EXCEED 3 TABLETS IN 24 HOURS. Qty: 30 Tab, Refills: 0      hydrOXYzine HCl (ATARAX) 25 mg tablet TAKE 1 TABLET BY MOUTH THREE TIMES DAILY AS NEEDED FOR ITCHING  Qty: 90 Tab, Refills: 0    Associated Diagnoses: Pruritus      pravastatin (PRAVACHOL) 40 mg tablet TAKE ONE TABLET BY MOUTH EVERY NIGHT  Qty: 30 Tab, Refills: 4      metFORMIN (GLUCOPHAGE) 1,000 mg tablet TAKE ONE TABLET BY MOUTH TWICE DAILY WITH MEALS  Qty: 60 Tab, Refills: 4      gabapentin (NEURONTIN) 300 mg capsule Take 1 Cap by mouth three (3) times daily. Qty: 90 Cap, Refills: 4      montelukast (SINGULAIR) 10 mg tablet Take 1 Tab by mouth daily. Qty: 30 Tab, Refills: 5      ergocalciferol (ERGOCALCIFEROL) 50,000 unit capsule Take 1 Cap by mouth every seven (7) days. Qty: 4 Cap, Refills: 5      lidocaine (LIDODERM) 5 % Apply patch to the affected area for 12 hours a day and remove for 12 hours a day.   Qty: 1 Package, Refills: 0      insulin aspart protamine/insulin aspart (NOVOLOG MIX 70-30 U-100 INSULN) 100 unit/mL (70-30) injection INJECT 25 UNITS UNDER THE SKIN TWO TIMES A DAY  Qty: 10 mL, Refills: 3    Comments: Before meals      mupirocin (BACTROBAN) 2 % ointment Apply to area affected BID  Qty: 22 g, Refills: 1    Associated Diagnoses: Dermatitis      lisinopril (PRINIVIL, ZESTRIL) 10 mg tablet TAKE 1 TABLET BY MOUTH ONCE DAILY  Qty: 30 Tab, Refills: 5      Insulin Syringe-Needle U-100 1/2 mL 30 gauge x 5/16 syrg INJECT TWICE A DAY  Qty: 100 Syringe, Refills: 5 Blood-Glucose Meter monitoring kit Check BS 3x/day E11.65  Qty: 1 Kit, Refills: 0      Lancets misc Check BS 3x/day E11.65  Qty: 100 Each, Refills: 5      glucose blood VI test strips (ONETOUCH ULTRA TEST) strip Check BS 3x/day E11.65  Qty: 100 Strip, Refills: 5      acetaminophen (TYLENOL) 325 mg tablet Take 2 Tabs by mouth every four (4) hours as needed for Pain. Qty: 20 Tab, Refills: 0      aspirin 81 mg tablet Take 81 mg by mouth daily. Current Facility-Administered Medications   Medication Dose Route Frequency    Vancomycin TROUGH level to be drawn 10/8 at 20:30 -- BEFORE giving the next dose due at 21:00. Thanks!    Other ONCE    insulin glargine (LANTUS) injection 36 Units  36 Units SubCUTAneous DAILY    lisinopril (PRINIVIL, ZESTRIL) tablet 10 mg  10 mg Oral DAILY    vancomycin (VANCOCIN) 1000 mg in  ml infusion  1,000 mg IntraVENous Q18H    polyethylene glycol (MIRALAX) packet 17 g  17 g Oral DAILY    docusate sodium (COLACE) capsule 100 mg  100 mg Oral BID PRN    bisacodyl (DULCOLAX) suppository 10 mg  10 mg Rectal DAILY PRN    lactobacillus sp. 50 billion cpu (BIO-K PLUS) capsule 1 Cap  1 Cap Oral DAILY    acetaminophen (TYLENOL) tablet 650 mg  650 mg Oral Q4H PRN    morphine injection 2 mg  2 mg IntraVENous Q3H PRN    HYDROcodone-acetaminophen (NORCO) 5-325 mg per tablet 1-2 Tab  1-2 Tab Oral Q6H PRN    naloxone (NARCAN) injection 0.4 mg  0.4 mg SubCUTAneous EVERY 2 MINUTES AS NEEDED    gabapentin (NEURONTIN) capsule 100 mg  100 mg Oral TID    sodium chloride (NS) flush 5-10 mL  5-10 mL IntraVENous PRN    insulin lispro (HUMALOG) injection   SubCUTAneous AC&HS    glucose chewable tablet 16 g  4 Tab Oral PRN    glucagon (GLUCAGEN) injection 1 mg  1 mg IntraMUSCular PRN    nicotine (NICODERM CQ) 21 mg/24 hr patch 1 Patch  1 Patch TransDERmal Q24H    heparin (porcine) injection 5,000 Units  5,000 Units SubCUTAneous Q8H    aspirin chewable tablet 81 mg  81 mg Oral DAILY    pravastatin (PRAVACHOL) tablet 40 mg  40 mg Oral QHS    montelukast (SINGULAIR) tablet 10 mg  10 mg Oral DAILY    hydrALAZINE (APRESOLINE) 20 mg/mL injection 10 mg  10 mg IntraVENous Q6H PRN    dextrose 10% infusion 125-250 mL  125-250 mL IntraVENous PRN       Allergies: Patient has no known allergies. Temp (24hrs), Av.2 °F (36.8 °C), Min:97.3 °F (36.3 °C), Max:99.1 °F (37.3 °C)    Visit Vitals  /73   Pulse 89   Temp 98 °F (36.7 °C)   Resp 16   Ht 5' 3\" (1.6 m)   Wt 59 kg (130 lb 1.1 oz)   LMP  (LMP Unknown)   SpO2 96%   Breastfeeding? No   BMI 23.04 kg/m²       ROS: 12 point ROS obtained in details. Pertinent positives as mentioned in HPI,   otherwise negative    Physical Exam:    General:          Alert, cooperative, no distress, appears older than stated age. Head:               Normocephalic, without obvious abnormality, atraumatic. Eyes:               Conjunctivae clear, anicteric sclerae. Nose:               Nares normal. No drainage or sinus tenderness. Throat:             Lips, mucosa, and tongue normal.  No Thrush  Back:               Symmetric  Lungs:             Clear to auscultation bilaterally. No Wheezing or Rhonchi. No rales. Chest wall:      No tenderness or deformity. No Accessory muscle use. Heart:              Regular rate and rhythm,  no murmur, rub or gallop. Abdomen:        Soft, non-tender. Not distended. Bowel sounds normal. No masses  Extremities:     Extremities normal, atraumatic, No cyanosis. No edema. No clubbing; left foot with erythema outlined / tender; quarter sized w/ small amount of residual drainage  Skin:                Texture, turgor normal. No rashes or lesions. Not Jaundiced  Lymph nodes: Cervical, supraclavicular normal.  Psych:             Poor insight. Not depressed. Not anxious or agitated. Neurologic:      No facial asymmetry. No aphasia or slurred speech.  Normal strength, Alert and oriented X 3.        Labs: Results: Chemistry Recent Labs     10/08/19  0307 10/06/19  0147   GLU 65* 53*    137   K 3.8 3.9    106   CO2 26 25   BUN 12 14   CREA 1.25 1.12   CA 8.6 8.7   AGAP 5 6   BUCR 10* 13      CBC w/Diff Recent Labs     10/08/19  0307   WBC 10.3   RBC 3.55*   HGB 10.2*   HCT 30.5*   *      Microbiology Recent Labs     10/07/19  1340   CULT NO GROWTH AFTER 20 HOURS  PENDING          RADIOLOGY:    All available imaging studies/reports in [facility] for this admission were reviewed    Dr. Juju Hernandez, Infectious Disease Specialist  [phone]  October 8, 2019  1:49 PM

## 2019-10-08 NOTE — PROGRESS NOTES
Reviewed  ID note and agree with present plan. Status post surgery and wound vac in place. Stable for discharge. Follow in 7 to 10 days.

## 2019-10-08 NOTE — PROGRESS NOTES
[facility]    Patient: Meg Klein Age: 60 y.o. : 1960 MR#: [id number] SSN: [id number]  Date: 10/8/2019     Subjective:     Patient was seen in in presence of nursing and  initially. Patient seems to be little odd. States how she can walk on left foot while having wound vac in. States she lives with son and would like to go home. Went back to see her this afternoon - Denies for chest and abdominal pain on repeatedly questions. No leg pain. No nausea or vomiting. Objective:     /81 (BP 1 Location: Left arm, BP Patient Position: At rest)   Pulse 78   Temp 98.6 °F (37 °C)   Resp 16   Ht 5' 3\" (1.6 m)   Wt 59 kg (130 lb 1.1 oz)   LMP  (LMP Unknown)   SpO2 97%   Breastfeeding? No   BMI 23.04 kg/m²     General:  Alert, NAD  Cardiovascular:  RRR  Pulmonary: B/L clear, no wheezes  GI:  + BS, soft, non-tender  Extremities:  No edema. Left foot wound vac, 2nd toe amputation. Neuro: AAOx3, moves all ext     Assessment/Plan:     1. Puncture wound of left foot with abscess s/p debridement and I&D 10/2/19, s/p amputation 2 nd toe and debridement 10/7. MRSA in Cx: D/w ID and Podiatry, plan to cont vancomycin per ID. Cont wound care and vac. D/w  about it. PT/OT needs to work with her. 2. Sepsis, POA due to # 1: better   3. PEPTOSTREPTOCOCCUS bacteremia: repeat Cx neg, possible contaminant   4. Mild cyanotic/dusky L 2nd toe: Vas Sx in put noted, duplex neg   4. DM 2: BS better: Continue current management  5. CKD3: Crt stable   6. Ongoing tobacco use, adv on cessation   7. HTN: cont current management  8. Hyperlipidemia  9.  Gout    Patient's Daughter Norm Klinefelter can be reached at [phone]     Case discussed with:  [x]Patient  [x]Family  []Nursing  [x]Case Management  DVT Prophylaxis:  []Lovenox  [x]Hep SQ  []SCDs  []Coumadin   []On Heparin gtt      Labs:    Recent Results (from the past 24 hour(s))   GLUCOSE, POC    Collection Time: 10/07/19  9:01 PM   Result Value Ref Range    Glucose (POC) 199 (H) 70 - 110 mg/dL   CBC W/O DIFF    Collection Time: 10/08/19  3:07 AM   Result Value Ref Range    WBC 10.3 4.6 - 13.2 K/uL    RBC 3.55 (L) 4.20 - 5.30 M/uL    HGB 10.2 (L) 12.0 - 16.0 g/dL    HCT 30.5 (L) 35.0 - 45.0 %    MCV 85.9 74.0 - 97.0 FL    MCH 28.7 24.0 - 34.0 PG    MCHC 33.4 31.0 - 37.0 g/dL    RDW 14.2 11.6 - 14.5 %    PLATELET 584 (H) 903 - 420 K/uL    MPV 9.7 9.2 - 04.6 FL   METABOLIC PANEL, BASIC    Collection Time: 10/08/19  3:07 AM   Result Value Ref Range    Sodium 136 136 - 145 mmol/L    Potassium 3.8 3.5 - 5.5 mmol/L    Chloride 105 100 - 111 mmol/L    CO2 26 21 - 32 mmol/L    Anion gap 5 3.0 - 18 mmol/L    Glucose 65 (L) 74 - 99 mg/dL    BUN 12 7.0 - 18 MG/DL    Creatinine 1.25 0.6 - 1.3 MG/DL    BUN/Creatinine ratio 10 (L) 12 - 20      GFR est AA 53 (L) >60 ml/min/1.73m2    GFR est non-AA 44 (L) >60 ml/min/1.73m2    Calcium 8.6 8.5 - 10.1 MG/DL   GLUCOSE, POC    Collection Time: 10/08/19  7:53 AM   Result Value Ref Range    Glucose (POC) 82 70 - 110 mg/dL   GLUCOSE, POC    Collection Time: 10/08/19 12:15 PM   Result Value Ref Range    Glucose (POC) 305 (H) 70 - 110 mg/dL   GLUCOSE, POC    Collection Time: 10/08/19  3:54 PM   Result Value Ref Range    Glucose (POC) 82 70 - 110 mg/dL       Signed By: Santa Real MD     October 8, 2019

## 2019-10-08 NOTE — ROUTINE PROCESS
Bedside and Verbal shift change report given to Miya Story RN (oncoming nurse) by Yang Callaway RN (offgoing nurse). Report included the following information SBAR, Kardex, ED Summary, OR Summary, Procedure Summary, Intake/Output, MAR and Recent Results.

## 2019-10-08 NOTE — ROUTINE PROCESS
Received report from [facility]. Pt AAOx3, NAD, breathing non labored, on room air, HOB up. IV site clean, dry and intact. Wound vac left foot in place. Bed at the lowest level on lock position, call bell w/i reach. Bed alarm on. Bedside and Verbal shift change report given to [facility] (oncoming nurse) by me (offgoing nurse). Report included the following information SBAR, Kardex, Procedure Summary, Intake/Output, MAR and Recent Results.

## 2019-10-09 ENCOUNTER — HOME HEALTH ADMISSION (OUTPATIENT)
Dept: HOME HEALTH SERVICES | Facility: HOME HEALTH | Age: 59
End: 2019-10-09
Payer: MEDICAID

## 2019-10-09 VITALS
OXYGEN SATURATION: 96 % | BODY MASS INDEX: 23.05 KG/M2 | DIASTOLIC BLOOD PRESSURE: 64 MMHG | HEART RATE: 78 BPM | WEIGHT: 130.07 LBS | TEMPERATURE: 98.7 F | RESPIRATION RATE: 16 BRPM | HEIGHT: 63 IN | SYSTOLIC BLOOD PRESSURE: 107 MMHG

## 2019-10-09 LAB
BACTERIA SPEC CULT: NORMAL
GLUCOSE BLD STRIP.AUTO-MCNC: 231 MG/DL (ref 70–110)
GLUCOSE BLD STRIP.AUTO-MCNC: 86 MG/DL (ref 70–110)
SERVICE CMNT-IMP: NORMAL

## 2019-10-09 PROCEDURE — 74011250636 HC RX REV CODE- 250/636: Performed by: PODIATRIST

## 2019-10-09 PROCEDURE — 97530 THERAPEUTIC ACTIVITIES: CPT

## 2019-10-09 PROCEDURE — 97605 NEG PRS WND THER DME<=50SQCM: CPT

## 2019-10-09 PROCEDURE — 74011636637 HC RX REV CODE- 636/637: Performed by: PODIATRIST

## 2019-10-09 PROCEDURE — 74011250637 HC RX REV CODE- 250/637: Performed by: PODIATRIST

## 2019-10-09 PROCEDURE — 74011636637 HC RX REV CODE- 636/637: Performed by: INTERNAL MEDICINE

## 2019-10-09 PROCEDURE — 82962 GLUCOSE BLOOD TEST: CPT

## 2019-10-09 PROCEDURE — 74011250637 HC RX REV CODE- 250/637: Performed by: INTERNAL MEDICINE

## 2019-10-09 PROCEDURE — 97535 SELF CARE MNGMENT TRAINING: CPT

## 2019-10-09 PROCEDURE — 77030019934 HC DRSG VAC ASST KCON -B

## 2019-10-09 PROCEDURE — 77030025414 HC DRSG VAC ASST SMPLC KCON -B

## 2019-10-09 PROCEDURE — 77030040162

## 2019-10-09 RX ORDER — INSULIN ASPART 100 [IU]/ML
INJECTION, SUSPENSION SUBCUTANEOUS
Qty: 10 ML | Refills: 0 | Status: SHIPPED | OUTPATIENT
Start: 2019-10-10 | End: 2019-10-30 | Stop reason: ALTCHOICE

## 2019-10-09 RX ORDER — HYDROCODONE BITARTRATE AND ACETAMINOPHEN 5; 325 MG/1; MG/1
1 TABLET ORAL
Qty: 20 TAB | Refills: 0 | Status: SHIPPED | OUTPATIENT
Start: 2019-10-09 | End: 2019-10-09 | Stop reason: SDUPTHER

## 2019-10-09 RX ORDER — POLYETHYLENE GLYCOL 3350 17 G/17G
17 POWDER, FOR SOLUTION ORAL DAILY
Qty: 30 PACKET | Refills: 0 | Status: SHIPPED | OUTPATIENT
Start: 2019-10-10

## 2019-10-09 RX ORDER — HYDROCODONE BITARTRATE AND ACETAMINOPHEN 5; 325 MG/1; MG/1
1 TABLET ORAL
Qty: 20 TAB | Refills: 0 | Status: SHIPPED | OUTPATIENT
Start: 2019-10-09 | End: 2019-10-14

## 2019-10-09 RX ORDER — INSULIN ASPART 100 [IU]/ML
INJECTION, SUSPENSION SUBCUTANEOUS
Qty: 10 ML | Refills: 0 | Status: SHIPPED
Start: 2019-10-10 | End: 2019-10-09 | Stop reason: SDUPTHER

## 2019-10-09 RX ORDER — INSULIN ASPART 100 [IU]/ML
INJECTION, SUSPENSION SUBCUTANEOUS
Qty: 10 ML | Refills: 3 | Status: SHIPPED
Start: 2019-10-09 | End: 2019-10-09 | Stop reason: SDUPTHER

## 2019-10-09 RX ORDER — POLYETHYLENE GLYCOL 3350 17 G/17G
17 POWDER, FOR SOLUTION ORAL DAILY
Qty: 30 PACKET | Refills: 0 | Status: SHIPPED | OUTPATIENT
Start: 2019-10-10 | End: 2019-10-09 | Stop reason: SDUPTHER

## 2019-10-09 RX ORDER — INSULIN ASPART 100 [IU]/ML
INJECTION, SUSPENSION SUBCUTANEOUS
Qty: 10 ML | Refills: 0 | Status: SHIPPED | OUTPATIENT
Start: 2019-10-09 | End: 2019-10-09 | Stop reason: SDUPTHER

## 2019-10-09 RX ADMIN — HYDROCODONE BITARTRATE AND ACETAMINOPHEN 1 TABLET: 5; 325 TABLET ORAL at 07:53

## 2019-10-09 RX ADMIN — INSULIN LISPRO 6 UNITS: 100 INJECTION, SOLUTION INTRAVENOUS; SUBCUTANEOUS at 13:36

## 2019-10-09 RX ADMIN — ASPIRIN 81 MG 81 MG: 81 TABLET ORAL at 08:50

## 2019-10-09 RX ADMIN — Medication 1 CAPSULE: at 08:49

## 2019-10-09 RX ADMIN — LISINOPRIL 10 MG: 10 TABLET ORAL at 08:50

## 2019-10-09 RX ADMIN — VANCOMYCIN HYDROCHLORIDE 1000 MG: 10 INJECTION, POWDER, LYOPHILIZED, FOR SOLUTION INTRAVENOUS at 14:06

## 2019-10-09 RX ADMIN — MONTELUKAST 10 MG: 10 TABLET, FILM COATED ORAL at 08:50

## 2019-10-09 RX ADMIN — GABAPENTIN 100 MG: 100 CAPSULE ORAL at 08:50

## 2019-10-09 RX ADMIN — HYDROCODONE BITARTRATE AND ACETAMINOPHEN 1 TABLET: 5; 325 TABLET ORAL at 14:05

## 2019-10-09 RX ADMIN — INSULIN GLARGINE 35 UNITS: 100 INJECTION, SOLUTION SUBCUTANEOUS at 08:48

## 2019-10-09 NOTE — DISCHARGE SUMMARY
Physician Discharge Summary       Patient: Ale Medellin MRN: [id number]  SSN: [id number]    YOB: 1960  Age: 60 y.o. Sex: female    PCP: Bg Curry MD    Allergies: Patient has no known allergies. Admit date: 9/30/2019  Admitting Provider: Jelena Mancini MD    Discharge date: 10/9/2019  Discharging Provider: Meenu Huggins MD    * Admission Diagnoses: Cellulitis and abscess of foot [L03.119, L02.619]    * Discharge Diagnoses:      1. Puncture wound of left foot with abscess s/p debridement and I&D 10/2/19, s/p amputation 2 nd toe and debridement 10/7.    2. Sepsis, POA due to # 1: better   3. PEPTOSTREPTOCOCCUS bacteremia: repeat Cx neg, possible contaminant   4. Minimal PAD  4. DM 2  5. CKD3  6. Ongoing tobacco use  7. HTN  8. Hyperlipidemia  9. Gout    * Hospital Course: This is a 59-year-old female patient with history of poorly controlled diabetes mellitus, gout presented to emergency room with complaint of left foot pain after stepping on a refugio nail. Please report hospital admission H&P for further details. Patient was admitted for cellulitis of left foot and management of poorly controlled diabetes mellitus. With regards to left foot cellulitis patient had x-ray done which showed abnormal subcutaneous air collections. MRI of the foot was done and there was no evidence of osteomyelitis. Arterial Doppler was done showed mild to moderate bilateral arterial insufficiency. There is no evidence of arterial insufficiency in major vessels. Her initial blood cultures were positive for Peptostreptococcus. Patient was seen by Dr. Jaleel Velez. She underwent IND of the left foot on October 2, 2019. Intraoperative culture was positive for Staphylococcus aureus. Patient was seen by infectious disease physician. Patient was maintained on antibiotics throughout her hospital stay. Her repeat blood culture on October 3, 2019 were negative.   Due to ischemic left second toe patient underwent amputation on October 6, 2019 and wound VAC was placed. No purulence noted intraoperatively. Vascular surgery was consulted and they did not recommend anything other than continuing aspirin as patient does not have significant peripheral arterial disease. Patient was seen by a podiatrist and cleared for discharge with continuation of current wound care. Patient will be continued on daptomycin until November 22, 2019 through PICC line. I spoke to patient's son and daughter-in-law over the phone on the day of discharge and they want her to come home. With regards to diabetes mellitus she was given Lantus here but will be changed back to her home dose of insulin 70/30 at 38 units twice a day. She will be continued on home medication for the comorbidities. Home health care will be set up for PT OT wound VAC management as well as wound care management and PICC line care. * Procedures:   Procedure(s):  DEBRIDEMENT LEFT FOOT and amputation second toe left foot  Cardiology and IR Orders (From admission to next 24h)     Start     Ordered    10/07/19 1600  IR PICC INSERT WO PORT OVER 5 YEARS WO IMG  [[id number]]  ONE TIME      10/07/19 1558                Consults: ID, Vascular Surgery and podaitrist    Significant Diagnostic Studies:     Xray left foot  CXR  MRI Left foot  Arterial doppler  PICC placement     Discharge Exam:    /64 (BP 1 Location: Left arm, BP Patient Position: At rest)   Pulse 78   Temp 98.7 °F (37.1 °C)   Resp 16   Ht 5' 3\" (1.6 m)   Wt 59 kg (130 lb 1.1 oz)   LMP  (LMP Unknown)   SpO2 96%   Breastfeeding? No   BMI 23.04 kg/m²       * Discharge Condition: improved  * Disposition: Home    Discharge Medications:  Current Discharge Medication List      START taking these medications    Details   lactobacillus sp. 50 billion cpu (BIO-K PLUS) 50 billion cell -375 mg cap capsule Take 1 Cap by mouth daily for 46 days.   Qty: 46 Cap, Refills: 0      polyethylene glycol (MIRALAX) 17 gram packet Take 1 Packet by mouth daily. Indications: hold it for loose BMs  Qty: 30 Packet, Refills: 0      HYDROcodone-acetaminophen (NORCO) 5-325 mg per tablet Take 1 Tab by mouth every six (6) hours as needed for Pain for up to 5 days. Max Daily Amount: 4 Tabs. Qty: 20 Tab, Refills: 0    Associated Diagnoses: Chronic pain syndrome      DAPTOmycin (CUBICIN) IVPB 354 mg by IntraVENous route every twenty-four (24) hours for 44 days. Qty: 44 Dose, Refills: 0         CONTINUE these medications which have CHANGED    Details   insulin aspart protamine/insulin aspart (NOVOLOG MIX 70-30 U-100 INSULN) 100 unit/mL (70-30) injection INJECT 38 UNITS UNDER THE SKIN TWO TIMES A DAY  Qty: 10 mL, Refills: 0    Comments: Before meals         CONTINUE these medications which have NOT CHANGED    Details   colchicine 0.6 mg tablet Take 1 tablet by mouth Q1Hour for gout pain. NOT TO EXCEED 3 TABLETS IN 24 HOURS. Qty: 30 Tab, Refills: 0      hydrOXYzine HCl (ATARAX) 25 mg tablet TAKE 1 TABLET BY MOUTH THREE TIMES DAILY AS NEEDED FOR ITCHING  Qty: 90 Tab, Refills: 0    Associated Diagnoses: Pruritus      pravastatin (PRAVACHOL) 40 mg tablet TAKE ONE TABLET BY MOUTH EVERY NIGHT  Qty: 30 Tab, Refills: 4      metFORMIN (GLUCOPHAGE) 1,000 mg tablet TAKE ONE TABLET BY MOUTH TWICE DAILY WITH MEALS  Qty: 60 Tab, Refills: 4      gabapentin (NEURONTIN) 300 mg capsule Take 1 Cap by mouth three (3) times daily. Qty: 90 Cap, Refills: 4      montelukast (SINGULAIR) 10 mg tablet Take 1 Tab by mouth daily. Qty: 30 Tab, Refills: 5      ergocalciferol (ERGOCALCIFEROL) 50,000 unit capsule Take 1 Cap by mouth every seven (7) days. Qty: 4 Cap, Refills: 5      lidocaine (LIDODERM) 5 % Apply patch to the affected area for 12 hours a day and remove for 12 hours a day.   Qty: 1 Package, Refills: 0      lisinopril (PRINIVIL, ZESTRIL) 10 mg tablet TAKE 1 TABLET BY MOUTH ONCE DAILY  Qty: 30 Tab, Refills: 5      Insulin Syringe-Needle U-100 1/2 mL 30 gauge x 5/16 syrg INJECT TWICE A DAY  Qty: 100 Syringe, Refills: 5      Blood-Glucose Meter monitoring kit Check BS 3x/day E11.65  Qty: 1 Kit, Refills: 0      Lancets misc Check BS 3x/day E11.65  Qty: 100 Each, Refills: 5      glucose blood VI test strips (ONETOUCH ULTRA TEST) strip Check BS 3x/day E11.65  Qty: 100 Strip, Refills: 5      acetaminophen (TYLENOL) 325 mg tablet Take 2 Tabs by mouth every four (4) hours as needed for Pain. Qty: 20 Tab, Refills: 0      aspirin 81 mg tablet Take 81 mg by mouth daily. STOP taking these medications       azithromycin (ZITHROMAX TRI-JORGE) 500 mg tab Comments:   Reason for Stopping:         mupirocin (BACTROBAN) 2 % ointment Comments:   Reason for Stopping:               * Follow-up Care/Patient Instructions: Activity: PT/OT per Home Health  Diet: Cardiac Diet and Diabetic Diet  Wound Care: As directed    Follow-up Information     Follow up With Specialties Details Why Contact Info    Magda Johnson MD Internal Medicine In 2 days  [address]  [phone]          Follow-up Appointments   Procedures    FOLLOW UP VISIT Appointment in: Other ([facility]) 1. With Dr Sabino Mills [phone] in 10 days 2. With PCP in 5-7 days     1. With Dr Sabino Mills [phone] in 10 days  2. With PCP in 5-7 days     Standing Status:   Standing     Number of Occurrences:   1     Order Specific Question:   Appointment in     Answer:    Other (Specify)       Signed:  Isidro Dooley MD  10/9/2019  2:10 PM

## 2019-10-09 NOTE — PROGRESS NOTES
Discharge:    Discharge order noted for today. Pt has been accepted to [facility] agency. Met with patient and her family and they are agreeable to the transition plan today. Transport has been arranged through her family. Patient's discharge summary and home health  orders have been forwarded to [facility] home health  agency via [address] [place] [address] Rd. Patient has received her wound vac and home health has arranged her IV antibiotics. Patient's DME (walker, bedside commode and shower chair) were ordered through [facility]. Her insurance is out of network with [employer]. The equipment will be delivered to her home, once approved. Updated bedside RN, Ita Allison,  to the transition plan. Discharge information has been documented on the AVS.       Gordo Gloria MSW  Care Manager  Pager#: [phone]

## 2019-10-09 NOTE — PROGRESS NOTES
Discharge planning:    Patient has home health orders for IV antibiotics and wound care management with wound vac. [place] of choice was completed and patient's family has elected to use [facility] health. The referral was sent. This writer will continue to closely monitor for discharge planning to ensure a safe discharge home from [facility]. Gordo Steele MSW  Care Manager  Pager#: [phone]

## 2019-10-09 NOTE — PROGRESS NOTES
Infectious Disease progress Note    Requested by:dr. Diaz Devine    Reason: left foot puncture wound infection    Current abx Prior abx   vancomycin since 9/30/19  Clindamycin since 10/4/19   Pip/tazo 9/30-10/3  Amp/sulbactam  10/3-10/4     Lines:       Assessment :    60 y.o.  female with past medical history of uncontrolled type 2 DM (last hgbA1C 13.3 on 1/25/19), gout and some issues with compliance / developmental delay per PCP records who presented to ed on 9/30/19 with left foot pain following stepping on a refugio nail     MRI foot 10//1/19-  There is subcutaneous emphysema in the forefoot at the level of the proximal phalanges of the first through fifth toes. defect in the plantar soft tissues near the base of the second and third toes. Clinical presentation c/w sepsis (POA) due to left foot cellulitis, infected left foot puncture wound in a patient with uncontrolled type 2 DM. S/p I&D left foot on 10/2/19. Significant necrotic fatty tissue noted intra op. Intra op cultures 10/2- staph aureus. preop wound cultures 10/1/19- MRSA. Now with peptostreptococcus bacteremia on 9/30/19 bloodstream infection due to left foot infection . Repeat blood cultures 10/3 negative. ischemic left second toe. S/p amputation 10/6/19. Biopsy of clean bone margins left second metatarsal sent. No purulence noted intra op. Vascular surgery consult appreciated. No significant PAD. Recommendations:    1. D/c  clindamycin. continue vancomycin till 11/22/19 if discharged to snf. Recommend daptomycin iv till 11/22/19 if discharged home (once daily dosing more feasible as outpatient). 2. picc line for outpatient iv antibiotics  3. F/u podiatry recommendations. 4.f/u bone biopsy of clean margins    Tentative home iv abx orders written.      Advance Care planning:full code: discussed  with patient/surrogate decision maker:       Above plan was discussed in details with patient, dr. Severo Rohrer and  finesse. Please call me if any further questions or concerns. Will continue to participate in the care of this patient. HPI:     Patient denies fever, headaches, visual disturbances, sore throat, runny nose, earaches, cp, sob, chills, cough, abdominal pain, diarrhea, burning micturition, pain or weakness in extremities. He denies back pain/flank pain. home Medication List    Details   colchicine 0.6 mg tablet Take 1 tablet by mouth Q1Hour for gout pain. NOT TO EXCEED 3 TABLETS IN 24 HOURS. Qty: 30 Tab, Refills: 0      hydrOXYzine HCl (ATARAX) 25 mg tablet TAKE 1 TABLET BY MOUTH THREE TIMES DAILY AS NEEDED FOR ITCHING  Qty: 90 Tab, Refills: 0    Associated Diagnoses: Pruritus      pravastatin (PRAVACHOL) 40 mg tablet TAKE ONE TABLET BY MOUTH EVERY NIGHT  Qty: 30 Tab, Refills: 4      metFORMIN (GLUCOPHAGE) 1,000 mg tablet TAKE ONE TABLET BY MOUTH TWICE DAILY WITH MEALS  Qty: 60 Tab, Refills: 4      gabapentin (NEURONTIN) 300 mg capsule Take 1 Cap by mouth three (3) times daily. Qty: 90 Cap, Refills: 4      montelukast (SINGULAIR) 10 mg tablet Take 1 Tab by mouth daily. Qty: 30 Tab, Refills: 5      ergocalciferol (ERGOCALCIFEROL) 50,000 unit capsule Take 1 Cap by mouth every seven (7) days. Qty: 4 Cap, Refills: 5      lidocaine (LIDODERM) 5 % Apply patch to the affected area for 12 hours a day and remove for 12 hours a day.   Qty: 1 Package, Refills: 0      insulin aspart protamine/insulin aspart (NOVOLOG MIX 70-30 U-100 INSULN) 100 unit/mL (70-30) injection INJECT 25 UNITS UNDER THE SKIN TWO TIMES A DAY  Qty: 10 mL, Refills: 3    Comments: Before meals      mupirocin (BACTROBAN) 2 % ointment Apply to area affected BID  Qty: 22 g, Refills: 1    Associated Diagnoses: Dermatitis      lisinopril (PRINIVIL, ZESTRIL) 10 mg tablet TAKE 1 TABLET BY MOUTH ONCE DAILY  Qty: 30 Tab, Refills: 5      Insulin Syringe-Needle U-100 1/2 mL 30 gauge x 5/16 syrg INJECT TWICE A DAY  Qty: 100 Syringe, Refills: 5 Blood-Glucose Meter monitoring kit Check BS 3x/day E11.65  Qty: 1 Kit, Refills: 0      Lancets misc Check BS 3x/day E11.65  Qty: 100 Each, Refills: 5      glucose blood VI test strips (ONETOUCH ULTRA TEST) strip Check BS 3x/day E11.65  Qty: 100 Strip, Refills: 5      acetaminophen (TYLENOL) 325 mg tablet Take 2 Tabs by mouth every four (4) hours as needed for Pain. Qty: 20 Tab, Refills: 0      aspirin 81 mg tablet Take 81 mg by mouth daily.                Current Facility-Administered Medications   Medication Dose Route Frequency    insulin glargine (LANTUS) injection 35 Units  35 Units SubCUTAneous DAILY    HYDROcodone-acetaminophen (NORCO) 5-325 mg per tablet 1 Tab  1 Tab Oral Q6H PRN    lisinopril (PRINIVIL, ZESTRIL) tablet 10 mg  10 mg Oral DAILY    vancomycin (VANCOCIN) 1000 mg in  ml infusion  1,000 mg IntraVENous Q18H    polyethylene glycol (MIRALAX) packet 17 g  17 g Oral DAILY    docusate sodium (COLACE) capsule 100 mg  100 mg Oral BID PRN    bisacodyl (DULCOLAX) suppository 10 mg  10 mg Rectal DAILY PRN    lactobacillus sp. 50 billion cpu (BIO-K PLUS) capsule 1 Cap  1 Cap Oral DAILY    acetaminophen (TYLENOL) tablet 650 mg  650 mg Oral Q4H PRN    naloxone (NARCAN) injection 0.4 mg  0.4 mg SubCUTAneous EVERY 2 MINUTES AS NEEDED    gabapentin (NEURONTIN) capsule 100 mg  100 mg Oral TID    sodium chloride (NS) flush 5-10 mL  5-10 mL IntraVENous PRN    insulin lispro (HUMALOG) injection   SubCUTAneous AC&HS    glucose chewable tablet 16 g  4 Tab Oral PRN    glucagon (GLUCAGEN) injection 1 mg  1 mg IntraMUSCular PRN    nicotine (NICODERM CQ) 21 mg/24 hr patch 1 Patch  1 Patch TransDERmal Q24H    heparin (porcine) injection 5,000 Units  5,000 Units SubCUTAneous Q8H    aspirin chewable tablet 81 mg  81 mg Oral DAILY    pravastatin (PRAVACHOL) tablet 40 mg  40 mg Oral QHS    montelukast (SINGULAIR) tablet 10 mg  10 mg Oral DAILY    dextrose 10% infusion 125-250 mL  125-250 mL IntraVENous PRN       Allergies: Patient has no known allergies. Temp (24hrs), Av.2 °F (36.8 °C), Min:97.4 °F (36.3 °C), Max:98.9 °F (37.2 °C)    Visit Vitals  /81   Pulse 86   Temp 98.9 °F (37.2 °C)   Resp 16   Ht 5' 3\" (1.6 m)   Wt 59 kg (130 lb 1.1 oz)   LMP  (LMP Unknown)   SpO2 93%   Breastfeeding? No   BMI 23.04 kg/m²       ROS: 12 point ROS obtained in details. Pertinent positives as mentioned in HPI,   otherwise negative    Physical Exam:    General:          Alert, cooperative, no distress, appears older than stated age. Head:               Normocephalic, without obvious abnormality, atraumatic. Eyes:               Conjunctivae clear, anicteric sclerae. Nose:               Nares normal. No drainage or sinus tenderness. Throat:             Lips, mucosa, and tongue normal.  No Thrush  Back:               Symmetric  Lungs:             Clear to auscultation bilaterally. No Wheezing or Rhonchi. No rales. Chest wall:      No tenderness or deformity. No Accessory muscle use. Heart:              Regular rate and rhythm,  no murmur, rub or gallop. Abdomen:        Soft, non-tender. Not distended. Bowel sounds normal. No masses  Extremities:     Extremities normal, atraumatic, No cyanosis. No edema. No clubbing; left foot with erythema outlined / tender; quarter sized w/ small amount of residual drainage  Skin:                Texture, turgor normal. No rashes or lesions. Not Jaundiced  Lymph nodes: Cervical, supraclavicular normal.  Psych:             Poor insight. Not depressed. Not anxious or agitated. Neurologic:      No facial asymmetry. No aphasia or slurred speech.  Normal strength, Alert and oriented X 3.        Labs: Results:   Chemistry Recent Labs     10/08/19  0307   GLU 65*      K 3.8      CO2 26   BUN 12   CREA 1.25   CA 8.6   AGAP 5   BUCR 10*      CBC w/Diff Recent Labs     10/08/19  0307   WBC 10.3   RBC 3.55*   HGB 10.2*   HCT 30.5*   *      Microbiology Recent Labs     10/07/19  1340   CULT NO GROWTH AFTER 20 HOURS  NO GROWTH AFTER 20 HOURS          RADIOLOGY:    All available imaging studies/reports in [facility] for this admission were reviewed    Dr. Home Yip, Infectious Disease Specialist  [phone]  October 9, 2019  1:49 PM

## 2019-10-09 NOTE — HOME CARE
Received  referral for  for IV Abx infusion,Wound vac care,PT,OT, Marianna Meek from [employer] infusion company states pt's IV Abx will be covered at 100%, pt was ordered and delivered a [employer]CI wound vac to patients room by  Emmy Nelson), pt's nurse Jude Muñiz) informed to place pt's home wound vac on prior to discharge , DME: RW,BSC and shower chair order was forwarded to [facility] DME by   ,notified [facility]  [facility]) that pt will need [facility] tomorrow for IV Abx, Starting 1st dose daptomycin at home on [facility] 10/10/19 , [employer] will provide Anaphylactic kit for 1st dose; pt's friend Sixto Grcathyshireen) states she is willing to learn how to administer IV Abx ; Discharge order noted for today; [facility] will follow. TEMI ROMAN.

## 2019-10-09 NOTE — PROGRESS NOTES
[facility]  Progress Note    Patient: Valerie Chow Age: 60 y.o. : 1960 MR#: [id number] SSN: [id number]  Date: 10/9/2019     Subjective:     Feeling better. No pain currently. No chest or abdominal pain. No nausea or vomiting. Spoke to son and daughter in law over the phone    Objective:     /81 (BP 1 Location: Left arm, BP Patient Position: At rest)   Pulse 78   Temp 98.6 °F (37 °C)   Resp 16   Ht 5' 3\" (1.6 m)   Wt 59 kg (130 lb 1.1 oz)   LMP  (LMP Unknown)   SpO2 97%   Breastfeeding? No   BMI 23.04 kg/m²     General:  Alert, NAD  Cardiovascular:  RRR  Pulmonary: B/L clear, no wheezes  GI:  + BS, soft, non-tender  Extremities:  No edema. Left foot wound vac, 2nd toe amputation. Neuro: AAOx3, moves all ext     Assessment/Plan:     1. Puncture wound of left foot with abscess s/p debridement and I&D 10/2/19, s/p amputation 2 nd toe and debridement 10/7. MRSA in Cx: D/w ID and Podiatry, plan to cont vancomycin per ID. Cont wound care and vac. D/w  about it. PT/OT needs to work with her. 2. Sepsis, POA due to # 1: better   3. PEPTOSTREPTOCOCCUS bacteremia: repeat Cx neg, possible contaminant   4. Mild cyanotic/dusky L 2nd toe: Vas Sx in put noted, duplex neg   4. DM 2: BS better: Continue current management  5. CKD3: Crt stable   6. Ongoing tobacco use, adv on cessation   7. HTN: cont current management  8. Hyperlipidemia  9.  Gout    Can be discharge home today once wound vac is set up     Case discussed with:  [x]Patient  [x]Family  []Nursing  [x]Case Management  DVT Prophylaxis:  []Lovenox  [x]Hep SQ  []SCDs  []Coumadin   []On Heparin gtt      Labs:    Recent Results (from the past 24 hour(s))   GLUCOSE, POC    Collection Time: 10/08/19 12:15 PM   Result Value Ref Range    Glucose (POC) 305 (H) 70 - 110 mg/dL   GLUCOSE, POC    Collection Time: 10/08/19  3:54 PM   Result Value Ref Range    Glucose (POC) 82 70 - 110 mg/dL Aissatou Gross    Collection Time: 10/08/19  8:25 PM   Result Value Ref Range    Vancomycin,trough 16.5 10.0 - 20.0 ug/mL    Reported dose date: 20191008      Reported dose time: 0300      Reported dose: 1000 MG UNITS   GLUCOSE, POC    Collection Time: 10/08/19  9:36 PM   Result Value Ref Range    Glucose (POC) 254 (H) 70 - 110 mg/dL   GLUCOSE, POC    Collection Time: 10/09/19  8:22 AM   Result Value Ref Range    Glucose (POC) 86 70 - 110 mg/dL       Signed By: Dolores Walden MD     October 9, 2019

## 2019-10-09 NOTE — PROGRESS NOTES
2nd Attempt:   Pt refused PT due to:  []  Nausea/vomiting  []  Eating  []  Pain  []  Pt lethargic  [x]  Other, pt is being d/c from hospital, nurse present in the room to assist w/ pt getting dressed  Will f/u tomorrow if pt's d/c status changes. Thank you.   Dale Ramos, PT

## 2019-10-09 NOTE — PROGRESS NOTES
Discharge planning:    Doctor's script for wound vac and other documents were faxed in to [facility] for processing and approving the wound vac. This writer will continue to closely monitor for discharge planning to ensure a safe discharge home from [facility]. Gordo Stark MSW  Care Manager  Pager#: [phone]

## 2019-10-09 NOTE — PROGRESS NOTES
Problem: Self Care Deficits Care Plan (Adult)  Goal: *Acute Goals and Plan of Care (Insert Text)  Description  Occupational Therapy Goals  Initiated 10/4/2019 within 7 day(s). 1.  Patient will perform lower body dressing with modified independence. 2.  Patient will perform toileting with modified independence. 3.  Patient will perform toilet transfer with modified independence following weightbearing precautions. 4.  Patient will perform a functional activity in standing with modified independence for 3 minutes, with G standing balance. Prior Level of Function: Pt reports she was (I) with basic self-care/ADLs and functional mobility PTA. Outcome: Progressing Towards Goal   OCCUPATIONAL THERAPY TREATMENT    Patient: Fawad Maldonado (34 y.o. female)  Date: 10/9/2019  Diagnosis: Cellulitis and abscess of foot [L03.119, L02.619] Puncture wound of foot, left, complicated  Procedure(s) (LRB):  DEBRIDEMENT LEFT FOOT and amputation second toe left foot (Left) 2 Days Post-Op  Precautions: PWB(through heel with post op shoe LLE)      Chart, occupational therapy assessment, plan of care, and goals were reviewed. ASSESSMENT:  Pt reports increased pain in RLE this am, stating she feels like she is not ready to std with the wound vac being in place. Pt is educated on the importance of EOB/OOB activities to increase strength and improve overall activity tolerance for carryover w/ADLs. Pt verbalized understanding, reports she will try to std later today. Pt educated on LB dressing techniques at bed level. Pt doffed/donned sock on RLE w/Mod Ind, and required SBA to simulate donning of underwear w/bridging method. Pt required Supervision with all aspects of bed mobility. Progression toward goals:  ?          Improving appropriately and progressing toward goals  ? Improving slowly and progressing toward goals  ?           Not making progress toward goals and plan of care will be adjusted     PLAN:  Patient continues to benefit from skilled intervention to address the above impairments. Continue treatment per established plan of care. Discharge Recommendations:  Skilled Nursing Facility  Further Equipment Recommendations for Discharge:  bedside commode and transfer bench     SUBJECTIVE:   Patient stated I need to mentally prepare for lunch.     OBJECTIVE DATA SUMMARY:   Cognitive/Behavioral Status:  Neurologic State: Alert  Orientation Level: Oriented X4  Cognition: Decreased attention/concentration  Safety/Judgement: Fall prevention    Functional Mobility and Transfers for ADLs:   Bed Mobility:     Supine to Sit: Supervision(long sit)     Scooting: Supervision    Balance:  Sitting: Intact    ADL Intervention:       Grooming  Grooming Assistance: Modified independent(bed level)  Lower Body Dressing Assistance  Underpants: Stand-by assistance(simulated)  Socks: Modified independent(RLE)  Leg Crossed Method Used: Yes    Pain:  Pain level pre-treatment: 10/10   Pain level post-treatment: 10/10    Activity Tolerance:    Fair  Please refer to the flowsheet for vital signs taken during this treatment. After treatment:   ?  Patient left in no apparent distress sitting up in chair  ? Patient left in no apparent distress in bed  ? Call bell left within reach  ? Nursing notified  ? Caregiver present  ? Bed alarm activated    COMMUNICATION/EDUCATION:   ? Role of Occupational Therapy in the acute care setting  ? Home safety education was provided and the patient/caregiver indicated understanding. ? Patient/family have participated as able in working towards goals and plan of care. ? Patient/family agree to work toward stated goals and plan of care. ? Patient understands intent and goals of therapy, but is neutral about his/her participation. ? Patient is unable to participate in goal setting and plan of care.       Thank you for this referral.  KEI Chavez  Time Calculation: 23 mins

## 2019-10-09 NOTE — DISCHARGE SUMMARY
[facility] [facility]  Face to Face Encounter    Patients Name: Naomie Myers    YOB: 1960    Primary Diagnosis: left foot punctate wound, wound vac, partial weight bearing, DM    Date of Face to Face:   10/9/19                                    Face to Face Encounter findings are related to primary reason for home care:   yes    1. I certify that the patient needs intermittent skilled nursing care, physical therapy and/or speech therapy. I will not be following this patient in the Community and Dr. Linda Manzo MD will be responsible for signing the [address] 2. Initial Orders for Care: Must be completed only if Face to Face MD will not be signing the [address] Skilled Nursing, Physical Therapy and Occupational Therapy    3. I certify that this patient is homebound for the following reason(s): Requires considerable and taxing effort to leave the home  and Requires the assistance of 1 or more persons to leave the home     4. I certify that this patient is under my care and that I, or a nurse practitioner or [phone] working with me, had a Face-to-Face Encounter that meets the physician Face-to-Face Encounter requirements. Document the physical findings from the Face-to-Face Encounter that support the need for skilled services: Has new diagnosis that requires skilled nursing teaching and intervention , Has new medications that requires skilled nursing teaching and monitoring for understanding and compliance  and Has new finding of weakness and altered mobility that requires skilled physical/occupational and/or speech therapy services for evaluation and interventions.      Scott Grove MD  10/9/2019

## 2019-10-09 NOTE — PROGRESS NOTES
Dr. Lesia Councilman called to inform that patient requested her wound vac be shut off. Wound vac has been beeping displaying air leak/blockage. RN troubleshoot dressing site, tubing, cannister, and machine. Attempted to reinforce dressing with no success. Patient refused complete dressing change. Physician verbalized he will check it in the morning. No further orders given.

## 2019-10-09 NOTE — PROGRESS NOTES
Seen at  Bedside awake and alert  Wound vac malfunction. Toes viable. No  Signs infection or necrosis. Wound care  Consult    PATIENT  IS STATUS POST EXCISIONAL  DEBRIDEMENT.

## 2019-10-09 NOTE — PROGRESS NOTES
Attempted PT tx at 11:45:   Pt refused PT due to:  []  Nausea/vomiting  []  Eating  []  Pain  []  Pt lethargic  [x]  Other, pt is being seen by wound care  Will f/u later as schedule allows. Thank you.   Noemy Dhaliwal, PT

## 2019-10-09 NOTE — WOUND CARE
Physical Exam  
Room 460: wound assess Vacuum Assisted Closure Left Foot (Active) Vac Status Patent 10/9/2019 12:49 PM  
Suction (mmHg) 100 10/9/2019 12:49 PM  
Site Assessment Intact 10/9/2019 12:49 PM  
Dressing Status New 10/9/2019 12:49 PM  
Intake (ml) 0 ml 10/9/2019 12:49 PM  
Cannister Changed No 10/9/2019 12:49 PM  
Number of days: 2 Wound Toe (Comment  which one) Left second toe amputation site [id number]. POA (Active) Dressing Status Intact; Removed;Breakthrough drainage 10/9/2019 12:48 PM  
Dressing Type Negative pressure wound therapy 10/9/2019 12:48 PM  
Incision Site Well Approximated No 10/9/2019 12:48 PM  
Non-staged Wound Description Full thickness 10/9/2019 12:48 PM  
Shape irregular 10/9/2019 12:48 PM  
Wound Length (cm) 4 cm 10/9/2019 12:48 PM  
Wound Width (cm) 4 cm 10/9/2019 12:48 PM  
Wound Depth (cm) 3 cm 10/9/2019 12:48 PM  
Wound Volume (cm^3) 48 cm^3 10/9/2019 12:48 PM  
Condition of Base Adipose exposed;Pink 10/9/2019 12:48 PM  
Condition of Edges Closed 10/9/2019 12:48 PM  
Epithelialization (%) 0 10/9/2019 12:48 PM  
Assessment Painful 10/9/2019 12:48 PM  
Tissue Type Percent Pink 50 10/9/2019 12:48 PM  
Tissue Type Percent Yellow 50 10/9/2019 12:48 PM  
Drainage Amount Small 10/9/2019 12:48 PM  
Drainage Color Serosanguinous 10/9/2019 12:48 PM  
Wound Odor None 10/9/2019 12:48 PM  
Anisa-wound Assessment Intact 10/9/2019 12:48 PM  
Margins Attached edges; Defined edges 10/9/2019 12:48 PM  
Cleansing and Cleansing Agents  Normal saline 10/9/2019 12:48 PM  
Dressing Changed Changed/New 10/9/2019 12:48 PM  
Dressing Type Applied Negative pressure wound therapy;Elastic bandage 10/9/2019 12:48 PM  
Procedure Tolerated Poor 10/9/2019 12:48 PM  
Number of days: 2 Bridged trac pad to dorsum of foot. Periwound sutures visible & intact. Will turn over care to nursing staff at this time.  
Husam TOWNSENDN, RN, [facility], [address]

## 2019-10-09 NOTE — PROGRESS NOTES
Discharge planning:    Patient was approved for the wound vac. This writer delivered the wound vac to patient's room. She has been set up with home health with [facility]. [facility] is in the process of working out the details of her home IV antibiotics. This writer will continue to closely monitor for discharge planning. Lemmie M. Celestia Lanes MSW  Care Manager  Pager#: [phone]

## 2019-10-09 NOTE — INTERDISCIPLINARY ROUNDS
Interdisciplinary Round Note Patient Information: Patient Information: Benigno Zepeda 
                                    460/01 Reason for Admission: Cellulitis and abscess of foot [L03.119, L02.619] Attending Provider:   Kallie Tyson MD 
Primary Care Physician: 
     Barby Marroquin MD 
     [phone] Past Medical History:  
Past Medical History:  
Diagnosis Date  Bladder infection  Depression  Diabetes ([facility] [address]  Difficulty walking  DM (diabetes mellitus) ([facility] [address] 6/22/2012  Ill-defined condition   
 leaking bladder;high cholesterol  Proteinuria 10/1/2019 RX  lisinopril for proteinuria  Puncture wound of foot, left, complicated 3/43/5930 Hospital day: 8 Estimated discharge date: 10/09/19 RRAT Score: High Risk   
      
 29 Total Score 3 Has Seen PCP in Last 6 Months (Yes=3, No=0) 3 Patient Length of Stay (>5 days = 3)  
 4 Pt. Coverage (Medicare=5 , Medicaid, or Self-Pay=4) 19 Charlson Comorbidity Score (Age + Comorbid Conditions) Criteria that do not apply:  
 . Living with Significant Other. Assisted Living. LTAC. SNF. or  
Rehab  
 IP Visits Last 12 Months (1-3=4, 4=9, >4=11) Goals for Today: pain control Overnight Events: none VITAL SIGNS Vitals:  
 10/08/19 7736 10/08/19 1104 10/08/19 1551 10/08/19 2006 BP: 142/74 135/73 137/81 159/86 Pulse: 82 89 78 80 Resp: 16 16 16 18 Temp: 98.2 °F (36.8 °C) 98 °F (36.7 °C) 98.6 °F (37 °C) 98.3 °F (36.8 °C) SpO2: 98% 96% 97% 98% Weight:      
Height:      
 
 
 
 Lines, Drains, & Airways PICC Double Lumen 10/08/19 Right;Brachial-Site Assessment: Clean, dry, & intact [REMOVED] External Female Catheter 10/01/19-Site Assessment: Clean, dry, & intact [REMOVED] Peripheral IV 10/01/19 Left Wrist-Site Assessment: Clean, dry, & intact [REMOVED] Peripheral IV 10/02/19 Anterior;Right Wrist-Site Assessment: Painful [REMOVED] Peripheral IV 09/30/19 Left Antecubital-Site Assessment: Clean, dry, & intact Peripheral IV 10/04/19 Anterior; Left Forearm-Site Assessment: Clean, dry, & intact Peripheral IV 10/07/19 Posterior;Right Forearm-Site Assessment: Clean, dry, & intact Vacuum Assisted Closure Left Foot-Site Assessment: Clean, dry, & intact VTE Prophylaxis Sequential/Intermittent Compression Device: Bilateral 
       
     Patient Refused VTE Prophylaxis: Yes Intake and Output:  
10/07 0701 - 10/08 1900 In: 350 [P.O.:100; I.V.:250] Out: 300 [Urine:300] No intake/output data recorded. Current Diet: DIET NUTRITIONAL SUPPLEMENTS Lunch; GLUCERNA SHAKE 
DIET DIABETIC CONSISTENT CARB Regular Abdominal  
Last Bowel Movement Date: 09/30/19 Abdominal Assessment: Constipation Appetite: Fair GI Prophylaxis: no 
      Type:        
Recent Glucose Results:  
Lab Results Component Value Date/Time GLU 65 (L) 10/08/2019 03:07 AM  
 GLUCPOC 82 10/08/2019 03:54 PM  
 GLUCPOC 305 (H) 10/08/2019 12:15 PM  
 GLUCPOC 82 10/08/2019 07:53 AM  
 
 
  
IV Antibiotics? yes When started: 09/30/19 Activity Level: Activity Level: Bed Rest, Up with Assistance Needs assistance with ADLs: yes PT Consult Status: has order Current Immunizations: 
Immunization History Administered Date(s) Administered  Influenza Vaccine (Quad) PF 09/15/2015, 11/15/2016, 01/25/2019  Influenza Vaccine Whole 01/22/2011  Tdap 09/30/2019 Recommendations:  
Discharge Disposition: Home with home health PT Needs for Discharge: wound vac, iv antibiotics by picc line Recommendations from IDR team: continue iv antibiotics, Other Notes:

## 2019-10-10 ENCOUNTER — HOME CARE VISIT (OUTPATIENT)
Dept: HOME HEALTH SERVICES | Facility: HOME HEALTH | Age: 59
End: 2019-10-10
Payer: MEDICAID

## 2019-10-10 ENCOUNTER — HOME CARE VISIT (OUTPATIENT)
Dept: SCHEDULING | Facility: HOME HEALTH | Age: 59
End: 2019-10-10
Payer: MEDICAID

## 2019-10-10 VITALS
HEART RATE: 72 BPM | RESPIRATION RATE: 16 BRPM | OXYGEN SATURATION: 97 % | DIASTOLIC BLOOD PRESSURE: 80 MMHG | SYSTOLIC BLOOD PRESSURE: 139 MMHG | TEMPERATURE: 97.7 F

## 2019-10-10 PROCEDURE — G0299 HHS/HOSPICE OF RN EA 15 MIN: HCPCS

## 2019-10-10 PROCEDURE — 400013 HH SOC

## 2019-10-11 ENCOUNTER — HOME CARE VISIT (OUTPATIENT)
Dept: SCHEDULING | Facility: HOME HEALTH | Age: 59
End: 2019-10-11
Payer: MEDICAID

## 2019-10-11 ENCOUNTER — HOME CARE VISIT (OUTPATIENT)
Dept: HOME HEALTH SERVICES | Facility: HOME HEALTH | Age: 59
End: 2019-10-11
Payer: MEDICAID

## 2019-10-11 VITALS
DIASTOLIC BLOOD PRESSURE: 80 MMHG | TEMPERATURE: 97.7 F | HEART RATE: 79 BPM | SYSTOLIC BLOOD PRESSURE: 110 MMHG | RESPIRATION RATE: 16 BRPM

## 2019-10-11 LAB
BACTERIA SPEC ANAEROBE CULT: NORMAL
BACTERIA SPEC ANAEROBE CULT: NORMAL
SPECIMEN SOURCE: NORMAL
SPECIMEN SOURCE: NORMAL

## 2019-10-11 PROCEDURE — G0151 HHCP-SERV OF PT,EA 15 MIN: HCPCS

## 2019-10-11 PROCEDURE — G0300 HHS/HOSPICE OF LPN EA 15 MIN: HCPCS

## 2019-10-12 LAB
BACTERIA SPEC CULT: NORMAL
BACTERIA SPEC CULT: NORMAL
GRAM STN SPEC: NORMAL
SERVICE CMNT-IMP: NORMAL
SERVICE CMNT-IMP: NORMAL

## 2019-10-13 PROCEDURE — A4452 WATERPROOF TAPE: HCPCS

## 2019-10-13 PROCEDURE — A4216 STERILE WATER/SALINE, 10 ML: HCPCS

## 2019-10-13 PROCEDURE — A4927 NON-STERILE GLOVES: HCPCS

## 2019-10-13 PROCEDURE — A6260 WOUND CLEANSER ANY TYPE/SIZE: HCPCS

## 2019-10-13 PROCEDURE — A6216 NON-STERILE GAUZE<=16 SQ IN: HCPCS

## 2019-10-13 PROCEDURE — A9270 NON-COVERED ITEM OR SERVICE: HCPCS

## 2019-10-13 PROCEDURE — A5120 SKIN BARRIER, WIPE OR SWAB: HCPCS

## 2019-10-14 ENCOUNTER — HOME CARE VISIT (OUTPATIENT)
Dept: SCHEDULING | Facility: HOME HEALTH | Age: 59
End: 2019-10-14
Payer: MEDICAID

## 2019-10-14 ENCOUNTER — HOSPITAL ENCOUNTER (OUTPATIENT)
Dept: LAB | Age: 59
Discharge: HOME | End: 2019-10-14
Payer: MEDICAID

## 2019-10-14 LAB
ALBUMIN SERPL-MCNC: 3 G/DL (ref 3.4–5)
ALBUMIN/GLOB SERPL: 0.8 {RATIO} (ref 0.8–1.7)
ALP SERPL-CCNC: 162 U/L (ref 45–117)
ALT SERPL-CCNC: 21 U/L (ref 13–56)
AST SERPL-CCNC: 14 U/L (ref 10–38)
BASOPHILS # BLD: 0 K/UL (ref 0–0.06)
BASOPHILS NFR BLD: 0 % (ref 0–3)
BILIRUB DIRECT SERPL-MCNC: <0.1 MG/DL (ref 0–0.2)
BILIRUB SERPL-MCNC: 0.2 MG/DL (ref 0.2–1)
BUN SERPL-MCNC: 19 MG/DL (ref 7–18)
CK SERPL-CCNC: 50 U/L (ref 26–192)
CREAT SERPL-MCNC: 1.18 MG/DL (ref 0.6–1.3)
CRP SERPL-MCNC: 1 MG/DL (ref 0–0.3)
DIFFERENTIAL METHOD BLD: ABNORMAL
EOSINOPHIL # BLD: 0.1 K/UL (ref 0–0.4)
EOSINOPHIL NFR BLD: 3 % (ref 0–5)
ERYTHROCYTE [DISTWIDTH] IN BLOOD BY AUTOMATED COUNT: 14.1 % (ref 11.6–14.5)
GLOBULIN SER CALC-MCNC: 3.8 G/DL (ref 2–4)
HCT VFR BLD AUTO: 53.8 % (ref 35–45)
HGB BLD-MCNC: 18.7 G/DL (ref 12–16)
LYMPHOCYTES # BLD: 1 K/UL (ref 0.8–3.5)
LYMPHOCYTES NFR BLD: 24 % (ref 20–51)
MCH RBC QN AUTO: 29.8 PG (ref 24–34)
MCHC RBC AUTO-ENTMCNC: 34.8 G/DL (ref 31–37)
MCV RBC AUTO: 85.7 FL (ref 74–97)
MONOCYTES # BLD: 0.4 K/UL (ref 0–1)
MONOCYTES NFR BLD: 9 % (ref 2–9)
NEUTS BAND NFR BLD MANUAL: 4 % (ref 0–5)
NEUTS SEG # BLD: 2.7 K/UL (ref 1.8–8)
NEUTS SEG NFR BLD: 60 % (ref 42–75)
PLATELET # BLD AUTO: 147 K/UL (ref 135–420)
PLATELET COMMENTS,PCOM: ABNORMAL
PROT SERPL-MCNC: 6.8 G/DL (ref 6.4–8.2)
RBC # BLD AUTO: 6.28 M/UL (ref 4.2–5.3)
RBC MORPH BLD: ABNORMAL
WBC # BLD AUTO: 4.2 K/UL (ref 4.6–13.2)

## 2019-10-14 PROCEDURE — G0299 HHS/HOSPICE OF RN EA 15 MIN: HCPCS

## 2019-10-14 PROCEDURE — 84520 ASSAY OF UREA NITROGEN: CPT

## 2019-10-14 PROCEDURE — 85025 COMPLETE CBC W/AUTO DIFF WBC: CPT

## 2019-10-14 PROCEDURE — 86140 C-REACTIVE PROTEIN: CPT

## 2019-10-14 PROCEDURE — 82550 ASSAY OF CK (CPK): CPT

## 2019-10-14 PROCEDURE — 80076 HEPATIC FUNCTION PANEL: CPT

## 2019-10-14 PROCEDURE — 82565 ASSAY OF CREATININE: CPT

## 2019-10-15 ENCOUNTER — HOME CARE VISIT (OUTPATIENT)
Dept: SCHEDULING | Facility: HOME HEALTH | Age: 59
End: 2019-10-15
Payer: MEDICAID

## 2019-10-15 VITALS
SYSTOLIC BLOOD PRESSURE: 120 MMHG | OXYGEN SATURATION: 97 % | TEMPERATURE: 97.3 F | DIASTOLIC BLOOD PRESSURE: 79 MMHG | HEART RATE: 68 BPM

## 2019-10-15 VITALS
DIASTOLIC BLOOD PRESSURE: 60 MMHG | SYSTOLIC BLOOD PRESSURE: 100 MMHG | HEART RATE: 60 BPM | TEMPERATURE: 97.1 F | OXYGEN SATURATION: 100 %

## 2019-10-15 PROCEDURE — A4452 WATERPROOF TAPE: HCPCS

## 2019-10-15 PROCEDURE — A6216 NON-STERILE GAUZE<=16 SQ IN: HCPCS

## 2019-10-15 PROCEDURE — A4216 STERILE WATER/SALINE, 10 ML: HCPCS

## 2019-10-15 PROCEDURE — A6260 WOUND CLEANSER ANY TYPE/SIZE: HCPCS

## 2019-10-15 PROCEDURE — A5120 SKIN BARRIER, WIPE OR SWAB: HCPCS

## 2019-10-15 PROCEDURE — A9270 NON-COVERED ITEM OR SERVICE: HCPCS

## 2019-10-15 PROCEDURE — G0299 HHS/HOSPICE OF RN EA 15 MIN: HCPCS

## 2019-10-15 PROCEDURE — G0157 HHC PT ASSISTANT EA 15: HCPCS

## 2019-10-16 VITALS
DIASTOLIC BLOOD PRESSURE: 78 MMHG | SYSTOLIC BLOOD PRESSURE: 122 MMHG | HEART RATE: 88 BPM | HEART RATE: 88 BPM | SYSTOLIC BLOOD PRESSURE: 122 MMHG | DIASTOLIC BLOOD PRESSURE: 80 MMHG | RESPIRATION RATE: 18 BRPM | TEMPERATURE: 98.4 F | RESPIRATION RATE: 20 BRPM | TEMPERATURE: 98.2 F | OXYGEN SATURATION: 98 % | OXYGEN SATURATION: 97 %

## 2019-10-17 ENCOUNTER — HOME CARE VISIT (OUTPATIENT)
Dept: SCHEDULING | Facility: HOME HEALTH | Age: 59
End: 2019-10-17
Payer: MEDICAID

## 2019-10-17 ENCOUNTER — HOME CARE VISIT (OUTPATIENT)
Dept: HOME HEALTH SERVICES | Facility: HOME HEALTH | Age: 59
End: 2019-10-17
Payer: MEDICAID

## 2019-10-17 VITALS
DIASTOLIC BLOOD PRESSURE: 63 MMHG | TEMPERATURE: 97 F | SYSTOLIC BLOOD PRESSURE: 118 MMHG | OXYGEN SATURATION: 97 % | HEART RATE: 70 BPM

## 2019-10-17 PROCEDURE — G0157 HHC PT ASSISTANT EA 15: HCPCS

## 2019-10-18 ENCOUNTER — HOME CARE VISIT (OUTPATIENT)
Dept: SCHEDULING | Facility: HOME HEALTH | Age: 59
End: 2019-10-18
Payer: MEDICAID

## 2019-10-18 ENCOUNTER — HOME CARE VISIT (OUTPATIENT)
Dept: HOME HEALTH SERVICES | Facility: HOME HEALTH | Age: 59
End: 2019-10-18
Payer: MEDICAID

## 2019-10-18 PROCEDURE — G0300 HHS/HOSPICE OF LPN EA 15 MIN: HCPCS

## 2019-10-18 PROCEDURE — G0152 HHCP-SERV OF OT,EA 15 MIN: HCPCS

## 2019-10-21 ENCOUNTER — HOME CARE VISIT (OUTPATIENT)
Dept: SCHEDULING | Facility: HOME HEALTH | Age: 59
End: 2019-10-21
Payer: MEDICAID

## 2019-10-21 VITALS
HEART RATE: 75 BPM | SYSTOLIC BLOOD PRESSURE: 136 MMHG | DIASTOLIC BLOOD PRESSURE: 77 MMHG | RESPIRATION RATE: 16 BRPM | OXYGEN SATURATION: 98 % | TEMPERATURE: 97.8 F

## 2019-10-21 DIAGNOSIS — E78.00 HIGH CHOLESTEROL: ICD-10-CM

## 2019-10-21 DIAGNOSIS — Z79.4 TYPE 2 DIABETES MELLITUS WITH DIABETIC NEUROPATHY, WITH LONG-TERM CURRENT USE OF INSULIN (HCC): Primary | ICD-10-CM

## 2019-10-21 DIAGNOSIS — E11.40 TYPE 2 DIABETES MELLITUS WITH DIABETIC NEUROPATHY, WITH LONG-TERM CURRENT USE OF INSULIN (HCC): Primary | ICD-10-CM

## 2019-10-21 LAB
BACTERIA SPEC CULT: ABNORMAL
BACTERIA SPEC CULT: ABNORMAL
BACTERIA SPEC CULT: NORMAL
SOURCE, RSRC56: ABNORMAL
SOURCE, RSRC56: NORMAL

## 2019-10-21 PROCEDURE — G0299 HHS/HOSPICE OF RN EA 15 MIN: HCPCS

## 2019-10-22 ENCOUNTER — HOME CARE VISIT (OUTPATIENT)
Dept: SCHEDULING | Facility: HOME HEALTH | Age: 59
End: 2019-10-22
Payer: MEDICAID

## 2019-10-22 VITALS
TEMPERATURE: 97.7 F | OXYGEN SATURATION: 97 % | DIASTOLIC BLOOD PRESSURE: 60 MMHG | SYSTOLIC BLOOD PRESSURE: 100 MMHG | HEART RATE: 72 BPM

## 2019-10-22 PROCEDURE — G0157 HHC PT ASSISTANT EA 15: HCPCS

## 2019-10-23 ENCOUNTER — HOME CARE VISIT (OUTPATIENT)
Dept: SCHEDULING | Facility: HOME HEALTH | Age: 59
End: 2019-10-23
Payer: MEDICAID

## 2019-10-23 ENCOUNTER — HOSPITAL ENCOUNTER (OUTPATIENT)
Dept: LAB | Age: 59
Discharge: HOME | End: 2019-10-23
Payer: MEDICAID

## 2019-10-23 VITALS
RESPIRATION RATE: 18 BRPM | DIASTOLIC BLOOD PRESSURE: 78 MMHG | SYSTOLIC BLOOD PRESSURE: 132 MMHG | OXYGEN SATURATION: 98 % | HEART RATE: 82 BPM | TEMPERATURE: 98 F

## 2019-10-23 LAB
ALBUMIN SERPL-MCNC: 2.4 G/DL (ref 3.4–5)
ALBUMIN/GLOB SERPL: 0.9 {RATIO} (ref 0.8–1.7)
ALP SERPL-CCNC: 78 U/L (ref 45–117)
ALT SERPL-CCNC: 15 U/L (ref 13–56)
AST SERPL-CCNC: 19 U/L (ref 10–38)
BASOPHILS # BLD: 0.1 K/UL (ref 0–0.1)
BASOPHILS NFR BLD: 2 % (ref 0–2)
BILIRUB DIRECT SERPL-MCNC: <0.1 MG/DL (ref 0–0.2)
BILIRUB SERPL-MCNC: 0.2 MG/DL (ref 0.2–1)
BUN SERPL-MCNC: 12 MG/DL (ref 7–18)
DIFFERENTIAL METHOD BLD: ABNORMAL
EOSINOPHIL # BLD: 0.4 K/UL (ref 0–0.4)
EOSINOPHIL NFR BLD: 7 % (ref 0–5)
ERYTHROCYTE [DISTWIDTH] IN BLOOD BY AUTOMATED COUNT: 14.2 % (ref 11.6–14.5)
GLOBULIN SER CALC-MCNC: 2.7 G/DL (ref 2–4)
HCT VFR BLD AUTO: 26.7 % (ref 35–45)
HGB BLD-MCNC: 8.8 G/DL (ref 12–16)
LYMPHOCYTES # BLD: 1.7 K/UL (ref 0.9–3.6)
LYMPHOCYTES NFR BLD: 33 % (ref 21–52)
MCH RBC QN AUTO: 28.3 PG (ref 24–34)
MCHC RBC AUTO-ENTMCNC: 33 G/DL (ref 31–37)
MCV RBC AUTO: 85.9 FL (ref 74–97)
MONOCYTES # BLD: 0.4 K/UL (ref 0.05–1.2)
MONOCYTES NFR BLD: 8 % (ref 3–10)
NEUTS SEG # BLD: 2.6 K/UL (ref 1.8–8)
NEUTS SEG NFR BLD: 50 % (ref 40–73)
PLATELET # BLD AUTO: 267 K/UL (ref 135–420)
PMV BLD AUTO: 11.1 FL (ref 9.2–11.8)
PROT SERPL-MCNC: 5.1 G/DL (ref 6.4–8.2)
RBC # BLD AUTO: 3.11 M/UL (ref 4.2–5.3)
WBC # BLD AUTO: 5.2 K/UL (ref 4.6–13.2)

## 2019-10-23 PROCEDURE — 83036 HEMOGLOBIN GLYCOSYLATED A1C: CPT

## 2019-10-23 PROCEDURE — 85025 COMPLETE CBC W/AUTO DIFF WBC: CPT

## 2019-10-23 PROCEDURE — 80076 HEPATIC FUNCTION PANEL: CPT

## 2019-10-23 PROCEDURE — 36415 COLL VENOUS BLD VENIPUNCTURE: CPT

## 2019-10-23 PROCEDURE — G0158 HHC OT ASSISTANT EA 15: HCPCS

## 2019-10-23 PROCEDURE — 84520 ASSAY OF UREA NITROGEN: CPT

## 2019-10-23 PROCEDURE — G0300 HHS/HOSPICE OF LPN EA 15 MIN: HCPCS

## 2019-10-24 ENCOUNTER — HOME CARE VISIT (OUTPATIENT)
Dept: SCHEDULING | Facility: HOME HEALTH | Age: 59
End: 2019-10-24
Payer: MEDICAID

## 2019-10-24 VITALS
DIASTOLIC BLOOD PRESSURE: 80 MMHG | TEMPERATURE: 97.9 F | SYSTOLIC BLOOD PRESSURE: 130 MMHG | HEART RATE: 69 BPM | OXYGEN SATURATION: 97 %

## 2019-10-24 PROCEDURE — G0151 HHCP-SERV OF PT,EA 15 MIN: HCPCS

## 2019-10-25 ENCOUNTER — HOME CARE VISIT (OUTPATIENT)
Dept: HOME HEALTH SERVICES | Facility: HOME HEALTH | Age: 59
End: 2019-10-25
Payer: MEDICAID

## 2019-10-25 ENCOUNTER — HOME CARE VISIT (OUTPATIENT)
Dept: SCHEDULING | Facility: HOME HEALTH | Age: 59
End: 2019-10-25
Payer: MEDICAID

## 2019-10-25 VITALS
DIASTOLIC BLOOD PRESSURE: 78 MMHG | RESPIRATION RATE: 16 BRPM | OXYGEN SATURATION: 98 % | SYSTOLIC BLOOD PRESSURE: 132 MMHG | HEART RATE: 87 BPM | TEMPERATURE: 98.7 F

## 2019-10-25 PROCEDURE — G0300 HHS/HOSPICE OF LPN EA 15 MIN: HCPCS

## 2019-10-25 PROCEDURE — G0158 HHC OT ASSISTANT EA 15: HCPCS

## 2019-10-26 ENCOUNTER — HOME CARE VISIT (OUTPATIENT)
Dept: SCHEDULING | Facility: HOME HEALTH | Age: 59
End: 2019-10-26
Payer: MEDICAID

## 2019-10-26 PROCEDURE — G0299 HHS/HOSPICE OF RN EA 15 MIN: HCPCS

## 2019-10-28 ENCOUNTER — HOME CARE VISIT (OUTPATIENT)
Dept: SCHEDULING | Facility: HOME HEALTH | Age: 59
End: 2019-10-28
Payer: MEDICAID

## 2019-10-28 ENCOUNTER — HOSPITAL ENCOUNTER (OUTPATIENT)
Dept: LAB | Age: 59
Discharge: HOME | End: 2019-10-28
Payer: MEDICAID

## 2019-10-28 ENCOUNTER — HOME CARE VISIT (OUTPATIENT)
Dept: HOME HEALTH SERVICES | Facility: HOME HEALTH | Age: 59
End: 2019-10-28
Payer: MEDICAID

## 2019-10-28 VITALS
SYSTOLIC BLOOD PRESSURE: 134 MMHG | OXYGEN SATURATION: 97 % | OXYGEN SATURATION: 97 % | HEART RATE: 77 BPM | SYSTOLIC BLOOD PRESSURE: 113 MMHG | SYSTOLIC BLOOD PRESSURE: 110 MMHG | TEMPERATURE: 97.4 F | DIASTOLIC BLOOD PRESSURE: 86 MMHG | TEMPERATURE: 98.2 F | HEART RATE: 80 BPM | DIASTOLIC BLOOD PRESSURE: 72 MMHG | HEART RATE: 78 BPM | TEMPERATURE: 97.1 F | OXYGEN SATURATION: 98 % | DIASTOLIC BLOOD PRESSURE: 59 MMHG

## 2019-10-28 VITALS
RESPIRATION RATE: 18 BRPM | HEART RATE: 82 BPM | OXYGEN SATURATION: 98 % | DIASTOLIC BLOOD PRESSURE: 64 MMHG | TEMPERATURE: 99.2 F | SYSTOLIC BLOOD PRESSURE: 118 MMHG

## 2019-10-28 LAB
ALBUMIN SERPL-MCNC: 3.5 G/DL (ref 3.4–5)
ALBUMIN/GLOB SERPL: 0.9 {RATIO} (ref 0.8–1.7)
ALP SERPL-CCNC: 117 U/L (ref 45–117)
ALT SERPL-CCNC: 24 U/L (ref 13–56)
ANION GAP SERPL CALC-SCNC: 6 MMOL/L (ref 3–18)
AST SERPL-CCNC: 14 U/L (ref 10–38)
BILIRUB SERPL-MCNC: 0.3 MG/DL (ref 0.2–1)
BUN SERPL-MCNC: 20 MG/DL (ref 7–18)
BUN/CREAT SERPL: 16 (ref 12–20)
CALCIUM SERPL-MCNC: 9 MG/DL (ref 8.5–10.1)
CHLORIDE SERPL-SCNC: 101 MMOL/L (ref 100–111)
CHOLEST SERPL-MCNC: 164 MG/DL
CO2 SERPL-SCNC: 26 MMOL/L (ref 21–32)
CREAT SERPL-MCNC: 1.28 MG/DL (ref 0.6–1.3)
EST. AVERAGE GLUCOSE BLD GHB EST-MCNC: 266 MG/DL
EST. AVERAGE GLUCOSE BLD GHB EST-MCNC: 278 MG/DL
GLOBULIN SER CALC-MCNC: 3.9 G/DL (ref 2–4)
GLUCOSE SERPL-MCNC: 287 MG/DL (ref 74–99)
HBA1C MFR BLD: 10.9 % (ref 4.2–5.6)
HBA1C MFR BLD: 11.3 % (ref 4.2–5.6)
HDLC SERPL-MCNC: 48 MG/DL (ref 40–60)
HDLC SERPL: 3.4 {RATIO} (ref 0–5)
LDLC SERPL CALC-MCNC: 90 MG/DL (ref 0–100)
LIPID PROFILE,FLP: NORMAL
POTASSIUM SERPL-SCNC: 5.1 MMOL/L (ref 3.5–5.5)
PROT SERPL-MCNC: 7.4 G/DL (ref 6.4–8.2)
SODIUM SERPL-SCNC: 133 MMOL/L (ref 136–145)
TRIGL SERPL-MCNC: 130 MG/DL (ref ?–150)
VLDLC SERPL CALC-MCNC: 26 MG/DL

## 2019-10-28 PROCEDURE — 80053 COMPREHEN METABOLIC PANEL: CPT

## 2019-10-28 PROCEDURE — 80061 LIPID PANEL: CPT

## 2019-10-28 PROCEDURE — 83036 HEMOGLOBIN GLYCOSYLATED A1C: CPT

## 2019-10-28 PROCEDURE — G0299 HHS/HOSPICE OF RN EA 15 MIN: HCPCS

## 2019-10-28 PROCEDURE — G0152 HHCP-SERV OF OT,EA 15 MIN: HCPCS

## 2019-10-29 ENCOUNTER — HOME CARE VISIT (OUTPATIENT)
Dept: SCHEDULING | Facility: HOME HEALTH | Age: 59
End: 2019-10-29
Payer: MEDICAID

## 2019-10-29 VITALS
TEMPERATURE: 97.6 F | SYSTOLIC BLOOD PRESSURE: 148 MMHG | HEART RATE: 78 BPM | OXYGEN SATURATION: 96 % | DIASTOLIC BLOOD PRESSURE: 78 MMHG | RESPIRATION RATE: 20 BRPM

## 2019-10-29 PROCEDURE — G0299 HHS/HOSPICE OF RN EA 15 MIN: HCPCS

## 2019-10-30 ENCOUNTER — OFFICE VISIT (OUTPATIENT)
Dept: FAMILY MEDICINE CLINIC | Age: 59
End: 2019-10-30

## 2019-10-30 ENCOUNTER — HOME CARE VISIT (OUTPATIENT)
Dept: SCHEDULING | Facility: HOME HEALTH | Age: 59
End: 2019-10-30
Payer: MEDICAID

## 2019-10-30 VITALS
SYSTOLIC BLOOD PRESSURE: 129 MMHG | BODY MASS INDEX: 23.04 KG/M2 | OXYGEN SATURATION: 98 % | DIASTOLIC BLOOD PRESSURE: 78 MMHG | TEMPERATURE: 98.1 F | WEIGHT: 130 LBS | HEIGHT: 63 IN | HEART RATE: 84 BPM | RESPIRATION RATE: 20 BRPM

## 2019-10-30 DIAGNOSIS — L97.526 DIABETIC ULCER OF TOE OF LEFT FOOT ASSOCIATED WITH TYPE 2 DIABETES MELLITUS, WITH BONE INVOLVEMENT WITHOUT EVIDENCE OF NECROSIS (HCC): ICD-10-CM

## 2019-10-30 DIAGNOSIS — I10 ESSENTIAL HYPERTENSION: ICD-10-CM

## 2019-10-30 DIAGNOSIS — E78.00 HIGH CHOLESTEROL: ICD-10-CM

## 2019-10-30 DIAGNOSIS — E11.621 DIABETIC ULCER OF TOE OF LEFT FOOT ASSOCIATED WITH TYPE 2 DIABETES MELLITUS, WITH BONE INVOLVEMENT WITHOUT EVIDENCE OF NECROSIS (HCC): ICD-10-CM

## 2019-10-30 DIAGNOSIS — Z79.4 TYPE 2 DIABETES MELLITUS WITH DIABETIC NEUROPATHY, WITH LONG-TERM CURRENT USE OF INSULIN (HCC): Primary | ICD-10-CM

## 2019-10-30 DIAGNOSIS — E11.40 TYPE 2 DIABETES MELLITUS WITH DIABETIC NEUROPATHY, WITH LONG-TERM CURRENT USE OF INSULIN (HCC): Primary | ICD-10-CM

## 2019-10-30 DIAGNOSIS — Z23 ENCOUNTER FOR IMMUNIZATION: ICD-10-CM

## 2019-10-30 LAB
BACTERIA SPEC CULT: NORMAL
SOURCE, RSRC56: NORMAL

## 2019-10-30 RX ORDER — INSULIN GLARGINE 100 [IU]/ML
INJECTION, SOLUTION SUBCUTANEOUS
Qty: 15 ML | Refills: 2 | Status: SHIPPED | OUTPATIENT
Start: 2019-10-30 | End: 2020-02-07 | Stop reason: SDUPTHER

## 2019-10-30 RX ORDER — PEN NEEDLE, DIABETIC 31 GX3/16"
NEEDLE, DISPOSABLE MISCELLANEOUS
Qty: 50 PEN NEEDLE | Refills: 3 | Status: SHIPPED | OUTPATIENT
Start: 2019-10-30

## 2019-10-30 NOTE — PROGRESS NOTES
Patient is in the office today for 3 month follow up. 1. Have you been to the ER, urgent care clinic since your last visit? Hospitalized since your last visit? No    2. Have you seen or consulted any other health care providers outside of the [facility] since your last visit? Include any pap smears or colon screening.  No

## 2019-10-30 NOTE — PROGRESS NOTES
Ari Eisenberg is a 60 y.o.  female and presents with Diabetes; Cholesterol Problem; Hypertension; and Foot Ulcer      SUBJECTIVE:    Patient presents for hospital follow-up. Patient was admitted to [facility] on 9/30/2019 and discharged on 10/9/2019. Patient has a long history of uncontrolled diabetes and was admitted for cellulitis and abscess of left foot. She underwent debridement on 10/2/2019 and is status post amputation of second toe and debridement on 10/7/2019. Patient had minimal peripheral artery disease. She continues to smoke tobacco.  Patient had initially stepped on a refugio nail. Patient was followed by Dr. Beata Maguire. Patient was told by our vascular just to continue aspirin daily and of course she needs to get her diabetes under better control. Patient was placed on a PICC line and will continue daptomycin until November 22, 2019. Patient is back home with home health. She is getting PT and OT and also has a wound VAC. Reference to her diabetes she was on NovoLog 70/30 and taken about 20 units twice daily however she had episodes of hypoglycemia for which relative who is helping her would have to have her missed doses of insulin. Patient and her caregiver are reluctant to do insulin pump at this time so plan will be to place on Lantus insulin and try 20 units daily and if needed increase to twice daily. Patient continues on Pravachol 40 mg nightly for her hypercholesterolemia. She also continues on lisinopril 10 mg daily for hypertension. Respiratory ROS: negative for - shortness of breath  Cardiovascular ROS: negative for - chest pain    Current Outpatient Medications   Medication Sig    insulin glargine (LANTUS,BASAGLAR) 100 unit/mL (3 mL) inpn 20 units QAM    Insulin Needles, Disposable, (RAFITA PEN NEEDLE) 32 gauge x 5/32\" ndle Inject 1x/day    sodium chloride (NORMAL SALINE FLUSH) 10 mL by IntraVENous route daily.  flush IV before and after medication administration and daily to keep line open    heparin sod,porcine/0.9 % NaCl (HEPARIN FLUSH IV) 3 mL by IntraVENous route daily. flush both ports of the PICC line daily    DAPTOmycin (CUBICIN) IVPB 354 mg by IntraVENous route every twenty-four (24) hours for 44 days.  lactobacillus sp. 50 billion cpu (BIO-K PLUS) 50 billion cell -375 mg cap capsule Take 1 Cap by mouth daily for 46 days.  hydrOXYzine HCl (ATARAX) 25 mg tablet TAKE 1 TABLET BY MOUTH THREE TIMES DAILY AS NEEDED FOR ITCHING    pravastatin (PRAVACHOL) 40 mg tablet TAKE ONE TABLET BY MOUTH EVERY NIGHT    metFORMIN (GLUCOPHAGE) 1,000 mg tablet TAKE ONE TABLET BY MOUTH TWICE DAILY WITH MEALS    gabapentin (NEURONTIN) 300 mg capsule Take 1 Cap by mouth three (3) times daily.  montelukast (SINGULAIR) 10 mg tablet Take 1 Tab by mouth daily.  ergocalciferol (ERGOCALCIFEROL) 50,000 unit capsule Take 1 Cap by mouth every seven (7) days.  lisinopril (PRINIVIL, ZESTRIL) 10 mg tablet TAKE 1 TABLET BY MOUTH ONCE DAILY    Insulin Syringe-Needle U-100 1/2 mL 30 gauge x 5/16 syrg INJECT TWICE A DAY    Blood-Glucose Meter monitoring kit Check BS 3x/day E11.65    Lancets misc Check BS 3x/day E11.65    glucose blood VI test strips (ONETOUCH ULTRA TEST) strip Check BS 3x/day E11.65    acetaminophen (TYLENOL) 325 mg tablet Take 2 Tabs by mouth every four (4) hours as needed for Pain.  polyethylene glycol (MIRALAX) 17 gram packet Take 1 Packet by mouth daily. Indications: hold it for loose BMs    colchicine 0.6 mg tablet Take 1 tablet by mouth Q1Hour for gout pain. NOT TO EXCEED 3 TABLETS IN 24 HOURS. (Patient not taking: Reported on 10/12/2019)    lidocaine (LIDODERM) 5 % Apply patch to the affected area for 12 hours a day and remove for 12 hours a day. (Patient taking differently: 1 Patch by TransDERmal route every twenty-four (24) hours.  Apply patch to the affected area (back)  for 12 hours a day and remove for 12 hours a day.)     No current facility-administered medications for this visit. OBJECTIVE:  alert, well appearing, and in no distress  Visit Vitals  /78 (BP 1 Location: Left arm, BP Patient Position: Sitting)   Pulse 84   Temp 98.1 °F (36.7 °C) (Oral)   Resp 20   Ht 5' 3\" (1.6 m)   Wt 130 lb (59 kg)   LMP  (LMP Unknown)   SpO2 98%   BMI 23.03 kg/m²      well developed and well nourished  Chest - clear to auscultation, no wheezes, rales or rhonchi, symmetric air entry  Heart - normal rate, regular rhythm, normal S1, S2, no murmurs, rubs, clicks or gallops  Extremities - bandage on left foot with wound vac    Labs:   Lab Results   Component Value Date/Time    Sodium 133 (L) 10/28/2019 01:15 PM    Potassium 5.1 10/28/2019 01:15 PM    Chloride 101 10/28/2019 01:15 PM    CO2 26 10/28/2019 01:15 PM    Anion gap 6 10/28/2019 01:15 PM    Glucose 287 (H) 10/28/2019 01:15 PM    BUN 26 (H) 11/08/2019 12:20 PM    Creatinine 1.28 11/08/2019 12:20 PM    BUN/Creatinine ratio 16 10/28/2019 01:15 PM    GFR est AA 52 (L) 11/08/2019 12:20 PM    GFR est non-AA 43 (L) 11/08/2019 12:20 PM    Calcium 9.0 10/28/2019 01:15 PM    Bilirubin, total 0.3 10/28/2019 01:15 PM    ALT (SGPT) 24 10/28/2019 01:15 PM    AST (SGOT) 14 10/28/2019 01:15 PM    Alk. phosphatase 117 10/28/2019 01:15 PM    Protein, total 7.4 10/28/2019 01:15 PM    Albumin 3.5 10/28/2019 01:15 PM    Globulin 3.9 10/28/2019 01:15 PM    A-G Ratio 0.9 10/28/2019 01:15 PM      Lab Results   Component Value Date/Time    Hemoglobin A1c 10.9 (H) 10/28/2019 01:15 PM    Hemoglobin A1c (POC) 13.3 01/25/2019 03:26 PM        Assessment/Plan      ICD-10-CM ICD-9-CM    1. Type 2 diabetes mellitus with diabetic neuropathy, with long-term current use of insulin (HCC) E11.40 250.60  uncontrolled. Will try insulin glargine (LANTUS,BASAGLAR) 100 unit/mL (3 mL) inpn 20 units daily to try and avoid hypoglycemia. Patient reluctant to do insulin pump    Z79.4 357.2      V58.67    2.  High cholesterol E78.00 272.0  patient to continue on Pravachol   3. Essential hypertension I10 401.9  well-controlled on lisinopril   4. Diabetic ulcer of toe of left foot associated with type 2 diabetes mellitus, with bone involvement without evidence of necrosis ([facility] [address] E11.621 250.80  patient continues on IV antibiotics as well as wound VAC. She will follow-up with her podiatrist    L97.526 707.15    5. Encounter for immunization Z23 V03.89 INFLUENZA VIRUS VAC QUAD,SPLIT,PRESV FREE SYRINGE IM     Follow-up and Dispositions    · Return in about 4 weeks (around 11/27/2019) for DM. Reviewed plan of care. Patient has provided input and agrees with goals.

## 2019-10-31 ENCOUNTER — HOME CARE VISIT (OUTPATIENT)
Dept: SCHEDULING | Facility: HOME HEALTH | Age: 59
End: 2019-10-31
Payer: MEDICAID

## 2019-10-31 PROCEDURE — G0300 HHS/HOSPICE OF LPN EA 15 MIN: HCPCS

## 2019-11-02 ENCOUNTER — HOSPITAL ENCOUNTER (OUTPATIENT)
Dept: LAB | Age: 59
Discharge: HOME | End: 2019-11-02
Payer: MEDICAID

## 2019-11-02 ENCOUNTER — HOME CARE VISIT (OUTPATIENT)
Dept: SCHEDULING | Facility: HOME HEALTH | Age: 59
End: 2019-11-02
Payer: MEDICAID

## 2019-11-02 VITALS
TEMPERATURE: 98.2 F | HEART RATE: 88 BPM | OXYGEN SATURATION: 98 % | DIASTOLIC BLOOD PRESSURE: 72 MMHG | SYSTOLIC BLOOD PRESSURE: 138 MMHG | RESPIRATION RATE: 16 BRPM

## 2019-11-02 PROCEDURE — 85025 COMPLETE CBC W/AUTO DIFF WBC: CPT

## 2019-11-02 PROCEDURE — G0299 HHS/HOSPICE OF RN EA 15 MIN: HCPCS

## 2019-11-02 PROCEDURE — 84520 ASSAY OF UREA NITROGEN: CPT

## 2019-11-02 PROCEDURE — 82550 ASSAY OF CK (CPK): CPT

## 2019-11-02 PROCEDURE — 82565 ASSAY OF CREATININE: CPT

## 2019-11-03 VITALS
DIASTOLIC BLOOD PRESSURE: 82 MMHG | OXYGEN SATURATION: 98 % | SYSTOLIC BLOOD PRESSURE: 128 MMHG | TEMPERATURE: 97 F | HEART RATE: 78 BPM | RESPIRATION RATE: 20 BRPM

## 2019-11-04 ENCOUNTER — HOME CARE VISIT (OUTPATIENT)
Dept: SCHEDULING | Facility: HOME HEALTH | Age: 59
End: 2019-11-04
Payer: MEDICAID

## 2019-11-04 LAB
BASOPHILS # BLD: 0.1 K/UL (ref 0–0.1)
BASOPHILS NFR BLD: 1 % (ref 0–2)
BUN SERPL-MCNC: 16 MG/DL (ref 7–18)
CK SERPL-CCNC: 66 U/L (ref 26–192)
CREAT SERPL-MCNC: 1.08 MG/DL (ref 0.6–1.3)
DIFFERENTIAL METHOD BLD: ABNORMAL
EOSINOPHIL # BLD: 0.7 K/UL (ref 0–0.4)
EOSINOPHIL NFR BLD: 10 % (ref 0–5)
ERYTHROCYTE [DISTWIDTH] IN BLOOD BY AUTOMATED COUNT: 14.3 % (ref 11.6–14.5)
HCT VFR BLD AUTO: 34.5 % (ref 35–45)
HGB BLD-MCNC: 11.9 G/DL (ref 12–16)
LYMPHOCYTES # BLD: 1.9 K/UL (ref 0.9–3.6)
LYMPHOCYTES NFR BLD: 27 % (ref 21–52)
MCH RBC QN AUTO: 29.3 PG (ref 24–34)
MCHC RBC AUTO-ENTMCNC: 34.5 G/DL (ref 31–37)
MCV RBC AUTO: 85 FL (ref 74–97)
MONOCYTES # BLD: 0.5 K/UL (ref 0.05–1.2)
MONOCYTES NFR BLD: 8 % (ref 3–10)
NEUTS SEG # BLD: 3.8 K/UL (ref 1.8–8)
NEUTS SEG NFR BLD: 54 % (ref 40–73)
PLATELET # BLD AUTO: 280 K/UL (ref 135–420)
PMV BLD AUTO: 11.5 FL (ref 9.2–11.8)
RBC # BLD AUTO: 4.06 M/UL (ref 4.2–5.3)
WBC # BLD AUTO: 7 K/UL (ref 4.6–13.2)

## 2019-11-04 PROCEDURE — G0299 HHS/HOSPICE OF RN EA 15 MIN: HCPCS

## 2019-11-05 VITALS
TEMPERATURE: 98.4 F | SYSTOLIC BLOOD PRESSURE: 118 MMHG | DIASTOLIC BLOOD PRESSURE: 78 MMHG | OXYGEN SATURATION: 99 % | HEART RATE: 76 BPM

## 2019-11-06 ENCOUNTER — HOME CARE VISIT (OUTPATIENT)
Dept: SCHEDULING | Facility: HOME HEALTH | Age: 59
End: 2019-11-06
Payer: MEDICAID

## 2019-11-06 VITALS
SYSTOLIC BLOOD PRESSURE: 118 MMHG | RESPIRATION RATE: 20 BRPM | DIASTOLIC BLOOD PRESSURE: 78 MMHG | TEMPERATURE: 97 F | OXYGEN SATURATION: 98 % | HEART RATE: 86 BPM

## 2019-11-06 PROCEDURE — G0300 HHS/HOSPICE OF LPN EA 15 MIN: HCPCS

## 2019-11-08 ENCOUNTER — HOME CARE VISIT (OUTPATIENT)
Dept: SCHEDULING | Facility: HOME HEALTH | Age: 59
End: 2019-11-08
Payer: MEDICAID

## 2019-11-08 ENCOUNTER — HOSPITAL ENCOUNTER (OUTPATIENT)
Dept: LAB | Age: 59
Discharge: HOME | End: 2019-11-08
Payer: MEDICAID

## 2019-11-08 LAB
BASOPHILS # BLD: 0.1 K/UL (ref 0–0.1)
BASOPHILS NFR BLD: 1 % (ref 0–2)
BUN SERPL-MCNC: 26 MG/DL (ref 7–18)
CK SERPL-CCNC: 50 U/L (ref 26–192)
CREAT SERPL-MCNC: 1.28 MG/DL (ref 0.6–1.3)
CRP SERPL-MCNC: 0.4 MG/DL (ref 0–0.3)
DIFFERENTIAL METHOD BLD: ABNORMAL
EOSINOPHIL # BLD: 0.5 K/UL (ref 0–0.4)
EOSINOPHIL NFR BLD: 6 % (ref 0–5)
ERYTHROCYTE [DISTWIDTH] IN BLOOD BY AUTOMATED COUNT: 14.3 % (ref 11.6–14.5)
HCT VFR BLD AUTO: 35.4 % (ref 35–45)
HGB BLD-MCNC: 12 G/DL (ref 12–16)
LYMPHOCYTES # BLD: 2.4 K/UL (ref 0.9–3.6)
LYMPHOCYTES NFR BLD: 28 % (ref 21–52)
MCH RBC QN AUTO: 28.7 PG (ref 24–34)
MCHC RBC AUTO-ENTMCNC: 33.9 G/DL (ref 31–37)
MCV RBC AUTO: 84.7 FL (ref 74–97)
MONOCYTES # BLD: 0.7 K/UL (ref 0.05–1.2)
MONOCYTES NFR BLD: 9 % (ref 3–10)
NEUTS SEG # BLD: 4.8 K/UL (ref 1.8–8)
NEUTS SEG NFR BLD: 56 % (ref 40–73)
PLATELET # BLD AUTO: 313 K/UL (ref 135–420)
PMV BLD AUTO: 11.8 FL (ref 9.2–11.8)
RBC # BLD AUTO: 4.18 M/UL (ref 4.2–5.3)
WBC # BLD AUTO: 8.4 K/UL (ref 4.6–13.2)

## 2019-11-08 PROCEDURE — 82565 ASSAY OF CREATININE: CPT

## 2019-11-08 PROCEDURE — 85025 COMPLETE CBC W/AUTO DIFF WBC: CPT

## 2019-11-08 PROCEDURE — 36415 COLL VENOUS BLD VENIPUNCTURE: CPT

## 2019-11-08 PROCEDURE — 86140 C-REACTIVE PROTEIN: CPT

## 2019-11-08 PROCEDURE — 82550 ASSAY OF CK (CPK): CPT

## 2019-11-08 PROCEDURE — 84520 ASSAY OF UREA NITROGEN: CPT

## 2019-11-08 PROCEDURE — G0299 HHS/HOSPICE OF RN EA 15 MIN: HCPCS

## 2019-11-10 VITALS
SYSTOLIC BLOOD PRESSURE: 128 MMHG | OXYGEN SATURATION: 97 % | HEART RATE: 94 BPM | DIASTOLIC BLOOD PRESSURE: 78 MMHG | TEMPERATURE: 97 F | RESPIRATION RATE: 20 BRPM

## 2019-11-11 ENCOUNTER — HOME CARE VISIT (OUTPATIENT)
Dept: SCHEDULING | Facility: HOME HEALTH | Age: 59
End: 2019-11-11
Payer: MEDICAID

## 2019-11-11 VITALS
DIASTOLIC BLOOD PRESSURE: 78 MMHG | OXYGEN SATURATION: 100 % | TEMPERATURE: 98.2 F | SYSTOLIC BLOOD PRESSURE: 134 MMHG | HEART RATE: 65 BPM

## 2019-11-11 PROCEDURE — G0299 HHS/HOSPICE OF RN EA 15 MIN: HCPCS

## 2019-11-13 ENCOUNTER — HOME CARE VISIT (OUTPATIENT)
Dept: SCHEDULING | Facility: HOME HEALTH | Age: 59
End: 2019-11-13
Payer: MEDICAID

## 2019-11-13 VITALS
RESPIRATION RATE: 20 BRPM | DIASTOLIC BLOOD PRESSURE: 68 MMHG | SYSTOLIC BLOOD PRESSURE: 110 MMHG | TEMPERATURE: 97.6 F | HEART RATE: 78 BPM | OXYGEN SATURATION: 94 %

## 2019-11-13 PROCEDURE — G0300 HHS/HOSPICE OF LPN EA 15 MIN: HCPCS

## 2019-11-15 ENCOUNTER — HOME CARE VISIT (OUTPATIENT)
Dept: SCHEDULING | Facility: HOME HEALTH | Age: 59
End: 2019-11-15
Payer: MEDICAID

## 2019-11-15 VITALS
OXYGEN SATURATION: 97 % | DIASTOLIC BLOOD PRESSURE: 79 MMHG | TEMPERATURE: 97.8 F | SYSTOLIC BLOOD PRESSURE: 143 MMHG | HEART RATE: 77 BPM | RESPIRATION RATE: 20 BRPM

## 2019-11-15 DIAGNOSIS — G89.4 CHRONIC PAIN SYNDROME: Primary | ICD-10-CM

## 2019-11-15 PROCEDURE — G0299 HHS/HOSPICE OF RN EA 15 MIN: HCPCS

## 2019-11-15 RX ORDER — PRAVASTATIN SODIUM 40 MG/1
TABLET ORAL
Qty: 30 TAB | Refills: 3 | Status: SHIPPED | OUTPATIENT
Start: 2019-11-15 | End: 2020-02-07 | Stop reason: SDUPTHER

## 2019-11-15 RX ORDER — GABAPENTIN 300 MG/1
300 CAPSULE ORAL 3 TIMES DAILY
Qty: 90 CAP | Refills: 4 | Status: SHIPPED | OUTPATIENT
Start: 2019-11-15 | End: 2019-11-21 | Stop reason: SDUPTHER

## 2019-11-15 RX ORDER — LISINOPRIL 10 MG/1
TABLET ORAL
Qty: 30 TAB | Refills: 5 | Status: SHIPPED | OUTPATIENT
Start: 2019-11-15 | End: 2020-10-13 | Stop reason: DRUGHIGH

## 2019-11-17 VITALS
RESPIRATION RATE: 20 BRPM | DIASTOLIC BLOOD PRESSURE: 78 MMHG | TEMPERATURE: 97 F | HEART RATE: 66 BPM | SYSTOLIC BLOOD PRESSURE: 140 MMHG | OXYGEN SATURATION: 97 %

## 2019-11-18 ENCOUNTER — HOME CARE VISIT (OUTPATIENT)
Dept: SCHEDULING | Facility: HOME HEALTH | Age: 59
End: 2019-11-18
Payer: MEDICAID

## 2019-11-18 ENCOUNTER — HOSPITAL ENCOUNTER (OUTPATIENT)
Dept: LAB | Age: 59
Discharge: HOME | End: 2019-11-18
Payer: MEDICAID

## 2019-11-18 LAB
BASOPHILS # BLD: 0.1 K/UL (ref 0–0.1)
BASOPHILS NFR BLD: 1 % (ref 0–2)
BUN SERPL-MCNC: 17 MG/DL (ref 7–18)
CK SERPL-CCNC: 48 U/L (ref 26–192)
CREAT SERPL-MCNC: 1.25 MG/DL (ref 0.6–1.3)
CRP SERPL-MCNC: 0.6 MG/DL (ref 0–0.3)
DIFFERENTIAL METHOD BLD: ABNORMAL
EOSINOPHIL # BLD: 0.2 K/UL (ref 0–0.4)
EOSINOPHIL NFR BLD: 3 % (ref 0–5)
ERYTHROCYTE [DISTWIDTH] IN BLOOD BY AUTOMATED COUNT: 14.3 % (ref 11.6–14.5)
HCT VFR BLD AUTO: 34.8 % (ref 35–45)
HGB BLD-MCNC: 11.7 G/DL (ref 12–16)
LYMPHOCYTES # BLD: 1.7 K/UL (ref 0.9–3.6)
LYMPHOCYTES NFR BLD: 19 % (ref 21–52)
MCH RBC QN AUTO: 28.6 PG (ref 24–34)
MCHC RBC AUTO-ENTMCNC: 33.6 G/DL (ref 31–37)
MCV RBC AUTO: 85.1 FL (ref 74–97)
MONOCYTES # BLD: 0.8 K/UL (ref 0.05–1.2)
MONOCYTES NFR BLD: 9 % (ref 3–10)
NEUTS SEG # BLD: 6 K/UL (ref 1.8–8)
NEUTS SEG NFR BLD: 68 % (ref 40–73)
PLATELET # BLD AUTO: 411 K/UL (ref 135–420)
PMV BLD AUTO: 11.7 FL (ref 9.2–11.8)
RBC # BLD AUTO: 4.09 M/UL (ref 4.2–5.3)
WBC # BLD AUTO: 8.8 K/UL (ref 4.6–13.2)

## 2019-11-18 PROCEDURE — G0299 HHS/HOSPICE OF RN EA 15 MIN: HCPCS

## 2019-11-18 PROCEDURE — 86140 C-REACTIVE PROTEIN: CPT

## 2019-11-18 PROCEDURE — 36415 COLL VENOUS BLD VENIPUNCTURE: CPT

## 2019-11-18 PROCEDURE — 84520 ASSAY OF UREA NITROGEN: CPT

## 2019-11-18 PROCEDURE — 82550 ASSAY OF CK (CPK): CPT

## 2019-11-18 PROCEDURE — 85025 COMPLETE CBC W/AUTO DIFF WBC: CPT

## 2019-11-18 PROCEDURE — 82565 ASSAY OF CREATININE: CPT

## 2019-11-20 ENCOUNTER — HOME CARE VISIT (OUTPATIENT)
Dept: SCHEDULING | Facility: HOME HEALTH | Age: 59
End: 2019-11-20
Payer: MEDICAID

## 2019-11-20 PROCEDURE — G0300 HHS/HOSPICE OF LPN EA 15 MIN: HCPCS

## 2019-11-21 VITALS
DIASTOLIC BLOOD PRESSURE: 78 MMHG | HEART RATE: 78 BPM | TEMPERATURE: 97 F | SYSTOLIC BLOOD PRESSURE: 128 MMHG | OXYGEN SATURATION: 97 % | RESPIRATION RATE: 20 BRPM

## 2019-11-21 DIAGNOSIS — G89.4 CHRONIC PAIN SYNDROME: ICD-10-CM

## 2019-11-21 DIAGNOSIS — M25.551 PAIN OF RIGHT HIP JOINT: ICD-10-CM

## 2019-11-21 RX ORDER — TRAMADOL HYDROCHLORIDE 50 MG/1
50 TABLET ORAL
Qty: 100 TAB | Refills: 2 | Status: SHIPPED | OUTPATIENT
Start: 2019-11-21 | End: 2019-12-21

## 2019-11-21 RX ORDER — TRAMADOL HYDROCHLORIDE 50 MG/1
50 TABLET ORAL
Qty: 100 TAB | Refills: 3 | Status: CANCELLED | OUTPATIENT
Start: 2019-11-21 | End: 2019-12-21

## 2019-11-21 RX ORDER — GABAPENTIN 300 MG/1
300 CAPSULE ORAL 3 TIMES DAILY
Qty: 90 CAP | Refills: 4 | Status: SHIPPED | OUTPATIENT
Start: 2019-11-21 | End: 2020-05-15

## 2019-11-22 ENCOUNTER — HOME CARE VISIT (OUTPATIENT)
Dept: SCHEDULING | Facility: HOME HEALTH | Age: 59
End: 2019-11-22
Payer: MEDICAID

## 2019-11-22 VITALS
DIASTOLIC BLOOD PRESSURE: 82 MMHG | OXYGEN SATURATION: 97 % | TEMPERATURE: 98 F | RESPIRATION RATE: 20 BRPM | HEART RATE: 78 BPM | SYSTOLIC BLOOD PRESSURE: 128 MMHG

## 2019-11-22 PROCEDURE — G0299 HHS/HOSPICE OF RN EA 15 MIN: HCPCS

## 2019-11-25 ENCOUNTER — HOME CARE VISIT (OUTPATIENT)
Dept: SCHEDULING | Facility: HOME HEALTH | Age: 59
End: 2019-11-25
Payer: MEDICAID

## 2019-11-25 PROCEDURE — G0299 HHS/HOSPICE OF RN EA 15 MIN: HCPCS

## 2019-11-26 VITALS
TEMPERATURE: 97.9 F | OXYGEN SATURATION: 99 % | DIASTOLIC BLOOD PRESSURE: 84 MMHG | HEART RATE: 76 BPM | SYSTOLIC BLOOD PRESSURE: 118 MMHG

## 2019-11-27 ENCOUNTER — HOME CARE VISIT (OUTPATIENT)
Dept: SCHEDULING | Facility: HOME HEALTH | Age: 59
End: 2019-11-27
Payer: MEDICAID

## 2019-11-27 VITALS
TEMPERATURE: 97 F | OXYGEN SATURATION: 97 % | DIASTOLIC BLOOD PRESSURE: 72 MMHG | HEART RATE: 83 BPM | RESPIRATION RATE: 20 BRPM | SYSTOLIC BLOOD PRESSURE: 100 MMHG

## 2019-11-27 PROCEDURE — G0300 HHS/HOSPICE OF LPN EA 15 MIN: HCPCS

## 2019-11-29 ENCOUNTER — HOME CARE VISIT (OUTPATIENT)
Dept: HOME HEALTH SERVICES | Facility: HOME HEALTH | Age: 59
End: 2019-11-29
Payer: MEDICAID

## 2019-11-29 PROCEDURE — G0299 HHS/HOSPICE OF RN EA 15 MIN: HCPCS

## 2019-12-01 VITALS
OXYGEN SATURATION: 97 % | HEART RATE: 77 BPM | TEMPERATURE: 97.9 F | RESPIRATION RATE: 20 BRPM | SYSTOLIC BLOOD PRESSURE: 124 MMHG | DIASTOLIC BLOOD PRESSURE: 79 MMHG

## 2019-12-01 VITALS
SYSTOLIC BLOOD PRESSURE: 128 MMHG | OXYGEN SATURATION: 97 % | HEART RATE: 78 BPM | DIASTOLIC BLOOD PRESSURE: 80 MMHG | TEMPERATURE: 97 F | RESPIRATION RATE: 20 BRPM

## 2019-12-02 ENCOUNTER — HOME CARE VISIT (OUTPATIENT)
Dept: SCHEDULING | Facility: HOME HEALTH | Age: 59
End: 2019-12-02
Payer: MEDICAID

## 2019-12-02 PROCEDURE — G0299 HHS/HOSPICE OF RN EA 15 MIN: HCPCS

## 2019-12-03 VITALS
TEMPERATURE: 97 F | DIASTOLIC BLOOD PRESSURE: 86 MMHG | OXYGEN SATURATION: 98 % | SYSTOLIC BLOOD PRESSURE: 138 MMHG | HEART RATE: 76 BPM | RESPIRATION RATE: 20 BRPM

## 2019-12-04 ENCOUNTER — HOME CARE VISIT (OUTPATIENT)
Dept: SCHEDULING | Facility: HOME HEALTH | Age: 59
End: 2019-12-04
Payer: MEDICAID

## 2019-12-04 PROCEDURE — G0300 HHS/HOSPICE OF LPN EA 15 MIN: HCPCS

## 2019-12-06 ENCOUNTER — HOME CARE VISIT (OUTPATIENT)
Dept: SCHEDULING | Facility: HOME HEALTH | Age: 59
End: 2019-12-06
Payer: MEDICAID

## 2019-12-06 PROCEDURE — G0299 HHS/HOSPICE OF RN EA 15 MIN: HCPCS

## 2019-12-09 ENCOUNTER — HOME CARE VISIT (OUTPATIENT)
Dept: SCHEDULING | Facility: HOME HEALTH | Age: 59
End: 2019-12-09
Payer: MEDICAID

## 2019-12-09 VITALS
HEART RATE: 90 BPM | OXYGEN SATURATION: 98 % | SYSTOLIC BLOOD PRESSURE: 122 MMHG | DIASTOLIC BLOOD PRESSURE: 70 MMHG | TEMPERATURE: 97 F | RESPIRATION RATE: 20 BRPM

## 2019-12-09 PROCEDURE — G0300 HHS/HOSPICE OF LPN EA 15 MIN: HCPCS

## 2019-12-09 PROCEDURE — 400014 HH F/U

## 2019-12-10 VITALS
DIASTOLIC BLOOD PRESSURE: 68 MMHG | OXYGEN SATURATION: 98 % | SYSTOLIC BLOOD PRESSURE: 121 MMHG | HEART RATE: 85 BPM | TEMPERATURE: 97.4 F

## 2019-12-11 VITALS
TEMPERATURE: 98.2 F | OXYGEN SATURATION: 99 % | SYSTOLIC BLOOD PRESSURE: 115 MMHG | DIASTOLIC BLOOD PRESSURE: 70 MMHG | HEART RATE: 70 BPM

## 2019-12-11 PROCEDURE — A6197 ALGINATE DRSG >16 <=48 SQ IN: HCPCS

## 2019-12-11 PROCEDURE — A6446 CONFORM BAND S W>=3" <5"/YD: HCPCS

## 2019-12-11 PROCEDURE — A6216 NON-STERILE GAUZE<=16 SQ IN: HCPCS

## 2019-12-12 ENCOUNTER — HOME CARE VISIT (OUTPATIENT)
Dept: SCHEDULING | Facility: HOME HEALTH | Age: 59
End: 2019-12-12
Payer: MEDICAID

## 2019-12-12 PROCEDURE — G0300 HHS/HOSPICE OF LPN EA 15 MIN: HCPCS

## 2019-12-16 ENCOUNTER — HOME CARE VISIT (OUTPATIENT)
Dept: SCHEDULING | Facility: HOME HEALTH | Age: 59
End: 2019-12-16
Payer: MEDICAID

## 2019-12-16 VITALS
TEMPERATURE: 98.2 F | SYSTOLIC BLOOD PRESSURE: 136 MMHG | DIASTOLIC BLOOD PRESSURE: 82 MMHG | HEART RATE: 66 BPM | OXYGEN SATURATION: 97 %

## 2019-12-16 PROCEDURE — G0300 HHS/HOSPICE OF LPN EA 15 MIN: HCPCS

## 2019-12-17 VITALS
DIASTOLIC BLOOD PRESSURE: 86 MMHG | SYSTOLIC BLOOD PRESSURE: 120 MMHG | OXYGEN SATURATION: 99 % | TEMPERATURE: 98.1 F | HEART RATE: 74 BPM

## 2019-12-19 ENCOUNTER — HOME CARE VISIT (OUTPATIENT)
Dept: SCHEDULING | Facility: HOME HEALTH | Age: 59
End: 2019-12-19
Payer: MEDICAID

## 2019-12-19 PROCEDURE — G0300 HHS/HOSPICE OF LPN EA 15 MIN: HCPCS

## 2019-12-23 ENCOUNTER — HOME CARE VISIT (OUTPATIENT)
Dept: SCHEDULING | Facility: HOME HEALTH | Age: 59
End: 2019-12-23
Payer: MEDICAID

## 2019-12-23 VITALS
HEART RATE: 96 BPM | DIASTOLIC BLOOD PRESSURE: 61 MMHG | SYSTOLIC BLOOD PRESSURE: 112 MMHG | TEMPERATURE: 96.9 F | OXYGEN SATURATION: 98 %

## 2019-12-23 PROCEDURE — G0300 HHS/HOSPICE OF LPN EA 15 MIN: HCPCS

## 2019-12-24 ENCOUNTER — HOME CARE VISIT (OUTPATIENT)
Dept: HOME HEALTH SERVICES | Facility: HOME HEALTH | Age: 59
End: 2019-12-24
Payer: MEDICAID

## 2019-12-25 ENCOUNTER — HOME CARE VISIT (OUTPATIENT)
Dept: HOME HEALTH SERVICES | Facility: HOME HEALTH | Age: 59
End: 2019-12-25
Payer: MEDICAID

## 2019-12-27 VITALS
SYSTOLIC BLOOD PRESSURE: 120 MMHG | OXYGEN SATURATION: 96 % | DIASTOLIC BLOOD PRESSURE: 80 MMHG | TEMPERATURE: 98.3 F | HEART RATE: 89 BPM

## 2019-12-31 ENCOUNTER — HOME CARE VISIT (OUTPATIENT)
Dept: HOME HEALTH SERVICES | Facility: HOME HEALTH | Age: 59
End: 2019-12-31
Payer: MEDICAID

## 2020-01-06 ENCOUNTER — HOME CARE VISIT (OUTPATIENT)
Dept: SCHEDULING | Facility: HOME HEALTH | Age: 60
End: 2020-01-06
Payer: MEDICAID

## 2020-01-06 PROCEDURE — G0299 HHS/HOSPICE OF RN EA 15 MIN: HCPCS

## 2020-01-07 RX ORDER — MONTELUKAST SODIUM 10 MG/1
TABLET ORAL
Qty: 30 TAB | Refills: 4 | Status: SHIPPED | OUTPATIENT
Start: 2020-01-07 | End: 2020-06-05 | Stop reason: SDUPTHER

## 2020-01-07 RX ORDER — ERGOCALCIFEROL 1.25 MG/1
50000 CAPSULE ORAL
Qty: 4 CAP | Refills: 5 | Status: SHIPPED | OUTPATIENT
Start: 2020-01-07 | End: 2020-08-07 | Stop reason: SDUPTHER

## 2020-01-08 VITALS
HEART RATE: 76 BPM | TEMPERATURE: 97 F | OXYGEN SATURATION: 96 % | RESPIRATION RATE: 18 BRPM | SYSTOLIC BLOOD PRESSURE: 128 MMHG | DIASTOLIC BLOOD PRESSURE: 68 MMHG

## 2020-01-13 RX ORDER — METFORMIN HYDROCHLORIDE 1000 MG/1
1000 TABLET ORAL 2 TIMES DAILY WITH MEALS
Qty: 60 TAB | Refills: 5 | Status: SHIPPED | OUTPATIENT
Start: 2020-01-13 | End: 2020-07-07 | Stop reason: SDUPTHER

## 2020-02-07 ENCOUNTER — OFFICE VISIT (OUTPATIENT)
Dept: FAMILY MEDICINE CLINIC | Age: 60
End: 2020-02-07

## 2020-02-07 ENCOUNTER — HOSPITAL ENCOUNTER (OUTPATIENT)
Dept: LAB | Age: 60
Discharge: HOME | End: 2020-02-07
Payer: MEDICAID

## 2020-02-07 VITALS
SYSTOLIC BLOOD PRESSURE: 135 MMHG | WEIGHT: 130 LBS | OXYGEN SATURATION: 98 % | DIASTOLIC BLOOD PRESSURE: 87 MMHG | HEIGHT: 63 IN | BODY MASS INDEX: 23.04 KG/M2 | TEMPERATURE: 98.5 F | RESPIRATION RATE: 20 BRPM | HEART RATE: 87 BPM

## 2020-02-07 DIAGNOSIS — I10 ESSENTIAL HYPERTENSION: ICD-10-CM

## 2020-02-07 DIAGNOSIS — E78.00 HIGH CHOLESTEROL: ICD-10-CM

## 2020-02-07 DIAGNOSIS — E55.9 VITAMIN D DEFICIENCY: ICD-10-CM

## 2020-02-07 DIAGNOSIS — Z23 ENCOUNTER FOR IMMUNIZATION: ICD-10-CM

## 2020-02-07 DIAGNOSIS — E11.40 TYPE 2 DIABETES MELLITUS WITH DIABETIC NEUROPATHY, WITH LONG-TERM CURRENT USE OF INSULIN (HCC): Primary | ICD-10-CM

## 2020-02-07 DIAGNOSIS — Z79.4 TYPE 2 DIABETES MELLITUS WITH DIABETIC NEUROPATHY, WITH LONG-TERM CURRENT USE OF INSULIN (HCC): Primary | ICD-10-CM

## 2020-02-07 LAB
25(OH)D3 SERPL-MCNC: 33.8 NG/ML (ref 30–100)
ALBUMIN SERPL-MCNC: 3.7 G/DL (ref 3.4–5)
ALBUMIN/GLOB SERPL: 1 {RATIO} (ref 0.8–1.7)
ALP SERPL-CCNC: 112 U/L (ref 45–117)
ALT SERPL-CCNC: 17 U/L (ref 13–56)
ANION GAP SERPL CALC-SCNC: 5 MMOL/L (ref 3–18)
AST SERPL-CCNC: 15 U/L (ref 10–38)
BILIRUB SERPL-MCNC: 0.5 MG/DL (ref 0.2–1)
BUN SERPL-MCNC: 14 MG/DL (ref 7–18)
BUN/CREAT SERPL: 11 (ref 12–20)
CALCIUM SERPL-MCNC: 9.7 MG/DL (ref 8.5–10.1)
CHLORIDE SERPL-SCNC: 97 MMOL/L (ref 100–111)
CHOLEST SERPL-MCNC: 158 MG/DL
CO2 SERPL-SCNC: 28 MMOL/L (ref 21–32)
CREAT SERPL-MCNC: 1.24 MG/DL (ref 0.6–1.3)
GLOBULIN SER CALC-MCNC: 3.7 G/DL (ref 2–4)
GLUCOSE SERPL-MCNC: 191 MG/DL (ref 74–99)
HBA1C MFR BLD HPLC: 8.9 %
HDLC SERPL-MCNC: 60 MG/DL (ref 40–60)
HDLC SERPL: 2.6 {RATIO} (ref 0–5)
LDLC SERPL CALC-MCNC: 72.6 MG/DL (ref 0–100)
LIPID PROFILE,FLP: NORMAL
POTASSIUM SERPL-SCNC: 5.4 MMOL/L (ref 3.5–5.5)
PROT SERPL-MCNC: 7.4 G/DL (ref 6.4–8.2)
SODIUM SERPL-SCNC: 130 MMOL/L (ref 136–145)
TRIGL SERPL-MCNC: 127 MG/DL (ref ?–150)
VLDLC SERPL CALC-MCNC: 25.4 MG/DL

## 2020-02-07 PROCEDURE — 80053 COMPREHEN METABOLIC PANEL: CPT

## 2020-02-07 PROCEDURE — 82306 VITAMIN D 25 HYDROXY: CPT

## 2020-02-07 PROCEDURE — 36415 COLL VENOUS BLD VENIPUNCTURE: CPT

## 2020-02-07 PROCEDURE — 80061 LIPID PANEL: CPT

## 2020-02-07 RX ORDER — SYRINGE-NEEDLE,INSULIN,0.5 ML 30 GX5/16"
SYRINGE, EMPTY DISPOSABLE MISCELLANEOUS
Qty: 100 SYRINGE | Refills: 5 | Status: SHIPPED | OUTPATIENT
Start: 2020-02-07 | End: 2021-08-16 | Stop reason: SDUPTHER

## 2020-02-07 RX ORDER — INSULIN GLARGINE 100 [IU]/ML
INJECTION, SOLUTION SUBCUTANEOUS
Qty: 1 VIAL | Refills: 5 | Status: SHIPPED | OUTPATIENT
Start: 2020-02-07 | End: 2020-08-07 | Stop reason: SDUPTHER

## 2020-02-07 RX ORDER — PRAVASTATIN SODIUM 40 MG/1
40 TABLET ORAL DAILY
Qty: 30 TAB | Refills: 3 | Status: SHIPPED | OUTPATIENT
Start: 2020-02-07 | End: 2021-02-08

## 2020-02-07 RX ORDER — INSULIN GLARGINE 100 [IU]/ML
INJECTION, SOLUTION SUBCUTANEOUS
Qty: 15 ML | Refills: 2 | Status: CANCELLED | OUTPATIENT
Start: 2020-02-07

## 2020-02-07 RX ORDER — TRAMADOL HYDROCHLORIDE 50 MG/1
TABLET ORAL
COMMUNITY
Start: 2020-01-31 | End: 2020-06-15 | Stop reason: SDUPTHER

## 2020-02-07 NOTE — PROGRESS NOTES
Patient is in the office today for a follow up. Patient states she would like to have a refill on her insulin vials. 1. Have you been to the ER, urgent care clinic since your last visit? Hospitalized since your last visit? No    2. Have you seen or consulted any other health care providers outside of the [address] [place] [address] since your last visit? Include any pap smears or colon screening. yes, Dr. Hattie Engle.

## 2020-02-07 NOTE — PATIENT INSTRUCTIONS

## 2020-02-07 NOTE — PROGRESS NOTES
Subjective:       Chief Complaint  The patient presents for follow up of diabetesand high cholesterol. Proteinuria, back pain         HPI  Saida Covington is a 60 y.o. female seen for follow up of diabetes. Shealso has  hyperlipidemia. Diabetes improving control with taking her insulin, she appears to be on Lantus insulin 20 units BID but she has not been always consistent due to developmental delay and no one to help her consistently. It has been difficult since pt is not motivated to take care of health. She continutes taking metformin and will try to take BID, pt with poor insight to her medical problems due to developmental delay. .Hyperlipidemia well controlled, no significant medication side effects noted, on Pravachol 40 mg. Pt remains on lisinopril 10 mg for her proteinuria. Compliance remains an issue. Patient has been unwilling to come in to see Pharm. D. due to issues with transportation. Diet and Lifestyle: generally follows a low fat low cholesterol diet, does not rigorously follow a diabetic diet, sedentary    Diabetic Review of Systems - home glucose monitoring: is performed sporadically, should be 3x/day . Other symptoms and concerns: . Pt's chronic back pain is stable on Ultram prn.  reviewed. Patient is also taking Neurontin for peripheral neuropathy. Pt's vit D level well controlled on vit D 50,000 units/week. Current Outpatient Medications   Medication Sig    traMADol (ULTRAM) 50 mg tablet     pravastatin (PRAVACHOL) 40 mg tablet Take 1 Tab by mouth daily.  Insulin Syringe-Needle U-100 0.5 mL 30 gauge x 5/16\" syrg INJECT TWICE A DAY    insulin glargine (LANTUS) 100 unit/mL injection 20 units BID    metFORMIN (GLUCOPHAGE) 1,000 mg tablet Take 1 Tab by mouth two (2) times daily (with meals).     montelukast (SINGULAIR) 10 mg tablet TAKE 1 TABLET BY MOUTH ONCE DAILY    ergocalciferol (ERGOCALCIFEROL) 50,000 unit capsule Take 1 Cap by mouth every seven (7) days. Please have patient call the office for an appointment.  gabapentin (NEURONTIN) 300 mg capsule Take 300 mg by mouth three (3) times daily.  gabapentin (NEURONTIN) 300 mg capsule Take 1 Cap by mouth three (3) times daily.  lisinopril (PRINIVIL, ZESTRIL) 10 mg tablet TAKE 1 TABLET BY MOUTH ONCE DAILY    Insulin Needles, Disposable, (RAFITA PEN NEEDLE) 32 gauge x 5/32\" ndle Inject 1x/day    sodium chloride (NORMAL SALINE FLUSH) 10 mL by IntraVENous route daily. flush IV before and after medication administration and daily to keep line open    heparin sod,porcine/0.9 % NaCl (HEPARIN FLUSH IV) 3 mL by IntraVENous route daily. flush both ports of the PICC line daily    polyethylene glycol (MIRALAX) 17 gram packet Take 1 Packet by mouth daily. Indications: hold it for loose BMs    colchicine 0.6 mg tablet Take 1 tablet by mouth Q1Hour for gout pain. NOT TO EXCEED 3 TABLETS IN 24 HOURS.  hydrOXYzine HCl (ATARAX) 25 mg tablet TAKE 1 TABLET BY MOUTH THREE TIMES DAILY AS NEEDED FOR ITCHING    lidocaine (LIDODERM) 5 % Apply patch to the affected area for 12 hours a day and remove for 12 hours a day. (Patient taking differently: 1 Patch by TransDERmal route every twenty-four (24) hours. Apply patch to the affected area (back)  for 12 hours a day and remove for 12 hours a day.)    Blood-Glucose Meter monitoring kit Check BS 3x/day E11.65    Lancets misc Check BS 3x/day E11.65    glucose blood VI test strips (ONETOUCH ULTRA TEST) strip Check BS 3x/day E11.65    acetaminophen (TYLENOL) 325 mg tablet Take 2 Tabs by mouth every four (4) hours as needed for Pain. No current facility-administered medications for this visit.             Review of Systems  Respiratory: negative for dyspnea on exertion  Cardiovascular: negative for chest pain    Objective:     Visit Vitals  /87 (BP 1 Location: Left arm, BP Patient Position: Sitting)   Pulse 87   Temp 98.5 °F (36.9 °C) (Oral)   Resp 20   Ht 5' 3\" (1.6 m)   Wt 130 lb (59 kg)   LMP  (LMP Unknown)   SpO2 98%   BMI 23.03 kg/m²        Eyes - pupils equal and reactive, extraocular eye movements intact  Chest - clear to auscultation, no wheezes, rales or rhonchi, symmetric air entry  Heart - normal rate, regular rhythm, normal S1, S2, no murmurs, rubs, clicks or gallops  Extremities - no edema         Labs:   Lab Results   Component Value Date/Time    Hemoglobin A1c 10.9 (H) 10/28/2019 01:15 PM    Hemoglobin A1c 11.3 (H) 10/23/2019 02:30 PM    Hemoglobin A1c 12.5 (H) 09/30/2019 03:52 PM    Glucose 191 (H) 02/07/2020 02:35 PM    Glucose (POC) 231 (H) 10/09/2019 11:48 AM    Microalbumin/Creat ratio (mg/g creat) 344 (H) 07/18/2019 08:25 AM    Microalbumin,urine random 9.62 (H) 07/18/2019 08:25 AM    LDL, calculated 72.6 02/07/2020 02:35 PM    Creatinine, POC 0.5 (L) 08/23/2010 10:49 AM    Creatinine 1.24 02/07/2020 02:35 PM      Lab Results   Component Value Date/Time    Cholesterol, total 158 02/07/2020 02:35 PM    HDL Cholesterol 60 02/07/2020 02:35 PM    LDL, calculated 72.6 02/07/2020 02:35 PM    Triglyceride 127 02/07/2020 02:35 PM    CHOL/HDL Ratio 2.6 02/07/2020 02:35 PM     Lab Results   Component Value Date/Time    ALT (SGPT) 17 02/07/2020 02:35 PM    AST (SGOT) 15 02/07/2020 02:35 PM    Alk.  phosphatase 112 02/07/2020 02:35 PM    Bilirubin, direct <0.1 10/23/2019 02:30 PM    Bilirubin, total 0.5 02/07/2020 02:35 PM     Lab Results   Component Value Date/Time    GFR est AA 53 (L) 02/07/2020 02:35 PM    GFR est non-AA 44 (L) 02/07/2020 02:35 PM    Creatinine, POC 0.5 (L) 08/23/2010 10:49 AM    Creatinine 1.24 02/07/2020 02:35 PM    BUN 14 02/07/2020 02:35 PM    Sodium 130 (L) 02/07/2020 02:35 PM    Potassium 5.4 02/07/2020 02:35 PM    Chloride 97 (L) 02/07/2020 02:35 PM    CO2 28 02/07/2020 02:35 PM      Lab Results   Component Value Date/Time    Glucose 191 (H) 02/07/2020 02:35 PM    Glucose (POC) 231 (H) 10/09/2019 11:48 AM      Labs:   Lab Results Component Value Date/Time    Hemoglobin A1c 10.9 (H) 10/28/2019 01:15 PM    Hemoglobin A1c (POC) 8.9 02/07/2020 02:16 PM              Assessment / Plan     Diabetes improving but still not well controlled. Pt to take Lantus insulin 25 units BID. Hyperlipidemia well controlled on Pravachol. ICD-10-CM ICD-9-CM    1. Type 2 diabetes mellitus with diabetic neuropathy, with long-term current use of insulin (HCC) E11.40 250.60 AMB POC HEMOGLOBIN A1C    Z79.4 357.2      V58.67    2. High cholesterol E78.00 272.0 LIPID PANEL   3. Essential hypertension I10 401.9 Well controlled on Lisinopril 10 mg daily METABOLIC PANEL, COMPREHENSIVE   4. Vitamin D deficiency E55.9 268.9 Well controlled on vit D 50,000 units/week VITAMIN D, 25 HYDROXY   5. Encounter for immunization Z23 V03.89 PNEUMOCOCCAL POLYSACCHARIDE VACCINE, 23-VALENT, ADULT OR IMMUNOSUPPRESSED PT DOSE,                  Diabetic issues reviewed with her: diabetic diet discussed in detail, and low cholesterol diet, weight control and daily exercise discussed. Medication instructions and potential side effects reviewed with patient in detail. Follow-up and Dispositions    · Return in about 3 months (around 5/7/2020). Reviewed plan of care. Patient has provided input and agrees with goals.

## 2020-05-01 ENCOUNTER — TELEPHONE (OUTPATIENT)
Dept: FAMILY MEDICINE CLINIC | Age: 60
End: 2020-05-01

## 2020-05-01 NOTE — TELEPHONE ENCOUNTER
Left message for patient to call the office appointment needs to be changed to a virtual appointment. Also her insurance will be billed for this service.

## 2020-05-04 ENCOUNTER — TELEPHONE (OUTPATIENT)
Dept: FAMILY MEDICINE CLINIC | Age: 60
End: 2020-05-04

## 2020-05-15 DIAGNOSIS — G89.4 CHRONIC PAIN SYNDROME: ICD-10-CM

## 2020-05-15 RX ORDER — GABAPENTIN 300 MG/1
CAPSULE ORAL
Qty: 90 CAP | Refills: 3 | Status: SHIPPED | OUTPATIENT
Start: 2020-05-15 | End: 2021-10-14 | Stop reason: SDUPTHER

## 2020-06-05 RX ORDER — MONTELUKAST SODIUM 10 MG/1
10 TABLET ORAL DAILY
Qty: 90 TAB | Refills: 1 | Status: SHIPPED | OUTPATIENT
Start: 2020-06-05 | End: 2021-10-14 | Stop reason: SDUPTHER

## 2020-06-05 NOTE — TELEPHONE ENCOUNTER
Last Visit: 2/7/20 with MD Saldaña  Next Appointment: Advised to follow-up in 3 months  Previous Refill Encounter(s): 1/7/20 #30 with 4 refills    Requested Prescriptions     Pending Prescriptions Disp Refills    montelukast (SINGULAIR) 10 mg tablet 90 Tab 1     Sig: Take 1 Tab by mouth daily.

## 2020-06-15 DIAGNOSIS — G89.4 CHRONIC PAIN SYNDROME: Primary | ICD-10-CM

## 2020-06-15 RX ORDER — TRAMADOL HYDROCHLORIDE 50 MG/1
50 TABLET ORAL
Qty: 100 TAB | Refills: 2 | Status: SHIPPED | OUTPATIENT
Start: 2020-06-15 | End: 2020-06-16 | Stop reason: SDUPTHER

## 2020-06-15 NOTE — TELEPHONE ENCOUNTER
[facility] date for Ultram as 5/15/20 for a 7 d/s. Last Visit: 2/7/20 with MD Saldaña  Next Appointment: none  Previous Refill Encounter(s): 11/21/19 #100 with 2 refills    Requested Prescriptions     Pending Prescriptions Disp Refills    traMADoL (ULTRAM) 50 mg tablet 100 Tab 2     Sig: Take 1 Tab by mouth every six (6) hours as needed for Pain for up to 30 days. Max Daily Amount: 200 mg.

## 2020-06-16 ENCOUNTER — TELEPHONE (OUTPATIENT)
Dept: INTERNAL MEDICINE CLINIC | Age: 60
End: 2020-06-16

## 2020-06-16 DIAGNOSIS — G89.4 CHRONIC PAIN SYNDROME: ICD-10-CM

## 2020-06-16 RX ORDER — TRAMADOL HYDROCHLORIDE 50 MG/1
50 TABLET ORAL
Qty: 28 TAB | Refills: 2 | Status: SHIPPED | OUTPATIENT
Start: 2020-06-16 | End: 2020-06-23

## 2020-06-30 ENCOUNTER — TELEPHONE (OUTPATIENT)
Dept: INTERNAL MEDICINE CLINIC | Age: 60
End: 2020-06-30

## 2020-06-30 NOTE — TELEPHONE ENCOUNTER
Pt is requesting an RX [facility]. Said she has been itching real bad and caught a bug this morning and figured out what was going on. Pt is scheduled to come in next week for appt she said.

## 2020-06-30 NOTE — TELEPHONE ENCOUNTER
Patient is aware medication sent to the pharmacy and appointment was changed to telephone call, patient does not have a smart phone.

## 2020-07-01 DIAGNOSIS — Z79.891 CHRONIC PRESCRIPTION OPIATE USE: Primary | ICD-10-CM

## 2020-07-01 DIAGNOSIS — M54.50 CHRONIC MIDLINE LOW BACK PAIN WITHOUT SCIATICA: ICD-10-CM

## 2020-07-01 DIAGNOSIS — G89.29 CHRONIC MIDLINE LOW BACK PAIN WITHOUT SCIATICA: ICD-10-CM

## 2020-07-07 ENCOUNTER — VIRTUAL VISIT (OUTPATIENT)
Dept: INTERNAL MEDICINE CLINIC | Age: 60
End: 2020-07-07

## 2020-07-07 DIAGNOSIS — E55.9 VITAMIN D DEFICIENCY: ICD-10-CM

## 2020-07-07 DIAGNOSIS — E78.00 HIGH CHOLESTEROL: ICD-10-CM

## 2020-07-07 DIAGNOSIS — G89.29 CHRONIC MIDLINE LOW BACK PAIN WITHOUT SCIATICA: ICD-10-CM

## 2020-07-07 DIAGNOSIS — Z79.4 TYPE 2 DIABETES MELLITUS WITH DIABETIC NEUROPATHY, WITH LONG-TERM CURRENT USE OF INSULIN (HCC): Primary | ICD-10-CM

## 2020-07-07 DIAGNOSIS — G89.4 CHRONIC PAIN SYNDROME: ICD-10-CM

## 2020-07-07 DIAGNOSIS — E11.40 TYPE 2 DIABETES MELLITUS WITH DIABETIC NEUROPATHY, WITH LONG-TERM CURRENT USE OF INSULIN (HCC): Primary | ICD-10-CM

## 2020-07-07 DIAGNOSIS — I10 ESSENTIAL HYPERTENSION: ICD-10-CM

## 2020-07-07 DIAGNOSIS — M54.50 CHRONIC MIDLINE LOW BACK PAIN WITHOUT SCIATICA: ICD-10-CM

## 2020-07-07 DIAGNOSIS — Z79.891 CHRONIC PRESCRIPTION OPIATE USE: ICD-10-CM

## 2020-07-07 RX ORDER — METFORMIN HYDROCHLORIDE 1000 MG/1
1000 TABLET ORAL 2 TIMES DAILY WITH MEALS
Qty: 60 TAB | Refills: 5 | Status: SHIPPED | OUTPATIENT
Start: 2020-07-07 | End: 2021-02-08

## 2020-07-07 NOTE — PROGRESS NOTES
Gilma Meza is a 63 y.o. female, evaluated via audio-only technology on 7/7/2020 for Diabetes; Hypertension; Cholesterol Problem; and Vitamin D Deficiency  . Assessment & Plan:   Diagnoses and all orders for this visit:    1. Type 2 diabetes mellitus with diabetic neuropathy, with long-term current use of insulin (HCC)  -     HEMOGLOBIN A1C W/O EAG; Future  -     MICROALBUMIN, UR, RAND W/ MICROALB/CREAT RATIO; Future    2. High cholesterol  -     LIPID PANEL; Future    3. Essential hypertension  -     METABOLIC PANEL, COMPREHENSIVE; Future    4. Chronic midline low back pain without sciatica  -     COMPLIANCE DRUG SCREEN/PRESCRIPTION MONITORING; Future    5. Chronic pain syndrome  -     COMPLIANCE DRUG SCREEN/PRESCRIPTION MONITORING; Future    6. Chronic prescription opiate use  -     COMPLIANCE DRUG SCREEN/PRESCRIPTION MONITORING; Future    7. Vitamin D deficiency  -     VITAMIN D, 25 HYDROXY; Future        12  Subjective:   Patient continues on insulin 20 units twice daily and Metformin but blood sugars remain elevated due to poor compliance. Patient continues on Pravachol 40 mg for cholesterol and lisinopril 10 mg for proteinuria. Patient continues on Neurontin for peripheral neuropathy and Ultram for her neuropathy as well as back pain.  has been reviewed and will do you with drug screen with next blood work. Prior to Admission medications    Medication Sig Start Date End Date Taking? Authorizing Provider   metFORMIN (GLUCOPHAGE) 1,000 mg tablet Take 1 Tab by mouth two (2) times daily (with meals). 7/7/20   Evans Saldaña MD   montelukast (SINGULAIR) 10 mg tablet Take 1 Tab by mouth daily. 6/5/20   Evans Saldaña MD   gabapentin (NEURONTIN) 300 mg capsule TAKE 1 CAPSULE BY MOUTH THREE TIMES DAILY 5/15/20   Maggi Nguyen MD   pravastatin (PRAVACHOL) 40 mg tablet Take 1 Tab by mouth daily.  2/7/20   Maggi Nguyen MD   Insulin Syringe-Needle U-100 0.5 mL 30 gauge x 5/16\" syrg INJECT TWICE A DAY 2/7/20   Willis Aparicio MD   insulin glargine (LANTUS) 100 unit/mL injection 20 units BID 2/7/20   Breann Saldaña MD   ergocalciferol (ERGOCALCIFEROL) 50,000 unit capsule Take 1 Cap by mouth every seven (7) days. Please have patient call the office for an appointment. 1/7/20   Breann Saldaña MD   lisinopril (PRINIVIL, ZESTRIL) 10 mg tablet TAKE 1 TABLET BY MOUTH ONCE DAILY 11/15/19   Willis Aparicio MD   Insulin Needles, Disposable, (RAFITA PEN NEEDLE) 32 gauge x 5/32\" ndle Inject 1x/day 10/30/19   Breann Saldaña MD   sodium chloride (NORMAL SALINE FLUSH) 10 mL by IntraVENous route daily. flush IV before and after medication administration and daily to keep line open    Provider, Historical   heparin sod,porcine/0.9 % NaCl (HEPARIN FLUSH IV) 3 mL by IntraVENous route daily. flush both ports of the PICC line daily    Provider, Historical   polyethylene glycol (MIRALAX) 17 gram packet Take 1 Packet by mouth daily. Indications: hold it for loose BMs 10/10/19   Alejandrina Aragon MD   colchicine 0.6 mg tablet Take 1 tablet by mouth Q1Hour for gout pain. NOT TO EXCEED 3 TABLETS IN 24 HOURS. 7/2/19   Yeison Jimenes PA-C   hydrOXYzine HCl (ATARAX) 25 mg tablet TAKE 1 TABLET BY MOUTH THREE TIMES DAILY AS NEEDED FOR ITCHING 5/21/19   Breann Saldaña MD   lidocaine (LIDODERM) 5 % Apply patch to the affected area for 12 hours a day and remove for 12 hours a day. Patient taking differently: 1 Patch by TransDERmal route every twenty-four (24) hours. Apply patch to the affected area (back)  for 12 hours a day and remove for 12 hours a day.  3/15/19   Jesus Rojas PA   Blood-Glucose Meter monitoring kit Check BS 3x/day E11.65 7/16/18   Willis Aparicio MD   Lancets misc Check BS 3x/day E11.65 7/16/18   Willis Aparicio MD   glucose blood VI test strips (ONETOUCH ULTRA TEST) strip Check BS 3x/day E11.65 7/16/18   Willis Aparicio MD   acetaminophen (TYLENOL) 325 mg tablet Take 2 Tabs by mouth every four (4) hours as needed for Pain. 6/18/17   Awa Jacobs PA-C     Patient Active Problem List   Diagnosis Code    Lumbago M54.5    Chronic pain syndrome G89.4    Myalgia and myositis, unspecified [id number]    Encounter for long-term (current) use of other medications Z79.899    Depression F32.9    Sacroiliac joint pain M53.3    Hip pain M25.559    High cholesterol E78.00    Vaginitis, atrophic N95.2    DM (diabetes mellitus), type 2, uncontrolled ([facility] [address] E11.65    Midline low back pain without sciatica M54.5    Type 2 diabetes mellitus with diabetic neuropathy (HCC) E11.40    Type 2 diabetes with nephropathy ([facility] [address] E11.21    Microalbuminuria R80.9    Puncture wound of foot, left, complicated L92.766S    Hyponatremia E87.1    Proteinuria R80.9    Acute on chronic renal insufficiency N28.9, N18.9       ROS    No flowsheet data found. Patito Hernández, who was evaluated through a patient-initiated, synchronous (real-time) audio only encounter, and/or her healthcare decision maker, is aware that it is a billable service, with coverage as determined by her insurance carrier. She provided verbal consent to proceed: Yes. She has not had a related appointment within my department in the past 7 days or scheduled within the next 24 hours. Total Time: minutes: 5-10 minutes    Follow-up and Dispositions    · Return in about 3 months (around 10/7/2020) for labs 1 week before.          Dnoald Ewing MD

## 2020-08-07 RX ORDER — ERGOCALCIFEROL 1.25 MG/1
50000 CAPSULE ORAL
Qty: 4 CAP | Refills: 5 | Status: SHIPPED | OUTPATIENT
Start: 2020-08-07 | End: 2021-10-14 | Stop reason: SDUPTHER

## 2020-08-07 RX ORDER — INSULIN GLARGINE 100 [IU]/ML
INJECTION, SOLUTION SUBCUTANEOUS
Qty: 1 VIAL | Refills: 5 | Status: SHIPPED | OUTPATIENT
Start: 2020-08-07 | End: 2021-08-02 | Stop reason: SDUPTHER

## 2020-08-07 NOTE — TELEPHONE ENCOUNTER
Last Visit: 20 with MD Saldaña  Next Appointment: 10/13/20 with MD Saldaña  Previous Refill Encounter(s): 20 #1 with 5 refills    Requested Prescriptions     Pending Prescriptions Disp Refills    insulin glargine (LANTUS) 100 unit/mL injection 1 Vial 5     Si units BID

## 2020-10-13 ENCOUNTER — HOSPITAL ENCOUNTER (OUTPATIENT)
Dept: LAB | Age: 60
Discharge: HOME | End: 2020-10-13
Payer: MEDICAID

## 2020-10-13 ENCOUNTER — OFFICE VISIT (OUTPATIENT)
Dept: INTERNAL MEDICINE CLINIC | Age: 60
End: 2020-10-13
Payer: MEDICAID

## 2020-10-13 VITALS
RESPIRATION RATE: 20 BRPM | WEIGHT: 142 LBS | HEART RATE: 86 BPM | DIASTOLIC BLOOD PRESSURE: 86 MMHG | HEIGHT: 63 IN | BODY MASS INDEX: 25.16 KG/M2 | TEMPERATURE: 97.4 F | SYSTOLIC BLOOD PRESSURE: 160 MMHG | OXYGEN SATURATION: 98 %

## 2020-10-13 DIAGNOSIS — E78.00 HIGH CHOLESTEROL: ICD-10-CM

## 2020-10-13 DIAGNOSIS — Z79.4 TYPE 2 DIABETES MELLITUS WITH DIABETIC NEUROPATHY, WITH LONG-TERM CURRENT USE OF INSULIN (HCC): Primary | ICD-10-CM

## 2020-10-13 DIAGNOSIS — I10 ESSENTIAL HYPERTENSION: ICD-10-CM

## 2020-10-13 DIAGNOSIS — Z79.4 TYPE 2 DIABETES MELLITUS WITH DIABETIC NEUROPATHY, WITH LONG-TERM CURRENT USE OF INSULIN (HCC): ICD-10-CM

## 2020-10-13 DIAGNOSIS — Z79.891 CHRONIC PRESCRIPTION OPIATE USE: ICD-10-CM

## 2020-10-13 DIAGNOSIS — Z12.31 BREAST CANCER SCREENING BY MAMMOGRAM: ICD-10-CM

## 2020-10-13 DIAGNOSIS — M54.50 CHRONIC MIDLINE LOW BACK PAIN WITHOUT SCIATICA: ICD-10-CM

## 2020-10-13 DIAGNOSIS — E11.40 TYPE 2 DIABETES MELLITUS WITH DIABETIC NEUROPATHY, WITH LONG-TERM CURRENT USE OF INSULIN (HCC): ICD-10-CM

## 2020-10-13 DIAGNOSIS — G89.29 CHRONIC MIDLINE LOW BACK PAIN WITHOUT SCIATICA: ICD-10-CM

## 2020-10-13 DIAGNOSIS — E55.9 VITAMIN D DEFICIENCY: ICD-10-CM

## 2020-10-13 DIAGNOSIS — G89.4 CHRONIC PAIN SYNDROME: ICD-10-CM

## 2020-10-13 DIAGNOSIS — E11.40 TYPE 2 DIABETES MELLITUS WITH DIABETIC NEUROPATHY, WITH LONG-TERM CURRENT USE OF INSULIN (HCC): Primary | ICD-10-CM

## 2020-10-13 DIAGNOSIS — Z23 NEEDS FLU SHOT: ICD-10-CM

## 2020-10-13 LAB
25(OH)D3 SERPL-MCNC: 46.5 NG/ML (ref 30–100)
ALBUMIN SERPL-MCNC: 3.7 G/DL (ref 3.4–5)
ALBUMIN/GLOB SERPL: 1 {RATIO} (ref 0.8–1.7)
ALP SERPL-CCNC: 97 U/L (ref 45–117)
ALT SERPL-CCNC: 13 U/L (ref 13–56)
ANION GAP SERPL CALC-SCNC: 7 MMOL/L (ref 3–18)
AST SERPL-CCNC: 11 U/L (ref 10–38)
BILIRUB SERPL-MCNC: 0.4 MG/DL (ref 0.2–1)
BUN SERPL-MCNC: 12 MG/DL (ref 7–18)
BUN/CREAT SERPL: 9 (ref 12–20)
CALCIUM SERPL-MCNC: 9.1 MG/DL (ref 8.5–10.1)
CHLORIDE SERPL-SCNC: 103 MMOL/L (ref 100–111)
CHOLEST SERPL-MCNC: 167 MG/DL
CO2 SERPL-SCNC: 26 MMOL/L (ref 21–32)
CREAT SERPL-MCNC: 1.34 MG/DL (ref 0.6–1.3)
CREAT UR-MCNC: 59 MG/DL (ref 30–125)
GLOBULIN SER CALC-MCNC: 3.8 G/DL (ref 2–4)
GLUCOSE SERPL-MCNC: 171 MG/DL (ref 74–99)
HBA1C MFR BLD HPLC: 7.5 %
HBA1C MFR BLD: 7.3 % (ref 4.2–5.6)
HDLC SERPL-MCNC: 60 MG/DL (ref 40–60)
HDLC SERPL: 2.8 {RATIO} (ref 0–5)
LDLC SERPL CALC-MCNC: 86.8 MG/DL (ref 0–100)
LIPID PROFILE,FLP: NORMAL
MICROALBUMIN UR-MCNC: 43 MG/DL (ref 0–3)
MICROALBUMIN/CREAT UR-RTO: 729 MG/G (ref 0–30)
POTASSIUM SERPL-SCNC: 4.5 MMOL/L (ref 3.5–5.5)
PROT SERPL-MCNC: 7.5 G/DL (ref 6.4–8.2)
SODIUM SERPL-SCNC: 136 MMOL/L (ref 136–145)
TRIGL SERPL-MCNC: 101 MG/DL (ref ?–150)
VLDLC SERPL CALC-MCNC: 20.2 MG/DL

## 2020-10-13 PROCEDURE — 80053 COMPREHEN METABOLIC PANEL: CPT

## 2020-10-13 PROCEDURE — 83036 HEMOGLOBIN GLYCOSYLATED A1C: CPT | Performed by: INTERNAL MEDICINE

## 2020-10-13 PROCEDURE — 82306 VITAMIN D 25 HYDROXY: CPT

## 2020-10-13 PROCEDURE — 83036 HEMOGLOBIN GLYCOSYLATED A1C: CPT

## 2020-10-13 PROCEDURE — 82043 UR ALBUMIN QUANTITATIVE: CPT

## 2020-10-13 PROCEDURE — 90686 IIV4 VACC NO PRSV 0.5 ML IM: CPT | Performed by: INTERNAL MEDICINE

## 2020-10-13 PROCEDURE — 3051F HG A1C>EQUAL 7.0%<8.0%: CPT | Performed by: INTERNAL MEDICINE

## 2020-10-13 PROCEDURE — 80307 DRUG TEST PRSMV CHEM ANLYZR: CPT

## 2020-10-13 PROCEDURE — 36415 COLL VENOUS BLD VENIPUNCTURE: CPT

## 2020-10-13 PROCEDURE — 80061 LIPID PANEL: CPT

## 2020-10-13 PROCEDURE — 99214 OFFICE O/P EST MOD 30 MIN: CPT | Performed by: INTERNAL MEDICINE

## 2020-10-13 RX ORDER — LISINOPRIL 20 MG/1
20 TABLET ORAL DAILY
Qty: 90 TAB | Refills: 3 | Status: SHIPPED | OUTPATIENT
Start: 2020-10-13 | End: 2021-10-14 | Stop reason: SDUPTHER

## 2020-10-13 NOTE — PROGRESS NOTES
Subjective:       Chief Complaint  The patient presents for follow up of diabetesand high cholesterol. Proteinuria, back pain         HPI  Anton Stevens is a 63 y.o. female seen for follow up of diabetes. Shealso has  hyperlipidemia. Diabetes improving control with taking her insulin, she appears to be on Lantus insulin 25 units BID but she has not been always consistent due to developmental delay and no one to help her consistently. She continutes taking metformin 1 gm BID. pt with poor insight to her medical problems due to developmental delay. .Hyperlipidemia well controlled, no significant medication side effects noted, on Pravachol 40 mg. Pt remains on lisinopril 10 mg for her proteinuria and HTN. Compliance remains an issue. BP is elevated today. Patient has been unwilling to come in to see Pharm. D. due to issues with transportation. Diet and Lifestyle: generally follows a low fat low cholesterol diet, does not rigorously follow a diabetic diet, sedentary    Diabetic Review of Systems - home glucose monitoring: is performed sporadically, should be 3x/day . Other symptoms and concerns: . Pt's chronic back pain is stable on Ultram prn.  reviewed. Patient is also taking Neurontin for peripheral neuropathy. Pt's vit D level well controlled on vit D 50,000 units/week. Patient continues to smoke cigarettes and is not ready to quit at this time. Current Outpatient Medications   Medication Sig    lisinopriL (PRINIVIL, ZESTRIL) 20 mg tablet Take 1 Tab by mouth daily.  insulin glargine (LANTUS) 100 unit/mL injection 20 units BID    ergocalciferol (ERGOCALCIFEROL) 1,250 mcg (50,000 unit) capsule Take 1 Cap by mouth every seven (7) days. Please have patient call the office for an appointment.  metFORMIN (GLUCOPHAGE) 1,000 mg tablet Take 1 Tab by mouth two (2) times daily (with meals).  montelukast (SINGULAIR) 10 mg tablet Take 1 Tab by mouth daily.     gabapentin (NEURONTIN) 300 mg capsule TAKE 1 CAPSULE BY MOUTH THREE TIMES DAILY    pravastatin (PRAVACHOL) 40 mg tablet Take 1 Tab by mouth daily.  Insulin Syringe-Needle U-100 0.5 mL 30 gauge x 5/16\" syrg INJECT TWICE A DAY    Insulin Needles, Disposable, (RAFITA PEN NEEDLE) 32 gauge x 5/32\" ndle Inject 1x/day    polyethylene glycol (MIRALAX) 17 gram packet Take 1 Packet by mouth daily. Indications: hold it for loose BMs    colchicine 0.6 mg tablet Take 1 tablet by mouth Q1Hour for gout pain. NOT TO EXCEED 3 TABLETS IN 24 HOURS.  hydrOXYzine HCl (ATARAX) 25 mg tablet TAKE 1 TABLET BY MOUTH THREE TIMES DAILY AS NEEDED FOR ITCHING    lidocaine (LIDODERM) 5 % Apply patch to the affected area for 12 hours a day and remove for 12 hours a day. (Patient taking differently: 1 Patch by TransDERmal route every twenty-four (24) hours. Apply patch to the affected area (back)  for 12 hours a day and remove for 12 hours a day.)    Blood-Glucose Meter monitoring kit Check BS 3x/day E11.65    Lancets misc Check BS 3x/day E11.65    glucose blood VI test strips (ONETOUCH ULTRA TEST) strip Check BS 3x/day E11.65    acetaminophen (TYLENOL) 325 mg tablet Take 2 Tabs by mouth every four (4) hours as needed for Pain.  sodium chloride (NORMAL SALINE FLUSH) 10 mL by IntraVENous route daily. flush IV before and after medication administration and daily to keep line open    heparin sod,porcine/0.9 % NaCl (HEPARIN FLUSH IV) 3 mL by IntraVENous route daily. flush both ports of the PICC line daily     No current facility-administered medications for this visit.             Review of Systems  Respiratory: negative for dyspnea on exertion  Cardiovascular: negative for chest pain    Objective:     Visit Vitals  BP (!) 160/86 (BP 1 Location: Left arm, BP Patient Position: Sitting)   Pulse 86   Temp 97.4 °F (36.3 °C) (Temporal)   Resp 20   Ht 5' 3\" (1.6 m)   Wt 142 lb (64.4 kg)   LMP  (LMP Unknown)   SpO2 98%   BMI 25.15 kg/m²        Eyes - pupils equal and reactive, extraocular eye movements intact  Chest - clear to auscultation, no wheezes, rales or rhonchi, symmetric air entry  Heart - normal rate, regular rhythm, normal S1, S2, no murmurs, rubs, clicks or gallops  Extremities - no edema         Labs:   Lab Results   Component Value Date/Time    Hemoglobin A1c 7.3 (H) 10/13/2020 03:17 PM    Hemoglobin A1c 10.9 (H) 10/28/2019 01:15 PM    Hemoglobin A1c 11.3 (H) 10/23/2019 02:30 PM    Glucose 171 (H) 10/13/2020 03:17 PM    Glucose (POC) 231 (H) 10/09/2019 11:48 AM    Microalbumin/Creat ratio (mg/g creat) 729 (H) 10/13/2020 03:17 PM    Microalbumin,urine random 43.00 (H) 10/13/2020 03:17 PM    LDL, calculated 86.8 10/13/2020 03:17 PM    Creatinine, POC 0.5 (L) 08/23/2010 10:49 AM    Creatinine 1.34 (H) 10/13/2020 03:17 PM      Lab Results   Component Value Date/Time    Cholesterol, total 167 10/13/2020 03:17 PM    HDL Cholesterol 60 10/13/2020 03:17 PM    LDL, calculated 86.8 10/13/2020 03:17 PM    Triglyceride 101 10/13/2020 03:17 PM    CHOL/HDL Ratio 2.8 10/13/2020 03:17 PM     Lab Results   Component Value Date/Time    ALT (SGPT) 13 10/13/2020 03:17 PM    Alk.  phosphatase 97 10/13/2020 03:17 PM    Bilirubin, direct <0.1 10/23/2019 02:30 PM    Bilirubin, total 0.4 10/13/2020 03:17 PM     Lab Results   Component Value Date/Time    GFR est AA 49 (L) 10/13/2020 03:17 PM    GFR est non-AA 40 (L) 10/13/2020 03:17 PM    Creatinine, POC 0.5 (L) 08/23/2010 10:49 AM    Creatinine 1.34 (H) 10/13/2020 03:17 PM    BUN 12 10/13/2020 03:17 PM    Sodium 136 10/13/2020 03:17 PM    Potassium 4.5 10/13/2020 03:17 PM    Chloride 103 10/13/2020 03:17 PM    CO2 26 10/13/2020 03:17 PM      Lab Results   Component Value Date/Time    Glucose 171 (H) 10/13/2020 03:17 PM    Glucose (POC) 231 (H) 10/09/2019 11:48 AM      Labs:   Lab Results   Component Value Date/Time    Hemoglobin A1c 7.3 (H) 10/13/2020 03:17 PM    Hemoglobin A1c (POC) 7.5 10/13/2020 03:25 PM Assessment / Plan     Diabetes much improved on Lantus insulin 25 units BID. Hyperlipidemia fairly well controlled on Pravachol 40 mg. ICD-10-CM ICD-9-CM    1. Type 2 diabetes mellitus with diabetic neuropathy, with long-term current use of insulin (HCC)  E11.40 250.60 AMB POC HEMOGLOBIN A1C    Z79.4 357.2 HEMOGLOBIN A1C W/O EAG     V58.67    2. High cholesterol  E78.00 272.0 LIPID PANEL   3. Essential hypertension  I10 401.9  uncontrolled patient to restart lisinopril 20 mg daily. METABOLIC PANEL, COMPREHENSIVE   4. Vitamin D deficiency  E55.9 268.9  well-controlled on vitamin D 50,000 units/week VITAMIN D, 25 HYDROXY   5. Chronic midline low back pain without sciatica  M54.5 724.2  controlled on tramadol. Patient had recent urine drug screen that is consistent. G89.29 338.29    6. Needs flu shot  Z23 V04.81 INFLUENZA VIRUS VAC QUAD,SPLIT,PRESV FREE SYRINGE IM   7. Breast cancer screening by mammogram  Z12.31 V76.12 YAJAIRA MAMMO BI SCREENING INCL CAD                  Diabetic issues reviewed with her: diabetic diet discussed in detail, and low cholesterol diet, weight control and daily exercise discussed. Medication instructions and potential side effects reviewed with patient in detail. Follow-up and Dispositions    · Return in about 4 months (around 2/13/2021). Reviewed plan of care. Patient has provided input and agrees with goals.

## 2020-10-13 NOTE — PROGRESS NOTES
Patient is in the office today for a 3 month follow up. 1. Have you been to the ER, urgent care clinic since your last visit? Hospitalized since your last visit? No    2. Have you seen or consulted any other health care providers outside of the [address] [place] [address] since your last visit? Include any pap smears or colon screening. No      Verbal order read back per Dr. Viviana Guzman flu vaccine. Patient received flu vaccine in right deltoid. Patient was observed and no signs or symptoms of allergic reaction noted at this time. Patient tolerated well and left without complaints. Patient received flu VIS.

## 2020-10-13 NOTE — PATIENT INSTRUCTIONS
High Blood Pressure: Care Instructions Overview It's normal for blood pressure to go up and down throughout the day. But if it stays up, you have high blood pressure. Another name for high blood pressure is hypertension. Despite what a lot of people think, high blood pressure usually doesn't cause headaches or make you feel dizzy or lightheaded. It usually has no symptoms. But it does increase your risk of stroke, heart attack, and other problems. You and your doctor will talk about your risks of these problems based on your blood pressure. Your doctor will give you a goal for your blood pressure. Your goal will be based on your health and your age. Lifestyle changes, such as eating healthy and being active, are always important to help lower blood pressure. You might also take medicine to reach your blood pressure goal. 
Follow-up care is a key part of your treatment and safety. Be sure to make and go to all appointments, and call your doctor if you are having problems. It's also a good idea to know your test results and keep a list of the medicines you take. How can you care for yourself at home? Medical treatment · If you stop taking your medicine, your blood pressure will go back up. You may take one or more types of medicine to lower your blood pressure. Be safe with medicines. Take your medicine exactly as prescribed. Call your doctor if you think you are having a problem with your medicine. · Talk to your doctor before you start taking aspirin every day. Aspirin can help certain people lower their risk of a heart attack or stroke. But taking aspirin isn't right for everyone, because it can cause serious bleeding. · See your doctor regularly. You may need to see the doctor more often at first or until your blood pressure comes down. · If you are taking blood pressure medicine, talk to your doctor before you take decongestants or anti-inflammatory medicine, such as ibuprofen. Some of these medicines can raise blood pressure. · Learn how to check your blood pressure at home. Lifestyle changes · Stay at a healthy weight. This is especially important if you put on weight around the waist. Losing even 10 pounds can help you lower your blood pressure. · If your doctor recommends it, get more exercise. Walking is a good choice. Bit by bit, increase the amount you walk every day. Try for at least 30 minutes on most days of the week. You also may want to swim, bike, or do other activities. · Avoid or limit alcohol. Talk to your doctor about whether you can drink any alcohol. · Try to limit how much sodium you eat to less than 2,300 milligrams (mg) a day. Your doctor may ask you to try to eat less than 1,500 mg a day. · Eat plenty of fruits (such as bananas and oranges), vegetables, legumes, whole grains, and low-fat dairy products. · Lower the amount of saturated fat in your diet. Saturated fat is found in animal products such as milk, cheese, and meat. Limiting these foods may help you lose weight and also lower your risk for heart disease. · Do not smoke. Smoking increases your risk for heart attack and stroke. If you need help quitting, talk to your doctor about stop-smoking programs and medicines. These can increase your chances of quitting for good. When should you call for help? Call  911 anytime you think you may need emergency care. This may mean having symptoms that suggest that your blood pressure is causing a serious heart or blood vessel problem. Your blood pressure may be over 180/120. For example, call 911 if: 
  · You have symptoms of a heart attack. These may include: 
? Chest pain or pressure, or a strange feeling in the chest. 
? Sweating. ? Shortness of breath. ? Nausea or vomiting. ? Pain, pressure, or a strange feeling in the back, neck, jaw, or upper belly or in one or both shoulders or arms. ? Lightheadedness or sudden weakness. ? A fast or irregular heartbeat.  
  · You have symptoms of a stroke. These may include: 
? Sudden numbness, tingling, weakness, or loss of movement in your face, arm, or leg, especially on only one side of your body. ? Sudden vision changes. ? Sudden trouble speaking. ? Sudden confusion or trouble understanding simple statements. ? Sudden problems with walking or balance. ? A sudden, severe headache that is different from past headaches.  
  · You have severe back or belly pain. Do not wait until your blood pressure comes down on its own. Get help right away. Call your doctor now or seek immediate care if: 
  · Your blood pressure is much higher than normal (such as 180/120 or higher), but you don't have symptoms.  
  · You think high blood pressure is causing symptoms, such as: 
? Severe headache. 
? Blurry vision. Watch closely for changes in your health, and be sure to contact your doctor if: 
  · Your blood pressure measures higher than your doctor recommends at least 2 times. That means the top number is higher or the bottom number is higher, or both.  
  · You think you may be having side effects from your blood pressure medicine. Where can you learn more? Go to http://www.gray.com/ Enter Z179 in the search box to learn more about \"High Blood Pressure: Care Instructions. \" Current as of: December 16, 2019               Content Version: 12.6 © 6430-3469 [employer]. Care instructions adapted under license by [employer] (which disclaims liability or warranty for this information). If you have questions about a medical condition or this instruction, always ask your healthcare professional. [place] [address] any warranty or liability for your use of this information. Influenza (Flu) Vaccine (Inactivated or Recombinant): What You Need to Know Why get vaccinated? Influenza vaccine can prevent influenza (flu). Flu is a contagious disease that spreads around the [facility] every year, usually between October and May. Anyone can get the flu, but it is more dangerous for some people. Infants and young children, people 72years of age and older, pregnant women, and people with certain health conditions or a weakened immune system are at greatest risk of flu complications. Pneumonia, bronchitis, sinus infections and ear infections are examples of flu-related complications. If you have a medical condition, such as heart disease, cancer or diabetes, flu can make it worse. Flu can cause fever and chills, sore throat, muscle aches, fatigue, cough, headache, and runny or stuffy nose. Some people may have vomiting and diarrhea, though this is more common in children than adults. Each year, thousands of people in the [facility] die from flu, and many more are hospitalized. Flu vaccine prevents millions of illnesses and flu-related visits to the doctor each year. Influenza vaccine CDC recommends everyone 10months of age and older get vaccinated every flu season. Children 6 months through 6years of age may need 2 doses during a single flu season. Everyone else needs only 1 dose each flu season. It takes about 2 weeks for protection to develop after vaccination. There are many flu viruses, and they are always changing. Each year a new flu vaccine is made to protect against three or four viruses that are likely to cause disease in the upcoming flu season. Even when the vaccine doesn't exactly match these viruses, it may still provide some protection. Influenza vaccine does not cause flu. Influenza vaccine may be given at the same time as other vaccines. Talk with your health care provider Tell your vaccine provider if the person getting the vaccine: 
· Has had an allergic reaction after a previous dose of influenza vaccine, or has any severe, life-threatening allergies. · Has ever had Guillain-Barré Syndrome (also called GBS). In some cases, your health care provider may decide to postpone influenza vaccination to a future visit. People with minor illnesses, such as a cold, may be vaccinated. People who are moderately or severely ill should usually wait until they recover before getting influenza vaccine. Your health care provider can give you more information. Risks of a vaccine reaction · Soreness, redness, and swelling where shot is given, fever, muscle aches, and headache can happen after influenza vaccine. · There may be a very small increased risk of Guillain-Barré Syndrome (GBS) after inactivated influenza vaccine (the flu shot). Bronwny Love children who get the flu shot along with pneumococcal vaccine (PCV13), and/or DTaP vaccine at the same time might be slightly more likely to have a seizure caused by fever. Tell your health care provider if a child who is getting flu vaccine has ever had a seizure. People sometimes faint after medical procedures, including vaccination. Tell your provider if you feel dizzy or have vision changes or ringing in the ears. As with any medicine, there is a very remote chance of a vaccine causing a severe allergic reaction, other serious injury, or death. What if there is a serious problem? An allergic reaction could occur after the vaccinated person leaves the clinic. If you see signs of a severe allergic reaction (hives, swelling of the face and throat, difficulty breathing, a fast heartbeat, dizziness, or weakness), call 9-1-1 and get the person to the nearest hospital. 
For other signs that concern you, call your health care provider. Adverse reactions should be reported to the Vaccine Adverse Event Reporting System (VAERS). Your health care provider will usually file this report, or you can do it yourself. Visit the VAERS website at www.vaers. hhs.gov or call 0-566.862.3522.  VAERS is only for reporting reactions, and [facility] staff do not give medical advice. The National Vaccine Injury Compensation Program 
The National Vaccine Injury Compensation Program (VICP) is a federal program that was created to compensate people who may have been injured by certain vaccines. Visit the VICP website at www.hrsa.gov/vaccinecompensation or call 0-348.769.5498 to learn about the program and about filing a claim. There is a time limit to file a claim for compensation. How can I learn more? · Ask your healthcare provider. · Call your local or state health department. · Contact the Centers for Disease Control and Prevention (CDC): 
? Call 7-154.667.2916 (1-800-CDC-INFO) or 
? Visit CDC's website at www.cdc.gov/flu Vaccine Information Statement (Interim) Inactivated Influenza Vaccine 8/15/2019 
42 [address]don Phan 295QS-25 Department of [facility] and [facility] Centers for Disease Control and Prevention Many Vaccine Information Statements are available in Korean and other languages. See www.immunize.org/vis. Muchas hojas de información sobre vacunas están disponibles en español y en otros idiomas. Visite www.immunize.org/vis. Care instructions adapted under license by "[employer] (which disclaims liability or warranty for this information). If you have questions about a medical condition or this instruction, always ask your healthcare professional. [address] any warranty or liability for your use of this information.

## 2020-10-17 LAB — DRUGS UR: NORMAL

## 2020-10-20 DIAGNOSIS — Z12.31 BREAST CANCER SCREENING BY MAMMOGRAM: ICD-10-CM

## 2020-11-12 ENCOUNTER — TELEPHONE (OUTPATIENT)
Dept: INTERNAL MEDICINE CLINIC | Age: 60
End: 2020-11-12

## 2020-11-12 RX ORDER — NITROFURANTOIN 25; 75 MG/1; MG/1
100 CAPSULE ORAL 2 TIMES DAILY
Qty: 10 CAP | Refills: 0 | Status: SHIPPED | OUTPATIENT
Start: 2020-11-12 | End: 2020-11-17

## 2020-11-12 NOTE — TELEPHONE ENCOUNTER
Pt states she has a bad bladder infection, can't sleep at night, has been on-going for a couple of days. She can't come into office, has no way to get here. Asking for medication to be sent in.   Any questions, call her at [phone]

## 2021-01-07 DIAGNOSIS — G89.4 CHRONIC PAIN SYNDROME: ICD-10-CM

## 2021-01-08 RX ORDER — TRAMADOL HYDROCHLORIDE 50 MG/1
TABLET ORAL
Qty: 100 TAB | Refills: 1 | Status: SHIPPED | OUTPATIENT
Start: 2021-01-08 | End: 2021-02-07

## 2021-01-26 ENCOUNTER — TELEPHONE (OUTPATIENT)
Dept: INTERNAL MEDICINE CLINIC | Age: 61
End: 2021-01-26

## 2021-02-08 RX ORDER — METFORMIN HYDROCHLORIDE 1000 MG/1
TABLET ORAL
Qty: 60 TAB | Refills: 4 | Status: SHIPPED | OUTPATIENT
Start: 2021-02-08 | End: 2021-10-14 | Stop reason: SDUPTHER

## 2021-05-31 NOTE — TELEPHONE ENCOUNTER
[facility] calling. Tramadol does not have a copay for a 7 day supply. Patient was given a 25 days supply and can't afford the copay. Can you change and resend to her pharmacy? Provider Services at [facility]  [phone]  If you have any questions. DISCHARGE

## 2021-07-13 ENCOUNTER — HOSPITAL ENCOUNTER (EMERGENCY)
Age: 61
Discharge: HOME | End: 2021-07-13
Attending: EMERGENCY MEDICINE
Payer: MEDICAID

## 2021-07-13 VITALS
OXYGEN SATURATION: 95 % | RESPIRATION RATE: 18 BRPM | SYSTOLIC BLOOD PRESSURE: 125 MMHG | HEIGHT: 63 IN | WEIGHT: 140 LBS | BODY MASS INDEX: 24.8 KG/M2 | HEART RATE: 98 BPM | TEMPERATURE: 98.1 F | DIASTOLIC BLOOD PRESSURE: 77 MMHG

## 2021-07-13 DIAGNOSIS — N30.00 ACUTE CYSTITIS WITHOUT HEMATURIA: Primary | ICD-10-CM

## 2021-07-13 LAB
APPEARANCE UR: CLEAR
BACTERIA URNS QL MICRO: ABNORMAL /HPF
BILIRUB UR QL: NEGATIVE
COLOR UR: YELLOW
EPITH CASTS URNS QL MICRO: ABNORMAL /LPF (ref 0–5)
GLUCOSE UR STRIP.AUTO-MCNC: >1000 MG/DL
HGB UR QL STRIP: NEGATIVE
KETONES UR QL STRIP.AUTO: NEGATIVE MG/DL
LEUKOCYTE ESTERASE UR QL STRIP.AUTO: ABNORMAL
NITRITE UR QL STRIP.AUTO: NEGATIVE
PH UR STRIP: 5.5 [PH] (ref 5–8)
PROT UR STRIP-MCNC: NEGATIVE MG/DL
RBC #/AREA URNS HPF: ABNORMAL /HPF (ref 0–5)
SP GR UR REFRACTOMETRY: 1.01 (ref 1–1.03)
UROBILINOGEN UR QL STRIP.AUTO: 0.2 EU/DL (ref 0.2–1)
WBC URNS QL MICRO: ABNORMAL /HPF (ref 0–5)

## 2021-07-13 PROCEDURE — 87077 CULTURE AEROBIC IDENTIFY: CPT

## 2021-07-13 PROCEDURE — 87086 URINE CULTURE/COLONY COUNT: CPT

## 2021-07-13 PROCEDURE — 99282 EMERGENCY DEPT VISIT SF MDM: CPT

## 2021-07-13 PROCEDURE — 74011250637 HC RX REV CODE- 250/637: Performed by: PHYSICIAN ASSISTANT

## 2021-07-13 PROCEDURE — 81001 URINALYSIS AUTO W/SCOPE: CPT

## 2021-07-13 PROCEDURE — 87186 SC STD MICRODIL/AGAR DIL: CPT

## 2021-07-13 RX ORDER — CEPHALEXIN 500 MG/1
500 CAPSULE ORAL 2 TIMES DAILY
Qty: 14 CAPSULE | Refills: 0 | Status: SHIPPED | OUTPATIENT
Start: 2021-07-13 | End: 2021-07-20

## 2021-07-13 RX ORDER — CEPHALEXIN 250 MG/1
500 CAPSULE ORAL
Status: COMPLETED | OUTPATIENT
Start: 2021-07-13 | End: 2021-07-13

## 2021-07-13 RX ADMIN — CEPHALEXIN 500 MG: 250 CAPSULE ORAL at 15:47

## 2021-07-13 NOTE — ED PROVIDER NOTES
EMERGENCY DEPARTMENT HISTORY AND PHYSICAL EXAM      Date: 7/13/2021  Patient Name: Dianne Dallas    History of Presenting Illness     Chief Complaint   Patient presents with    Urinary Pain       History Provided By: Patient    HPI: Dianne Dallas, 57 y.o. female PMHx significant for depression, DM presents ambulatory to the ED with cc of dysuria and urinary frequency x \" a few days\". Pt also reports odor to urine. Hx DM. Denies hematuria, abd pain, vomiting, diarrhea, fever/chills. There are no other complaints, changes, or physical findings at this time. PCP: Arlene Munoz MD    No current facility-administered medications on file prior to encounter. Current Outpatient Medications on File Prior to Encounter   Medication Sig Dispense Refill    metFORMIN (GLUCOPHAGE) 1,000 mg tablet TAKE 1 TABLET BY MOUTH TWO TIMES A DAY WITH A MEAL 60 Tab 4    pravastatin (PRAVACHOL) 40 mg tablet TAKE 1 TABLET BY MOUTH ONCE DAILY 30 Tab 5    lisinopriL (PRINIVIL, ZESTRIL) 20 mg tablet Take 1 Tab by mouth daily. 90 Tab 3    insulin glargine (LANTUS) 100 unit/mL injection 20 units BID 1 Vial 5    ergocalciferol (ERGOCALCIFEROL) 1,250 mcg (50,000 unit) capsule Take 1 Cap by mouth every seven (7) days. Please have patient call the office for an appointment. 4 Cap 5    montelukast (SINGULAIR) 10 mg tablet Take 1 Tab by mouth daily. 90 Tab 1    gabapentin (NEURONTIN) 300 mg capsule TAKE 1 CAPSULE BY MOUTH THREE TIMES DAILY 90 Cap 3    Insulin Syringe-Needle U-100 0.5 mL 30 gauge x 5/16\" syrg INJECT TWICE A  Syringe 5    Insulin Needles, Disposable, (RAFITA PEN NEEDLE) 32 gauge x 5/32\" ndle Inject 1x/day 50 Pen Needle 3    sodium chloride (NORMAL SALINE FLUSH) 10 mL by IntraVENous route daily. flush IV before and after medication administration and daily to keep line open      heparin sod,porcine/0.9 % NaCl (HEPARIN FLUSH IV) 3 mL by IntraVENous route daily.  flush both ports of the PICC line daily  polyethylene glycol (MIRALAX) 17 gram packet Take 1 Packet by mouth daily. Indications: hold it for loose BMs 30 Packet 0    colchicine 0.6 mg tablet Take 1 tablet by mouth Q1Hour for gout pain. NOT TO EXCEED 3 TABLETS IN 24 HOURS. 30 Tab 0    hydrOXYzine HCl (ATARAX) 25 mg tablet TAKE 1 TABLET BY MOUTH THREE TIMES DAILY AS NEEDED FOR ITCHING 90 Tab 0    lidocaine (LIDODERM) 5 % Apply patch to the affected area for 12 hours a day and remove for 12 hours a day. (Patient taking differently: 1 Patch by TransDERmal route every twenty-four (24) hours. Apply patch to the affected area (back)  for 12 hours a day and remove for 12 hours a day.) 1 Package 0    Blood-Glucose Meter monitoring kit Check BS 3x/day E11.65 1 Kit 0    Lancets misc Check BS 3x/day E11.65 100 Each 5    glucose blood VI test strips (ONETOUCH ULTRA TEST) strip Check BS 3x/day E11.65 100 Strip 5    acetaminophen (TYLENOL) 325 mg tablet Take 2 Tabs by mouth every four (4) hours as needed for Pain.  20 Tab 0       Past History     Past Medical History:  Past Medical History:   Diagnosis Date    Bladder infection     Depression     Diabetes ([address]     Difficulty walking     DM (diabetes mellitus) [facility] [address] 6/22/2012    Ill-defined condition     leaking bladder;high cholesterol    Proteinuria 10/1/2019    RX  lisinopril for proteinuria    Puncture wound of foot, left, complicated 9/68/2052       Past Surgical History:  Past Surgical History:   Procedure Laterality Date    HX GYN      hysterectomy    HX OTHER SURGICAL      bladder surgery       Family History:  Family History   Problem Relation Age of Onset    Diabetes Mother     Cancer Mother     Heart Attack Father         had 3 hear attacks    Diabetes Father        Social History:  Social History     Tobacco Use    Smoking status: Current Every Day Smoker     Packs/day: 2.00     Years: 38.00     Pack years: 76.00    Smokeless tobacco: Never Used   Substance Use Topics    Alcohol use: No    Drug use: No       Allergies:  No Known Allergies      Review of Systems   Review of Systems   Constitutional: Negative for chills and fever. Respiratory: Negative for shortness of breath. Cardiovascular: Negative for chest pain. Gastrointestinal: Negative for abdominal pain, nausea and vomiting. Genitourinary: Positive for dysuria and frequency. Negative for flank pain. Musculoskeletal: Negative for back pain and myalgias. Skin: Negative for color change, pallor, rash and wound. Neurological: Negative for dizziness, weakness and light-headedness. All other systems reviewed and are negative. Physical Exam   Physical Exam  Vitals and nursing note reviewed. Constitutional:       General: She is not in acute distress. Appearance: She is well-developed. Comments: Pt in NAD   HENT:      Head: Normocephalic and atraumatic. Eyes:      Conjunctiva/sclera: Conjunctivae normal.   Cardiovascular:      Rate and Rhythm: Normal rate and regular rhythm. Heart sounds: Normal heart sounds. Pulmonary:      Effort: Pulmonary effort is normal. No respiratory distress. Breath sounds: Normal breath sounds. Abdominal:      General: Bowel sounds are normal. There is no distension. Palpations: Abdomen is soft. Comments: Abdomen soft, nontender  Nondistended  No guarding or rigidity   Musculoskeletal:         General: Normal range of motion. Skin:     General: Skin is warm. Findings: No rash. Neurological:      Mental Status: She is alert and oriented to person, place, and time.    Psychiatric:         Behavior: Behavior normal.         Diagnostic Study Results     Labs -     Recent Results (from the past 12 hour(s))   URINALYSIS W/ RFLX MICROSCOPIC    Collection Time: 07/13/21  2:18 PM   Result Value Ref Range    Color YELLOW      Appearance CLEAR      Specific gravity 1.011 1.005 - 1.030      pH (UA) 5.5 5.0 - 8.0      Protein Negative NEG mg/dL    Glucose >1,000 (A) NEG mg/dL    Ketone Negative NEG mg/dL    Bilirubin Negative NEG      Blood Negative NEG      Urobilinogen 0.2 0.2 - 1.0 EU/dL    Nitrites Negative NEG      Leukocyte Esterase SMALL (A) NEG     URINE MICROSCOPIC ONLY    Collection Time: 07/13/21  2:18 PM   Result Value Ref Range    WBC 25 to 30 0 - 5 /hpf    RBC 0 to 1 0 - 5 /hpf    Epithelial cells 1+ 0 - 5 /lpf    Bacteria 3+ (A) NEG /hpf       Radiologic Studies -   No orders to display     CT Results  (Last 48 hours)    None        CXR Results  (Last 48 hours)    None          Medical Decision Making   I am the first provider for this patient. I reviewed the vital signs, available nursing notes, past medical history, past surgical history, family history and social history. Vital Signs-Reviewed the patient's vital signs. Patient Vitals for the past 12 hrs:   Temp Pulse Resp BP SpO2   07/13/21 1417 98.1 °F (36.7 °C) 98 18 125/77 95 %       Records Reviewed: Nursing Notes and Old Medical Records    Provider Notes (Medical Decision Making):   DDx: UTI, Pyelo    56 yo F who presents with dysuria and odor to urine over the past few days. On exam, no abdominal tenderness. UA shows WBC in urine. Urine culture sent. Will treat with keflex. Patient afebrile not tachycardic. At time of discharge, pt non-toxic appearing in NAD. Pt stable for prompt outpatient follow-up with PCP 1 to 2 days. Patient given strict instructions to return if symptoms worsen. ED Course:   Initial assessment performed. The patients presenting problems have been discussed, and they are in agreement with the care plan formulated and outlined with them. I have encouraged them to ask questions as they arise throughout their visit. Disposition:  Discussed dx and treatment plan. Discussed importance of PCP follow up. All questions answered. Pt voiced they understood. Return if sx worsen. PLAN:  1.    Discharge Medication List as of 7/13/2021  3:47 PM      START taking these medications    Details   cephALEXin (Keflex) 500 mg capsule Take 1 Capsule by mouth two (2) times a day for 7 days. , Normal, Disp-14 Capsule, R-0         CONTINUE these medications which have NOT CHANGED    Details   metFORMIN (GLUCOPHAGE) 1,000 mg tablet TAKE 1 TABLET BY MOUTH TWO TIMES A DAY WITH A MEAL, Normal, Disp-60 Tab, R-4      pravastatin (PRAVACHOL) 40 mg tablet TAKE 1 TABLET BY MOUTH ONCE DAILY, Normal, Disp-30 Tab, R-5      lisinopriL (PRINIVIL, ZESTRIL) 20 mg tablet Take 1 Tab by mouth daily. , Normal, Disp-90 Tab,R-3      insulin glargine (LANTUS) 100 unit/mL injection 20 units BID, Normal, Disp-1 Vial,R-5      ergocalciferol (ERGOCALCIFEROL) 1,250 mcg (50,000 unit) capsule Take 1 Cap by mouth every seven (7) days. Please have patient call the office for an appointment., Normal, Disp-4 Cap,R-5      montelukast (SINGULAIR) 10 mg tablet Take 1 Tab by mouth daily. , Normal, Disp-90 Tab, R-1      gabapentin (NEURONTIN) 300 mg capsule TAKE 1 CAPSULE BY MOUTH THREE TIMES DAILY, Normal, Disp-90 Cap, R-3      Insulin Syringe-Needle U-100 0.5 mL 30 gauge x 5/16\" syrg INJECT TWICE A DAY, Normal, Disp-100 Syringe, R-5      Insulin Needles, Disposable, (RAFITA PEN NEEDLE) 32 gauge x 5/32\" ndle Inject 1x/day, Normal, Disp-50 Pen Needle, R-3      sodium chloride (NORMAL SALINE FLUSH) 10 mL by IntraVENous route daily. flush IV before and after medication administration and daily to keep line open, Historical Med      heparin sod,porcine/0.9 % NaCl (HEPARIN FLUSH IV) 3 mL by IntraVENous route daily. flush both ports of the PICC line daily, Historical Med      polyethylene glycol (MIRALAX) 17 gram packet Take 1 Packet by mouth daily. Indications: hold it for loose BMs, Print, Disp-30 Packet, R-0      colchicine 0.6 mg tablet Take 1 tablet by mouth Q1Hour for gout pain.     NOT TO EXCEED 3 TABLETS IN 24 HOURS., Print, Disp-30 Tab, R-0      hydrOXYzine HCl (ATARAX) 25 mg tablet TAKE 1 TABLET BY MOUTH THREE TIMES DAILY AS NEEDED FOR ITCHING, Normal, Disp-90 Tab, R-0      lidocaine (LIDODERM) 5 % Apply patch to the affected area for 12 hours a day and remove for 12 hours a day., Print, Disp-1 Package, R-0      Blood-Glucose Meter monitoring kit Check BS 3x/day E11.65, Normal, Disp-1 Kit, R-0      Lancets misc Check BS 3x/day E11.65, Normal, Disp-100 Each, R-5      glucose blood VI test strips (ONETOUCH ULTRA TEST) strip Check BS 3x/day E11.65, Normal, Disp-100 Strip, R-5      acetaminophen (TYLENOL) 325 mg tablet Take 2 Tabs by mouth every four (4) hours as needed for Pain., Print, Disp-20 Tab, R-0           2. Follow-up Information     Follow up With Specialties Details Why Contact Info    Toribio Bond MD Internal Medicine Schedule an appointment as soon as possible for a visit in 1 day  [address]  [address] [place] [address]  [phone]      [facility] EMERGENCY DEPT Emergency Medicine  As needed, If symptoms worsen [facility] Lucie Fabian [facility]  [phone]        Return to ED if worse     Diagnosis     Clinical Impression:   1. Acute cystitis without hematuria        Attestations:    ISHAN Patel    Please note that this dictation was completed with [employer], the computer voice recognition software. Quite often unanticipated grammatical, syntax, homophones, and other interpretive errors are inadvertently transcribed by the computer software. Please disregard these errors. Please excuse any errors that have escaped final proofreading. Thank you.

## 2021-07-13 NOTE — ED TRIAGE NOTES
Pt concerned for bladder infection with history of same. States pain with urination and odor to her urine.

## 2021-07-16 LAB
BACTERIA SPEC CULT: ABNORMAL
CC UR VC: ABNORMAL
SERVICE CMNT-IMP: ABNORMAL

## 2021-08-02 ENCOUNTER — TELEPHONE (OUTPATIENT)
Dept: INTERNAL MEDICINE CLINIC | Age: 61
End: 2021-08-02

## 2021-08-02 DIAGNOSIS — E11.21 TYPE 2 DIABETES WITH NEPHROPATHY (HCC): Primary | ICD-10-CM

## 2021-08-02 RX ORDER — INSULIN GLARGINE 100 [IU]/ML
INJECTION, SOLUTION SUBCUTANEOUS
Qty: 1 VIAL | Refills: 0 | Status: SHIPPED | OUTPATIENT
Start: 2021-08-02 | End: 2021-10-13 | Stop reason: SDUPTHER

## 2021-08-16 RX ORDER — SYRINGE-NEEDLE,INSULIN,0.5 ML 30 GX5/16"
SYRINGE, EMPTY DISPOSABLE MISCELLANEOUS
Qty: 200 SYRINGE | Refills: 1 | Status: SHIPPED | OUTPATIENT
Start: 2021-08-16

## 2021-08-16 NOTE — TELEPHONE ENCOUNTER
Last Visit: 10/13/20 with MD Saldaña  Next Appointment: no show 2/15/21  Previous Refill Encounter(s): 2/7/20 #100 with 5 refills    Requested Prescriptions     Pending Prescriptions Disp Refills    Insulin Syringe-Needle U-100 0.5 mL 30 gauge x 5/16\" syrg 200 Syringe 1     Sig: INJECT TWICE A DAY

## 2021-10-13 DIAGNOSIS — E11.21 TYPE 2 DIABETES WITH NEPHROPATHY (HCC): ICD-10-CM

## 2021-10-13 RX ORDER — INSULIN GLARGINE 100 [IU]/ML
20 INJECTION, SOLUTION SUBCUTANEOUS 2 TIMES DAILY
Qty: 10 ML | Refills: 1 | Status: SHIPPED | OUTPATIENT
Start: 2021-10-13 | End: 2021-12-02 | Stop reason: SDUPTHER

## 2021-10-13 NOTE — TELEPHONE ENCOUNTER
Last Visit: 10/13/20 with MD Saldaña  Next Appointment: no show 2/15/21  Previous Refill Encounter(s): 21 #1    Requested Prescriptions     Pending Prescriptions Disp Refills    insulin glargine (LANTUS) 100 unit/mL injection 10 mL 1     Si Units by SubCUTAneous route two (2) times a day.

## 2021-10-14 ENCOUNTER — HOSPITAL ENCOUNTER (OUTPATIENT)
Dept: LAB | Age: 61
Discharge: HOME | End: 2021-10-14
Payer: MEDICAID

## 2021-10-14 ENCOUNTER — OFFICE VISIT (OUTPATIENT)
Dept: INTERNAL MEDICINE CLINIC | Age: 61
End: 2021-10-14
Payer: MEDICAID

## 2021-10-14 ENCOUNTER — TELEPHONE (OUTPATIENT)
Dept: INTERNAL MEDICINE CLINIC | Age: 61
End: 2021-10-14

## 2021-10-14 VITALS
WEIGHT: 115 LBS | HEIGHT: 63 IN | RESPIRATION RATE: 20 BRPM | SYSTOLIC BLOOD PRESSURE: 134 MMHG | TEMPERATURE: 96.4 F | BODY MASS INDEX: 20.38 KG/M2 | HEART RATE: 81 BPM | DIASTOLIC BLOOD PRESSURE: 75 MMHG | OXYGEN SATURATION: 96 %

## 2021-10-14 DIAGNOSIS — Z23 NEEDS FLU SHOT: ICD-10-CM

## 2021-10-14 DIAGNOSIS — I10 ESSENTIAL HYPERTENSION: ICD-10-CM

## 2021-10-14 DIAGNOSIS — Z12.31 BREAST CANCER SCREENING BY MAMMOGRAM: ICD-10-CM

## 2021-10-14 DIAGNOSIS — M54.50 CHRONIC MIDLINE LOW BACK PAIN WITHOUT SCIATICA: ICD-10-CM

## 2021-10-14 DIAGNOSIS — G89.4 CHRONIC PAIN SYNDROME: ICD-10-CM

## 2021-10-14 DIAGNOSIS — E55.9 VITAMIN D DEFICIENCY: ICD-10-CM

## 2021-10-14 DIAGNOSIS — E78.00 HIGH CHOLESTEROL: ICD-10-CM

## 2021-10-14 DIAGNOSIS — E11.21 TYPE 2 DIABETES WITH NEPHROPATHY (HCC): Primary | ICD-10-CM

## 2021-10-14 DIAGNOSIS — E11.21 TYPE 2 DIABETES WITH NEPHROPATHY (HCC): ICD-10-CM

## 2021-10-14 DIAGNOSIS — G89.29 CHRONIC MIDLINE LOW BACK PAIN WITHOUT SCIATICA: ICD-10-CM

## 2021-10-14 DIAGNOSIS — Z12.11 COLON CANCER SCREENING: ICD-10-CM

## 2021-10-14 LAB
25(OH)D3 SERPL-MCNC: 14.8 NG/ML (ref 30–100)
ALBUMIN SERPL-MCNC: 3.7 G/DL (ref 3.4–5)
ALBUMIN/GLOB SERPL: 1.1 {RATIO} (ref 0.8–1.7)
ALP SERPL-CCNC: 98 U/L (ref 45–117)
ALT SERPL-CCNC: 17 U/L (ref 13–56)
ANION GAP SERPL CALC-SCNC: 8 MMOL/L (ref 3–18)
AST SERPL-CCNC: 12 U/L (ref 10–38)
BILIRUB SERPL-MCNC: 0.2 MG/DL (ref 0.2–1)
BUN SERPL-MCNC: 27 MG/DL (ref 7–18)
BUN/CREAT SERPL: 17 (ref 12–20)
CALCIUM SERPL-MCNC: 9.2 MG/DL (ref 8.5–10.1)
CHLORIDE SERPL-SCNC: 97 MMOL/L (ref 100–111)
CHOLEST SERPL-MCNC: 227 MG/DL
CO2 SERPL-SCNC: 24 MMOL/L (ref 21–32)
CREAT SERPL-MCNC: 1.59 MG/DL (ref 0.6–1.3)
CREAT UR-MCNC: 29 MG/DL (ref 30–125)
GLOBULIN SER CALC-MCNC: 3.5 G/DL (ref 2–4)
GLUCOSE SERPL-MCNC: 571 MG/DL (ref 74–99)
HBA1C MFR BLD HPLC: NORMAL %
HDLC SERPL-MCNC: 35 MG/DL (ref 40–60)
HDLC SERPL: 6.5 {RATIO} (ref 0–5)
LDLC SERPL CALC-MCNC: 130.6 MG/DL (ref 0–100)
LIPID PROFILE,FLP: ABNORMAL
MICROALBUMIN UR-MCNC: 5.19 MG/DL (ref 0–3)
MICROALBUMIN/CREAT UR-RTO: 179 MG/G (ref 0–30)
POTASSIUM SERPL-SCNC: 4.9 MMOL/L (ref 3.5–5.5)
PROT SERPL-MCNC: 7.2 G/DL (ref 6.4–8.2)
SODIUM SERPL-SCNC: 129 MMOL/L (ref 136–145)
TRIGL SERPL-MCNC: 307 MG/DL (ref ?–150)
VLDLC SERPL CALC-MCNC: 61.4 MG/DL

## 2021-10-14 PROCEDURE — 99214 OFFICE O/P EST MOD 30 MIN: CPT | Performed by: INTERNAL MEDICINE

## 2021-10-14 PROCEDURE — 80061 LIPID PANEL: CPT

## 2021-10-14 PROCEDURE — 82043 UR ALBUMIN QUANTITATIVE: CPT

## 2021-10-14 PROCEDURE — 90000 INFLUENZA VIRUS VAC QUAD,SPLIT,PRESV FREE SYRINGE IM: CPT | Performed by: INTERNAL MEDICINE

## 2021-10-14 PROCEDURE — 83036 HEMOGLOBIN GLYCOSYLATED A1C: CPT | Performed by: INTERNAL MEDICINE

## 2021-10-14 PROCEDURE — 90686 IIV4 VACC NO PRSV 0.5 ML IM: CPT | Performed by: INTERNAL MEDICINE

## 2021-10-14 PROCEDURE — 82306 VITAMIN D 25 HYDROXY: CPT

## 2021-10-14 PROCEDURE — 80053 COMPREHEN METABOLIC PANEL: CPT

## 2021-10-14 RX ORDER — INSULIN PUMP SYRINGE, 3 ML
EACH MISCELLANEOUS
Qty: 1 KIT | Refills: 0 | Status: SHIPPED | OUTPATIENT
Start: 2021-10-14

## 2021-10-14 RX ORDER — GABAPENTIN 300 MG/1
300 CAPSULE ORAL 3 TIMES DAILY
Qty: 90 CAPSULE | Refills: 3 | Status: SHIPPED | OUTPATIENT
Start: 2021-10-14 | End: 2022-06-16

## 2021-10-14 RX ORDER — METFORMIN HYDROCHLORIDE 1000 MG/1
TABLET ORAL
Qty: 60 TABLET | Refills: 4 | Status: SHIPPED | OUTPATIENT
Start: 2021-10-14 | End: 2022-10-28

## 2021-10-14 RX ORDER — MONTELUKAST SODIUM 10 MG/1
10 TABLET ORAL DAILY
Qty: 90 TABLET | Refills: 1 | Status: SHIPPED | OUTPATIENT
Start: 2021-10-14 | End: 2022-06-16

## 2021-10-14 RX ORDER — LISINOPRIL 20 MG/1
20 TABLET ORAL DAILY
Qty: 90 TABLET | Refills: 3 | Status: SHIPPED | OUTPATIENT
Start: 2021-10-14

## 2021-10-14 RX ORDER — PRAVASTATIN SODIUM 40 MG/1
40 TABLET ORAL DAILY
Qty: 30 TABLET | Refills: 5 | Status: SHIPPED | OUTPATIENT
Start: 2021-10-14 | End: 2022-06-16

## 2021-10-14 RX ORDER — BLOOD SUGAR DIAGNOSTIC
STRIP MISCELLANEOUS
Qty: 100 STRIP | Refills: 5 | Status: SHIPPED | OUTPATIENT
Start: 2021-10-14

## 2021-10-14 RX ORDER — INSULIN GLARGINE 100 [IU]/ML
20 INJECTION, SOLUTION SUBCUTANEOUS 2 TIMES DAILY
Qty: 10 ML | Refills: 1 | Status: CANCELLED | OUTPATIENT
Start: 2021-10-14

## 2021-10-14 RX ORDER — LANCETS
EACH MISCELLANEOUS
Qty: 100 EACH | Refills: 5 | Status: SHIPPED | OUTPATIENT
Start: 2021-10-14 | End: 2022-06-16

## 2021-10-14 RX ORDER — ERGOCALCIFEROL 1.25 MG/1
50000 CAPSULE ORAL
Qty: 4 CAPSULE | Refills: 5 | Status: SHIPPED | OUTPATIENT
Start: 2021-10-14 | End: 2022-06-16

## 2021-10-14 NOTE — PATIENT INSTRUCTIONS
High Blood Pressure: Care Instructions  Overview     It's normal for blood pressure to go up and down throughout the day. But if it stays up, you have high blood pressure. Another name for high blood pressure is hypertension. Despite what a lot of people think, high blood pressure usually doesn't cause headaches or make you feel dizzy or lightheaded. It usually has no symptoms. But it does increase your risk of stroke, heart attack, and other problems. You and your doctor will talk about your risks of these problems based on your blood pressure. Your doctor will give you a goal for your blood pressure. Your goal will be based on your health and your age. Lifestyle changes, such as eating healthy and being active, are always important to help lower blood pressure. You might also take medicine to reach your blood pressure goal.  Follow-up care is a key part of your treatment and safety. Be sure to make and go to all appointments, and call your doctor if you are having problems. It's also a good idea to know your test results and keep a list of the medicines you take. How can you care for yourself at home? Medical treatment  · If you stop taking your medicine, your blood pressure will go back up. You may take one or more types of medicine to lower your blood pressure. Be safe with medicines. Take your medicine exactly as prescribed. Call your doctor if you think you are having a problem with your medicine. · Talk to your doctor before you start taking aspirin every day. Aspirin can help certain people lower their risk of a heart attack or stroke. But taking aspirin isn't right for everyone, because it can cause serious bleeding. · See your doctor regularly. You may need to see the doctor more often at first or until your blood pressure comes down. · If you are taking blood pressure medicine, talk to your doctor before you take decongestants or anti-inflammatory medicine, such as ibuprofen.  Some of these medicines can raise blood pressure. · Learn how to check your blood pressure at home. Lifestyle changes  · Stay at a healthy weight. This is especially important if you put on weight around the waist. Losing even 10 pounds can help you lower your blood pressure. · If your doctor recommends it, get more exercise. Walking is a good choice. Bit by bit, increase the amount you walk every day. Try for at least 30 minutes on most days of the week. You also may want to swim, bike, or do other activities. · Avoid or limit alcohol. Talk to your doctor about whether you can drink any alcohol. · Try to limit how much sodium you eat to less than 2,300 milligrams (mg) a day. Your doctor may ask you to try to eat less than 1,500 mg a day. · Eat plenty of fruits (such as bananas and oranges), vegetables, legumes, whole grains, and low-fat dairy products. · Lower the amount of saturated fat in your diet. Saturated fat is found in animal products such as milk, cheese, and meat. Limiting these foods may help you lose weight and also lower your risk for heart disease. · Do not smoke. Smoking increases your risk for heart attack and stroke. If you need help quitting, talk to your doctor about stop-smoking programs and medicines. These can increase your chances of quitting for good. When should you call for help? Call 911  anytime you think you may need emergency care. This may mean having symptoms that suggest that your blood pressure is causing a serious heart or blood vessel problem. Your blood pressure may be over 180/120. For example, call 911 if:    · You have symptoms of a heart attack. These may include:  ? Chest pain or pressure, or a strange feeling in the chest.  ? Sweating. ? Shortness of breath. ? Nausea or vomiting. ? Pain, pressure, or a strange feeling in the back, neck, jaw, or upper belly or in one or both shoulders or arms. ? Lightheadedness or sudden weakness.   ? A fast or irregular heartbeat.     · You have symptoms of a stroke. These may include:  ? Sudden numbness, tingling, weakness, or loss of movement in your face, arm, or leg, especially on only one side of your body. ? Sudden vision changes. ? Sudden trouble speaking. ? Sudden confusion or trouble understanding simple statements. ? Sudden problems with walking or balance. ? A sudden, severe headache that is different from past headaches.     · You have severe back or belly pain. Do not wait until your blood pressure comes down on its own. Get help right away. Call your doctor now or seek immediate care if:    · Your blood pressure is much higher than normal (such as 180/120 or higher), but you don't have symptoms.     · You think high blood pressure is causing symptoms, such as:  ? Severe headache.  ? Blurry vision. Watch closely for changes in your health, and be sure to contact your doctor if:    · Your blood pressure measures higher than your doctor recommends at least 2 times. That means the top number is higher or the bottom number is higher, or both.     · You think you may be having side effects from your blood pressure medicine. Where can you learn more? Go to http://www.gray.com/  Enter [id number] in the search box to learn more about \"High Blood Pressure: Care Instructions. \"  Current as of: April 29, 2021               Content Version: 13.0  © 2006-2021 [employer]. Care instructions adapted under license by [employer] (which disclaims liability or warranty for this information). If you have questions about a medical condition or this instruction, always ask your healthcare professional. [place] [address] any warranty or liability for your use of this information. Influenza (Flu) Vaccine (Inactivated or Recombinant): What You Need to Know  Why get vaccinated? Influenza vaccine can prevent influenza (flu).   Flu is a contagious disease that spreads around the [facility] every year, usually between October and May. Anyone can get the flu, but it is more dangerous for some people. Infants and young children, people 72years of age and older, pregnant women, and people with certain health conditions or a weakened immune system are at greatest risk of flu complications. Pneumonia, bronchitis, sinus infections and ear infections are examples of flu-related complications. If you have a medical condition, such as heart disease, cancer or diabetes, flu can make it worse. Flu can cause fever and chills, sore throat, muscle aches, fatigue, cough, headache, and runny or stuffy nose. Some people may have vomiting and diarrhea, though this is more common in children than adults. Each year, thousands of people in the [facility] die from flu, and many more are hospitalized. Flu vaccine prevents millions of illnesses and flu-related visits to the doctor each year. Influenza vaccine  CDC recommends everyone 10months of age and older get vaccinated every flu season. Children 6 months through 6years of age may need 2 doses during a single flu season. Everyone else needs only 1 dose each flu season. It takes about 2 weeks for protection to develop after vaccination. There are many flu viruses, and they are always changing. Each year a new flu vaccine is made to protect against three or four viruses that are likely to cause disease in the upcoming flu season. Even when the vaccine doesn't exactly match these viruses, it may still provide some protection. Influenza vaccine does not cause flu. Influenza vaccine may be given at the same time as other vaccines. Talk with your health care provider  Tell your vaccine provider if the person getting the vaccine:  · Has had an allergic reaction after a previous dose of influenza vaccine, or has any severe, life-threatening allergies. · Has ever had Guillain-Barré Syndrome (also called GBS).   In some cases, your health care provider may decide to postpone influenza vaccination to a future visit. People with minor illnesses, such as a cold, may be vaccinated. People who are moderately or severely ill should usually wait until they recover before getting influenza vaccine. Your health care provider can give you more information. Risks of a vaccine reaction  · Soreness, redness, and swelling where shot is given, fever, muscle aches, and headache can happen after influenza vaccine. · There may be a very small increased risk of Guillain-Barré Syndrome (GBS) after inactivated influenza vaccine (the flu shot). Ivan Salas children who get the flu shot along with pneumococcal vaccine (PCV13), and/or DTaP vaccine at the same time might be slightly more likely to have a seizure caused by fever. Tell your health care provider if a child who is getting flu vaccine has ever had a seizure. People sometimes faint after medical procedures, including vaccination. Tell your provider if you feel dizzy or have vision changes or ringing in the ears. As with any medicine, there is a very remote chance of a vaccine causing a severe allergic reaction, other serious injury, or death. What if there is a serious problem? An allergic reaction could occur after the vaccinated person leaves the clinic. If you see signs of a severe allergic reaction (hives, swelling of the face and throat, difficulty breathing, a fast heartbeat, dizziness, or weakness), call 9-1-1 and get the person to the nearest hospital.  For other signs that concern you, call your health care provider. Adverse reactions should be reported to the Vaccine Adverse Event Reporting System (VAERS). Your health care provider will usually file this report, or you can do it yourself. Visit the VAERS website at www.vaers. hhs.gov or call 0-568.436.7664. VAERS is only for reporting reactions, and VAERS staff do not give medical advice.   The [employer] Vaccine Injury Compensation Program  [employer] Injury Compensation Program (VICP) is a federal program that was created to compensate people who may have been injured by certain vaccines. Visit the VICP website at www.hrsa.gov/vaccinecompensation or call 9-676.637.2878 to learn about the program and about filing a claim. There is a time limit to file a claim for compensation. How can I learn more? · Ask your healthcare provider. · Call your local or state health department. · Contact the Centers for Disease Control and Prevention (CDC):  ? Call 5-354.370.5059 (1-800-CDC-INFO) or  ? Visit CDC's website at www.cdc.gov/flu  Vaccine Information Statement (Interim)  Inactivated Influenza Vaccine  8/15/2019  [address] [id number]  Department of Health and Human Services  Centers for Disease Control and Prevention  Many Vaccine Information Statements are available in [place] and other languages. See www.immunize.org/vis. Muchas hojas de información sobre vacunas están disponibles en español y en otros idiomas. Visite www.immunize.org/vis. Care instructions adapted under license by [employer] (which disclaims liability or warranty for this information). If you have questions about a medical condition or this instruction, always ask your healthcare professional. [address] any warranty or liability for your use of this information.

## 2021-10-14 NOTE — PROGRESS NOTES
Patient is in the office today for a follow up. 1. Have you been to the ER, urgent care clinic since your last visit? Hospitalized since your last visit? No    2. Have you seen or consulted any other health care providers outside of the [address] [place] [address] since your last visit? Include any pap smears or colon screening. No     Verbal order read back per Dr. Flaco Franklin flu vaccine . Patient received flu vaccine in right deltoid. Patient was observed and no signs or symptoms of an allergic reaction noted at this time. Patient tolerated well and left without complaints. Patient received flu VIS.

## 2021-10-14 NOTE — PROGRESS NOTES
Subjective:       Chief Complaint  The patient presents for follow up of diabetesand high cholesterol. Proteinuria, back pain         HPI  Elliot Guevara is a 57 y.o. female seen for follow up of diabetes. Shealso has  hyperlipidemia. Diabetes uncontrolled  With pt not taking Lantus insulin 20 units BID for several weeks. Concerned patient also  has not been taking her Metformin. Patient has developmental delay and has had difficulty managing her medications. She has someone with her today who seems to try to work with her to get her more compliant with taking her medications. Hyperlipidemia uncontrolled, patient most likely has not been taking Pravachol 40 mg. Pt remains on lisinopril 10 mg for her proteinuria and HTN. Blood pressure is controlled and her proteinuria seems to be improving. Diet and Lifestyle: generally follows a low fat low cholesterol diet, does not rigorously follow a diabetic diet, sedentary    Diabetic Review of Systems - home glucose monitoring: is performed sporadically, should be 3x/day . Other symptoms and concerns: . Pt's chronic back pain is stable on Ultram prn.  reviewed. Patient is also taking Neurontin for peripheral neuropathy. Pt's vit D level is uncontrolled due to patient not taking her vitamin D 50,000 units/week. She will restart taking it from now. Patient continues to smoke cigarettes and is not ready to quit at this time. Current Outpatient Medications   Medication Sig    metFORMIN (GLUCOPHAGE) 1,000 mg tablet TAKE 1 TABLET BY MOUTH TWO TIMES A DAY WITH A MEAL    pravastatin (PRAVACHOL) 40 mg tablet Take 1 Tablet by mouth daily.  lisinopriL (PRINIVIL, ZESTRIL) 20 mg tablet Take 1 Tablet by mouth daily.  ergocalciferol (ERGOCALCIFEROL) 1,250 mcg (50,000 unit) capsule Take 1 Capsule by mouth every seven (7) days. Please have patient call the office for an appointment.     montelukast (SINGULAIR) 10 mg tablet Take 1 Tablet by mouth daily.  gabapentin (NEURONTIN) 300 mg capsule Take 1 Capsule by mouth three (3) times daily.  lancets misc Check BS 3x/day E11.65    Blood-Glucose Meter monitoring kit Check BS 3x/day E11.65    glucose blood VI test strips (OneTouch Ultra Test) strip Check BS 3x/day E11.65    insulin glargine (LANTUS) 100 unit/mL injection 20 Units by SubCUTAneous route two (2) times a day.  Insulin Syringe-Needle U-100 0.5 mL 30 gauge x 5/16\" syrg INJECT TWICE A DAY    Insulin Needles, Disposable, (RAFITA PEN NEEDLE) 32 gauge x 5/32\" ndle Inject 1x/day    polyethylene glycol (MIRALAX) 17 gram packet Take 1 Packet by mouth daily. Indications: hold it for loose BMs    colchicine 0.6 mg tablet Take 1 tablet by mouth Q1Hour for gout pain. NOT TO EXCEED 3 TABLETS IN 24 HOURS.  hydrOXYzine HCl (ATARAX) 25 mg tablet TAKE 1 TABLET BY MOUTH THREE TIMES DAILY AS NEEDED FOR ITCHING    acetaminophen (TYLENOL) 325 mg tablet Take 2 Tabs by mouth every four (4) hours as needed for Pain.  sodium chloride (NORMAL SALINE FLUSH) 10 mL by IntraVENous route daily. flush IV before and after medication administration and daily to keep line open (Patient not taking: Reported on 10/14/2021)    heparin sod,porcine/0.9 % NaCl (HEPARIN FLUSH IV) 3 mL by IntraVENous route daily. flush both ports of the PICC line daily (Patient not taking: Reported on 10/14/2021)    lidocaine (LIDODERM) 5 % Apply patch to the affected area for 12 hours a day and remove for 12 hours a day. (Patient not taking: Reported on 10/14/2021)     No current facility-administered medications for this visit.            Review of Systems  Respiratory: negative for dyspnea on exertion  Cardiovascular: negative for chest pain    Objective:     Visit Vitals  /75 (BP 1 Location: Left arm, BP Patient Position: Sitting, BP Cuff Size: Adult)   Pulse 81   Temp (!) 96.4 °F (35.8 °C) (Temporal)   Resp 20   Ht 5' 3\" (1.6 m)   Wt 115 lb (52.2 kg)   LMP  (LMP Unknown)   SpO2 96%   BMI 20.37 kg/m²        Eyes - pupils equal and reactive, extraocular eye movements intact  Chest - clear to auscultation, no wheezes, rales or rhonchi, symmetric air entry  Heart - normal rate, regular rhythm, normal S1, S2, no murmurs, rubs, clicks or gallops  Extremities - no edema         Labs:   Lab Results   Component Value Date/Time    Hemoglobin A1c 7.3 (H) 10/13/2020 03:17 PM    Hemoglobin A1c 10.9 (H) 10/28/2019 01:15 PM    Hemoglobin A1c 11.3 (H) 10/23/2019 02:30 PM    Glucose 571 (HH) 10/14/2021 11:27 AM    Glucose (POC) 231 (H) 10/09/2019 11:48 AM    Microalbumin/Creat ratio (mg/g creat) 179 (H) 10/14/2021 11:27 AM    Microalbumin,urine random 5.19 (H) 10/14/2021 11:27 AM    LDL, calculated 130.6 (H) 10/14/2021 11:27 AM    Creatinine, POC 0.5 (L) 08/23/2010 10:49 AM    Creatinine 1.59 (H) 10/14/2021 11:27 AM      Lab Results   Component Value Date/Time    Cholesterol, total 227 (H) 10/14/2021 11:27 AM    HDL Cholesterol 35 (L) 10/14/2021 11:27 AM    LDL, calculated 130.6 (H) 10/14/2021 11:27 AM    Triglyceride 307 (H) 10/14/2021 11:27 AM    CHOL/HDL Ratio 6.5 (H) 10/14/2021 11:27 AM     Lab Results   Component Value Date/Time    ALT (SGPT) 17 10/14/2021 11:27 AM    Alk.  phosphatase 98 10/14/2021 11:27 AM    Bilirubin, direct <0.1 10/23/2019 02:30 PM    Bilirubin, total 0.2 10/14/2021 11:27 AM     Lab Results   Component Value Date/Time    GFR est AA 40 (L) 10/14/2021 11:27 AM    GFR est non-AA 33 (L) 10/14/2021 11:27 AM    Creatinine, POC 0.5 (L) 08/23/2010 10:49 AM    Creatinine 1.59 (H) 10/14/2021 11:27 AM    BUN 27 (H) 10/14/2021 11:27 AM    Sodium 129 (L) 10/14/2021 11:27 AM    Potassium 4.9 10/14/2021 11:27 AM    Chloride 97 (L) 10/14/2021 11:27 AM    CO2 24 10/14/2021 11:27 AM      Lab Results   Component Value Date/Time    Glucose 571 (HH) 10/14/2021 11:27 AM    Glucose (POC) 231 (H) 10/09/2019 11:48 AM      Labs:   Lab Results   Component Value Date/Time    Hemoglobin A1c 7.3 (H) 10/13/2020 03:17 PM    Hemoglobin A1c (POC)  10/14/2021 11:06 AM      Comment:      greater than 15              Assessment / Plan     Diabetes uncontrolled due to poor compliance with taking her insulin for several weeks. Patient to restart Lantus 20 units twice daily  Hyperlipidemia uncontrolled patient to restart Pravachol 40 mg daily. ICD-10-CM ICD-9-CM    1. Type 2 diabetes with nephropathy (HCC)  E11.21 250.40 AMB POC HEMOGLOBIN A1C     583.81 MICROALBUMIN, UR, RAND W/ MICROALB/CREAT RATIO   2. High cholesterol  E78.00 272.0 LIPID PANEL   3. Essential hypertension  P35 368.1 METABOLIC PANEL, COMPREHENSIVE   4. Chronic midline low back pain without sciatica  M54.50 724.2  normally controlled on Ultram but patient has not gotten a new prescription since March 2021. G89.29 338.29    5. Chronic pain syndrome  G89.4 338.4 gabapentin (NEURONTIN) 300 mg capsule   6. Vitamin D deficiency  E55.9 268.9  uncontrolled patient to restart vitamin D 50,000's per week VITAMIN D, 25 HYDROXY   7. Breast cancer screening by mammogram  Z12.31 V76.12 YAJAIRA MAMMO BI SCREENING INCL CAD   8. Colon cancer screening  Z12.11 V76.51 COLOGUARD TEST (FECAL DNA COLORECTAL CANCER SCREENING)   9. Needs flu shot  Z23 V04.81 INFLUENZA VIRUS VAC QUAD,SPLIT,PRESV FREE SYRINGE IM                  Diabetic issues reviewed with her: diabetic diet discussed in detail, and low cholesterol diet, weight control and daily exercise discussed. Medication instructions and potential side effects reviewed with patient in detail. Follow-up and Dispositions    · Return in about 3 months (around 1/14/2022). Reviewed plan of care. Patient has provided input and agrees with goals.

## 2021-10-15 NOTE — TELEPHONE ENCOUNTER
Received a page at earlier today at 1800 from the lab reporting a critical value for the patient's blood glucose at 571 on labs drawn today. Called and spoke with patient. Reports that she is feeling fine and she did not take any insulin today since she is out of it. Inquired whether she is monitoring her blood sugars, and she stated that she does not have a working monitor. States that insulin was not in the prescriptions that she picked up from the pharmacy today. Advised that she should contact the pharmacy regarding Lantus insulin and obtain. Prescription already sent in by Dr. Shane Giron yesterday.

## 2021-12-02 DIAGNOSIS — E11.21 TYPE 2 DIABETES WITH NEPHROPATHY (HCC): ICD-10-CM

## 2021-12-02 RX ORDER — INSULIN GLARGINE 100 [IU]/ML
20 INJECTION, SOLUTION SUBCUTANEOUS 2 TIMES DAILY
Qty: 10 ML | Refills: 5 | Status: SHIPPED | OUTPATIENT
Start: 2021-12-02 | End: 2022-02-18 | Stop reason: SDUPTHER

## 2021-12-02 NOTE — TELEPHONE ENCOUNTER
Received call from Ms Cristóbal Chamberlain at [facility] who had pt on the call as well. Said pt cannot read nor write and told her she wasn't feeling well and hadn't been taking her insulin because she didn't know how to call to get a refill and has run out. Also needs other medications but doesn't know names of them. Ms Cristóbal Chamberlain said perhaps she needs to be set up on re-occurring refills? ?  Please call Ms Cristóbal Chamberlain and she will connect pt on the call so she can tell what medications she needs.  She can be reached at [phone]

## 2021-12-02 NOTE — TELEPHONE ENCOUNTER
Spoke with Ms. Nathaly Sethi with patient on the phone. Ms. Nathaly Sethi was advised all medications were sent to the pharmacy 10/2021 with a 90 day supply. Ms. Nathaly Sethi is aware the office does not make arrangements for auto refills. Ms. Dixoninda Navdeep states she will call pharmacy and have auto refills arranged for patient as well as home delivery. Patient states she does need a refill on her insulin.      Last OV  10/14/2021  Next OV 1/13/2022  Last refill 10/13/2021

## 2022-01-13 DIAGNOSIS — Z12.31 BREAST CANCER SCREENING BY MAMMOGRAM: ICD-10-CM

## 2022-01-24 ENCOUNTER — HOSPITAL ENCOUNTER (EMERGENCY)
Age: 62
Discharge: HOME | End: 2022-01-24
Attending: EMERGENCY MEDICINE
Payer: MEDICAID

## 2022-01-24 VITALS
RESPIRATION RATE: 20 BRPM | SYSTOLIC BLOOD PRESSURE: 149 MMHG | TEMPERATURE: 98.2 F | DIASTOLIC BLOOD PRESSURE: 95 MMHG | HEART RATE: 93 BPM | OXYGEN SATURATION: 95 %

## 2022-01-24 DIAGNOSIS — M54.50 ACUTE RIGHT-SIDED LOW BACK PAIN WITHOUT SCIATICA: ICD-10-CM

## 2022-01-24 DIAGNOSIS — N30.00 ACUTE CYSTITIS WITHOUT HEMATURIA: Primary | ICD-10-CM

## 2022-01-24 DIAGNOSIS — E11.9 TYPE 2 DIABETES MELLITUS WITHOUT COMPLICATION, UNSPECIFIED WHETHER LONG TERM INSULIN USE (HCC): ICD-10-CM

## 2022-01-24 LAB
APPEARANCE UR: ABNORMAL
BACTERIA URNS QL MICRO: ABNORMAL /HPF
BILIRUB UR QL: NEGATIVE
COLOR UR: YELLOW
EPITH CASTS URNS QL MICRO: ABNORMAL /LPF (ref 0–5)
GLUCOSE UR STRIP.AUTO-MCNC: >1000 MG/DL
HGB UR QL STRIP: NEGATIVE
KETONES UR QL STRIP.AUTO: NEGATIVE MG/DL
LEUKOCYTE ESTERASE UR QL STRIP.AUTO: ABNORMAL
MUCOUS THREADS URNS QL MICRO: ABNORMAL /LPF
NITRITE UR QL STRIP.AUTO: NEGATIVE
PH UR STRIP: 5.5 [PH] (ref 5–8)
PROT UR STRIP-MCNC: 30 MG/DL
RBC #/AREA URNS HPF: ABNORMAL /HPF (ref 0–5)
SP GR UR REFRACTOMETRY: 1.01 (ref 1–1.03)
UROBILINOGEN UR QL STRIP.AUTO: 0.2 EU/DL (ref 0.2–1)
WBC URNS QL MICRO: ABNORMAL /HPF (ref 0–5)

## 2022-01-24 PROCEDURE — 87077 CULTURE AEROBIC IDENTIFY: CPT

## 2022-01-24 PROCEDURE — 81001 URINALYSIS AUTO W/SCOPE: CPT

## 2022-01-24 PROCEDURE — 87186 SC STD MICRODIL/AGAR DIL: CPT

## 2022-01-24 PROCEDURE — 99284 EMERGENCY DEPT VISIT MOD MDM: CPT

## 2022-01-24 PROCEDURE — 87086 URINE CULTURE/COLONY COUNT: CPT

## 2022-01-24 RX ORDER — CEPHALEXIN 500 MG/1
500 CAPSULE ORAL 2 TIMES DAILY
Qty: 14 CAPSULE | Refills: 0 | Status: SHIPPED | OUTPATIENT
Start: 2022-01-24 | End: 2022-01-31

## 2022-01-24 NOTE — DISCHARGE INSTRUCTIONS
Take all the antibiotics as directed until gone  Your urine has been sent for culture, you may get a call that you need to change your antibiotics  Recommend taking Tylenol for your back pain. Can take every 4-6 hours. Drink plenty of fluids.   Return to the emergency department if any worsening such as fever, difficulty urinating or abdominal pain

## 2022-01-24 NOTE — ED PROVIDER NOTES
EMERGENCY DEPARTMENT HISTORY AND PHYSICAL EXAM    Date: 1/24/2022  Patient Name: Leda Briggs    History of Presenting Illness     Chief Complaint   Patient presents with    Urinary Pain         History Provided By:     Chief Complaint:  Duration:   Timing:    Location:   Quality:   Severity:   Modifying Factors:   Associated Symptoms: none       Additional History (Context): Leda Briggs is a 57 y.o. female with a history of hypertension, diabetes presents for evaluation of urinary pain x2 weeks. Increased urinary frequency, urgency. Also having pain in her lower back. Denies flank pain, fever, abdominal pain, nausea vomiting, diarrhea. States feels very similar to when she had a UTI the past.   has a foul odor with her urine. No vaginal discharge, bleeding, pain. PCP: Fidencio Priest MD    Current Outpatient Medications   Medication Sig Dispense Refill    cephALEXin (Keflex) 500 mg capsule Take 1 Capsule by mouth two (2) times a day for 7 days. 14 Capsule 0    insulin glargine (LANTUS) 100 unit/mL injection 20 Units by SubCUTAneous route two (2) times a day. 10 mL 5    metFORMIN (GLUCOPHAGE) 1,000 mg tablet TAKE 1 TABLET BY MOUTH TWO TIMES A DAY WITH A MEAL 60 Tablet 4    pravastatin (PRAVACHOL) 40 mg tablet Take 1 Tablet by mouth daily. 30 Tablet 5    lisinopriL (PRINIVIL, ZESTRIL) 20 mg tablet Take 1 Tablet by mouth daily. 90 Tablet 3    ergocalciferol (ERGOCALCIFEROL) 1,250 mcg (50,000 unit) capsule Take 1 Capsule by mouth every seven (7) days. Please have patient call the office for an appointment. 4 Capsule 5    montelukast (SINGULAIR) 10 mg tablet Take 1 Tablet by mouth daily. 90 Tablet 1    gabapentin (NEURONTIN) 300 mg capsule Take 1 Capsule by mouth three (3) times daily.  90 Capsule 3    lancets misc Check BS 3x/day E11.65 100 Each 5    Blood-Glucose Meter monitoring kit Check BS 3x/day E11.65 1 Kit 0    glucose blood VI test strips (OneTouch Ultra Test) strip Check BS 3x/day E11.65 100 Strip 5    Insulin Syringe-Needle U-100 0.5 mL 30 gauge x 5/16\" syrg INJECT TWICE A  Syringe 1    Insulin Needles, Disposable, (RAFITA PEN NEEDLE) 32 gauge x 5/32\" ndle Inject 1x/day 50 Pen Needle 3    sodium chloride (NORMAL SALINE FLUSH) 10 mL by IntraVENous route daily. flush IV before and after medication administration and daily to keep line open (Patient not taking: Reported on 10/14/2021)      heparin sod,porcine/0.9 % NaCl (HEPARIN FLUSH IV) 3 mL by IntraVENous route daily. flush both ports of the PICC line daily (Patient not taking: Reported on 10/14/2021)      polyethylene glycol (MIRALAX) 17 gram packet Take 1 Packet by mouth daily. Indications: hold it for loose BMs 30 Packet 0    colchicine 0.6 mg tablet Take 1 tablet by mouth Q1Hour for gout pain. NOT TO EXCEED 3 TABLETS IN 24 HOURS. 30 Tab 0    hydrOXYzine HCl (ATARAX) 25 mg tablet TAKE 1 TABLET BY MOUTH THREE TIMES DAILY AS NEEDED FOR ITCHING 90 Tab 0    lidocaine (LIDODERM) 5 % Apply patch to the affected area for 12 hours a day and remove for 12 hours a day. (Patient not taking: Reported on 10/14/2021) 1 Package 0    acetaminophen (TYLENOL) 325 mg tablet Take 2 Tabs by mouth every four (4) hours as needed for Pain.  20 Tab 0       Past History     Past Medical History:  Past Medical History:   Diagnosis Date    Bladder infection     Depression     Diabetes ([address]     Difficulty walking     DM (diabetes mellitus) ([facility] [address] 6/22/2012    Ill-defined condition     leaking bladder;high cholesterol    Proteinuria 10/1/2019    RX  lisinopril for proteinuria    Puncture wound of foot, left, complicated 2/64/2916       Past Surgical History:  Past Surgical History:   Procedure Laterality Date    HX GYN      hysterectomy    HX OTHER SURGICAL      bladder surgery       Family History:  Family History   Problem Relation Age of Onset    Diabetes Mother     Cancer Mother     Heart Attack Father         had 1 hear attacks    Diabetes Father        Social History:  Social History     Tobacco Use    Smoking status: Current Every Day Smoker     Packs/day: 2.00     Years: 38.00     Pack years: 76.00    Smokeless tobacco: Never Used   Substance Use Topics    Alcohol use: No    Drug use: No       Allergies:  No Known Allergies      Review of Systems   Review of Systems   Constitutional: Negative for chills, fatigue and fever. HENT: Negative. Respiratory: Negative. Cardiovascular: Negative. Gastrointestinal: Negative for abdominal pain, constipation, diarrhea, nausea and vomiting. Genitourinary: Positive for dysuria, frequency and urgency. Negative for decreased urine volume, difficulty urinating, flank pain, hematuria, pelvic pain, vaginal bleeding, vaginal discharge and vaginal pain. Musculoskeletal: Positive for back pain. Skin: Negative. Neurological: Negative. Hematological: Negative. All Other Systems Negative  Physical Exam     Vitals:    01/24/22 1050   BP: (!) 149/95   Pulse: 93   Resp: 20   Temp: 98.2 °F (36.8 °C)   SpO2: 95%     Physical Exam  Constitutional:       Appearance: Normal appearance. She is normal weight. HENT:      Head: Normocephalic and atraumatic. Eyes:      Conjunctiva/sclera: Conjunctivae normal.   Cardiovascular:      Rate and Rhythm: Normal rate and regular rhythm. Pulses: Normal pulses. Heart sounds: Normal heart sounds. Pulmonary:      Effort: Pulmonary effort is normal.      Breath sounds: Normal breath sounds. Abdominal:      Palpations: Abdomen is soft. Tenderness: There is no abdominal tenderness. There is no right CVA tenderness, left CVA tenderness, guarding or rebound. Musculoskeletal:         General: Tenderness (low back paraspinal, worse on the right side) present. No deformity or signs of injury. Right lower leg: No edema. Left lower leg: No edema. Comments: Negative straight leg raise.    Pt has kyphosis with ambulation, antalgic gait favoring the right side where her pain is     Skin:     General: Skin is warm and dry. Neurological:      General: No focal deficit present. Mental Status: She is alert. Mental status is at baseline. Psychiatric:         Mood and Affect: Mood normal.         Behavior: Behavior normal.         Thought Content: Thought content normal.         Judgment: Judgment normal.           Diagnostic Study Results     Labs -     Recent Results (from the past 12 hour(s))   URINALYSIS W/ RFLX MICROSCOPIC    Collection Time: 01/24/22 10:40 AM   Result Value Ref Range    Color YELLOW      Appearance CLOUDY      Specific gravity 1.014 1.005 - 1.030      pH (UA) 5.5 5.0 - 8.0      Protein 30 (A) NEG mg/dL    Glucose >1,000 (A) NEG mg/dL    Ketone Negative NEG mg/dL    Bilirubin Negative NEG      Blood Negative NEG      Urobilinogen 0.2 0.2 - 1.0 EU/dL    Nitrites Negative NEG      Leukocyte Esterase MODERATE (A) NEG     URINE MICROSCOPIC ONLY    Collection Time: 01/24/22 10:40 AM   Result Value Ref Range    WBC 25 to 35 0 - 5 /hpf    RBC NONE 0 - 5 /hpf    Epithelial cells FEW 0 - 5 /lpf    Bacteria 4+ (A) NEG /hpf    Mucus 1+ (A) NEG /lpf       Radiologic Studies -   No orders to display     CT Results  (Last 48 hours)    None        CXR Results  (Last 48 hours)    None            Medical Decision Making   I am the first provider for this patient. I reviewed the vital signs, available nursing notes, past medical history, past surgical history, family history and social history. Vital Signs-Reviewed the patient's vital signs. Records Reviewed: Nursing Notes and Old Medical Records     Procedures: None   Procedures    Provider Notes (Medical Decision Making): Concern for UTI, pyelonephritis, cauda equina, mechanical back pain, sciatica, appendicitis, diverticulitis. Patient has no abdominal pain on exam.  Has right-sided low back pain, no midline tenderness.   Pain in the SI joint area with palpation. Negative straight leg raise. No alarm symptoms such as fever, loss of bowel or bladder function, urinary retention. Denies any recent history of trauma, fall, lifting. Recommend treating for UTI today as urine is positive. Will send for culture. Patient has no allergies. Recommend Tylenol for back pain. Follow-up with your primary care provider as needed. MED RECONCILIATION:  No current facility-administered medications for this encounter. Current Outpatient Medications   Medication Sig    cephALEXin (Keflex) 500 mg capsule Take 1 Capsule by mouth two (2) times a day for 7 days.  insulin glargine (LANTUS) 100 unit/mL injection 20 Units by SubCUTAneous route two (2) times a day.  metFORMIN (GLUCOPHAGE) 1,000 mg tablet TAKE 1 TABLET BY MOUTH TWO TIMES A DAY WITH A MEAL    pravastatin (PRAVACHOL) 40 mg tablet Take 1 Tablet by mouth daily.  lisinopriL (PRINIVIL, ZESTRIL) 20 mg tablet Take 1 Tablet by mouth daily.  ergocalciferol (ERGOCALCIFEROL) 1,250 mcg (50,000 unit) capsule Take 1 Capsule by mouth every seven (7) days. Please have patient call the office for an appointment.  montelukast (SINGULAIR) 10 mg tablet Take 1 Tablet by mouth daily.  gabapentin (NEURONTIN) 300 mg capsule Take 1 Capsule by mouth three (3) times daily.  lancets misc Check BS 3x/day E11.65    Blood-Glucose Meter monitoring kit Check BS 3x/day E11.65    glucose blood VI test strips (OneTouch Ultra Test) strip Check BS 3x/day E11.65    Insulin Syringe-Needle U-100 0.5 mL 30 gauge x 5/16\" syrg INJECT TWICE A DAY    Insulin Needles, Disposable, (RAFITA PEN NEEDLE) 32 gauge x 5/32\" ndle Inject 1x/day    sodium chloride (NORMAL SALINE FLUSH) 10 mL by IntraVENous route daily.  flush IV before and after medication administration and daily to keep line open (Patient not taking: Reported on 10/14/2021)    heparin sod,porcine/0.9 % NaCl (HEPARIN FLUSH IV) 3 mL by IntraVENous route daily. flush both ports of the PICC line daily (Patient not taking: Reported on 10/14/2021)    polyethylene glycol (MIRALAX) 17 gram packet Take 1 Packet by mouth daily. Indications: hold it for loose BMs    colchicine 0.6 mg tablet Take 1 tablet by mouth Q1Hour for gout pain. NOT TO EXCEED 3 TABLETS IN 24 HOURS.  hydrOXYzine HCl (ATARAX) 25 mg tablet TAKE 1 TABLET BY MOUTH THREE TIMES DAILY AS NEEDED FOR ITCHING    lidocaine (LIDODERM) 5 % Apply patch to the affected area for 12 hours a day and remove for 12 hours a day. (Patient not taking: Reported on 10/14/2021)    acetaminophen (TYLENOL) 325 mg tablet Take 2 Tabs by mouth every four (4) hours as needed for Pain. Disposition:  Home     DISCHARGE NOTE:   Pt has been reexamined. Patient has no new complaints, changes, or physical findings. Care plan outlined and precautions discussed. Results of workup were reviewed with the patient. All medications were reviewed with the patient. All of pt's questions and concerns were addressed. Patient was instructed and agrees to follow up with PCP as well as to return to the ED upon further deterioration. Patient is ready to go home. Follow-up Information    None         Current Discharge Medication List      START taking these medications    Details   cephALEXin (Keflex) 500 mg capsule Take 1 Capsule by mouth two (2) times a day for 7 days. Qty: 14 Capsule, Refills: 0  Start date: 1/24/2022, End date: 1/31/2022                 Diagnosis     Clinical Impression:   1. Acute cystitis without hematuria    2. Acute right-sided low back pain without sciatica    3. Type 2 diabetes mellitus without complication, unspecified whether long term insulin use ([facility] [address]          \"Please note that this dictation was completed with [employer], the [employer] voice recognition software. Quite often unanticipated grammatical, syntax, homophones, and other interpretive errors are inadvertently transcribed by the computer software. Please disregard these errors. Please excuse any errors that have escaped final proofreading. \"

## 2022-01-27 LAB
BACTERIA SPEC CULT: ABNORMAL
CC UR VC: ABNORMAL
SERVICE CMNT-IMP: ABNORMAL

## 2022-02-08 NOTE — ED TRIAGE NOTES
Client reports having foul smelling urine and intemittent  r. Side pain x 1 week. Pain 9/10 with urination. Client NAD. AXOX4.  Denies N/V. This writer spoke with Jackie Ta in intake with [facility]. A packet was sent over for review.

## 2022-02-18 ENCOUNTER — HOSPITAL ENCOUNTER (OUTPATIENT)
Dept: LAB | Age: 62
Discharge: HOME | End: 2022-02-18
Payer: MEDICAID

## 2022-02-18 ENCOUNTER — OFFICE VISIT (OUTPATIENT)
Dept: INTERNAL MEDICINE CLINIC | Age: 62
End: 2022-02-18

## 2022-02-18 VITALS
SYSTOLIC BLOOD PRESSURE: 101 MMHG | HEIGHT: 63 IN | WEIGHT: 130 LBS | TEMPERATURE: 97.3 F | HEART RATE: 79 BPM | OXYGEN SATURATION: 95 % | DIASTOLIC BLOOD PRESSURE: 64 MMHG | BODY MASS INDEX: 23.04 KG/M2 | RESPIRATION RATE: 20 BRPM

## 2022-02-18 DIAGNOSIS — E11.22 TYPE 2 DIABETES MELLITUS WITH STAGE 3B CHRONIC KIDNEY DISEASE, WITH LONG-TERM CURRENT USE OF INSULIN (HCC): Primary | ICD-10-CM

## 2022-02-18 DIAGNOSIS — N18.32 TYPE 2 DIABETES MELLITUS WITH STAGE 3B CHRONIC KIDNEY DISEASE, WITH LONG-TERM CURRENT USE OF INSULIN (HCC): Primary | ICD-10-CM

## 2022-02-18 DIAGNOSIS — R10.9 SIDE PAIN: ICD-10-CM

## 2022-02-18 DIAGNOSIS — Z79.4 TYPE 2 DIABETES MELLITUS WITH STAGE 3B CHRONIC KIDNEY DISEASE, WITH LONG-TERM CURRENT USE OF INSULIN (HCC): Primary | ICD-10-CM

## 2022-02-18 DIAGNOSIS — E55.9 VITAMIN D DEFICIENCY: ICD-10-CM

## 2022-02-18 DIAGNOSIS — I10 ESSENTIAL HYPERTENSION: ICD-10-CM

## 2022-02-18 DIAGNOSIS — E78.00 HIGH CHOLESTEROL: ICD-10-CM

## 2022-02-18 LAB
25(OH)D3 SERPL-MCNC: 23.5 NG/ML (ref 30–100)
ALBUMIN SERPL-MCNC: 3.5 G/DL (ref 3.4–5)
ALBUMIN/GLOB SERPL: 1 {RATIO} (ref 0.8–1.7)
ALP SERPL-CCNC: 95 U/L (ref 45–117)
ALT SERPL-CCNC: 16 U/L (ref 13–56)
ANION GAP SERPL CALC-SCNC: 7 MMOL/L (ref 3–18)
AST SERPL-CCNC: 13 U/L (ref 10–38)
BILIRUB SERPL-MCNC: 0.3 MG/DL (ref 0.2–1)
BILIRUB UR QL STRIP: NEGATIVE
BUN SERPL-MCNC: 26 MG/DL (ref 7–18)
BUN/CREAT SERPL: 19 (ref 12–20)
CALCIUM SERPL-MCNC: 9.5 MG/DL (ref 8.5–10.1)
CHLORIDE SERPL-SCNC: 101 MMOL/L (ref 100–111)
CHOLEST SERPL-MCNC: 168 MG/DL
CO2 SERPL-SCNC: 25 MMOL/L (ref 21–32)
CREAT SERPL-MCNC: 1.35 MG/DL (ref 0.6–1.3)
GLOBULIN SER CALC-MCNC: 3.5 G/DL (ref 2–4)
GLUCOSE SERPL-MCNC: 102 MG/DL (ref 74–99)
GLUCOSE UR-MCNC: NEGATIVE MG/DL
HBA1C MFR BLD HPLC: 12.4 %
HDLC SERPL-MCNC: 62 MG/DL (ref 40–60)
HDLC SERPL: 2.7 {RATIO} (ref 0–5)
KETONES P FAST UR STRIP-MCNC: NEGATIVE MG/DL
LDLC SERPL CALC-MCNC: 83.8 MG/DL (ref 0–100)
LIPID PROFILE,FLP: ABNORMAL
PH UR STRIP: 5.5 [PH] (ref 4.6–8)
POTASSIUM SERPL-SCNC: 4.7 MMOL/L (ref 3.5–5.5)
PROT SERPL-MCNC: 7 G/DL (ref 6.4–8.2)
PROT UR QL STRIP: NEGATIVE
SODIUM SERPL-SCNC: 133 MMOL/L (ref 136–145)
SP GR UR STRIP: 1.01 (ref 1–1.03)
TRIGL SERPL-MCNC: 111 MG/DL (ref ?–150)
UA UROBILINOGEN AMB POC: NORMAL (ref 0.2–1)
URINALYSIS CLARITY POC: NORMAL
URINALYSIS COLOR POC: YELLOW
URINE BLOOD POC: NEGATIVE
URINE LEUKOCYTES POC: NORMAL
URINE NITRITES POC: POSITIVE
VLDLC SERPL CALC-MCNC: 22.2 MG/DL

## 2022-02-18 PROCEDURE — 87186 SC STD MICRODIL/AGAR DIL: CPT

## 2022-02-18 PROCEDURE — 99214 OFFICE O/P EST MOD 30 MIN: CPT | Performed by: INTERNAL MEDICINE

## 2022-02-18 PROCEDURE — 87077 CULTURE AEROBIC IDENTIFY: CPT

## 2022-02-18 PROCEDURE — 83036 HEMOGLOBIN GLYCOSYLATED A1C: CPT | Performed by: INTERNAL MEDICINE

## 2022-02-18 PROCEDURE — 80061 LIPID PANEL: CPT

## 2022-02-18 PROCEDURE — 82306 VITAMIN D 25 HYDROXY: CPT

## 2022-02-18 PROCEDURE — 80053 COMPREHEN METABOLIC PANEL: CPT

## 2022-02-18 PROCEDURE — 87086 URINE CULTURE/COLONY COUNT: CPT

## 2022-02-18 PROCEDURE — 81003 URINALYSIS AUTO W/O SCOPE: CPT | Performed by: INTERNAL MEDICINE

## 2022-02-18 RX ORDER — AMOXICILLIN AND CLAVULANATE POTASSIUM 875; 125 MG/1; MG/1
1 TABLET, FILM COATED ORAL EVERY 12 HOURS
Qty: 20 TABLET | Refills: 0 | Status: SHIPPED | OUTPATIENT
Start: 2022-02-18 | End: 2022-04-11

## 2022-02-18 RX ORDER — INSULIN GLARGINE 100 [IU]/ML
20 INJECTION, SOLUTION SUBCUTANEOUS 2 TIMES DAILY
Qty: 10 ML | Refills: 5 | Status: SHIPPED | OUTPATIENT
Start: 2022-02-18 | End: 2022-09-09

## 2022-02-18 NOTE — PATIENT INSTRUCTIONS
High Blood Pressure: Care Instructions  Overview     It's normal for blood pressure to go up and down throughout the day. But if it stays up, you have high blood pressure. Another name for high blood pressure is hypertension. Despite what a lot of people think, high blood pressure usually doesn't cause headaches or make you feel dizzy or lightheaded. It usually has no symptoms. But it does increase your risk of stroke, heart attack, and other problems. You and your doctor will talk about your risks of these problems based on your blood pressure. Your doctor will give you a goal for your blood pressure. Your goal will be based on your health and your age. Lifestyle changes, such as eating healthy and being active, are always important to help lower blood pressure. You might also take medicine to reach your blood pressure goal.  Follow-up care is a key part of your treatment and safety. Be sure to make and go to all appointments, and call your doctor if you are having problems. It's also a good idea to know your test results and keep a list of the medicines you take. How can you care for yourself at home? Medical treatment  · If you stop taking your medicine, your blood pressure will go back up. You may take one or more types of medicine to lower your blood pressure. Be safe with medicines. Take your medicine exactly as prescribed. Call your doctor if you think you are having a problem with your medicine. · Talk to your doctor before you start taking aspirin every day. Aspirin can help certain people lower their risk of a heart attack or stroke. But taking aspirin isn't right for everyone, because it can cause serious bleeding. · See your doctor regularly. You may need to see the doctor more often at first or until your blood pressure comes down. · If you are taking blood pressure medicine, talk to your doctor before you take decongestants or anti-inflammatory medicine, such as ibuprofen.  Some of these medicines can raise blood pressure. · Learn how to check your blood pressure at home. Lifestyle changes  · Stay at a healthy weight. This is especially important if you put on weight around the waist. Losing even 10 pounds can help you lower your blood pressure. · If your doctor recommends it, get more exercise. Walking is a good choice. Bit by bit, increase the amount you walk every day. Try for at least 30 minutes on most days of the week. You also may want to swim, bike, or do other activities. · Avoid or limit alcohol. Talk to your doctor about whether you can drink any alcohol. · Try to limit how much sodium you eat to less than 2,300 milligrams (mg) a day. Your doctor may ask you to try to eat less than 1,500 mg a day. · Eat plenty of fruits (such as bananas and oranges), vegetables, legumes, whole grains, and low-fat dairy products. · Lower the amount of saturated fat in your diet. Saturated fat is found in animal products such as milk, cheese, and meat. Limiting these foods may help you lose weight and also lower your risk for heart disease. · Do not smoke. Smoking increases your risk for heart attack and stroke. If you need help quitting, talk to your doctor about stop-smoking programs and medicines. These can increase your chances of quitting for good. When should you call for help? Call 911  anytime you think you may need emergency care. This may mean having symptoms that suggest that your blood pressure is causing a serious heart or blood vessel problem. Your blood pressure may be over 180/120. For example, call 911 if:    · You have symptoms of a heart attack. These may include:  ? Chest pain or pressure, or a strange feeling in the chest.  ? Sweating. ? Shortness of breath. ? Nausea or vomiting. ? Pain, pressure, or a strange feeling in the back, neck, jaw, or upper belly or in one or both shoulders or arms. ? Lightheadedness or sudden weakness.   ? A fast or irregular heartbeat.     · You have symptoms of a stroke. These may include:  ? Sudden numbness, tingling, weakness, or loss of movement in your face, arm, or leg, especially on only one side of your body. ? Sudden vision changes. ? Sudden trouble speaking. ? Sudden confusion or trouble understanding simple statements. ? Sudden problems with walking or balance. ? A sudden, severe headache that is different from past headaches.     · You have severe back or belly pain. Do not wait until your blood pressure comes down on its own. Get help right away. Call your doctor now or seek immediate care if:    · Your blood pressure is much higher than normal (such as 180/120 or higher), but you don't have symptoms.     · You think high blood pressure is causing symptoms, such as:  ? Severe headache.  ? Blurry vision. Watch closely for changes in your health, and be sure to contact your doctor if:    · Your blood pressure measures higher than your doctor recommends at least 2 times. That means the top number is higher or the bottom number is higher, or both.     · You think you may be having side effects from your blood pressure medicine. Where can you learn more? Go to http://www.gray.com/  Enter [id number] in the search box to learn more about \"High Blood Pressure: Care Instructions. \"  Current as of: April 29, 2021               Content Version: 13.0  © 2006-2021 [employer]. Care instructions adapted under license by [employer] (which disclaims liability or warranty for this information). If you have questions about a medical condition or this instruction, always ask your healthcare professional. [place] [address] any warranty or liability for your use of this information.

## 2022-02-18 NOTE — PROGRESS NOTES
Patient is in the office today for a  follow up. Patient states she is coughing up green sputum, and she has side pain. 1. \"Have you been to the ER, urgent care clinic since your last visit? Hospitalized since your last visit? \" yes, [facility] ED.    2. \"Have you seen or consulted any other health care providers outside of the [address] [place] [address] since your last visit? \" No     3. For patients aged 39-70: Has the patient had a colonoscopy / FIT/ Cologuard? No      If the patient is female:    4. For patients aged 41-77: Has the patient had a mammogram within the past 2 years? No      5. For patients aged 21-65: Has the patient had a pap smear?  No

## 2022-02-21 ENCOUNTER — TELEPHONE (OUTPATIENT)
Dept: INTERNAL MEDICINE CLINIC | Age: 62
End: 2022-02-21

## 2022-02-21 LAB
BACTERIA SPEC CULT: ABNORMAL
CC UR VC: ABNORMAL
SERVICE CMNT-IMP: ABNORMAL

## 2022-02-21 RX ORDER — CIPROFLOXACIN 500 MG/1
500 TABLET ORAL 2 TIMES DAILY
Qty: 10 TABLET | Refills: 0 | Status: SHIPPED | OUTPATIENT
Start: 2022-02-21 | End: 2022-02-26

## 2022-02-21 NOTE — TELEPHONE ENCOUNTER
Patient aware antibiotic was sent to the pharmacy. Patient states she received Lantus pens instead of vial.  Patient can not read and is wanting the vial and not pens. Spoke with pharmacist and explained to her patient can not read and is very upset that she was given the pens. Spoke with pharmacist he states he will switch it out. Patient verbalizes understanding.

## 2022-02-21 NOTE — TELEPHONE ENCOUNTER
Pt called stating she was seen on 02/18- Dr Kay Méndez was suppose to prescribe something for her bladder infection ,

## 2022-02-24 NOTE — PROGRESS NOTES
Subjective:       Chief Complaint  The patient presents for follow up of diabetesand high cholesterol. Proteinuria, back pain         HPI  Miriam Barbour is a 57 y.o. female seen for follow up of diabetes. Celsao has  hyperlipidemia. Diabetes uncontrolled  With pt not consistently taking Lantus insulin 20 units BID . Concerned patient also  has not been taking her Metformin. Patient has developmental delay and has had difficulty managing her medications. She unfortunately has not had any family members that will help her to take her medications consistently and patient does not read so has been difficult for us to teach her how to take her medications correctly. Yuridia Gip Hyperlipidemia uncontrolled, patient most likely has not been taking Pravachol 40 mg consistently. Pt remains on lisinopril 10 mg for her proteinuria and HTN. Blood pressure is controlled and her proteinuria seems to be improving. Patient does have chronic kidney disease stage III however. Diet and Lifestyle: generally follows a low fat low cholesterol diet, does not rigorously follow a diabetic diet, sedentary    Diabetic Review of Systems - home glucose monitoring: is performed sporadically, should be 3x/day . Other symptoms and concerns: . Pt's chronic back pain is stable on Ultram prn.  reviewed. Patient is also taking Neurontin for peripheral neuropathy. Pt's vit D level is uncontrolled due to patient not taking her vitamin D 50,000 units/week. She will restart taking it from now. Patient continues to smoke cigarettes and is not ready to quit at this time. Current Outpatient Medications   Medication Sig    insulin glargine (LANTUS) 100 unit/mL injection 20 Units by SubCUTAneous route two (2) times a day.  amoxicillin-clavulanate (AUGMENTIN) 875-125 mg per tablet Take 1 Tablet by mouth every twelve (12) hours for 10 days.     metFORMIN (GLUCOPHAGE) 1,000 mg tablet TAKE 1 TABLET BY MOUTH TWO TIMES A DAY WITH A MEAL    pravastatin (PRAVACHOL) 40 mg tablet Take 1 Tablet by mouth daily.  lisinopriL (PRINIVIL, ZESTRIL) 20 mg tablet Take 1 Tablet by mouth daily.  ergocalciferol (ERGOCALCIFEROL) 1,250 mcg (50,000 unit) capsule Take 1 Capsule by mouth every seven (7) days. Please have patient call the office for an appointment.  montelukast (SINGULAIR) 10 mg tablet Take 1 Tablet by mouth daily.  gabapentin (NEURONTIN) 300 mg capsule Take 1 Capsule by mouth three (3) times daily.  lancets misc Check BS 3x/day E11.65    Blood-Glucose Meter monitoring kit Check BS 3x/day E11.65    glucose blood VI test strips (OneTouch Ultra Test) strip Check BS 3x/day E11.65    Insulin Syringe-Needle U-100 0.5 mL 30 gauge x 5/16\" syrg INJECT TWICE A DAY    Insulin Needles, Disposable, (RAFITA PEN NEEDLE) 32 gauge x 5/32\" ndle Inject 1x/day    polyethylene glycol (MIRALAX) 17 gram packet Take 1 Packet by mouth daily. Indications: hold it for loose BMs    colchicine 0.6 mg tablet Take 1 tablet by mouth Q1Hour for gout pain. NOT TO EXCEED 3 TABLETS IN 24 HOURS.  acetaminophen (TYLENOL) 325 mg tablet Take 2 Tabs by mouth every four (4) hours as needed for Pain.  ciprofloxacin HCl (CIPRO) 500 mg tablet Take 1 Tablet by mouth two (2) times a day for 5 days.  sodium chloride (NORMAL SALINE FLUSH) 10 mL by IntraVENous route daily. flush IV before and after medication administration and daily to keep line open (Patient not taking: Reported on 10/14/2021)    heparin sod,porcine/0.9 % NaCl (HEPARIN FLUSH IV) 3 mL by IntraVENous route daily. flush both ports of the PICC line daily (Patient not taking: Reported on 10/14/2021)    hydrOXYzine HCl (ATARAX) 25 mg tablet TAKE 1 TABLET BY MOUTH THREE TIMES DAILY AS NEEDED FOR ITCHING (Patient not taking: Reported on 2/18/2022)    lidocaine (LIDODERM) 5 % Apply patch to the affected area for 12 hours a day and remove for 12 hours a day.  (Patient not taking: Reported on 10/14/2021)     No current facility-administered medications for this visit. Review of Systems  Respiratory: negative for dyspnea on exertion  Cardiovascular: negative for chest pain    Objective:     Visit Vitals  /64 (BP 1 Location: Left arm, BP Patient Position: Sitting, BP Cuff Size: Adult)   Pulse 79   Temp 97.3 °F (36.3 °C) (Temporal)   Resp 20   Ht 5' 3\" (1.6 m)   Wt 130 lb (59 kg)   LMP  (LMP Unknown)   SpO2 95%   BMI 23.03 kg/m²        Eyes - pupils equal and reactive, extraocular eye movements intact  Chest - clear to auscultation, no wheezes, rales or rhonchi, symmetric air entry  Heart - normal rate, regular rhythm, normal S1, S2, no murmurs, rubs, clicks or gallops  Extremities - no edema         Labs:   Lab Results   Component Value Date/Time    Hemoglobin A1c 7.3 (H) 10/13/2020 03:17 PM    Hemoglobin A1c 10.9 (H) 10/28/2019 01:15 PM    Hemoglobin A1c 11.3 (H) 10/23/2019 02:30 PM    Glucose 102 (H) 02/18/2022 11:15 AM    Glucose (POC) 231 (H) 10/09/2019 11:48 AM    Microalbumin/Creat ratio (mg/g creat) 179 (H) 10/14/2021 11:27 AM    Microalbumin,urine random 5.19 (H) 10/14/2021 11:27 AM    LDL, calculated 83.8 02/18/2022 11:15 AM    Creatinine, POC 0.5 (L) 08/23/2010 10:49 AM    Creatinine 1.35 (H) 02/18/2022 11:15 AM      Lab Results   Component Value Date/Time    Cholesterol, total 168 02/18/2022 11:15 AM    HDL Cholesterol 62 (H) 02/18/2022 11:15 AM    LDL, calculated 83.8 02/18/2022 11:15 AM    Triglyceride 111 02/18/2022 11:15 AM    CHOL/HDL Ratio 2.7 02/18/2022 11:15 AM     Lab Results   Component Value Date/Time    ALT (SGPT) 16 02/18/2022 11:15 AM    Alk.  phosphatase 95 02/18/2022 11:15 AM    Bilirubin, direct <0.1 10/23/2019 02:30 PM    Bilirubin, total 0.3 02/18/2022 11:15 AM     Lab Results   Component Value Date/Time    GFR est AA 48 (L) 02/18/2022 11:15 AM    GFR est non-AA 40 (L) 02/18/2022 11:15 AM    Creatinine, POC 0.5 (L) 08/23/2010 10:49 AM    Creatinine 1.35 (H) 02/18/2022 11:15 AM    BUN 26 (H) 02/18/2022 11:15 AM    Sodium 133 (L) 02/18/2022 11:15 AM    Potassium 4.7 02/18/2022 11:15 AM    Chloride 101 02/18/2022 11:15 AM    CO2 25 02/18/2022 11:15 AM      Lab Results   Component Value Date/Time    Glucose 102 (H) 02/18/2022 11:15 AM    Glucose (POC) 231 (H) 10/09/2019 11:48 AM      Labs:   Lab Results   Component Value Date/Time    Hemoglobin A1c 7.3 (H) 10/13/2020 03:17 PM    Hemoglobin A1c (POC) 12.4 02/18/2022 09:59 AM              Assessment / Plan     Diabetes uncontrolled due to poor compliance with taking her insulin. Patient to take Lantus 20 units twice daily consistently but due to patient's developmental delay and unwillingness of family member to help her or able to help her doubt much improvement in the future. Hyperlipidemia borderline controlled on Pravachol 40 mg daily. Patient will try to take medication consistently      ICD-10-CM ICD-9-CM    1. Type 2 diabetes mellitus with stage 3b chronic kidney disease, with long-term current use of insulin ([facility])  E11.22 250.40 AMB POC HEMOGLOBIN A1C    N18.32 585.3 insulin glargine (LANTUS) 100 unit/mL injection    Z79.4 V58.67    2. Essential hypertension  K38 554.9 METABOLIC PANEL, COMPREHENSIVE   3. High cholesterol  E78.00 272.0 LIPID PANEL   4. Vitamin D deficiency  E55.9 268.9  uncontrolled patient to take vitamin D 50,000 units/week on a regular basis VITAMIN D, 25 HYDROXY   5. Side pain  R10.9 789.00  patient has urinary tract infection and will treat with Cipro. Patient was initially given Augmentin empirically since she also had a sinus infection. AMB POC URINALYSIS DIP STICK AUTO W/O MICRO      CULTURE, URINE                  Diabetic issues reviewed with her: diabetic diet discussed in detail, and low cholesterol diet, weight control and daily exercise discussed. Medication instructions and potential side effects reviewed with patient in detail.     Follow-up and Dispositions    · Return in about 3 months (around 5/18/2022) for labs 1 week before. Reviewed plan of care. Patient has provided input and agrees with goals.

## 2022-03-07 ENCOUNTER — TELEPHONE (OUTPATIENT)
Dept: INTERNAL MEDICINE CLINIC | Age: 62
End: 2022-03-07

## 2022-03-07 NOTE — TELEPHONE ENCOUNTER
Called pt to reschedule appt 5/20. Dr. Kurt Ying will be out of office. Per pt her counselor is to call us to reschedule the appt.

## 2022-03-18 PROBLEM — E87.1 HYPONATREMIA: Status: ACTIVE | Noted: 2019-09-30

## 2022-03-18 PROBLEM — N18.9 ACUTE ON CHRONIC RENAL INSUFFICIENCY: Status: ACTIVE | Noted: 2019-10-01

## 2022-03-18 PROBLEM — N28.9 ACUTE ON CHRONIC RENAL INSUFFICIENCY: Status: ACTIVE | Noted: 2019-10-01

## 2022-03-18 PROBLEM — R80.9 MICROALBUMINURIA: Status: ACTIVE | Noted: 2018-10-24

## 2022-03-19 PROBLEM — E11.40 TYPE 2 DIABETES MELLITUS WITH DIABETIC NEUROPATHY (HCC): Status: ACTIVE | Noted: 2018-04-10

## 2022-03-19 PROBLEM — E11.21 TYPE 2 DIABETES WITH NEPHROPATHY (HCC): Status: ACTIVE | Noted: 2018-04-12

## 2022-03-20 PROBLEM — R80.9 PROTEINURIA: Status: ACTIVE | Noted: 2019-10-01

## 2022-03-20 PROBLEM — S91.332A: Status: ACTIVE | Noted: 2019-09-30

## 2022-04-11 RX ORDER — AMOXICILLIN AND CLAVULANATE POTASSIUM 875; 125 MG/1; MG/1
TABLET, FILM COATED ORAL
Qty: 20 TABLET | Refills: 0 | Status: SHIPPED | OUTPATIENT
Start: 2022-04-11 | End: 2022-05-02

## 2022-05-23 RX ORDER — AMOXICILLIN AND CLAVULANATE POTASSIUM 875; 125 MG/1; MG/1
TABLET, FILM COATED ORAL
Qty: 14 TABLET | Refills: 0 | Status: SHIPPED | OUTPATIENT
Start: 2022-05-23

## 2022-06-19 RX ORDER — ERGOCALCIFEROL 1.25 MG/1
CAPSULE ORAL
Qty: 4 CAPSULE | Refills: 4 | Status: SHIPPED | OUTPATIENT
Start: 2022-06-19

## 2022-08-09 DIAGNOSIS — G89.4 CHRONIC PAIN SYNDROME: ICD-10-CM

## 2022-08-11 RX ORDER — PRAVASTATIN SODIUM 40 MG/1
40 TABLET ORAL DAILY
Qty: 90 TABLET | Refills: 0 | Status: SHIPPED | OUTPATIENT
Start: 2022-08-11

## 2022-08-11 RX ORDER — GABAPENTIN 300 MG/1
CAPSULE ORAL
Qty: 90 CAPSULE | Refills: 0 | Status: SHIPPED | OUTPATIENT
Start: 2022-08-11 | End: 2022-09-29

## 2022-08-11 RX ORDER — MONTELUKAST SODIUM 10 MG/1
10 TABLET ORAL DAILY
Qty: 90 TABLET | Refills: 0 | Status: SHIPPED | OUTPATIENT
Start: 2022-08-11

## 2022-08-22 ENCOUNTER — TELEPHONE (OUTPATIENT)
Dept: MAMMOGRAPHY | Age: 62
End: 2022-08-22

## 2022-09-28 ENCOUNTER — HOSPITAL ENCOUNTER (EMERGENCY)
Age: 62
Discharge: HOME | End: 2022-09-28
Attending: EMERGENCY MEDICINE
Payer: MEDICAID

## 2022-09-28 VITALS
HEIGHT: 63 IN | HEART RATE: 89 BPM | BODY MASS INDEX: 23.03 KG/M2 | OXYGEN SATURATION: 98 % | DIASTOLIC BLOOD PRESSURE: 73 MMHG | TEMPERATURE: 98.1 F | SYSTOLIC BLOOD PRESSURE: 151 MMHG | RESPIRATION RATE: 18 BRPM

## 2022-09-28 DIAGNOSIS — N30.00 ACUTE CYSTITIS WITHOUT HEMATURIA: Primary | ICD-10-CM

## 2022-09-28 LAB
APPEARANCE UR: CLEAR
BACTERIA URNS QL MICRO: ABNORMAL /HPF
BILIRUB UR QL: NEGATIVE
COLOR UR: YELLOW
EPITH CASTS URNS QL MICRO: ABNORMAL /LPF (ref 0–5)
GLUCOSE UR STRIP.AUTO-MCNC: >1000 MG/DL
HGB UR QL STRIP: NEGATIVE
KETONES UR QL STRIP.AUTO: NEGATIVE MG/DL
LEUKOCYTE ESTERASE UR QL STRIP.AUTO: ABNORMAL
NITRITE UR QL STRIP.AUTO: NEGATIVE
PH UR STRIP: 6.5 [PH] (ref 5–8)
PROT UR STRIP-MCNC: 30 MG/DL
SP GR UR REFRACTOMETRY: 1.01 (ref 1–1.03)
UROBILINOGEN UR QL STRIP.AUTO: 0.2 EU/DL (ref 0.2–1)
WBC URNS QL MICRO: ABNORMAL /HPF (ref 0–4)

## 2022-09-28 PROCEDURE — 99284 EMERGENCY DEPT VISIT MOD MDM: CPT

## 2022-09-28 PROCEDURE — 96372 THER/PROPH/DIAG INJ SC/IM: CPT

## 2022-09-28 PROCEDURE — 74011250637 HC RX REV CODE- 250/637: Performed by: EMERGENCY MEDICINE

## 2022-09-28 PROCEDURE — 74011250636 HC RX REV CODE- 250/636: Performed by: EMERGENCY MEDICINE

## 2022-09-28 PROCEDURE — 81001 URINALYSIS AUTO W/SCOPE: CPT

## 2022-09-28 PROCEDURE — 87186 SC STD MICRODIL/AGAR DIL: CPT

## 2022-09-28 PROCEDURE — 87077 CULTURE AEROBIC IDENTIFY: CPT

## 2022-09-28 PROCEDURE — 74011000250 HC RX REV CODE- 250: Performed by: EMERGENCY MEDICINE

## 2022-09-28 PROCEDURE — 87086 URINE CULTURE/COLONY COUNT: CPT

## 2022-09-28 RX ORDER — CEPHALEXIN 500 MG/1
500 CAPSULE ORAL 3 TIMES DAILY
Qty: 21 CAPSULE | Refills: 0 | Status: SHIPPED | OUTPATIENT
Start: 2022-09-28 | End: 2022-10-05

## 2022-09-28 RX ORDER — PHENAZOPYRIDINE HYDROCHLORIDE 200 MG/1
200 TABLET, FILM COATED ORAL
Qty: 4 TABLET | Refills: 0 | Status: SHIPPED | OUTPATIENT
Start: 2022-09-28 | End: 2022-09-30

## 2022-09-28 RX ORDER — PHENAZOPYRIDINE HYDROCHLORIDE 100 MG/1
200 TABLET, FILM COATED ORAL ONCE
Status: COMPLETED | OUTPATIENT
Start: 2022-09-28 | End: 2022-09-28

## 2022-09-28 RX ORDER — NAPROXEN 250 MG/1
250 TABLET ORAL
Status: COMPLETED | OUTPATIENT
Start: 2022-09-28 | End: 2022-09-28

## 2022-09-28 RX ADMIN — LIDOCAINE HYDROCHLORIDE 1 G: 10 INJECTION, SOLUTION EPIDURAL; INFILTRATION; INTRACAUDAL; PERINEURAL at 21:57

## 2022-09-28 RX ADMIN — NAPROXEN 250 MG: 250 TABLET ORAL at 21:57

## 2022-09-28 RX ADMIN — PHENAZOPYRIDINE HYDROCHLORIDE 200 MG: 100 TABLET ORAL at 21:57

## 2022-09-29 DIAGNOSIS — G89.4 CHRONIC PAIN SYNDROME: ICD-10-CM

## 2022-09-29 RX ORDER — GABAPENTIN 300 MG/1
CAPSULE ORAL
Qty: 90 CAPSULE | Refills: 1 | Status: SHIPPED | OUTPATIENT
Start: 2022-09-29

## 2022-09-29 NOTE — DISCHARGE INSTRUCTIONS
Take all antibiotics as prescribed. Follow-up with your primary care doctor to have your symptoms rechecked later this week or early next week. If you develop high fever, worsening pain, frequent vomiting or any general worsening in your condition you should return.

## 2022-09-29 NOTE — ED TRIAGE NOTES
Pt states she has been having left flank/back pain x3 days. Pt states she has burning with urination and frequency.

## 2022-09-29 NOTE — ED PROVIDER NOTES
EMERGENCY DEPARTMENT HISTORY AND PHYSICAL EXAM      Date: 9/28/2022  Patient Name: Shai Falcon      History of Presenting Illness     Chief Complaint   Patient presents with    Bladder Infection       History Provided By: Patient  Location/Duration/Severity/Modifying factors   Patient presents to the emergency room with a chief complaint of dysuria urgency frequency and suprapubic discomfort. States feels similar to previous UTIs. No fevers, chills, flank pain. Denies any vomiting or diarrhea. States has had several UTIs in the past.        There are no other complaints, changes, or physical findings at this time. PCP: Isabel Barcenas MD    Current Outpatient Medications   Medication Sig Dispense Refill    cephALEXin (Keflex) 500 mg capsule Take 1 Capsule by mouth three (3) times daily for 7 days. 21 Capsule 0    phenazopyridine (Pyridium) 200 mg tablet Take 1 Tablet by mouth two (2) times daily as needed for Pain (Urinary Discomfort) for up to 2 days. 4 Tablet 0    gabapentin (NEURONTIN) 300 mg capsule TAKE 1 CAPSULE BY MOUTH THREE TIMES DAILY 90 Capsule 1    Lantus U-100 Insulin 100 unit/mL injection 20 UNITS BY SUBCUTANEOUS ROUTE TWO (2) TIMES A DAY. 10 mL 2    pravastatin (PRAVACHOL) 40 mg tablet TAKE 1 TABLET BY MOUTH DAILY 90 Tablet 0    montelukast (SINGULAIR) 10 mg tablet TAKE 1 TABLET BY MOUTH DAILY 90 Tablet 0    Vitamin D2 1,250 mcg (50,000 unit) capsule TAKE 1 CAPSULE BY MOUTH EVERY SEVEN DAYS. PLEASE CALL DOCTORS OFFICE FOR AN APPOINTMENT 4 Capsule 4    lancets (OneTouch UltraSoft Lancets) misc USE TO TEST BLOOD SUGARS THREE TIMES DAILY 100 Lancet 4    amoxicillin-clavulanate (AUGMENTIN) 875-125 mg per tablet TAKE 1 TABLET BY MOUTH EVERY TWELVE (12) HOURS FOR FOR 10 DAYS DAYS. 14 Tablet 0    metFORMIN (GLUCOPHAGE) 1,000 mg tablet TAKE 1 TABLET BY MOUTH TWO TIMES A DAY WITH A MEAL 60 Tablet 4    lisinopriL (PRINIVIL, ZESTRIL) 20 mg tablet Take 1 Tablet by mouth daily.  90 Tablet 3 Blood-Glucose Meter monitoring kit Check BS 3x/day E11.65 1 Kit 0    glucose blood VI test strips (OneTouch Ultra Test) strip Check BS 3x/day E11.65 100 Strip 5    Insulin Syringe-Needle U-100 0.5 mL 30 gauge x 5/16\" syrg INJECT TWICE A  Syringe 1    Insulin Needles, Disposable, (RAFITA PEN NEEDLE) 32 gauge x 5/32\" ndle Inject 1x/day 50 Pen Needle 3    sodium chloride (NORMAL SALINE FLUSH) 10 mL by IntraVENous route daily. flush IV before and after medication administration and daily to keep line open (Patient not taking: Reported on 10/14/2021)      heparin sod,porcine/0.9 % NaCl (HEPARIN FLUSH IV) 3 mL by IntraVENous route daily. flush both ports of the PICC line daily (Patient not taking: Reported on 10/14/2021)      polyethylene glycol (MIRALAX) 17 gram packet Take 1 Packet by mouth daily. Indications: hold it for loose BMs 30 Packet 0    colchicine 0.6 mg tablet Take 1 tablet by mouth Q1Hour for gout pain. NOT TO EXCEED 3 TABLETS IN 24 HOURS. 30 Tab 0    hydrOXYzine HCl (ATARAX) 25 mg tablet TAKE 1 TABLET BY MOUTH THREE TIMES DAILY AS NEEDED FOR ITCHING (Patient not taking: Reported on 2/18/2022) 90 Tab 0    lidocaine (LIDODERM) 5 % Apply patch to the affected area for 12 hours a day and remove for 12 hours a day. (Patient not taking: Reported on 10/14/2021) 1 Package 0    acetaminophen (TYLENOL) 325 mg tablet Take 2 Tabs by mouth every four (4) hours as needed for Pain.  20 Tab 0       Past History     Past Medical History:  Past Medical History:   Diagnosis Date    Bladder infection     Depression     Diabetes ([address]     Difficulty walking     DM (diabetes mellitus) ([facility] [address] 6/22/2012    Ill-defined condition     leaking bladder;high cholesterol    Proteinuria 10/1/2019    RX  lisinopril for proteinuria    Puncture wound of foot, left, complicated 4/06/2693       Past Surgical History:  Past Surgical History:   Procedure Laterality Date    HX GYN      hysterectomy    HX OTHER SURGICAL      bladder surgery Family History:  Family History   Problem Relation Age of Onset    Diabetes Mother     Cancer Mother     Heart Attack Father         had 3 hear attacks    Diabetes Father        Social History:  Social History     Tobacco Use    Smoking status: Every Day     Packs/day: 2.00     Years: 38.00     Pack years: 76.00     Types: Cigarettes    Smokeless tobacco: Never   Substance Use Topics    Alcohol use: No    Drug use: No       Allergies:  No Known Allergies      Review of Systems     Review of Systems   Constitutional:  Negative for chills and fever. HENT:  Negative for congestion, rhinorrhea, sinus pressure and sneezing. Eyes:  Negative for visual disturbance. Respiratory:  Negative for cough and shortness of breath. Cardiovascular:  Negative for chest pain. Gastrointestinal:  Negative for abdominal pain, diarrhea, nausea and vomiting. Genitourinary:  Positive for dysuria, frequency and urgency. Musculoskeletal:  Negative for back pain and neck pain. Skin:  Negative for rash. Neurological:  Negative for syncope, numbness and headaches. Physical Exam     Physical Exam  Constitutional:       General: She is not in acute distress. Appearance: Normal appearance. She is not ill-appearing. HENT:      Head: Normocephalic and atraumatic. Right Ear: External ear normal.      Left Ear: External ear normal.      Nose: Nose normal.   Eyes:      Conjunctiva/sclera: Conjunctivae normal.   Cardiovascular:      Rate and Rhythm: Normal rate and regular rhythm. Pulses: Normal pulses. Pulmonary:      Effort: Pulmonary effort is normal.   Abdominal:      General: Abdomen is flat. Tenderness: There is no right CVA tenderness or left CVA tenderness. Musculoskeletal:         General: Normal range of motion. Cervical back: Neck supple. Skin:     General: Skin is warm and dry. Neurological:      General: No focal deficit present. Mental Status: She is alert.        Lab and Diagnostic Study Results     Labs -  No results found for this or any previous visit (from the past 24 hour(s)). Radiologic Studies -   No orders to display         Medical Decision Making and ED Course   - I am the first and primary provider for this patient AND AM THE PRIMARY PROVIDER OF RECORD. - I reviewed the vital signs, available nursing notes, past medical history, past surgical history, family history and social history. - Initial assessment performed. The patients presenting problems have been discussed, and the staff are in agreement with the care plan formulated and outlined with them. I have encouraged them to ask questions as they arise throughout their visit. Vital Signs-Reviewed the patient's vital signs. No data found. Nursing notes and pertinent past medical history including imaging and labs have been reviewed (if available)    ED Course:          Provider Notes (Medical Decision Making):     MDM  Number of Diagnoses or Management Options  Acute cystitis without hematuria  Diagnosis management comments: Patient presents to the ED with complaints of dysuria urgency frequency. DDx: Cystitis, UTI,, nephritis, urethral virus, etc.  She has no other complaints no signs of pyelonephritis or sepsis. We will treat her antibiotics give her her first dose here and have her follow-up with her primary care provider in few days to have.         _________________________________________________________________    At this time, patient is stable and appropriate for discharge home. Patient demonstrates understanding of current diagnoses and is in agreement with the treatment plan. They are advised that while the likelihood of serious underlying condition is low at this point given the evaluation performed today, we cannot fully rule it out. They are advised to immediately return with any new symptoms or worsening of current condition.  Any Incidental findings were noted on the patient's discharge paperwork as well as verbally directly to the patient, and the appropriate follow-up was given to the patient as far as instructions on testing needed as well as the timeframe. All questions have been answered. Patient is given educational material regarding their diagnoses, including danger symptoms and when to return to the ED. This note was dictated utilizing Dragon voice recognition software. Unfortunately this leads to occasional typographical errors. I apologize in advance if the situation occurs. If questions occur please do not hesitate to contact me directly. Renny Caruso DO          Procedures and Critical Care   Performed by: Renny Caruso DO  Procedures         Renny Caruso DO      Diagnosis:   1. Acute cystitis without hematuria          Disposition: Discharge    Follow-up Information       Follow up With Specialties Details Why Contact Info    Marcin Peoples MD Internal Medicine Physician Call in 1 day  [address] [address] [phone]      [facility] EMERGENCY DEPT Emergency Medicine  As needed, If symptoms worsen; or [place]  [id number]  [phone]            Discharge Medication List as of 9/28/2022 10:00 PM        START taking these medications    Details   cephALEXin (Keflex) 500 mg capsule Take 1 Capsule by mouth three (3) times daily for 7 days. , Normal, Disp-21 Capsule, R-0      phenazopyridine (Pyridium) 200 mg tablet Take 1 Tablet by mouth two (2) times daily as needed for Pain (Urinary Discomfort) for up to 2 days. , Normal, Disp-4 Tablet, R-0           CONTINUE these medications which have NOT CHANGED    Details   Lantus U-100 Insulin 100 unit/mL injection 20 UNITS BY SUBCUTANEOUS ROUTE TWO (2) TIMES A DAY., Normal, Disp-10 mL, R-2, LILY      pravastatin (PRAVACHOL) 40 mg tablet TAKE 1 TABLET BY MOUTH DAILY, Normal, Disp-90 Tablet, R-0      montelukast (SINGULAIR) 10 mg tablet TAKE 1 TABLET BY MOUTH DAILY, Normal, Disp-90 Tablet, R-0      gabapentin (NEURONTIN) 300 mg capsule TAKE 1 CAPSULE BY MOUTH THREE TIMES DAILY, Normal, Disp-90 Capsule, R-0      Vitamin D2 1,250 mcg (50,000 unit) capsule TAKE 1 CAPSULE BY MOUTH EVERY SEVEN DAYS. PLEASE CALL DOCTORS OFFICE FOR AN APPOINTMENT, Normal, Disp-4 Capsule, R-4      lancets (OneTouch UltraSoft Lancets) misc USE TO TEST BLOOD SUGARS THREE TIMES DAILY, Normal, Disp-100 Lancet, R-4      amoxicillin-clavulanate (AUGMENTIN) 875-125 mg per tablet TAKE 1 TABLET BY MOUTH EVERY TWELVE (12) HOURS FOR FOR 10 DAYS DAYS., Normal, Disp-14 Tablet, R-0      metFORMIN (GLUCOPHAGE) 1,000 mg tablet TAKE 1 TABLET BY MOUTH TWO TIMES A DAY WITH A MEAL, Normal, Disp-60 Tablet, R-4      lisinopriL (PRINIVIL, ZESTRIL) 20 mg tablet Take 1 Tablet by mouth daily. , Normal, Disp-90 Tablet, R-3      Blood-Glucose Meter monitoring kit Check BS 3x/day E11.65, Normal, Disp-1 Kit, R-0      glucose blood VI test strips (OneTouch Ultra Test) strip Check BS 3x/day E11.65, Normal, Disp-100 Strip, R-5      Insulin Syringe-Needle U-100 0.5 mL 30 gauge x 5/16\" syrg INJECT TWICE A DAY, Normal, Disp-200 Syringe, R-1      Insulin Needles, Disposable, (RAFITA PEN NEEDLE) 32 gauge x 5/32\" ndle Inject 1x/day, Normal, Disp-50 Pen Needle, R-3      sodium chloride (NORMAL SALINE FLUSH) 10 mL by IntraVENous route daily. flush IV before and after medication administration and daily to keep line open, Historical Med      heparin sod,porcine/0.9 % NaCl (HEPARIN FLUSH IV) 3 mL by IntraVENous route daily. flush both ports of the PICC line daily, Historical Med      polyethylene glycol (MIRALAX) 17 gram packet Take 1 Packet by mouth daily. Indications: hold it for loose BMs, Print, Disp-30 Packet, R-0      colchicine 0.6 mg tablet Take 1 tablet by mouth Q1Hour for gout pain.     NOT TO EXCEED 3 TABLETS IN 24 HOURS., Print, Disp-30 Tab, R-0      hydrOXYzine HCl (ATARAX) 25 mg tablet TAKE 1 TABLET BY MOUTH THREE TIMES DAILY AS NEEDED FOR ITCHING, Normal, Disp-90 Tab, R-0      lidocaine (LIDODERM) 5 % Apply patch to the affected area for 12 hours a day and remove for 12 hours a day., Print, Disp-1 Package, R-0      acetaminophen (TYLENOL) 325 mg tablet Take 2 Tabs by mouth every four (4) hours as needed for Pain., Print, Disp-20 Tab, R-0             Patient seen in the context of the Novel Coronavirus (COVID19) pandemic, utilizing contemporary protocols and evidence based on the most up to date available evidence, understanding that the current evidence has the potential to change as additional information becomes available. This note is dictated utilizing Dragon voice recognition software. Unfortunately this leads to occasional typographical errors using the voice recognition. I apologize in advance if the situation occurs. If questions occur please do not hesitate to contact me directly.     Selma Phillips, DO

## 2022-10-01 LAB
BACTERIA SPEC CULT: ABNORMAL
CC UR VC: ABNORMAL
SERVICE CMNT-IMP: ABNORMAL

## 2022-10-12 RX ORDER — LANCETS
EACH MISCELLANEOUS
Qty: 100 LANCET | Refills: 3 | Status: SHIPPED | OUTPATIENT
Start: 2022-10-12

## 2022-10-27 ENCOUNTER — TELEPHONE (OUTPATIENT)
Dept: INTERNAL MEDICINE CLINIC | Age: 62
End: 2022-10-27

## 2022-10-27 NOTE — TELEPHONE ENCOUNTER
We don't just call in abx for possible uti    We need to prove that the urinary sx are due possibly to uti by looking at the ua    Secondly, we need cultures just in case the 'abx does not work' as sometimes people grow resistant bugs    At the er visit, she was given keflex by er doc  Her urine grew out klebsiella which was susceptible to the abx  She should've been 'cured' of the infection    So how come she's still having sx of uti?   We don't know and that's why she needs at the very least a ua and possibly a visit if the ua is neg

## 2022-10-27 NOTE — TELEPHONE ENCOUNTER
Pt calling requesting medication for uti that is still going on. She has frequency, tingling and low back side pain (she was unsure which side)    Stated she was seen at the ED several weeks ago and the medication given did not help (possibly the ED visit 09/28/22 at [facility])    I advised pt Dr Kassy Miranda is out of the office and the request will be reviewed by our on call provider. I advised mostly likely an appt and or a urine sample would be requested. She stated Dr Kassy Miranda has done it before for someone else she know. She did not have transportation or money to pay someone for transportation. He son then got on the phone and he also did not understand why a RX could not just be sent in. Stated we had the information from the ED visit. He became upset and hung up on me before I could get an accurate call back phone number or the pharmacy they are using.

## 2022-10-28 NOTE — TELEPHONE ENCOUNTER
Called patient and left VM to call the office back. I was going to try to put her on Dr. Raad Byrne schedule for this morning.

## 2022-11-01 RX ORDER — CIPROFLOXACIN 500 MG/1
500 TABLET ORAL 2 TIMES DAILY
Qty: 14 TABLET | Refills: 0 | Status: SHIPPED | OUTPATIENT
Start: 2022-11-01 | End: 2022-11-08

## 2022-11-01 NOTE — TELEPHONE ENCOUNTER
Spoke with patient she states she is still having lower back pain and burning with urination. Patient would like to know if something could be called into her pharmacy.

## 2023-01-30 ENCOUNTER — TELEPHONE (OUTPATIENT)
Age: 63
End: 2023-01-30

## 2023-01-30 DIAGNOSIS — Z79.4 TYPE 2 DIABETES MELLITUS WITH STAGE 3B CHRONIC KIDNEY DISEASE, WITH LONG-TERM CURRENT USE OF INSULIN (HCC): ICD-10-CM

## 2023-01-30 DIAGNOSIS — N18.32 TYPE 2 DIABETES MELLITUS WITH STAGE 3B CHRONIC KIDNEY DISEASE, WITH LONG-TERM CURRENT USE OF INSULIN (HCC): ICD-10-CM

## 2023-01-30 DIAGNOSIS — E11.22 TYPE 2 DIABETES MELLITUS WITH STAGE 3B CHRONIC KIDNEY DISEASE, WITH LONG-TERM CURRENT USE OF INSULIN (HCC): ICD-10-CM

## 2023-01-30 RX ORDER — INSULIN GLARGINE 100 [IU]/ML
INJECTION, SOLUTION SUBCUTANEOUS
Qty: 10 ML | Refills: 0 | Status: SHIPPED | OUTPATIENT
Start: 2023-01-30

## 2023-01-30 RX ORDER — METFORMIN HYDROCHLORIDE 1000 MG/1
1000 TABLET ORAL DAILY
Qty: 30 TABLET | Refills: 0 | Status: SHIPPED | OUTPATIENT
Start: 2023-01-30

## 2023-01-30 RX ORDER — LISINOPRIL 20 MG/1
20 TABLET ORAL DAILY
Qty: 30 TABLET | Refills: 0 | Status: SHIPPED | OUTPATIENT
Start: 2023-01-30

## 2023-01-30 RX ORDER — PRAVASTATIN SODIUM 40 MG/1
40 TABLET ORAL DAILY
Qty: 30 TABLET | Refills: 0 | Status: SHIPPED | OUTPATIENT
Start: 2023-01-30

## 2023-01-30 NOTE — TELEPHONE ENCOUNTER
Gold 3 text paged:    Pt has discharge orders but iron infusion is pending. Please clarify iron infusion orders for pharmacy. Thanks.   Last OV: 2/18/2023  Next OV: patient will be scheduled.

## 2023-01-30 NOTE — TELEPHONE ENCOUNTER
----- Message from Kasey Godoy sent at 1/30/2023  8:44 AM EST -----  Subject: Appointment Request    Reason for Call: Established Patient Appointment needed: Routine Existing   Condition Follow Up (Diabetes)    QUESTIONS    Reason for appointment request? No appointments available during search     Additional Information for Provider? Patient is completed out of her   insulin and needing a DM FU to get refills.  Please call  at   number provided ASAP.   ---------------------------------------------------------------------------  --------------  [address] [employer]  [phone]; OK to leave message on voicemail  ---------------------------------------------------------------------------  --------------  SCRIPT ANSWERS  JIMENAID Screen: Clarita Oreilly

## 2023-01-30 NOTE — TELEPHONE ENCOUNTER
----- Message from Brittany Miner sent at 1/30/2023  8:44 AM EST -----  Subject: Appointment Request    Reason for Call: Established Patient Appointment needed: Routine Existing   Condition Follow Up (Diabetes)    QUESTIONS    Reason for appointment request? No appointments available during search     Additional Information for Provider? Patient is completed out of her   insulin and needing a DM FU to get refills.  Please call  at   number provided ASAP.   ---------------------------------------------------------------------------  --------------  [address] [facility]  [phone]; OK to leave message on voicemail  ---------------------------------------------------------------------------  --------------  SCRIPT ANSWERS  COVID Screen: Carrabelle Moder

## 2023-01-31 DIAGNOSIS — N18.32 TYPE 2 DIABETES MELLITUS WITH STAGE 3B CHRONIC KIDNEY DISEASE, WITH LONG-TERM CURRENT USE OF INSULIN (HCC): Primary | ICD-10-CM

## 2023-01-31 DIAGNOSIS — I10 ESSENTIAL HYPERTENSION: ICD-10-CM

## 2023-01-31 DIAGNOSIS — E78.00 HIGH CHOLESTEROL: ICD-10-CM

## 2023-01-31 DIAGNOSIS — E11.22 TYPE 2 DIABETES MELLITUS WITH STAGE 3B CHRONIC KIDNEY DISEASE, WITH LONG-TERM CURRENT USE OF INSULIN (HCC): Primary | ICD-10-CM

## 2023-01-31 DIAGNOSIS — E55.9 VITAMIN D DEFICIENCY: ICD-10-CM

## 2023-01-31 DIAGNOSIS — Z79.4 TYPE 2 DIABETES MELLITUS WITH STAGE 3B CHRONIC KIDNEY DISEASE, WITH LONG-TERM CURRENT USE OF INSULIN (HCC): Primary | ICD-10-CM

## 2023-01-31 NOTE — TELEPHONE ENCOUNTER
Pt 's rep aware medication sent.     Appt scheduled for 03/02/23 labs 02/23/23    Please review/add lab orders if needed to connect care

## 2023-02-13 ENCOUNTER — HOSPITAL ENCOUNTER (EMERGENCY)
Facility: HOSPITAL | Age: 63
Discharge: HOME | End: 2023-02-13
Attending: EMERGENCY MEDICINE
Payer: MEDICAID

## 2023-02-13 VITALS
HEART RATE: 81 BPM | TEMPERATURE: 98.5 F | DIASTOLIC BLOOD PRESSURE: 81 MMHG | SYSTOLIC BLOOD PRESSURE: 145 MMHG | RESPIRATION RATE: 18 BRPM | OXYGEN SATURATION: 100 %

## 2023-02-13 DIAGNOSIS — N30.00 ACUTE CYSTITIS WITHOUT HEMATURIA: Primary | ICD-10-CM

## 2023-02-13 LAB
APPEARANCE UR: CLEAR
BACTERIA URNS QL MICRO: ABNORMAL /HPF
BILIRUB UR QL: NEGATIVE
COLOR UR: YELLOW
EPITH CASTS URNS QL MICRO: ABNORMAL /LPF (ref 0–5)
GLUCOSE UR STRIP.AUTO-MCNC: NEGATIVE MG/DL
HGB UR QL STRIP: NEGATIVE
KETONES UR QL STRIP.AUTO: NEGATIVE MG/DL
LEUKOCYTE ESTERASE UR QL STRIP.AUTO: ABNORMAL
NITRITE UR QL STRIP.AUTO: NEGATIVE
PH UR STRIP: 6 (ref 5–8)
PROT UR STRIP-MCNC: ABNORMAL MG/DL
RBC #/AREA URNS HPF: NEGATIVE /HPF (ref 0–5)
SP GR UR REFRACTOMETRY: 1.01 (ref 1–1.03)
UROBILINOGEN UR QL STRIP.AUTO: 0.2 EU/DL (ref 0.2–1)
WBC URNS QL MICRO: ABNORMAL /HPF (ref 0–4)

## 2023-02-13 PROCEDURE — 6360000002 HC RX W HCPCS: Performed by: HEALTH CARE PROVIDER

## 2023-02-13 PROCEDURE — 87077 CULTURE AEROBIC IDENTIFY: CPT

## 2023-02-13 PROCEDURE — 96372 THER/PROPH/DIAG INJ SC/IM: CPT

## 2023-02-13 PROCEDURE — 87086 URINE CULTURE/COLONY COUNT: CPT

## 2023-02-13 PROCEDURE — 99284 EMERGENCY DEPT VISIT MOD MDM: CPT

## 2023-02-13 PROCEDURE — 81001 URINALYSIS AUTO W/SCOPE: CPT

## 2023-02-13 PROCEDURE — 87186 SC STD MICRODIL/AGAR DIL: CPT

## 2023-02-13 PROCEDURE — 2500000003 HC RX 250 WO HCPCS: Performed by: HEALTH CARE PROVIDER

## 2023-02-13 RX ORDER — ACETAMINOPHEN 500 MG
1000 TABLET ORAL EVERY 6 HOURS PRN
Qty: 30 TABLET | Refills: 0 | Status: SHIPPED | OUTPATIENT
Start: 2023-02-13

## 2023-02-13 RX ORDER — CEFADROXIL 1000 MG/1
1 TABLET ORAL 2 TIMES DAILY
Qty: 20 TABLET | Refills: 0 | Status: SHIPPED | OUTPATIENT
Start: 2023-02-13 | End: 2023-02-23

## 2023-02-13 RX ADMIN — LIDOCAINE HYDROCHLORIDE 1000 MG: 10 INJECTION, SOLUTION EPIDURAL; INFILTRATION; INTRACAUDAL; PERINEURAL at 11:30

## 2023-02-13 ASSESSMENT — ENCOUNTER SYMPTOMS
VOMITING: 0
NAUSEA: 0
SHORTNESS OF BREATH: 0
CHEST TIGHTNESS: 0
COUGH: 0
DIARRHEA: 0
ABDOMINAL PAIN: 1

## 2023-02-13 ASSESSMENT — PAIN - FUNCTIONAL ASSESSMENT: PAIN_FUNCTIONAL_ASSESSMENT: NONE - DENIES PAIN

## 2023-02-13 NOTE — ED NOTES
All discharge paperwork reviewed with patient and she denies any need for further explanation regarding these instructions.  Denies need for wheelchair and ambulates out of ED     Pravin Puente RN  02/13/23 8924

## 2023-02-13 NOTE — ED PROVIDER NOTES
EMERGENCY DEPARTMENT HISTORY AND PHYSICAL EXAM        Date: 2/13/2023  Patient Name: Sanjana Warren    History of Presenting Illness     Chief Complaint   Patient presents with    Cystitis       History Provided By: History obtained from patient    HPI: Sanjana Warren, 60 y.o. female PMHx significant for type 2 diabetes, CKD 3, history of UTI presents self ambulatory to the ED with cc of urinary hesitancy and urgency for 1 week with right flank pain constant for 3 days. Patient denies other systemic symptoms of nausea, vomiting, fever, chill, shortness of breath. Endorses right inguinal pain and right-sided abdominal pain. There are no other complaints, changes, or physical findings at this time. PCP: ELIZ JONES MD    No current facility-administered medications on file prior to encounter. No current outpatient medications on file prior to encounter.        Past History     Past Medical History:  Past Medical History:   Diagnosis Date    Bladder infection     Depression     Diabetes ([facility] [address]     Difficulty walking     DM (diabetes mellitus) ([facility] [address] 6/22/2012    Ill-defined condition     leaking bladder;high cholesterol    Proteinuria 10/1/2019    RX  lisinopril for proteinuria    Puncture wound of foot, left, complicated 8/31/7074       Past Surgical History:  Past Surgical History:   Procedure Laterality Date    GYN      hysterectomy    OTHER SURGICAL HISTORY      bladder surgery       Family History:  Family History   Problem Relation Age of Onset    Heart Attack Father         had 3 hear attacks    Diabetes Mother     Cancer Mother     Diabetes Father        Social History:  Social History     Tobacco Use    Smoking status: Every Day     Packs/day: 2.00     Types: Cigarettes    Smokeless tobacco: Never   Substance Use Topics    Alcohol use: No    Drug use: No       Allergies:  No Known Allergies      Review of Systems   Review of Systems   Constitutional:  Negative for appetite change, fatigue and fever. Respiratory:  Negative for cough, chest tightness and shortness of breath. Cardiovascular:  Negative for chest pain, palpitations and leg swelling. Gastrointestinal:  Positive for abdominal pain. Negative for diarrhea, nausea and vomiting. Genitourinary:  Positive for urgency. Negative for decreased urine volume, difficulty urinating, dysuria, flank pain, frequency, hematuria, pelvic pain, vaginal bleeding and vaginal discharge. Skin:  Negative for rash. Neurological:  Negative for dizziness, facial asymmetry, weakness, light-headedness, numbness and headaches. Hematological:  Negative for adenopathy. Does not bruise/bleed easily. Psychiatric/Behavioral:  Negative for agitation, behavioral problems and confusion. Physical Exam   Physical Exam  Vitals and nursing note reviewed. Constitutional:       General: She is not in acute distress. Appearance: Normal appearance. She is not ill-appearing, toxic-appearing or diaphoretic. HENT:      Head: Normocephalic and atraumatic. Cardiovascular:      Rate and Rhythm: Normal rate and regular rhythm. Pulmonary:      Effort: Pulmonary effort is normal.      Breath sounds: Normal breath sounds. Abdominal:      General: There is no distension. Palpations: Abdomen is soft. Tenderness: There is abdominal tenderness. There is right CVA tenderness. There is no left CVA tenderness or guarding. Musculoskeletal:         General: Normal range of motion. Cervical back: Normal range of motion and neck supple. Skin:     General: Skin is warm. Findings: No rash. Neurological:      General: No focal deficit present. Mental Status: She is alert and oriented to person, place, and time. Cranial Nerves: No cranial nerve deficit. Sensory: No sensory deficit. Motor: No weakness. Psychiatric:         Mood and Affect: Mood normal.         Behavior: Behavior normal.         Thought Content:  Thought content normal.       Diagnostic Study Results     Labs -     Recent Results (from the past 12 hour(s))   Urinalysis    Collection Time: 02/13/23  9:44 AM   Result Value Ref Range    Color, UA YELLOW      Appearance CLEAR      Specific Gravity, UA 1.006 1.005 - 1.030      pH, Urine 6.0 5.0 - 8.0      Protein, UA TRACE (A) NEG mg/dL    Glucose, UA Negative NEG mg/dL    Ketones, Urine Negative NEG mg/dL    Bilirubin Urine Negative NEG      Blood, Urine Negative NEG      Urobilinogen, Urine 0.2 0.2 - 1.0 EU/dL    Nitrite, Urine Negative NEG      Leukocyte Esterase, Urine MODERATE (A) NEG     Urinalysis, Micro    Collection Time: 02/13/23  9:44 AM   Result Value Ref Range    WBC, UA 20 to 30 0 - 4 /hpf    RBC, UA Negative 0 - 5 /hpf    Epithelial Cells UA 1+ 0 - 5 /lpf    BACTERIA, URINE 2+ (A) NEG /hpf       Radiologic Studies -   No orders to display       Medical Decision Making   I am the first provider for this patient. I reviewed the vital signs, available nursing notes, past medical history, past surgical history, family history and social history. Vital Signs-Reviewed the patient's vital signs. Vitals:    02/13/23 1219   BP: (!) 145/81   Pulse: 81   Resp: 18   Temp: 98.5 °F (36.9 °C)   SpO2: 100%             Records Reviewed: past medical records, past lab data    Provider Notes (Medical Decision Making):   Liz Weber, 60 y.o. female PMHx significant for type 2 diabetes, CKD 3, history of UTI presents self ambulatory to the ED with cc of urinary hesitancy and urgency for 1 week with right flank pain constant for 3 days. Concern for pyelonephritis given her flank pain and lower abdominal pain. Will give 1 dose of Rocephin in the department and outpatient prescription of antibiotics. Urine culture obtained.    DDx: Cystitis, UTI, nephritis, urethral virus, etc.     1. Acute cystitis without hematuria        ED Course:   ED Course as of 02/13/23 1841   Mon Feb 13, 2023   1026 Urinalysis, Micro(!):    WBC, UA 20 to 30   RBC, UA Negative   Epithelial Cells, UA 1+   Bacteria, UA 2+(!) [AS]   1026 Urinalysis(!):    Color, UA YELLOW   Appearance CLEAR   Specific Gravity, UA 1.006   pH, Urine 6.0   Protein, UA TRACE(!)   Glucose, UA Negative   Ketones, Urine Negative   Bilirubin, Urine Negative   Blood, Urine Negative   Urobilinogen, Urine 0.2   Nitrite, Urine Negative   Leukocyte Esterase, Urine MODERATE(!) [AS]      ED Course User Index  [AS] JOVI Beverly PA-C  02/13/23 7230

## 2023-02-16 LAB
BACTERIA SPEC CULT: ABNORMAL
CC UR VC: ABNORMAL
SERVICE CMNT-IMP: ABNORMAL

## 2023-02-22 ENCOUNTER — TELEPHONE (OUTPATIENT)
Age: 63
End: 2023-02-22

## 2023-02-22 DIAGNOSIS — G89.29 CHRONIC MIDLINE LOW BACK PAIN WITH SCIATICA, SCIATICA LATERALITY UNSPECIFIED: Primary | ICD-10-CM

## 2023-02-22 DIAGNOSIS — M54.40 CHRONIC MIDLINE LOW BACK PAIN WITH SCIATICA, SCIATICA LATERALITY UNSPECIFIED: Primary | ICD-10-CM

## 2023-02-22 RX ORDER — GABAPENTIN 300 MG/1
CAPSULE ORAL
Qty: 90 CAPSULE | Refills: 1 | Status: SHIPPED | OUTPATIENT
Start: 2023-02-22 | End: 2024-02-22

## 2023-02-22 NOTE — TELEPHONE ENCOUNTER
Patient son Keith Espitia called  ( PATIENT PRESENT) upset his mom is having extreme pain in her legs  he said her pain level is at a 10 and said she needs a refill ,\"not shots in her legs\" he said patient has an upcoming appt on 03/02   GABAPENTIN    Drug [facility]

## 2023-02-23 ENCOUNTER — HOSPITAL ENCOUNTER (OUTPATIENT)
Facility: HOSPITAL | Age: 63
Setting detail: SPECIMEN
Discharge: HOME | End: 2023-02-23
Payer: MEDICAID

## 2023-02-23 LAB
25(OH)D3 SERPL-MCNC: 20.3 NG/ML (ref 30–100)
ALBUMIN SERPL-MCNC: 3.9 G/DL (ref 3.4–5)
ALBUMIN/GLOB SERPL: 1.1 (ref 0.8–1.7)
ALP SERPL-CCNC: 96 U/L (ref 45–117)
ALT SERPL-CCNC: 16 U/L (ref 13–56)
ANION GAP SERPL CALC-SCNC: 8 MMOL/L (ref 3–18)
AST SERPL-CCNC: 14 U/L (ref 10–38)
BILIRUB SERPL-MCNC: 0.4 MG/DL (ref 0.2–1)
BUN SERPL-MCNC: 21 MG/DL (ref 7–18)
BUN/CREAT SERPL: 15 (ref 12–20)
CALCIUM SERPL-MCNC: 9.1 MG/DL (ref 8.5–10.1)
CHLORIDE SERPL-SCNC: 99 MMOL/L (ref 100–111)
CHOLEST SERPL-MCNC: 184 MG/DL
CO2 SERPL-SCNC: 23 MMOL/L (ref 21–32)
CREAT SERPL-MCNC: 1.36 MG/DL (ref 0.6–1.3)
CREAT UR-MCNC: 34 MG/DL (ref 30–125)
GLOBULIN SER CALC-MCNC: 3.5 G/DL (ref 2–4)
GLUCOSE SERPL-MCNC: 399 MG/DL (ref 74–99)
HBA1C MFR BLD: 10.9 % (ref 4.2–5.6)
HDLC SERPL-MCNC: 48 MG/DL (ref 40–60)
HDLC SERPL: 3.8 (ref 0–5)
LDLC SERPL CALC-MCNC: 102.8 MG/DL (ref 0–100)
LIPID PANEL: ABNORMAL
MICROALBUMIN UR-MCNC: 21.4 MG/DL (ref 0–3)
MICROALBUMIN/CREAT UR-RTO: 629 MG/G (ref 0–30)
POTASSIUM SERPL-SCNC: 5.2 MMOL/L (ref 3.5–5.5)
PROT SERPL-MCNC: 7.4 G/DL (ref 6.4–8.2)
SODIUM SERPL-SCNC: 130 MMOL/L (ref 136–145)
TRIGL SERPL-MCNC: 166 MG/DL
VLDLC SERPL CALC-MCNC: 33.2 MG/DL

## 2023-02-23 PROCEDURE — 83036 HEMOGLOBIN GLYCOSYLATED A1C: CPT

## 2023-02-23 PROCEDURE — 80053 COMPREHEN METABOLIC PANEL: CPT

## 2023-02-23 PROCEDURE — 82306 VITAMIN D 25 HYDROXY: CPT

## 2023-02-23 PROCEDURE — 82043 UR ALBUMIN QUANTITATIVE: CPT

## 2023-02-23 PROCEDURE — 80061 LIPID PANEL: CPT

## 2023-02-23 PROCEDURE — 36415 COLL VENOUS BLD VENIPUNCTURE: CPT

## 2023-03-02 ENCOUNTER — OFFICE VISIT (OUTPATIENT)
Age: 63
End: 2023-03-02
Payer: MEDICAID

## 2023-03-02 VITALS
RESPIRATION RATE: 20 BRPM | WEIGHT: 127 LBS | BODY MASS INDEX: 22.5 KG/M2 | OXYGEN SATURATION: 95 % | TEMPERATURE: 98.2 F | HEART RATE: 84 BPM | SYSTOLIC BLOOD PRESSURE: 118 MMHG | HEIGHT: 63 IN | DIASTOLIC BLOOD PRESSURE: 64 MMHG

## 2023-03-02 DIAGNOSIS — E11.22 TYPE 2 DIABETES MELLITUS WITH STAGE 3B CHRONIC KIDNEY DISEASE, WITHOUT LONG-TERM CURRENT USE OF INSULIN (HCC): Primary | ICD-10-CM

## 2023-03-02 DIAGNOSIS — E78.00 PURE HYPERCHOLESTEROLEMIA, UNSPECIFIED: ICD-10-CM

## 2023-03-02 DIAGNOSIS — E55.9 VITAMIN D DEFICIENCY, UNSPECIFIED: ICD-10-CM

## 2023-03-02 DIAGNOSIS — N18.32 TYPE 2 DIABETES MELLITUS WITH STAGE 3B CHRONIC KIDNEY DISEASE, WITHOUT LONG-TERM CURRENT USE OF INSULIN (HCC): Primary | ICD-10-CM

## 2023-03-02 DIAGNOSIS — I10 ESSENTIAL (PRIMARY) HYPERTENSION: ICD-10-CM

## 2023-03-02 DIAGNOSIS — Z12.11 COLON CANCER SCREENING: ICD-10-CM

## 2023-03-02 PROCEDURE — 99214 OFFICE O/P EST MOD 30 MIN: CPT | Performed by: INTERNAL MEDICINE

## 2023-03-02 PROCEDURE — 3074F SYST BP LT 130 MM HG: CPT | Performed by: INTERNAL MEDICINE

## 2023-03-02 PROCEDURE — 3046F HEMOGLOBIN A1C LEVEL >9.0%: CPT | Performed by: INTERNAL MEDICINE

## 2023-03-02 PROCEDURE — 99213 OFFICE O/P EST LOW 20 MIN: CPT

## 2023-03-02 PROCEDURE — 3078F DIAST BP <80 MM HG: CPT | Performed by: INTERNAL MEDICINE

## 2023-03-02 RX ORDER — NAPROXEN SODIUM 220 MG
TABLET ORAL
Qty: 100 EACH | Refills: 5 | Status: SHIPPED | OUTPATIENT
Start: 2023-03-02

## 2023-03-02 RX ORDER — NAPROXEN SODIUM 220 MG
TABLET ORAL
COMMUNITY
Start: 2021-08-16 | End: 2023-03-02 | Stop reason: SDUPTHER

## 2023-03-02 SDOH — ECONOMIC STABILITY: INCOME INSECURITY: HOW HARD IS IT FOR YOU TO PAY FOR THE VERY BASICS LIKE FOOD, HOUSING, MEDICAL CARE, AND HEATING?: SOMEWHAT HARD

## 2023-03-02 SDOH — ECONOMIC STABILITY: HOUSING INSECURITY
IN THE LAST 12 MONTHS, WAS THERE A TIME WHEN YOU DID NOT HAVE A STEADY PLACE TO SLEEP OR SLEPT IN A SHELTER (INCLUDING NOW)?: NO

## 2023-03-02 SDOH — ECONOMIC STABILITY: FOOD INSECURITY: WITHIN THE PAST 12 MONTHS, YOU WORRIED THAT YOUR FOOD WOULD RUN OUT BEFORE YOU GOT MONEY TO BUY MORE.: SOMETIMES TRUE

## 2023-03-02 SDOH — ECONOMIC STABILITY: FOOD INSECURITY: WITHIN THE PAST 12 MONTHS, THE FOOD YOU BOUGHT JUST DIDN'T LAST AND YOU DIDN'T HAVE MONEY TO GET MORE.: SOMETIMES TRUE

## 2023-03-02 ASSESSMENT — PATIENT HEALTH QUESTIONNAIRE - PHQ9: DEPRESSION UNABLE TO ASSESS: PT REFUSES

## 2023-03-02 NOTE — PROGRESS NOTES
Adrienne Jean Alejandro presents today for   Chief Complaint   Patient presents with    Hypertension    Diabetes    Cholesterol Problem     1 year follow up             Patient states she has not had any medication since Monday Feb. 27.        1. \"Have you been to the ER, urgent care clinic since your last visit?  Hospitalized since your last visit?\" no    2. \"Have you seen or consulted any other health care providers outside of the [facility] since your last visit?\" no     3. For patients aged 45-75: Has the patient had a colonoscopy / FIT/ Cologuard? No      If the patient is female:    4. For patients aged 40-74: Has the patient had a mammogram within the past 2 years? No      5. For patients aged 21-65: Has the patient had a pap smear? No

## 2023-03-06 NOTE — PROGRESS NOTES
Subjective:       Chief Complaint  The patient presents for follow up of diabetesand high cholesterol. Proteinuria, back pain         HPI  Lisa Patel is a 57 y.o. female seen for follow up of diabetes. Shealso has  hyperlipidemia. Diabetes uncontrolled  With pt not consistently taking Lantus insulin 20 units BID . Concerned patient also  has not been taking her Metformin. Patient has developmental delay and has had difficulty managing her medications. She unfortunately has not had any family members that will help her to take her medications consistently and patient does not read so has been difficult for us to teach her how to take her medications correctly. Britany Hilario Hyperlipidemia borderline controlled, patient continues on Pravachol 40 mg. Pt remains on lisinopril 10 mg for her proteinuria and HTN. Blood pressure  is controlled but she still has proteinuria most likely due to uncontrolled diabetes. Patient does have chronic kidney disease stage IIIb and is at high risk of progression due to her uncontrolled diabetes. Diet and Lifestyle: generally follows a low fat low cholesterol diet, does not rigorously follow a diabetic diet, sedentary    Diabetic Review of Systems - home glucose monitoring: is performed sporadically, should be 3x/day . Other symptoms and concerns: . Pt's chronic back pain is stable on Ultram prn.  reviewed. Patient is also taking Neurontin for peripheral neuropathy. Pt's vit D level is uncontrolled due to patient not taking her vitamin D 50,000 units/week. She will restart taking it from now. Patient continues to smoke cigarettes and is not ready to quit at this time.     Current Outpatient Medications   Medication Sig    Insulin Syringe-Needle U-100 (INSULIN SYRINGE .5CC/30GX5/16\") 30G X 5/16\" 0.5 ML MISC INJECT TWICE A DAY    gabapentin (NEURONTIN) 300 MG capsule TAKE 1 CAPSULE BY MOUTH THREE TIMES DAILY    acetaminophen (TYLENOL) 325 MG tablet Take 650 mg by mouth every 4 hours as needed    colchicine (COLCRYS) 0.6 MG tablet Take 1 tablet by mouth Q1Hour for gout pain. NOT TO EXCEED 3 TABLETS IN 24 HOURS.    ergocalciferol (ERGOCALCIFEROL) 1.25 MG (78600 UT) capsule TAKE 1 CAPSULE BY MOUTH EVERY SEVEN DAYS. PLEASE CALL DOCTORS OFFICE FOR AN APPOINTMENT    hydrOXYzine HCl (ATARAX) 25 MG tablet TAKE 1 TABLET BY MOUTH THREE TIMES DAILY AS NEEDED FOR ITCHING    insulin glargine (LANTUS) 100 UNIT/ML injection vial inject 20 UNITS BY SUBCUTANEOUS ROUTE TWO (2) TIMES A DAY. lisinopril (PRINIVIL;ZESTRIL) 20 MG tablet Take 20 mg by mouth daily    metFORMIN (GLUCOPHAGE) 1000 MG tablet Take 1,000 mg by mouth daily    montelukast (SINGULAIR) 10 MG tablet Take 10 mg by mouth daily    polyethylene glycol (GLYCOLAX) 17 GM/SCOOP powder Take 17 g by mouth daily    pravastatin (PRAVACHOL) 40 MG tablet Take 40 mg by mouth daily    lidocaine (LIDODERM) 5 % [The details of the medication are not available because there are pending changes by a home health clinician.] (Patient not taking: Reported on 3/2/2023)    acetaminophen (TYLENOL) 500 MG tablet Take 2 tablets by mouth every 6 hours as needed for Pain     No current facility-administered medications for this visit.            Review of Systems  Respiratory: negative for dyspnea on exertion  Cardiovascular: negative for chest pain    Objective:   Visit Vitals  /64 (Site: Left Upper Arm, Position: Sitting, Cuff Size: Small Adult)   Pulse 84   Temp 98.2 °F (36.8 °C) (Temporal)   Resp 20   Ht 5' 3\" (1.6 m)   Wt 127 lb (57.6 kg)   SpO2 95%   BMI 22.50 kg/m²       Eyes - pupils equal and reactive, extraocular eye movements intact  Chest - clear to auscultation, no wheezes, rales or rhonchi, symmetric air entry  Heart - normal rate, regular rhythm, normal S1, S2, no murmurs, rubs, clicks or gallops  Extremities - no edema       CMP:    Lab Results   Component Value Date/Time     02/23/2023 07:39 PM    K 5.2 02/23/2023 07:39 PM    CL 99 02/23/2023 07:39 PM    CO2 23 02/23/2023 07:39 PM    BUN 21 02/23/2023 07:39 PM    CREATININE 1.36 02/23/2023 07:39 PM    GFRAA 48 02/18/2022 11:15 AM    AGRATIO 1.0 02/18/2022 11:15 AM    LABGLOM 44 02/23/2023 07:39 PM    GLUCOSE 399 02/23/2023 07:39 PM    PROT 7.4 02/23/2023 07:39 PM    LABALBU 3.9 02/23/2023 07:39 PM    CALCIUM 9.1 02/23/2023 07:39 PM    BILITOT 0.4 02/23/2023 07:39 PM    ALKPHOS 96 02/23/2023 07:39 PM    ALKPHOS 95 02/18/2022 11:15 AM    AST 14 02/23/2023 07:39 PM    ALT 16 02/23/2023 07:39 PM     HgBA1c:    Lab Results   Component Value Date/Time    LABA1C 10.9 02/23/2023 07:41 PM     Microalbumen/Creatinine ratio:  No components found for: RUCREAT  FLP:    Lab Results   Component Value Date/Time    TRIG 166 02/23/2023 07:39 PM    HDL 48 02/23/2023 07:39 PM    LDLCALC 102.8 02/23/2023 07:39 PM    LABVLDL 33.2 02/23/2023 07:39 PM           Assessment / Plan     Diabetes uncontrolled due to poor compliance with taking her insulin. Patient to take Lantus 20 units twice daily consistently but due to patient's developmental delay and unwillingness of family member to help her or able to help her doubt much improvement in the future. Hyperlipidemia borderline controlled on Pravachol 40 mg daily. Patient will try to take medication consistently    ICD-10-CM    1. Type 2 diabetes mellitus with stage 3b chronic kidney disease, without long-term current use of insulin ([facility])  E11.22 Hemoglobin A1C    N18.32       2. Essential (primary) hypertension  I10 Comprehensive Metabolic Panel      3. Pure hypercholesterolemia, unspecified  E78.00 Lipid Panel      4. Vitamin D deficiency, unspecified  E55.9 Uncontrolled patient to take vitamin D 50,000 units/week on a regular basis Vitamin D 25 Hydroxy      5. Colon cancer screening  Z12.11 Cologuard (Fecal DNA Colorectal Cancer Screening)           HM patient has declined to take her mammogram as well as colonoscopy.   She says she is willing to do the Cologuard           Diabetic issues reviewed with her: diabetic diet discussed in detail, and low cholesterol diet, weight control and daily exercise discussed.     Medication instructions and potential side effects reviewed with patient in detail.    Return in about 3 months (around 6/2/2023) for labs 1 week before.           Reviewed plan of care. Patient has provided input and agrees with goals.

## 2023-03-17 DIAGNOSIS — G89.29 CHRONIC MIDLINE LOW BACK PAIN WITH SCIATICA, SCIATICA LATERALITY UNSPECIFIED: ICD-10-CM

## 2023-03-17 DIAGNOSIS — M54.40 CHRONIC MIDLINE LOW BACK PAIN WITH SCIATICA, SCIATICA LATERALITY UNSPECIFIED: ICD-10-CM

## 2023-03-17 RX ORDER — GABAPENTIN 300 MG/1
CAPSULE ORAL
Qty: 90 CAPSULE | Refills: 2 | Status: SHIPPED | OUTPATIENT
Start: 2023-03-17 | End: 2023-05-17

## 2023-03-17 NOTE — TELEPHONE ENCOUNTER
[facility]17[facility]2023    The last fill date was 2/22/2023 for a 30 d/s qty 90      Last UDS: not on file     CSA Last signed: not on file         PCP: Amelie Warner MD      Future Appointments   Date Time Provider Jennifer Joe   5/26/2023 10:50 AM IO LAB VISIT [facility] BS AMB   6/5/2023 10:00 AM Yasemin Killian MD [facility] BS AMB       Requested Prescriptions     Pending Prescriptions Disp Refills    gabapentin (NEURONTIN) 300 MG capsule [Pharmacy Med Name: GABAPENTIN 300mg CAP] 90 capsule 0     Sig: TAKE 1 CAPSULE BY MOUTH THREE TIMES DAILY

## 2023-03-28 ENCOUNTER — TELEPHONE (OUTPATIENT)
Age: 63
End: 2023-03-28

## 2023-03-28 RX ORDER — NITROFURANTOIN 25; 75 MG/1; MG/1
100 CAPSULE ORAL 2 TIMES DAILY
Qty: 10 CAPSULE | Refills: 0 | Status: SHIPPED | OUTPATIENT
Start: 2023-03-28 | End: 2023-04-02

## 2023-03-28 NOTE — TELEPHONE ENCOUNTER
Pt called stating she has a UTI , her symptoms started  Sunday 03/26- she is asking if something could be called into her [facility] [address]   She is asking if she could get a call back

## 2023-03-28 NOTE — TELEPHONE ENCOUNTER
Pt calling says she is going to go to the er tomorrow because she is having leg pain. She just wants doctor to be aware.

## 2023-03-29 ENCOUNTER — HOSPITAL ENCOUNTER (EMERGENCY)
Facility: HOSPITAL | Age: 63
Discharge: HOME | End: 2023-03-29
Attending: EMERGENCY MEDICINE
Payer: MEDICAID

## 2023-03-29 VITALS
RESPIRATION RATE: 16 BRPM | DIASTOLIC BLOOD PRESSURE: 82 MMHG | SYSTOLIC BLOOD PRESSURE: 135 MMHG | HEART RATE: 88 BPM | TEMPERATURE: 97.9 F | OXYGEN SATURATION: 100 %

## 2023-03-29 DIAGNOSIS — R25.2 LEG CRAMPING: Primary | ICD-10-CM

## 2023-03-29 LAB
ANION GAP SERPL CALC-SCNC: ABNORMAL MMOL/L (ref 3–18)
BASOPHILS # BLD: 0.1 K/UL (ref 0–0.1)
BASOPHILS NFR BLD: 2 % (ref 0–2)
BUN SERPL-MCNC: 17 MG/DL (ref 7–18)
BUN/CREAT SERPL: 12 (ref 12–20)
CALCIUM SERPL-MCNC: 8.9 MG/DL (ref 8.5–10.1)
CHLORIDE SERPL-SCNC: 109 MMOL/L (ref 100–111)
CO2 SERPL-SCNC: 28 MMOL/L (ref 21–32)
CREAT SERPL-MCNC: 1.38 MG/DL (ref 0.6–1.3)
DIFFERENTIAL METHOD BLD: NORMAL
EOSINOPHIL # BLD: 0 K/UL (ref 0–0.4)
EOSINOPHIL NFR BLD: 0 % (ref 0–5)
ERYTHROCYTE [DISTWIDTH] IN BLOOD BY AUTOMATED COUNT: 13.7 % (ref 11.6–14.5)
GLUCOSE SERPL-MCNC: 173 MG/DL (ref 74–99)
HCT VFR BLD AUTO: 39.3 % (ref 35–45)
HGB BLD-MCNC: 13 G/DL (ref 12–16)
IMM GRANULOCYTES # BLD AUTO: 0 K/UL (ref 0–0.04)
IMM GRANULOCYTES NFR BLD AUTO: 0 % (ref 0–0.5)
LYMPHOCYTES # BLD: 2.3 K/UL (ref 0.9–3.6)
LYMPHOCYTES NFR BLD: 29 % (ref 21–52)
MCH RBC QN AUTO: 29.7 PG (ref 24–34)
MCHC RBC AUTO-ENTMCNC: 33.1 G/DL (ref 31–37)
MCV RBC AUTO: 89.9 FL (ref 78–100)
MONOCYTES # BLD: 0.6 K/UL (ref 0.05–1.2)
MONOCYTES NFR BLD: 8 % (ref 3–10)
NEUTS SEG # BLD: 4.9 K/UL (ref 1.8–8)
NEUTS SEG NFR BLD: 62 % (ref 40–73)
NRBC # BLD: 0 K/UL (ref 0–0.01)
NRBC BLD-RTO: 0 PER 100 WBC
PLATELET # BLD AUTO: 363 K/UL (ref 135–420)
PMV BLD AUTO: 11.4 FL (ref 9.2–11.8)
POTASSIUM SERPL-SCNC: 4.3 MMOL/L (ref 3.5–5.5)
RBC # BLD AUTO: 4.37 M/UL (ref 4.2–5.3)
SODIUM SERPL-SCNC: 136 MMOL/L (ref 136–145)
WBC # BLD AUTO: 7.9 K/UL (ref 4.6–13.2)

## 2023-03-29 PROCEDURE — 80048 BASIC METABOLIC PNL TOTAL CA: CPT

## 2023-03-29 PROCEDURE — 99283 EMERGENCY DEPT VISIT LOW MDM: CPT

## 2023-03-29 PROCEDURE — 85025 COMPLETE CBC W/AUTO DIFF WBC: CPT

## 2023-03-29 RX ORDER — ACETAMINOPHEN 325 MG/1
650 TABLET ORAL 4 TIMES DAILY PRN
Qty: 20 TABLET | Refills: 0 | Status: SHIPPED | OUTPATIENT
Start: 2023-03-29

## 2023-03-29 ASSESSMENT — ENCOUNTER SYMPTOMS
DIARRHEA: 0
ABDOMINAL PAIN: 0
SORE THROAT: 0
VOMITING: 0
SHORTNESS OF BREATH: 0

## 2023-03-29 NOTE — ED TRIAGE NOTES
Patient states she wakes up in the am with leg cramps in her calves. States she has to rub them to make them feel better. Patient states she has been dealing with it for months.      Denies any trauma

## 2023-03-29 NOTE — ED PROVIDER NOTES
EMR.    Medical Decision Making   I am the first provider for this patient. I reviewed the vital signs, available nursing notes, past medical history, past surgical history, family history and social history. Vital Signs-Reviewed the patient's vital signs. Records Reviewed: Personally, on initial evaluation    MDM:   DDX includes but is not limited to: Hypokalemia, Electrolyte abnormality, Dehydration    Will obtain lab work and imaging for further evaluation of patients complaint. Will continue to monitor and evaluate patient while in the ED. Orders as below:  Orders Placed This Encounter   Procedures    CBC with Auto Differential    Basic Metabolic Panel    Insert peripheral IV      61 yo F who presents with lower leg cramping x \"months\". On exam no swelling appreciated. NVI. No signs of rash or swelling. No signs of infection. Normal K. Will treat pt symptomatically and discussed need for continued follow-up with PCP. At time of discharge, pt non-toxic appearing in NAD. Pt stable for prompt outpatient follow-up with PCP 1 to 2 days. Patient given strict instructions to return if symptoms worsen. ED Course:          Procedures:  Procedures        Documentation/Prior Results Review:  Previous electrocardiograms. Nursing notes. Previous radiology studies. Diagnosis and Disposition     CLINICAL IMPRESSION:  No diagnosis found.      Medication List        CONTINUE taking these medications      INSULIN SYRINGE .5CC/30GX5/16\" 30G X 5/16\" 0.5 ML Misc  INJECT TWICE A DAY            ASK your doctor about these medications      * acetaminophen 325 MG tablet  Commonly known as: TYLENOL     * acetaminophen 500 MG tablet  Commonly known as: TYLENOL  Take 2 tablets by mouth every 6 hours as needed for Pain     colchicine 0.6 MG tablet  Commonly known as: COLCRYS     ergocalciferol 1.25 MG (87377 UT) capsule  Commonly known as: ERGOCALCIFEROL     gabapentin 300 MG capsule  Commonly known as:

## 2023-03-31 RX ORDER — INSULIN GLARGINE 100 [IU]/ML
INJECTION, SOLUTION SUBCUTANEOUS
Qty: 10 ML | Refills: 2 | Status: SHIPPED | OUTPATIENT
Start: 2023-03-31

## 2023-05-19 ENCOUNTER — TELEPHONE (OUTPATIENT)
Age: 63
End: 2023-05-19

## 2023-05-19 DIAGNOSIS — N18.32 TYPE 2 DIABETES MELLITUS WITH STAGE 3B CHRONIC KIDNEY DISEASE, WITHOUT LONG-TERM CURRENT USE OF INSULIN (HCC): Primary | ICD-10-CM

## 2023-05-19 DIAGNOSIS — M79.673 PAIN OF FOOT, UNSPECIFIED LATERALITY: ICD-10-CM

## 2023-05-19 DIAGNOSIS — E11.22 TYPE 2 DIABETES MELLITUS WITH STAGE 3B CHRONIC KIDNEY DISEASE, WITHOUT LONG-TERM CURRENT USE OF INSULIN (HCC): Primary | ICD-10-CM

## 2023-05-23 NOTE — TELEPHONE ENCOUNTER
Called Pt and let her know. She stated that she does not have any type of transportation to get to any doctors appointments.

## 2023-06-24 DIAGNOSIS — N18.31 TYPE 2 DIABETES MELLITUS WITH STAGE 3A CHRONIC KIDNEY DISEASE, UNSPECIFIED WHETHER LONG TERM INSULIN USE (HCC): Primary | ICD-10-CM

## 2023-06-24 DIAGNOSIS — E11.22 TYPE 2 DIABETES MELLITUS WITH STAGE 3A CHRONIC KIDNEY DISEASE, UNSPECIFIED WHETHER LONG TERM INSULIN USE (HCC): Primary | ICD-10-CM

## 2023-07-01 RX ORDER — BLOOD SUGAR DIAGNOSTIC
STRIP MISCELLANEOUS
Qty: 100 STRIP | Refills: 8 | Status: SHIPPED | OUTPATIENT
Start: 2023-07-01

## 2023-07-26 DIAGNOSIS — G89.29 CHRONIC MIDLINE LOW BACK PAIN WITH SCIATICA, SCIATICA LATERALITY UNSPECIFIED: ICD-10-CM

## 2023-07-26 DIAGNOSIS — M54.40 CHRONIC MIDLINE LOW BACK PAIN WITH SCIATICA, SCIATICA LATERALITY UNSPECIFIED: ICD-10-CM

## 2023-07-26 RX ORDER — GABAPENTIN 300 MG/1
CAPSULE ORAL
Qty: 90 CAPSULE | Refills: 5 | Status: SHIPPED | OUTPATIENT
Start: 2023-07-26 | End: 2023-12-26

## 2023-07-26 NOTE — TELEPHONE ENCOUNTER
PCP: ELIZ JONES MD    gabapentin (NEURONTIN) 300 MG capsule ()  Edit       Summary: TAKE 1 CAPSULE BY MOUTH THREE TIMES DAILY, Disp-90 capsule, R-2  Normal   Start: 3/17/2023  End: 2023  Ord/Sold: 3/17/2023 (O)  Report  Taking:   Long-term:   Pharmacy: [address] [place] [address] Dr Sandrita ROTH [phone]  Med Dose History       Patient Sig: TAKE 1 CAPSULE BY MOUTH THREE TIMES DAILY       Ordered on: 3/17/2023       Authorized by: ELIZ Diop       Dispense: 90 capsule       Refills: 2 ordered        metFORMIN (GLUCOPHAGE) 1000 MG tablet 1,000 mg, Oral, DAILY Edit       Summary: Take 1,000 mg by mouth daily   Dose, Frequency: 1,000 mg, DAILY  Start: 2023  Ord/Sold: 2023 (O)  Report  Taking:   Long-term:   Med Dose History       Patient Sig: Take 1,000 mg by mouth daily       Ordered on: 2023          No future appointments.

## 2023-08-10 ENCOUNTER — TELEPHONE (OUTPATIENT)
Age: 63
End: 2023-08-10

## 2023-08-10 NOTE — TELEPHONE ENCOUNTER
----- Message from Rasta Contreras sent at 8/9/2023  4:02 PM EDT -----  Subject: Message to Provider    QUESTIONS  Information for Provider? Eugene Kolb with [address] needs to   know if Dr. Gaurav Troy would write a letter stating that the patient needs a   support animal to help her get through the day. The patient gave the cat   back for a few days and had a emotional meltdown. Eugene Kolb is hoping that   this letter would help patient keep the cat in the new apartment where she   is living.   ---------------------------------------------------------------------------  --------------  [address]  [phone]; OK to leave message on voicemail  ---------------------------------------------------------------------------  --------------  SCRIPT ANSWERS  Relationship to Patient? Covered Entity  Covered Entity Type? Home Health Care? Representative Name?  Eugene Kolb

## 2023-10-23 RX ORDER — SYRINGE-NEEDLE,INSULIN,0.5 ML 27GX1/2"
SYRINGE, EMPTY DISPOSABLE MISCELLANEOUS
Qty: 100 EACH | Refills: 0 | Status: SHIPPED | OUTPATIENT
Start: 2023-10-23

## 2023-11-15 ENCOUNTER — TELEPHONE (OUTPATIENT)
Age: 63
End: 2023-11-15

## 2023-11-15 RX ORDER — NITROFURANTOIN 25; 75 MG/1; MG/1
100 CAPSULE ORAL 2 TIMES DAILY
Qty: 10 CAPSULE | Refills: 0 | Status: SHIPPED | OUTPATIENT
Start: 2023-11-15 | End: 2023-11-20

## 2023-11-15 NOTE — TELEPHONE ENCOUNTER
Pt states she has a bladder infection for a couple days and she is having pain in her side.  No available appt until 11/21/23 pt can not wait that long     Please advise

## 2023-12-06 ENCOUNTER — OFFICE VISIT (OUTPATIENT)
Age: 63
End: 2023-12-06
Payer: MEDICAID

## 2023-12-06 VITALS
HEART RATE: 78 BPM | DIASTOLIC BLOOD PRESSURE: 91 MMHG | WEIGHT: 122 LBS | HEIGHT: 63 IN | SYSTOLIC BLOOD PRESSURE: 167 MMHG | RESPIRATION RATE: 20 BRPM | BODY MASS INDEX: 21.62 KG/M2 | OXYGEN SATURATION: 99 % | TEMPERATURE: 98.6 F

## 2023-12-06 DIAGNOSIS — E78.00 PURE HYPERCHOLESTEROLEMIA, UNSPECIFIED: ICD-10-CM

## 2023-12-06 DIAGNOSIS — E11.22 TYPE 2 DIABETES MELLITUS WITH STAGE 3A CHRONIC KIDNEY DISEASE, UNSPECIFIED WHETHER LONG TERM INSULIN USE (HCC): Primary | ICD-10-CM

## 2023-12-06 DIAGNOSIS — I10 ESSENTIAL (PRIMARY) HYPERTENSION: ICD-10-CM

## 2023-12-06 DIAGNOSIS — L97.521 DIABETIC ULCER OF TOE OF LEFT FOOT ASSOCIATED WITH TYPE 2 DIABETES MELLITUS, LIMITED TO BREAKDOWN OF SKIN (HCC): ICD-10-CM

## 2023-12-06 DIAGNOSIS — R21 RASH: ICD-10-CM

## 2023-12-06 DIAGNOSIS — E11.621 DIABETIC ULCER OF TOE OF LEFT FOOT ASSOCIATED WITH TYPE 2 DIABETES MELLITUS, LIMITED TO BREAKDOWN OF SKIN (HCC): ICD-10-CM

## 2023-12-06 DIAGNOSIS — E55.9 VITAMIN D DEFICIENCY, UNSPECIFIED: ICD-10-CM

## 2023-12-06 DIAGNOSIS — N18.31 TYPE 2 DIABETES MELLITUS WITH STAGE 3A CHRONIC KIDNEY DISEASE, UNSPECIFIED WHETHER LONG TERM INSULIN USE (HCC): Primary | ICD-10-CM

## 2023-12-06 LAB — HBA1C MFR BLD: ABNORMAL %

## 2023-12-06 PROCEDURE — PBSHW AMB POC HEMOGLOBIN A1C: Performed by: INTERNAL MEDICINE

## 2023-12-06 PROCEDURE — 99214 OFFICE O/P EST MOD 30 MIN: CPT | Performed by: INTERNAL MEDICINE

## 2023-12-06 PROCEDURE — PBSHW INFLUENZA, FLUCELVAX, (AGE 6 MO+), IM, PF, 0.5 ML: Performed by: INTERNAL MEDICINE

## 2023-12-06 PROCEDURE — 90674 CCIIV4 VAC NO PRSV 0.5 ML IM: CPT | Performed by: INTERNAL MEDICINE

## 2023-12-06 PROCEDURE — 3080F DIAST BP >= 90 MM HG: CPT | Performed by: INTERNAL MEDICINE

## 2023-12-06 PROCEDURE — 3077F SYST BP >= 140 MM HG: CPT | Performed by: INTERNAL MEDICINE

## 2023-12-06 PROCEDURE — 3046F HEMOGLOBIN A1C LEVEL >9.0%: CPT | Performed by: INTERNAL MEDICINE

## 2023-12-06 PROCEDURE — 83036 HEMOGLOBIN GLYCOSYLATED A1C: CPT | Performed by: INTERNAL MEDICINE

## 2023-12-06 RX ORDER — FLUCONAZOLE 100 MG/1
100 TABLET ORAL DAILY
Qty: 7 TABLET | Refills: 0 | Status: SHIPPED | OUTPATIENT
Start: 2023-12-06 | End: 2023-12-13

## 2023-12-06 RX ORDER — CEPHALEXIN 500 MG/1
500 CAPSULE ORAL 2 TIMES DAILY
Qty: 14 CAPSULE | Refills: 0 | Status: SHIPPED | OUTPATIENT
Start: 2023-12-06

## 2023-12-06 RX ORDER — LISINOPRIL 20 MG/1
20 TABLET ORAL DAILY
Qty: 30 TABLET | Refills: 5 | Status: SHIPPED | OUTPATIENT
Start: 2023-12-06

## 2023-12-06 RX ORDER — MONTELUKAST SODIUM 10 MG/1
10 TABLET ORAL DAILY
Qty: 30 TABLET | Refills: 5 | Status: SHIPPED | OUTPATIENT
Start: 2023-12-06

## 2023-12-06 RX ORDER — INSULIN GLARGINE 100 [IU]/ML
INJECTION, SOLUTION SUBCUTANEOUS
Qty: 10 ML | Refills: 5 | Status: SHIPPED | OUTPATIENT
Start: 2023-12-06

## 2023-12-06 RX ORDER — PRAVASTATIN SODIUM 40 MG
40 TABLET ORAL DAILY
Qty: 30 TABLET | Refills: 5 | Status: SHIPPED | OUTPATIENT
Start: 2023-12-06

## 2023-12-06 ASSESSMENT — PATIENT HEALTH QUESTIONNAIRE - PHQ9
SUM OF ALL RESPONSES TO PHQ QUESTIONS 1-9: 0
7. TROUBLE CONCENTRATING ON THINGS, SUCH AS READING THE NEWSPAPER OR WATCHING TELEVISION: 0
5. POOR APPETITE OR OVEREATING: 0
3. TROUBLE FALLING OR STAYING ASLEEP: 0
SUM OF ALL RESPONSES TO PHQ QUESTIONS 1-9: 0
SUM OF ALL RESPONSES TO PHQ QUESTIONS 1-9: 0
8. MOVING OR SPEAKING SO SLOWLY THAT OTHER PEOPLE COULD HAVE NOTICED. OR THE OPPOSITE, BEING SO FIGETY OR RESTLESS THAT YOU HAVE BEEN MOVING AROUND A LOT MORE THAN USUAL: 0
1. LITTLE INTEREST OR PLEASURE IN DOING THINGS: 0
SUM OF ALL RESPONSES TO PHQ9 QUESTIONS 1 & 2: 0
2. FEELING DOWN, DEPRESSED OR HOPELESS: 0
10. IF YOU CHECKED OFF ANY PROBLEMS, HOW DIFFICULT HAVE THESE PROBLEMS MADE IT FOR YOU TO DO YOUR WORK, TAKE CARE OF THINGS AT HOME, OR GET ALONG WITH OTHER PEOPLE: 0
9. THOUGHTS THAT YOU WOULD BE BETTER OFF DEAD, OR OF HURTING YOURSELF: 0
4. FEELING TIRED OR HAVING LITTLE ENERGY: 0
6. FEELING BAD ABOUT YOURSELF - OR THAT YOU ARE A FAILURE OR HAVE LET YOURSELF OR YOUR FAMILY DOWN: 0
SUM OF ALL RESPONSES TO PHQ QUESTIONS 1-9: 0

## 2023-12-06 ASSESSMENT — COLUMBIA-SUICIDE SEVERITY RATING SCALE - C-SSRS
3. HAVE YOU BEEN THINKING ABOUT HOW YOU MIGHT KILL YOURSELF?: NO
5. HAVE YOU STARTED TO WORK OUT OR WORKED OUT THE DETAILS OF HOW TO KILL YOURSELF? DO YOU INTEND TO CARRY OUT THIS PLAN?: NO
7. DID THIS OCCUR IN THE LAST THREE MONTHS: NO
4. HAVE YOU HAD THESE THOUGHTS AND HAD SOME INTENTION OF ACTING ON THEM?: NO

## 2023-12-06 NOTE — PROGRESS NOTES
Nori Prescott presents today for   Chief Complaint   Patient presents with    Hypertension    Diabetes    Cholesterol Problem     Follow up        Rash on stomach and wound on foot. Hgb A1c >15 % POC machine unable to read because value is so high. Verbal order read back per Dr. Dione Sanchez flu vaccine. Patient received flu vaccine in left deltoid. Patient was observed and no signs or symptoms of an allergic reaction noted at this time. Patient tolerated well and left without complaints. Patient received flu VIS. 1. \"Have you been to the ER, urgent care clinic since your last visit? Hospitalized since your last visit? \" no    2. \"Have you seen or consulted any other health care providers outside of the [address] [place] [address] since your last visit? \" no     3. For patients aged 43-73: Has the patient had a colonoscopy / FIT/ Cologuard? No      If the patient is female:    4. For patients aged 43-66: Has the patient had a mammogram within the past 2 years? No      5. For patients aged 21-65: Has the patient had a pap smear?  No

## 2023-12-06 NOTE — PROGRESS NOTES
Subjective:       Chief Complaint  The patient presents for follow up of diabetesand high cholesterol. Proteinuria, back pain         HPI  Trinh Splendora is a 58 y.o. Foye Ede female seen for follow up of diabetes. Shealso has  hyperlipidemia. Diabetes uncontrolled  With pt not consistently taking Lantus insulin 20 units BID . Concerned patient also  has not been taking her Metformin. Patient has developmental delay and has had difficulty managing her medications. She unfortunately has not had any family members that will help her to take her medications consistently and patient does not read so has been difficult for us to teach her how to take her medications correctly. .  She has difficulty coming to appointments and she has someone from the [facility] who has brought her today and they will work to find a  who can help her find someone to help her with her medications, hyperlipidemia controlled unknown patient continues on Pravachol 40 mg but has not been taking medication recently. Pt remains on lisinopril 10 mg for her proteinuria and HTN. Patient has not been taking lisinopril and will restart. Casework with her today we will try him get her to [facility] lab to have labs and updated blood work done. Patient has history of proteinuria which is most likely progress with her uncontrolled diabetes. .  Patient does have chronic kidney disease stage IIIb and is at high risk of progression due to her uncontrolled diabetes and uncontrolled blood pressure. Patient today has ulcerated callus under her under her left big toe. No active sign of infection but patient needs to be managed urgently by podiatry and we will go ahead and place referral today. Again patient's  will try and get her to appointment. Patient also has scaly erythematous rash around her navel which looks like a fungal infection since it is itching.   Doubt infection but will still treat empirically with some

## 2023-12-07 ENCOUNTER — TELEPHONE (OUTPATIENT)
Age: 63
End: 2023-12-07

## 2023-12-07 NOTE — TELEPHONE ENCOUNTER
----- Message from Debbie Umana sent at 12/6/2023  1:31 PM EST -----  Subject: Message to Provider    QUESTIONS  Information for Provider? Patient needs phone number to Referral doctors. One for Podiatry and the other for Dermatology. Please call patient to   advise. Thank you   ---------------------------------------------------------------------------  --------------  Remy WOOD  [phone]; OK to leave message on voicemail  ---------------------------------------------------------------------------  --------------  SCRIPT ANSWERS  Relationship to Patient?  Self

## 2023-12-12 ENCOUNTER — HOSPITAL ENCOUNTER (OUTPATIENT)
Facility: HOSPITAL | Age: 63
Discharge: HOME | End: 2023-12-15

## 2023-12-12 DIAGNOSIS — E87.5 HIGH POTASSIUM: Primary | ICD-10-CM

## 2023-12-12 LAB — LABCORP SPECIMEN COLLECTION: NORMAL

## 2023-12-13 ENCOUNTER — TELEPHONE (OUTPATIENT)
Age: 63
End: 2023-12-13

## 2023-12-13 LAB
25(OH)D3+25(OH)D2 SERPL-MCNC: 13.1 NG/ML (ref 30–100)
ALBUMIN SERPL-MCNC: 4.2 G/DL (ref 3.9–4.9)
ALBUMIN/CREAT UR: 242 MG/G CREAT (ref 0–29)
ALBUMIN/GLOB SERPL: 1.5 {RATIO} (ref 1.2–2.2)
ALP SERPL-CCNC: 111 IU/L (ref 44–121)
ALT SERPL-CCNC: 7 IU/L (ref 0–32)
AST SERPL-CCNC: 17 IU/L (ref 0–40)
BASOPHILS # BLD AUTO: 0.1 X10E3/UL (ref 0–0.2)
BASOPHILS NFR BLD AUTO: 1 %
BILIRUB SERPL-MCNC: <0.2 MG/DL (ref 0–1.2)
BUN SERPL-MCNC: 14 MG/DL (ref 8–27)
BUN/CREAT SERPL: 10 (ref 12–28)
CALCIUM SERPL-MCNC: 9.5 MG/DL (ref 8.7–10.3)
CHLORIDE SERPL-SCNC: 102 MMOL/L (ref 96–106)
CHOLEST SERPL-MCNC: 194 MG/DL (ref 100–199)
CO2 SERPL-SCNC: 21 MMOL/L (ref 20–29)
CREAT SERPL-MCNC: 1.41 MG/DL (ref 0.57–1)
CREAT UR-MCNC: 23 MG/DL
EGFRCR SERPLBLD CKD-EPI 2021: 42 ML/MIN/1.73
EOSINOPHIL # BLD AUTO: 0.2 X10E3/UL (ref 0–0.4)
EOSINOPHIL NFR BLD AUTO: 2 %
ERYTHROCYTE [DISTWIDTH] IN BLOOD BY AUTOMATED COUNT: 14.3 % (ref 11.7–15.4)
GLOBULIN SER CALC-MCNC: 2.8 G/DL (ref 1.5–4.5)
GLUCOSE SERPL-MCNC: 158 MG/DL (ref 70–99)
HBA1C MFR BLD: 15 % (ref 4.8–5.6)
HCT VFR BLD AUTO: 38.4 % (ref 34–46.6)
HDLC SERPL-MCNC: 54 MG/DL
HGB BLD-MCNC: 12.4 G/DL (ref 11.1–15.9)
IMM GRANULOCYTES # BLD AUTO: 0.1 X10E3/UL (ref 0–0.1)
IMM GRANULOCYTES NFR BLD AUTO: 1 %
LDLC SERPL CALC-MCNC: 123 MG/DL (ref 0–99)
LYMPHOCYTES # BLD AUTO: 2.8 X10E3/UL (ref 0.7–3.1)
LYMPHOCYTES NFR BLD AUTO: 34 %
MCH RBC QN AUTO: 29.5 PG (ref 26.6–33)
MCHC RBC AUTO-ENTMCNC: 32.3 G/DL (ref 31.5–35.7)
MCV RBC AUTO: 91 FL (ref 79–97)
MICROALBUMIN UR-MCNC: 55.6 UG/ML
MONOCYTES # BLD AUTO: 0.6 X10E3/UL (ref 0.1–0.9)
MONOCYTES NFR BLD AUTO: 8 %
NEUTROPHILS # BLD AUTO: 4.4 X10E3/UL (ref 1.4–7)
NEUTROPHILS NFR BLD AUTO: 54 %
PLATELET # BLD AUTO: 420 X10E3/UL (ref 150–450)
POTASSIUM SERPL-SCNC: 7.2 MMOL/L (ref 3.5–5.2)
PROT SERPL-MCNC: 7 G/DL (ref 6–8.5)
RBC # BLD AUTO: 4.21 X10E6/UL (ref 3.77–5.28)
SODIUM SERPL-SCNC: 135 MMOL/L (ref 134–144)
SPECIMEN STATUS REPORT: NORMAL
TRIGL SERPL-MCNC: 94 MG/DL (ref 0–149)
VLDLC SERPL CALC-MCNC: 17 MG/DL (ref 5–40)
WBC # BLD AUTO: 8.2 X10E3/UL (ref 3.4–10.8)

## 2023-12-13 NOTE — TELEPHONE ENCOUNTER
Received call from [address] [place] [address] for critical lab results: potassium 7.2. Did not note that specimen was hemolyzed but stated that could have been elevated due to specimen's prolonged exposure to cells.

## 2023-12-13 NOTE — RESULT ENCOUNTER NOTE
Tell patient or her caregiver her potassium is critically high and can affect her heart.  It may be lab error so need them to get potassium repeated ASAP, lab order placed

## 2023-12-27 RX ORDER — LISINOPRIL 20 MG/1
20 TABLET ORAL DAILY
Qty: 30 TABLET | Refills: 5 | Status: SHIPPED | OUTPATIENT
Start: 2023-12-27

## 2024-01-01 ENCOUNTER — TELEPHONE (OUTPATIENT)
Age: 64
End: 2024-01-01

## 2024-01-15 DIAGNOSIS — G89.29 CHRONIC MIDLINE LOW BACK PAIN WITH SCIATICA, SCIATICA LATERALITY UNSPECIFIED: ICD-10-CM

## 2024-01-15 DIAGNOSIS — M54.40 CHRONIC MIDLINE LOW BACK PAIN WITH SCIATICA, SCIATICA LATERALITY UNSPECIFIED: ICD-10-CM

## 2024-01-15 RX ORDER — GABAPENTIN 300 MG/1
CAPSULE ORAL
Qty: 90 CAPSULE | Refills: 4 | Status: SHIPPED | OUTPATIENT
Start: 2024-01-15 | End: 2024-02-16 | Stop reason: SDUPTHER

## 2024-01-15 NOTE — TELEPHONE ENCOUNTER
Last ov: 12/06/2023  Next ov: 04/08/2024  Last refill: 07/26/2023 -refuse refill for metformin only

## 2024-01-19 ENCOUNTER — HOSPITAL ENCOUNTER (INPATIENT)
Facility: HOSPITAL | Age: 64
LOS: 7 days | Discharge: HOME | DRG: 174 | End: 2024-01-26
Attending: STUDENT IN AN ORGANIZED HEALTH CARE EDUCATION/TRAINING PROGRAM | Admitting: INTERNAL MEDICINE
Payer: MEDICAID

## 2024-01-19 ENCOUNTER — APPOINTMENT (OUTPATIENT)
Facility: HOSPITAL | Age: 64
DRG: 174 | End: 2024-01-19
Payer: MEDICAID

## 2024-01-19 DIAGNOSIS — N18.9 ACUTE ON CHRONIC RENAL INSUFFICIENCY: Primary | ICD-10-CM

## 2024-01-19 DIAGNOSIS — J96.91 RESPIRATORY FAILURE WITH HYPOXIA, UNSPECIFIED CHRONICITY (HCC): ICD-10-CM

## 2024-01-19 DIAGNOSIS — I25.110 CORONARY ARTERY DISEASE INVOLVING NATIVE CORONARY ARTERY OF NATIVE HEART WITH UNSTABLE ANGINA PECTORIS (HCC): ICD-10-CM

## 2024-01-19 DIAGNOSIS — I21.3 STEMI (ST ELEVATION MYOCARDIAL INFARCTION) (HCC): ICD-10-CM

## 2024-01-19 DIAGNOSIS — I21.4 NSTEMI (NON-ST ELEVATED MYOCARDIAL INFARCTION) (HCC): ICD-10-CM

## 2024-01-19 DIAGNOSIS — I50.43 CHF (CONGESTIVE HEART FAILURE), NYHA CLASS I, ACUTE ON CHRONIC, COMBINED (HCC): ICD-10-CM

## 2024-01-19 DIAGNOSIS — N28.9 ACUTE ON CHRONIC RENAL INSUFFICIENCY: Primary | ICD-10-CM

## 2024-01-19 DIAGNOSIS — I50.9 CHF (CONGESTIVE HEART FAILURE) (HCC): ICD-10-CM

## 2024-01-19 DIAGNOSIS — I50.9 ACUTE CONGESTIVE HEART FAILURE, UNSPECIFIED HEART FAILURE TYPE (HCC): ICD-10-CM

## 2024-01-19 DIAGNOSIS — I24.89 DEMAND ISCHEMIA: ICD-10-CM

## 2024-01-19 DIAGNOSIS — J96.01 ACUTE RESPIRATORY FAILURE WITH HYPOXIA (HCC): ICD-10-CM

## 2024-01-19 DIAGNOSIS — I25.10 CAD (CORONARY ARTERY DISEASE): ICD-10-CM

## 2024-01-19 DIAGNOSIS — I25.118 CORONARY ARTERY DISEASE OF NATIVE ARTERY OF NATIVE HEART WITH STABLE ANGINA PECTORIS (HCC): ICD-10-CM

## 2024-01-19 PROBLEM — J96.90 RESPIRATORY FAILURE (HCC): Status: ACTIVE | Noted: 2024-01-19

## 2024-01-19 LAB
ALBUMIN SERPL-MCNC: 3.2 G/DL (ref 3.4–5)
ALBUMIN/GLOB SERPL: 0.9 (ref 0.8–1.7)
ALP SERPL-CCNC: 128 U/L (ref 45–117)
ALT SERPL-CCNC: 13 U/L (ref 13–56)
ANION GAP SERPL CALC-SCNC: 5 MMOL/L (ref 3–18)
APTT PPP: 81.4 SEC (ref 23–36.4)
ARTERIAL PATENCY WRIST A: POSITIVE
ARTERIAL PATENCY WRIST A: POSITIVE
AST SERPL-CCNC: 13 U/L (ref 10–38)
BASE DEFICIT BLD-SCNC: 4.4 MMOL/L
BASE DEFICIT BLD-SCNC: 4.8 MMOL/L
BASOPHILS # BLD: 0.3 K/UL (ref 0–0.1)
BASOPHILS NFR BLD: 2 % (ref 0–2)
BDY SITE: ABNORMAL
BDY SITE: ABNORMAL
BILIRUB SERPL-MCNC: 0.2 MG/DL (ref 0.2–1)
BUN SERPL-MCNC: 12 MG/DL (ref 7–18)
BUN/CREAT SERPL: 9 (ref 12–20)
CALCIUM SERPL-MCNC: 8.5 MG/DL (ref 8.5–10.1)
CHLORIDE SERPL-SCNC: 105 MMOL/L (ref 100–111)
CO2 SERPL-SCNC: 26 MMOL/L (ref 21–32)
CREAT SERPL-MCNC: 1.4 MG/DL (ref 0.6–1.3)
DIFFERENTIAL METHOD BLD: ABNORMAL
EKG ATRIAL RATE: 115 BPM
EKG DIAGNOSIS: NORMAL
EKG P AXIS: 64 DEGREES
EKG P-R INTERVAL: 166 MS
EKG Q-T INTERVAL: 276 MS
EKG QRS DURATION: 88 MS
EKG QTC CALCULATION (BAZETT): 381 MS
EKG R AXIS: 77 DEGREES
EKG T AXIS: 81 DEGREES
EKG VENTRICULAR RATE: 115 BPM
EOSINOPHIL # BLD: 0.4 K/UL (ref 0–0.4)
EOSINOPHIL NFR BLD: 3 % (ref 0–5)
ERYTHROCYTE [DISTWIDTH] IN BLOOD BY AUTOMATED COUNT: 14.6 % (ref 11.6–14.5)
ERYTHROCYTE [DISTWIDTH] IN BLOOD BY AUTOMATED COUNT: 14.9 % (ref 11.6–14.5)
FLUAV RNA SPEC QL NAA+PROBE: NOT DETECTED
FLUBV RNA SPEC QL NAA+PROBE: NOT DETECTED
GAS FLOW.O2 O2 DELIVERY SYS: ABNORMAL
GAS FLOW.O2 O2 DELIVERY SYS: ABNORMAL
GLOBULIN SER CALC-MCNC: 3.5 G/DL (ref 2–4)
GLUCOSE BLD STRIP.AUTO-MCNC: 199 MG/DL (ref 70–110)
GLUCOSE BLD STRIP.AUTO-MCNC: 301 MG/DL (ref 70–110)
GLUCOSE BLD STRIP.AUTO-MCNC: 371 MG/DL (ref 70–110)
GLUCOSE BLD STRIP.AUTO-MCNC: 432 MG/DL (ref 70–110)
GLUCOSE BLD STRIP.AUTO-MCNC: 460 MG/DL (ref 70–110)
GLUCOSE SERPL-MCNC: 290 MG/DL (ref 74–99)
HCO3 BLD-SCNC: 20.7 MMOL/L (ref 22–26)
HCO3 BLD-SCNC: 23.2 MMOL/L (ref 22–26)
HCT VFR BLD AUTO: 36.7 % (ref 35–45)
HCT VFR BLD AUTO: 39.7 % (ref 35–45)
HGB BLD-MCNC: 11.8 G/DL (ref 12–16)
HGB BLD-MCNC: 12.5 G/DL (ref 12–16)
IMM GRANULOCYTES # BLD AUTO: 0 K/UL (ref 0–0.04)
IMM GRANULOCYTES NFR BLD AUTO: 0 % (ref 0–0.5)
IPAP/PIP/HIGH PEEP: 16
LYMPHOCYTES # BLD: 4.6 K/UL (ref 0.9–3.6)
LYMPHOCYTES NFR BLD: 36 % (ref 21–52)
MAGNESIUM SERPL-MCNC: 3.5 MG/DL (ref 1.6–2.6)
MCH RBC QN AUTO: 29.3 PG (ref 24–34)
MCH RBC QN AUTO: 29.4 PG (ref 24–34)
MCHC RBC AUTO-ENTMCNC: 31.5 G/DL (ref 31–37)
MCHC RBC AUTO-ENTMCNC: 32.2 G/DL (ref 31–37)
MCV RBC AUTO: 91.3 FL (ref 78–100)
MCV RBC AUTO: 93.2 FL (ref 78–100)
MONOCYTES # BLD: 0.5 K/UL (ref 0.05–1.2)
MONOCYTES NFR BLD: 4 % (ref 3–10)
NEUTS BAND NFR BLD MANUAL: 2 %
NEUTS SEG # BLD: 7 K/UL (ref 1.8–8)
NEUTS SEG NFR BLD: 53 % (ref 40–73)
NRBC # BLD: 0 K/UL (ref 0–0.01)
NRBC # BLD: 0 K/UL (ref 0–0.01)
NRBC BLD-RTO: 0 PER 100 WBC
NRBC BLD-RTO: 0 PER 100 WBC
NT PRO BNP: 2230 PG/ML (ref 0–900)
O2/TOTAL GAS SETTING VFR VENT: 100 %
O2/TOTAL GAS SETTING VFR VENT: 40 %
PCO2 BLD: 38.8 MMHG (ref 35–45)
PCO2 BLD: 51.6 MMHG (ref 35–45)
PEEP RESPIRATORY: 8 CMH2O
PH BLD: 7.26 (ref 7.35–7.45)
PH BLD: 7.33 (ref 7.35–7.45)
PLATELET # BLD AUTO: 385 K/UL (ref 135–420)
PLATELET # BLD AUTO: 428 K/UL (ref 135–420)
PLATELET COMMENT: ABNORMAL
PMV BLD AUTO: 12.3 FL (ref 9.2–11.8)
PMV BLD AUTO: 12.6 FL (ref 9.2–11.8)
PO2 BLD: 106 MMHG (ref 80–100)
PO2 BLD: 121 MMHG (ref 80–100)
POTASSIUM SERPL-SCNC: 4 MMOL/L (ref 3.5–5.5)
PROT SERPL-MCNC: 6.7 G/DL (ref 6.4–8.2)
RBC # BLD AUTO: 4.02 M/UL (ref 4.2–5.3)
RBC # BLD AUTO: 4.26 M/UL (ref 4.2–5.3)
RBC MORPH BLD: ABNORMAL
SAO2 % BLD: 97.7 % (ref 92–97)
SAO2 % BLD: 98 % (ref 92–97)
SARS-COV-2 RNA RESP QL NAA+PROBE: NOT DETECTED
SERVICE CMNT-IMP: ABNORMAL
SERVICE CMNT-IMP: ABNORMAL
SODIUM SERPL-SCNC: 136 MMOL/L (ref 136–145)
SPECIMEN TYPE: ABNORMAL
SPECIMEN TYPE: ABNORMAL
TROPONIN I SERPL HS-MCNC: 133 NG/L (ref 0–54)
TROPONIN I SERPL HS-MCNC: 361 NG/L (ref 0–54)
TROPONIN I SERPL HS-MCNC: 4299 NG/L (ref 0–54)
TROPONIN I SERPL HS-MCNC: 543 NG/L (ref 0–54)
WBC # BLD AUTO: 12.8 K/UL (ref 4.6–13.2)
WBC # BLD AUTO: 9.6 K/UL (ref 4.6–13.2)

## 2024-01-19 PROCEDURE — 2580000003 HC RX 258: Performed by: INTERNAL MEDICINE

## 2024-01-19 PROCEDURE — 6360000002 HC RX W HCPCS: Performed by: INTERNAL MEDICINE

## 2024-01-19 PROCEDURE — 36415 COLL VENOUS BLD VENIPUNCTURE: CPT

## 2024-01-19 PROCEDURE — 85027 COMPLETE CBC AUTOMATED: CPT

## 2024-01-19 PROCEDURE — 99223 1ST HOSP IP/OBS HIGH 75: CPT

## 2024-01-19 PROCEDURE — 93010 ELECTROCARDIOGRAM REPORT: CPT | Performed by: INTERNAL MEDICINE

## 2024-01-19 PROCEDURE — 6370000000 HC RX 637 (ALT 250 FOR IP): Performed by: STUDENT IN AN ORGANIZED HEALTH CARE EDUCATION/TRAINING PROGRAM

## 2024-01-19 PROCEDURE — 80053 COMPREHEN METABOLIC PANEL: CPT

## 2024-01-19 PROCEDURE — 71045 X-RAY EXAM CHEST 1 VIEW: CPT

## 2024-01-19 PROCEDURE — 36600 WITHDRAWAL OF ARTERIAL BLOOD: CPT

## 2024-01-19 PROCEDURE — 6370000000 HC RX 637 (ALT 250 FOR IP): Performed by: INTERNAL MEDICINE

## 2024-01-19 PROCEDURE — 83735 ASSAY OF MAGNESIUM: CPT

## 2024-01-19 PROCEDURE — 84484 ASSAY OF TROPONIN QUANT: CPT

## 2024-01-19 PROCEDURE — 83880 ASSAY OF NATRIURETIC PEPTIDE: CPT

## 2024-01-19 PROCEDURE — 99223 1ST HOSP IP/OBS HIGH 75: CPT | Performed by: INTERNAL MEDICINE

## 2024-01-19 PROCEDURE — 82962 GLUCOSE BLOOD TEST: CPT

## 2024-01-19 PROCEDURE — 99285 EMERGENCY DEPT VISIT HI MDM: CPT

## 2024-01-19 PROCEDURE — 94660 CPAP INITIATION&MGMT: CPT

## 2024-01-19 PROCEDURE — 82803 BLOOD GASES ANY COMBINATION: CPT

## 2024-01-19 PROCEDURE — 2500000003 HC RX 250 WO HCPCS: Performed by: INTERNAL MEDICINE

## 2024-01-19 PROCEDURE — 85730 THROMBOPLASTIN TIME PARTIAL: CPT

## 2024-01-19 PROCEDURE — 1100000003 HC PRIVATE W/ TELEMETRY

## 2024-01-19 PROCEDURE — 6370000000 HC RX 637 (ALT 250 FOR IP)

## 2024-01-19 PROCEDURE — 87636 SARSCOV2 & INF A&B AMP PRB: CPT

## 2024-01-19 PROCEDURE — 93005 ELECTROCARDIOGRAM TRACING: CPT

## 2024-01-19 PROCEDURE — 93005 ELECTROCARDIOGRAM TRACING: CPT | Performed by: STUDENT IN AN ORGANIZED HEALTH CARE EDUCATION/TRAINING PROGRAM

## 2024-01-19 PROCEDURE — 6360000002 HC RX W HCPCS

## 2024-01-19 PROCEDURE — 96374 THER/PROPH/DIAG INJ IV PUSH: CPT

## 2024-01-19 PROCEDURE — 6360000002 HC RX W HCPCS: Performed by: STUDENT IN AN ORGANIZED HEALTH CARE EDUCATION/TRAINING PROGRAM

## 2024-01-19 PROCEDURE — 85025 COMPLETE CBC W/AUTO DIFF WBC: CPT

## 2024-01-19 RX ORDER — POLYETHYLENE GLYCOL 3350 17 G/17G
17 POWDER, FOR SOLUTION ORAL DAILY PRN
Status: DISCONTINUED | OUTPATIENT
Start: 2024-01-19 | End: 2024-01-26 | Stop reason: HOSPADM

## 2024-01-19 RX ORDER — INSULIN LISPRO 100 [IU]/ML
0-4 INJECTION, SOLUTION INTRAVENOUS; SUBCUTANEOUS NIGHTLY
Status: DISCONTINUED | OUTPATIENT
Start: 2024-01-19 | End: 2024-01-26 | Stop reason: HOSPADM

## 2024-01-19 RX ORDER — FUROSEMIDE 10 MG/ML
40 INJECTION INTRAMUSCULAR; INTRAVENOUS 2 TIMES DAILY
Status: DISCONTINUED | OUTPATIENT
Start: 2024-01-19 | End: 2024-01-23

## 2024-01-19 RX ORDER — SODIUM CHLORIDE 0.9 % (FLUSH) 0.9 %
5-40 SYRINGE (ML) INJECTION EVERY 12 HOURS SCHEDULED
Status: DISCONTINUED | OUTPATIENT
Start: 2024-01-19 | End: 2024-01-26 | Stop reason: HOSPADM

## 2024-01-19 RX ORDER — INSULIN GLARGINE 100 [IU]/ML
20 INJECTION, SOLUTION SUBCUTANEOUS 2 TIMES DAILY
Status: DISCONTINUED | OUTPATIENT
Start: 2024-01-19 | End: 2024-01-26 | Stop reason: HOSPADM

## 2024-01-19 RX ORDER — FUROSEMIDE 10 MG/ML
20 INJECTION INTRAMUSCULAR; INTRAVENOUS 2 TIMES DAILY
Status: DISCONTINUED | OUTPATIENT
Start: 2024-01-19 | End: 2024-01-19

## 2024-01-19 RX ORDER — HEPARIN SODIUM 10000 [USP'U]/100ML
5-30 INJECTION, SOLUTION INTRAVENOUS CONTINUOUS
Status: DISCONTINUED | OUTPATIENT
Start: 2024-01-19 | End: 2024-01-26

## 2024-01-19 RX ORDER — ACETAMINOPHEN 650 MG/1
650 SUPPOSITORY RECTAL EVERY 6 HOURS PRN
Status: DISCONTINUED | OUTPATIENT
Start: 2024-01-19 | End: 2024-01-26 | Stop reason: HOSPADM

## 2024-01-19 RX ORDER — SODIUM CHLORIDE 0.9 % (FLUSH) 0.9 %
5-40 SYRINGE (ML) INJECTION PRN
Status: DISCONTINUED | OUTPATIENT
Start: 2024-01-19 | End: 2024-01-26 | Stop reason: HOSPADM

## 2024-01-19 RX ORDER — HEPARIN SODIUM 1000 [USP'U]/ML
4000 INJECTION, SOLUTION INTRAVENOUS; SUBCUTANEOUS ONCE
Status: COMPLETED | OUTPATIENT
Start: 2024-01-19 | End: 2024-01-19

## 2024-01-19 RX ORDER — HEPARIN SODIUM 1000 [USP'U]/ML
2000 INJECTION, SOLUTION INTRAVENOUS; SUBCUTANEOUS PRN
Status: DISCONTINUED | OUTPATIENT
Start: 2024-01-19 | End: 2024-01-26

## 2024-01-19 RX ORDER — ENOXAPARIN SODIUM 100 MG/ML
40 INJECTION SUBCUTANEOUS DAILY
Status: DISCONTINUED | OUTPATIENT
Start: 2024-01-19 | End: 2024-01-19

## 2024-01-19 RX ORDER — SODIUM CHLORIDE 9 MG/ML
INJECTION, SOLUTION INTRAVENOUS PRN
Status: DISCONTINUED | OUTPATIENT
Start: 2024-01-19 | End: 2024-01-26 | Stop reason: HOSPADM

## 2024-01-19 RX ORDER — ACETAMINOPHEN 325 MG/1
650 TABLET ORAL EVERY 6 HOURS PRN
Status: DISCONTINUED | OUTPATIENT
Start: 2024-01-19 | End: 2024-01-26 | Stop reason: HOSPADM

## 2024-01-19 RX ORDER — GABAPENTIN 300 MG/1
300 CAPSULE ORAL 3 TIMES DAILY
Status: DISCONTINUED | OUTPATIENT
Start: 2024-01-19 | End: 2024-01-26 | Stop reason: HOSPADM

## 2024-01-19 RX ORDER — NITROGLYCERIN 0.4 MG/1
0.4 TABLET SUBLINGUAL ONCE
Status: COMPLETED | OUTPATIENT
Start: 2024-01-19 | End: 2024-01-19

## 2024-01-19 RX ORDER — ONDANSETRON 4 MG/1
4 TABLET, ORALLY DISINTEGRATING ORAL EVERY 8 HOURS PRN
Status: DISCONTINUED | OUTPATIENT
Start: 2024-01-19 | End: 2024-01-26 | Stop reason: HOSPADM

## 2024-01-19 RX ORDER — ONDANSETRON 2 MG/ML
4 INJECTION INTRAMUSCULAR; INTRAVENOUS EVERY 6 HOURS PRN
Status: DISCONTINUED | OUTPATIENT
Start: 2024-01-19 | End: 2024-01-26 | Stop reason: HOSPADM

## 2024-01-19 RX ORDER — INSULIN LISPRO 100 [IU]/ML
0-8 INJECTION, SOLUTION INTRAVENOUS; SUBCUTANEOUS
Status: DISCONTINUED | OUTPATIENT
Start: 2024-01-19 | End: 2024-01-26 | Stop reason: HOSPADM

## 2024-01-19 RX ORDER — PRAVASTATIN SODIUM 20 MG
40 TABLET ORAL DAILY
Status: DISCONTINUED | OUTPATIENT
Start: 2024-01-19 | End: 2024-01-22

## 2024-01-19 RX ORDER — HEPARIN SODIUM 1000 [USP'U]/ML
4000 INJECTION, SOLUTION INTRAVENOUS; SUBCUTANEOUS PRN
Status: DISCONTINUED | OUTPATIENT
Start: 2024-01-19 | End: 2024-01-26

## 2024-01-19 RX ORDER — ASPIRIN 81 MG/1
81 TABLET, CHEWABLE ORAL DAILY
Status: DISCONTINUED | OUTPATIENT
Start: 2024-01-19 | End: 2024-01-26 | Stop reason: HOSPADM

## 2024-01-19 RX ORDER — FUROSEMIDE 10 MG/ML
40 INJECTION INTRAMUSCULAR; INTRAVENOUS
Status: COMPLETED | OUTPATIENT
Start: 2024-01-19 | End: 2024-01-19

## 2024-01-19 RX ORDER — LISINOPRIL 20 MG/1
20 TABLET ORAL DAILY
Status: DISCONTINUED | OUTPATIENT
Start: 2024-01-19 | End: 2024-01-20

## 2024-01-19 RX ORDER — NITROGLYCERIN 20 MG/100ML
10 INJECTION INTRAVENOUS CONTINUOUS
Status: DISCONTINUED | OUTPATIENT
Start: 2024-01-19 | End: 2024-01-26

## 2024-01-19 RX ADMIN — FUROSEMIDE 40 MG: 10 INJECTION, SOLUTION INTRAMUSCULAR; INTRAVENOUS at 20:25

## 2024-01-19 RX ADMIN — SODIUM CHLORIDE, PRESERVATIVE FREE 10 ML: 5 INJECTION INTRAVENOUS at 11:35

## 2024-01-19 RX ADMIN — INSULIN LISPRO 8 UNITS: 100 INJECTION, SOLUTION INTRAVENOUS; SUBCUTANEOUS at 11:45

## 2024-01-19 RX ADMIN — FUROSEMIDE 20 MG: 10 INJECTION, SOLUTION INTRAMUSCULAR; INTRAVENOUS at 17:51

## 2024-01-19 RX ADMIN — HEPARIN SODIUM 4000 UNITS: 1000 INJECTION INTRAVENOUS; SUBCUTANEOUS at 14:45

## 2024-01-19 RX ADMIN — NITROGLYCERIN 0.4 MG: 0.4 TABLET, ORALLY DISINTEGRATING SUBLINGUAL at 08:22

## 2024-01-19 RX ADMIN — GABAPENTIN 300 MG: 300 CAPSULE ORAL at 20:25

## 2024-01-19 RX ADMIN — LISINOPRIL 20 MG: 20 TABLET ORAL at 11:49

## 2024-01-19 RX ADMIN — DOXYCYCLINE 100 MG: 100 INJECTION, POWDER, LYOPHILIZED, FOR SOLUTION INTRAVENOUS at 11:16

## 2024-01-19 RX ADMIN — FUROSEMIDE 40 MG: 10 INJECTION, SOLUTION INTRAMUSCULAR; INTRAVENOUS at 10:31

## 2024-01-19 RX ADMIN — INSULIN LISPRO 6 UNITS: 100 INJECTION, SOLUTION INTRAVENOUS; SUBCUTANEOUS at 17:51

## 2024-01-19 RX ADMIN — WATER 80 MG: 1 INJECTION INTRAMUSCULAR; INTRAVENOUS; SUBCUTANEOUS at 11:27

## 2024-01-19 RX ADMIN — PRAVASTATIN SODIUM 40 MG: 20 TABLET ORAL at 11:49

## 2024-01-19 RX ADMIN — ASPIRIN 81 MG 81 MG: 81 TABLET ORAL at 15:27

## 2024-01-19 RX ADMIN — SODIUM CHLORIDE, PRESERVATIVE FREE 10 ML: 5 INJECTION INTRAVENOUS at 20:27

## 2024-01-19 RX ADMIN — HEPARIN SODIUM 12 UNITS/KG/HR: 10000 INJECTION, SOLUTION INTRAVENOUS at 14:46

## 2024-01-19 RX ADMIN — NITROGLYCERIN 10 MCG/MIN: 20 INJECTION INTRAVENOUS at 15:28

## 2024-01-19 RX ADMIN — INSULIN GLARGINE 20 UNITS: 100 INJECTION, SOLUTION SUBCUTANEOUS at 20:25

## 2024-01-19 RX ADMIN — INSULIN GLARGINE 20 UNITS: 100 INJECTION, SOLUTION SUBCUTANEOUS at 11:30

## 2024-01-19 RX ADMIN — FUROSEMIDE 20 MG: 10 INJECTION, SOLUTION INTRAMUSCULAR; INTRAVENOUS at 11:43

## 2024-01-19 RX ADMIN — ENOXAPARIN SODIUM 40 MG: 100 INJECTION SUBCUTANEOUS at 11:22

## 2024-01-19 RX ADMIN — GABAPENTIN 300 MG: 300 CAPSULE ORAL at 15:26

## 2024-01-19 ASSESSMENT — PAIN SCALES - GENERAL: PAINLEVEL_OUTOF10: 0

## 2024-01-19 ASSESSMENT — LIFESTYLE VARIABLES
HOW OFTEN DO YOU HAVE A DRINK CONTAINING ALCOHOL: NEVER
HOW MANY STANDARD DRINKS CONTAINING ALCOHOL DO YOU HAVE ON A TYPICAL DAY: PATIENT DOES NOT DRINK

## 2024-01-19 ASSESSMENT — PAIN - FUNCTIONAL ASSESSMENT: PAIN_FUNCTIONAL_ASSESSMENT: 0-10

## 2024-01-19 NOTE — ED TRIAGE NOTES
EMS arrived patient was tachypnic, hypertensive, sinus tach with PVCs, administered 2 of versed for CPAP (unable to tolerate mask), rhonchi in the bases. Denied respiratory hx to EMS. Admits to hypertension and diabetes. Pt attached to ED monitor satting at 100%. Team at the bedside performing workup.

## 2024-01-19 NOTE — ED PROVIDER NOTES
EMERGENCY DEPARTMENT HISTORY AND PHYSICAL EXAM    12:18 PM      Date: 1/19/2024  Patient Name: Adrienne Allen    History of Presenting Illness     Chief Complaint   Patient presents with    Shortness of Breath       History From: Patient and EMS  HPI  63-year-old female with history of diabetes, depression, hyperlipidemia who presents with shortness of breath.  Per EMS patient is short of breath today.  Patient found to be hypoxic in the mid 80s.  Patient placed on 6 L oxygen and hypoxia improved to the 90s.  Up-to-date on vaccination.  No sick contacts, recent travel, or any other precipitating factors.  When assessing ROS she denies any chest pain, abdominal pain, changes in bowel movement, lower extremity swelling, fevers, or any other changes.     Nursing Notes were all reviewed and agreed with or any disagreements were addressed in the HPI.    PCP: Kwan Ferguson MD    Current Facility-Administered Medications   Medication Dose Route Frequency Provider Last Rate Last Admin    hydrALAZINE (APRESOLINE) injection 5 mg  5 mg IntraVENous Q6H PRN Skylar Garsia, JOVI        diphenhydrAMINE (BENADRYL) capsule 25 mg  25 mg Oral Q6H PRN Amelia Barlow MD   25 mg at 01/20/24 1415    glucose chewable tablet 16 g  4 tablet Oral PRN Amelia Barlow MD        dextrose bolus 10% 125 mL  125 mL IntraVENous PRN Amelia Barlow MD        Or    dextrose bolus 10% 250 mL  250 mL IntraVENous PRN Amelia Barlow MD        glucagon injection 1 mg  1 mg SubCUTAneous PRN Amelia Barlow MD        dextrose 10 % infusion   IntraVENous Continuous PRN Amelia Barlow MD        insulin glargine (LANTUS) injection vial 20 Units  20 Units SubCUTAneous BID Amelia Barlow MD   20 Units at 01/21/24 0917    insulin lispro (HUMALOG) injection vial 0-8 Units  0-8 Units SubCUTAneous TID WC Amelia Barlow MD   2 Units at 01/20/24 1815    insulin lispro (HUMALOG) injection vial 0-4 Units  0-4 Units SubCUTAneous Nightly Cain

## 2024-01-19 NOTE — PLAN OF CARE
Problem: Discharge Planning  Goal: Discharge to home or other facility with appropriate resources  Outcome: Progressing     Problem: Skin/Tissue Integrity  Goal: Absence of new skin breakdown  Description: 1.  Monitor for areas of redness and/or skin breakdown  2.  Assess vascular access sites hourly  3.  Every 4-6 hours minimum:  Change oxygen saturation probe site  4.  Every 4-6 hours:  If on nasal continuous positive airway pressure, respiratory therapy assess nares and determine need for appliance change or resting period.  Outcome: Progressing     Problem: Safety - Adult  Goal: Free from fall injury  Outcome: Progressing     Problem: ABCDS Injury Assessment  Goal: Absence of physical injury  Outcome: Progressing     Problem: Chronic Conditions and Co-morbidities  Goal: Patient's chronic conditions and co-morbidity symptoms are monitored and maintained or improved  Outcome: Progressing

## 2024-01-19 NOTE — PROGRESS NOTES
completed the initial Spiritual Assessment of the patient, and offered Pastoral Care, support to the patient  There is no advance directive .Patient does not have any Yazdanism/cultural needs that will affect patient’s preferences in health care. Chaplains will continue to follow and will provide pastoral care on an as needed/requested basis.    adryan Harris  Board Certified   Spiritual Care Department  [phone]

## 2024-01-19 NOTE — PROGRESS NOTES
1441: pt arrived on unit. Pt is on 5 L NC. BiPap in room. Tele applied to pt. Pt unable to lie flat. Heparin drip started per order. Pts VS and Head to toe assessment completed. Pt appears to be anxious and slightly confused at baseline. Pt has purewick in place.     4 Eyes Skin Assessment     NAME:  Adrienne Allen  YOB: 1960  MEDICAL RECORD NUMBER:  [id number]    The patient is being assessed for  Admission    I agree that at least one RN has performed a thorough Head to Toe Skin Assessment on the patient. ALL assessment sites listed below have been assessed.      Areas assessed by both nurses:    Head, Face, Ears, Shoulders, Back, Chest, Arms, Elbows, Hands, Sacrum. Buttock, Coccyx, Ischium, Legs. Feet and Heels, and Under Medical Devices         Does the Patient have a Wound? No noted wound(s)     Scattered scabbing, LLE swollen +2/3 pitting edema., Left first toe ampuated and healed, ecchymosis on arms, and periumbilical discoloration, purplish color, nontender. Pt denies pain at site, and states it if from insulin.     Nurse 1 eSignature: Electronically signed by Dorota Vitale RN on 1/19/24 at 3:09 PM EST    Nurse 2 eSignature: Moy Schmid RN    1515: /80  1528: Nitro started  1542: BP recheck 117/71  1600: BP recheck 114/71  1615: BP recheck 106/68  1630: BP recheck 106/67- end Q15 BP checks.     1645: Pt moving to hardwired room 368 for further VS monitoring Q1    1921: Critical troponin called in. Call received by ALYSSA Winter. RN notified primary RN. Primary RN included result in handoff. MD paged. Awaiting call back.     1930: Bedside shift change report given to ALYSSA Alanis (oncoming nurse) by ALYSSA Raya (offgoing nurse). Report included the following information Nurse Handoff Report, Intake/Output, MAR, and Cardiac Rhythm SR .

## 2024-01-19 NOTE — H&P
History and Physical    Patient: Adrienne Allen MRN: [id number]  SSN: [id number]    YOB: 1960  Age: 63 y.o.  Sex: female      Kwan Ferguson MD    C/C : SOB     Subjective:      Adrienne Allen is a 63 y.o. female with PMH of diabetes mellitus type 2, hypertension, some developmental delay, noncompliance to medication being admitted for acute shortness of breath and hypoxemia, initially requiring BiPAP.    Patient is extremely poor historian, most history obtained from chart review and ED discussion    Patient was transferred to [facility] emergency room after she was noticed to have increasing shortness of breath at the place of her residence, she was brought in by patient to be hypoxic in the mid 80s requiring 6 L nasal cannula oxygen persistent hypoxemia requiring BiPAP initially.  In ED she was diuresed and improved oxygenation changed to nasal cannula.    During my interview  Patient became more alert awake however her orientation was questionable, she denied chest pain fever chills no other history except shortness of breath and she says she cannot lie flat.  No similar episode in the past.  When I asked patient do you smoke she answered \" yes\", I asked her how much\" I do not know\" was her answer .        In the ED    -Patient was found to have BNP,   Troponin was elevated- -later on trend was up trending  Cardiology was consulted        Past Medical History:   Diagnosis Date    Bladder infection     Depression     Diabetes (HCC)     Difficulty walking     DM (diabetes mellitus) (HCC) 6/22/2012    Ill-defined condition     leaking bladder;high cholesterol    Proteinuria 10/1/2019    RX  lisinopril for proteinuria    Puncture wound of foot, left, complicated 9/30/2019     Past Surgical History:   Procedure Laterality Date    GYN      hysterectomy    OTHER SURGICAL HISTORY      bladder surgery      Family History   Problem Relation Age of Onset    Heart Attack Father         had  breath/acute hypoxic respite failure--with elevated BNP.  Most likely secondary to CHF exacerbation type of CHF no echo ordered cardiology consulted IV diuresis and supplemental oxygen to maintain O2 saturation above 92%.    -Other possibilities are-viral infection/viral pneumonia-COVID and other respiratory panel is pending.  - ?  COPD exacerbation-no surgical history however given severity of hypoxia and respiratory distress-will start patient on steroids and doxycycline which can be later discontinued.      -Diabetes mellitus-requiring insulin-according to PCP note patient was supposed to take 20 units twice daily I will restart that especially with steroid induced hyperglycemia he is expected , also add  medium scale insulin sliding scale.    -Blood pressure control, depending on echo-GDMT treatment      ADD:     -Patient's troponin elevated, cardiology was informed, it was decided patient will have a cath on Monday as currently patient cannot lie flat will continue diuresis,     ADD:  8:04 PM   D/w Cardiologist - Dr mendez - Elevated trop - continue to trend - Continue heparin gtt. And NTG , Continue aggressive diuresis - possible cath sooner than planned       Signed By: Amelia Barlow MD     January 19, 2024

## 2024-01-19 NOTE — CONSULTS
[facility] - Consult Note    Cardiology consultation request from Dr. He for evaluation and management/treatment of elevated troponin, volume overload    Date of  Admission: 1/19/2024  8:06 AM   Primary Care Physician:  Kwan Ferguson MD    Attending Cardiologist: Dr. Smith       Assessment:     Patient Active Problem List    Diagnosis Date Noted    Acute on chronic renal insufficiency 10/01/2019    Proteinuria 10/01/2019    Hyponatremia 09/30/2019    Puncture wound of foot, left, complicated 09/30/2019    Microalbuminuria 10/24/2018    Type 2 diabetes with nephropathy (HCC) 04/12/2018    Type 2 diabetes mellitus with diabetic neuropathy (HCC) 04/10/2018    Encounter for long-term (current) use of other medications     Midline low back pain without sciatica 01/21/2016    DM (diabetes mellitus), type 2, uncontrolled 01/21/2016    Vaginitis, atrophic 05/03/2015    High cholesterol 05/09/2014    Hip pain 06/23/2012    Sacroiliac joint pain 08/11/2011    Depression 08/11/2011    Lumbago     Chronic pain syndrome      - Acute hypoxic respiratory failure requiring bipap. Likely related to volume overload and possible COPD exacerbation.  -  Volume overload: BNP 2,230. CXR with increased pulmonary interstitial opacity which could represent edema. Received 1 x dose of lasix in the ER.  - Elevated troponin: 133 > 361. Patient without chest pain, unlikely ACS. Likely type 2 MI related to demand ischemia secondary to acute respiratory failure and CKD.   - Suspected COPD exacerbation: on steroids, IV abx per primary  - CKD stage 3  - DM type 2: Uncontrolled, last A1c 15.  - HTN: on lisinopril 20mg as otpt  - HLD: on statin  -Tobacco use: 1 PPD. Patient has been smoking since she was 12 years old.  - Medication noncompliance     Primary cardiologist None on file. Initial consult, Dr. Smith     Plan:     Addendum: Independently seen and evaluated.  Agree with below.  Her echo is pending at this time.  Her ECG  dyslipidemia, and smoking/ tobacco exposure      Review of Symptoms:  Except as stated above include:  Constitutional:  negative  Respiratory:  negative  Cardiovascular:  negative  Gastrointestinal: negative  Genitourinary:  negative  Musculoskeletal:  Negative  Neurological:  Negative  Dermatological:  Negative  Endocrinological: Negative  Psychological:  Negative    Pertinent items are noted in HPI.     Past Medical History:     Past Medical History:   Diagnosis Date    Bladder infection     Depression     Diabetes (HCC)     Difficulty walking     DM (diabetes mellitus) (HCC) 6/22/2012    Ill-defined condition     leaking bladder;high cholesterol    Proteinuria 10/1/2019    RX  lisinopril for proteinuria    Puncture wound of foot, left, complicated 9/30/2019         Social History:     Social History     Socioeconomic History    Marital status:    Tobacco Use    Smoking status: Every Day     Current packs/day: 2.00     Types: Cigarettes     Passive exposure: Never    Smokeless tobacco: Never   Substance and Sexual Activity    Alcohol use: No    Drug use: No   Social History Narrative    ** Merged History Encounter **          Social Determinants of Health     Financial Resource Strain: Medium Risk (3/2/2023)    Overall Financial Resource Strain (CARDIA)     Difficulty of Paying Living Expenses: Somewhat hard   Food Insecurity:   Recent Concern: Food Insecurity - Food Insecurity Present (3/2/2023)    Hunger Vital Sign     Worried About Running Out of Food in the Last Year: Sometimes true     Ran Out of Food in the Last Year: Sometimes true   Transportation Needs: Unknown (3/2/2023)    PRAPARE - Transportation     Lack of Transportation (Non-Medical): No   Housing Stability: Unknown (3/2/2023)    Housing Stability Vital Sign     Unstable Housing in the Last Year: No        Family History:     Family History   Problem Relation Age of Onset    Heart Attack Father         had 3 hear attacks    Diabetes Mother

## 2024-01-20 PROBLEM — I10 HYPERTENSION: Status: ACTIVE | Noted: 2024-01-20

## 2024-01-20 PROBLEM — I50.31 ACUTE HEART FAILURE WITH PRESERVED EJECTION FRACTION (HCC): Status: ACTIVE | Noted: 2024-01-20

## 2024-01-20 PROBLEM — I21.3 STEMI (ST ELEVATION MYOCARDIAL INFARCTION) (HCC): Status: ACTIVE | Noted: 2024-01-20

## 2024-01-20 PROBLEM — I21.4 NSTEMI (NON-ST ELEVATED MYOCARDIAL INFARCTION) (HCC): Status: ACTIVE | Noted: 2024-01-20

## 2024-01-20 LAB
ALBUMIN SERPL-MCNC: 2.7 G/DL (ref 3.4–5)
ALBUMIN/GLOB SERPL: 0.7 (ref 0.8–1.7)
ALP SERPL-CCNC: 110 U/L (ref 45–117)
ALT SERPL-CCNC: 12 U/L (ref 13–56)
ANION GAP SERPL CALC-SCNC: 6 MMOL/L (ref 3–18)
APTT PPP: 28.3 SEC (ref 23–36.4)
APTT PPP: 55.4 SEC (ref 23–36.4)
APTT PPP: 75.3 SEC (ref 23–36.4)
AST SERPL-CCNC: 39 U/L (ref 10–38)
BASOPHILS # BLD: 0 K/UL (ref 0–0.1)
BASOPHILS NFR BLD: 0 % (ref 0–2)
BILIRUB SERPL-MCNC: 0.8 MG/DL (ref 0.2–1)
BUN SERPL-MCNC: 18 MG/DL (ref 7–18)
BUN/CREAT SERPL: 13 (ref 12–20)
CALCIUM SERPL-MCNC: 8.6 MG/DL (ref 8.5–10.1)
CHLORIDE SERPL-SCNC: 105 MMOL/L (ref 100–111)
CO2 SERPL-SCNC: 24 MMOL/L (ref 21–32)
CREAT SERPL-MCNC: 1.36 MG/DL (ref 0.6–1.3)
DIFFERENTIAL METHOD BLD: ABNORMAL
ECHO BSA: 1.78 M2
EKG ATRIAL RATE: 117 BPM
EKG ATRIAL RATE: 119 BPM
EKG ATRIAL RATE: 81 BPM
EKG DIAGNOSIS: NORMAL
EKG P AXIS: 63 DEGREES
EKG P AXIS: 65 DEGREES
EKG P AXIS: 72 DEGREES
EKG P-R INTERVAL: 160 MS
EKG P-R INTERVAL: 172 MS
EKG P-R INTERVAL: 174 MS
EKG Q-T INTERVAL: 326 MS
EKG Q-T INTERVAL: 380 MS
EKG Q-T INTERVAL: 446 MS
EKG QRS DURATION: 82 MS
EKG QRS DURATION: 84 MS
EKG QRS DURATION: 96 MS
EKG QTC CALCULATION (BAZETT): 454 MS
EKG QTC CALCULATION (BAZETT): 518 MS
EKG QTC CALCULATION (BAZETT): 534 MS
EKG R AXIS: 63 DEGREES
EKG R AXIS: 66 DEGREES
EKG R AXIS: 68 DEGREES
EKG T AXIS: 138 DEGREES
EKG T AXIS: 65 DEGREES
EKG T AXIS: 88 DEGREES
EKG VENTRICULAR RATE: 117 BPM
EKG VENTRICULAR RATE: 119 BPM
EKG VENTRICULAR RATE: 81 BPM
EOSINOPHIL # BLD: 0 K/UL (ref 0–0.4)
EOSINOPHIL NFR BLD: 0 % (ref 0–5)
ERYTHROCYTE [DISTWIDTH] IN BLOOD BY AUTOMATED COUNT: 14.8 % (ref 11.6–14.5)
GLOBULIN SER CALC-MCNC: 3.9 G/DL (ref 2–4)
GLUCOSE BLD STRIP.AUTO-MCNC: 126 MG/DL (ref 70–110)
GLUCOSE BLD STRIP.AUTO-MCNC: 249 MG/DL (ref 70–110)
GLUCOSE BLD STRIP.AUTO-MCNC: 73 MG/DL (ref 70–110)
GLUCOSE SERPL-MCNC: 252 MG/DL (ref 74–99)
HCT VFR BLD AUTO: 34.3 % (ref 35–45)
HGB BLD-MCNC: 11.4 G/DL (ref 12–16)
IMM GRANULOCYTES # BLD AUTO: 0.1 K/UL (ref 0–0.04)
IMM GRANULOCYTES NFR BLD AUTO: 1 % (ref 0–0.5)
LYMPHOCYTES # BLD: 1.5 K/UL (ref 0.9–3.6)
LYMPHOCYTES NFR BLD: 11 % (ref 21–52)
MCH RBC QN AUTO: 29.8 PG (ref 24–34)
MCHC RBC AUTO-ENTMCNC: 33.2 G/DL (ref 31–37)
MCV RBC AUTO: 89.8 FL (ref 78–100)
MONOCYTES # BLD: 1 K/UL (ref 0.05–1.2)
MONOCYTES NFR BLD: 8 % (ref 3–10)
NEUTS SEG # BLD: 10.4 K/UL (ref 1.8–8)
NEUTS SEG NFR BLD: 80 % (ref 40–73)
NRBC # BLD: 0 K/UL (ref 0–0.01)
NRBC BLD-RTO: 0 PER 100 WBC
PLATELET # BLD AUTO: 413 K/UL (ref 135–420)
PMV BLD AUTO: 12.7 FL (ref 9.2–11.8)
POTASSIUM SERPL-SCNC: 4 MMOL/L (ref 3.5–5.5)
PROT SERPL-MCNC: 6.6 G/DL (ref 6.4–8.2)
RBC # BLD AUTO: 3.82 M/UL (ref 4.2–5.3)
SODIUM SERPL-SCNC: 135 MMOL/L (ref 136–145)
WBC # BLD AUTO: 12.9 K/UL (ref 4.6–13.2)

## 2024-01-20 PROCEDURE — B2111ZZ FLUOROSCOPY OF MULTIPLE CORONARY ARTERIES USING LOW OSMOLAR CONTRAST: ICD-10-PCS | Performed by: INTERNAL MEDICINE

## 2024-01-20 PROCEDURE — 6360000002 HC RX W HCPCS: Performed by: INTERNAL MEDICINE

## 2024-01-20 PROCEDURE — 6370000000 HC RX 637 (ALT 250 FOR IP): Performed by: INTERNAL MEDICINE

## 2024-01-20 PROCEDURE — 85730 THROMBOPLASTIN TIME PARTIAL: CPT

## 2024-01-20 PROCEDURE — 2500000003 HC RX 250 WO HCPCS: Performed by: INTERNAL MEDICINE

## 2024-01-20 PROCEDURE — 6370000000 HC RX 637 (ALT 250 FOR IP)

## 2024-01-20 PROCEDURE — 2709999900 HC NON-CHARGEABLE SUPPLY: Performed by: INTERNAL MEDICINE

## 2024-01-20 PROCEDURE — 6360000004 HC RX CONTRAST MEDICATION: Performed by: INTERNAL MEDICINE

## 2024-01-20 PROCEDURE — 76000 FLUOROSCOPY <1 HR PHYS/QHP: CPT | Performed by: INTERNAL MEDICINE

## 2024-01-20 PROCEDURE — 93458 L HRT ARTERY/VENTRICLE ANGIO: CPT | Performed by: INTERNAL MEDICINE

## 2024-01-20 PROCEDURE — 36415 COLL VENOUS BLD VENIPUNCTURE: CPT

## 2024-01-20 PROCEDURE — C1769 GUIDE WIRE: HCPCS | Performed by: INTERNAL MEDICINE

## 2024-01-20 PROCEDURE — 2580000003 HC RX 258: Performed by: INTERNAL MEDICINE

## 2024-01-20 PROCEDURE — 93010 ELECTROCARDIOGRAM REPORT: CPT | Performed by: INTERNAL MEDICINE

## 2024-01-20 PROCEDURE — 99222 1ST HOSP IP/OBS MODERATE 55: CPT | Performed by: INTERNAL MEDICINE

## 2024-01-20 PROCEDURE — 99152 MOD SED SAME PHYS/QHP 5/>YRS: CPT | Performed by: INTERNAL MEDICINE

## 2024-01-20 PROCEDURE — 4A023N7 MEASUREMENT OF CARDIAC SAMPLING AND PRESSURE, LEFT HEART, PERCUTANEOUS APPROACH: ICD-10-PCS | Performed by: INTERNAL MEDICINE

## 2024-01-20 PROCEDURE — 85025 COMPLETE CBC W/AUTO DIFF WBC: CPT

## 2024-01-20 PROCEDURE — 80053 COMPREHEN METABOLIC PANEL: CPT

## 2024-01-20 PROCEDURE — 99232 SBSQ HOSP IP/OBS MODERATE 35: CPT | Performed by: INTERNAL MEDICINE

## 2024-01-20 PROCEDURE — 82962 GLUCOSE BLOOD TEST: CPT

## 2024-01-20 PROCEDURE — 93005 ELECTROCARDIOGRAM TRACING: CPT | Performed by: INTERNAL MEDICINE

## 2024-01-20 PROCEDURE — 1100000000 HC RM PRIVATE

## 2024-01-20 PROCEDURE — 6360000002 HC RX W HCPCS

## 2024-01-20 PROCEDURE — C1713 ANCHOR/SCREW BN/BN,TIS/BN: HCPCS | Performed by: INTERNAL MEDICINE

## 2024-01-20 RX ORDER — SODIUM CHLORIDE 0.9 % (FLUSH) 0.9 %
5-40 SYRINGE (ML) INJECTION PRN
OUTPATIENT
Start: 2024-01-20

## 2024-01-20 RX ORDER — FENTANYL CITRATE 50 UG/ML
INJECTION, SOLUTION INTRAMUSCULAR; INTRAVENOUS PRN
Status: DISCONTINUED | OUTPATIENT
Start: 2024-01-20 | End: 2024-01-20 | Stop reason: HOSPADM

## 2024-01-20 RX ORDER — HEPARIN SODIUM 1000 [USP'U]/ML
INJECTION, SOLUTION INTRAVENOUS; SUBCUTANEOUS PRN
Status: DISCONTINUED | OUTPATIENT
Start: 2024-01-20 | End: 2024-01-20 | Stop reason: HOSPADM

## 2024-01-20 RX ORDER — DEXTROSE MONOHYDRATE 100 MG/ML
INJECTION, SOLUTION INTRAVENOUS CONTINUOUS PRN
Status: DISCONTINUED | OUTPATIENT
Start: 2024-01-20 | End: 2024-01-26 | Stop reason: HOSPADM

## 2024-01-20 RX ORDER — HYDRALAZINE HYDROCHLORIDE 20 MG/ML
5 INJECTION INTRAMUSCULAR; INTRAVENOUS EVERY 6 HOURS PRN
Status: DISCONTINUED | OUTPATIENT
Start: 2024-01-20 | End: 2024-01-26

## 2024-01-20 RX ORDER — DIPHENHYDRAMINE HCL 25 MG
25 CAPSULE ORAL EVERY 6 HOURS PRN
Status: DISCONTINUED | OUTPATIENT
Start: 2024-01-20 | End: 2024-01-26 | Stop reason: HOSPADM

## 2024-01-20 RX ORDER — ACETAMINOPHEN 325 MG/1
650 TABLET ORAL EVERY 4 HOURS PRN
OUTPATIENT
Start: 2024-01-20

## 2024-01-20 RX ORDER — SODIUM CHLORIDE 9 MG/ML
INJECTION, SOLUTION INTRAVENOUS CONTINUOUS
Status: DISPENSED | OUTPATIENT
Start: 2024-01-20 | End: 2024-01-20

## 2024-01-20 RX ORDER — SODIUM CHLORIDE 0.9 % (FLUSH) 0.9 %
5-40 SYRINGE (ML) INJECTION EVERY 12 HOURS SCHEDULED
OUTPATIENT
Start: 2024-01-20

## 2024-01-20 RX ORDER — ASPIRIN 81 MG/1
TABLET, CHEWABLE ORAL PRN
Status: DISCONTINUED | OUTPATIENT
Start: 2024-01-20 | End: 2024-01-20 | Stop reason: HOSPADM

## 2024-01-20 RX ORDER — MIDAZOLAM HYDROCHLORIDE 1 MG/ML
INJECTION INTRAMUSCULAR; INTRAVENOUS PRN
Status: DISCONTINUED | OUTPATIENT
Start: 2024-01-20 | End: 2024-01-20 | Stop reason: HOSPADM

## 2024-01-20 RX ORDER — SODIUM CHLORIDE 9 MG/ML
INJECTION, SOLUTION INTRAVENOUS PRN
OUTPATIENT
Start: 2024-01-20

## 2024-01-20 RX ADMIN — SODIUM CHLORIDE: 9 INJECTION, SOLUTION INTRAVENOUS at 13:41

## 2024-01-20 RX ADMIN — HEPARIN SODIUM 4000 UNITS: 1000 INJECTION INTRAVENOUS; SUBCUTANEOUS at 17:39

## 2024-01-20 RX ADMIN — SODIUM CHLORIDE, PRESERVATIVE FREE 10 ML: 5 INJECTION INTRAVENOUS at 18:00

## 2024-01-20 RX ADMIN — GABAPENTIN 300 MG: 300 CAPSULE ORAL at 22:30

## 2024-01-20 RX ADMIN — DOXYCYCLINE 100 MG: 100 INJECTION, POWDER, LYOPHILIZED, FOR SOLUTION INTRAVENOUS at 00:06

## 2024-01-20 RX ADMIN — INSULIN LISPRO 2 UNITS: 100 INJECTION, SOLUTION INTRAVENOUS; SUBCUTANEOUS at 18:15

## 2024-01-20 RX ADMIN — METOPROLOL TARTRATE 12.5 MG: 25 TABLET, FILM COATED ORAL at 22:30

## 2024-01-20 RX ADMIN — DIPHENHYDRAMINE HYDROCHLORIDE 25 MG: 25 CAPSULE ORAL at 14:15

## 2024-01-20 RX ADMIN — SODIUM CHLORIDE, PRESERVATIVE FREE 10 ML: 5 INJECTION INTRAVENOUS at 22:31

## 2024-01-20 RX ADMIN — HEPARIN SODIUM 16 UNITS/KG/HR: 10000 INJECTION, SOLUTION INTRAVENOUS at 17:38

## 2024-01-20 RX ADMIN — FUROSEMIDE 40 MG: 10 INJECTION, SOLUTION INTRAMUSCULAR; INTRAVENOUS at 18:02

## 2024-01-20 RX ADMIN — DOXYCYCLINE 100 MG: 100 INJECTION, POWDER, LYOPHILIZED, FOR SOLUTION INTRAVENOUS at 13:15

## 2024-01-20 RX ADMIN — INSULIN GLARGINE 20 UNITS: 100 INJECTION, SOLUTION SUBCUTANEOUS at 22:30

## 2024-01-20 RX ADMIN — HEPARIN SODIUM 4000 UNITS: 1000 INJECTION INTRAVENOUS; SUBCUTANEOUS at 03:57

## 2024-01-20 RX ADMIN — GABAPENTIN 300 MG: 300 CAPSULE ORAL at 13:05

## 2024-01-20 ASSESSMENT — PAIN SCALES - GENERAL
PAINLEVEL_OUTOF10: 0
PAINLEVEL_OUTOF10: 0

## 2024-01-20 NOTE — CARE COORDINATION
01/20/24 1428   Service Assessment   Patient Orientation Alert and Oriented   Cognition Alert   History Provided By Patient   Primary Caregiver Self   Support Systems Children;Family Members;/  (per pt, she has 3 brothers and a niece)   Patient's Healthcare Decision Maker is: Legal Next of Kin   PCP Verified by CM Yes   Last Visit to PCP Within last 3 months   Prior Functional Level Independent in ADLs/IADLs   Current Functional Level Independent in ADLs/IADLs   Can patient return to prior living arrangement Unknown at present   Ability to make needs known: Fair   Family able to assist with home care needs: Yes   Financial Resources Medicaid   Social/Functional History   Lives With Alone   Type of Home Apartment   Home Layout Multi-level   Home Access Stairs to enter with rails   Bathroom Shower/Tub Walk-in shower   Bathroom Equipment Grab bars around toilet   Bathroom Accessibility Accessible   Home Equipment Cane   Receives Help From Family;Other (comment)   ADL Assistance Independent   Ambulation Assistance Independent   Transfer Assistance Independent   Active  No   Patient's  Info patient's brother Kar Diaz   Mode of Transportation Car   Occupation On disability   Discharge Planning   Type of Residence Apartment   Living Arrangements Alone   Potential Assistance Needed Home Care;Durable Medical Equipment;Other (Comment)  (acute rehab)   Potential DME Needed Walker   DME Ordered? No   Type of Home Care Services Aide Services;OT;PT;Nursing Services   Patient expects to be discharged to: Rehabilitation facility   Services At/After Discharge   Transition of Care Consult (CM Consult) Acute Rehab   Services At/After Discharge Inpatient rehab    Resource Information Provided? No   Mode of Transport at Discharge Other (see comment)  (family)   Confirm Follow Up Transport Family   Condition of Participation: Discharge Planning   The Plan for Transition of Care is  ALYSSA Goddard  Case Management Department

## 2024-01-20 NOTE — PROGRESS NOTES
Cardiology Progress Note    Admit Date: 1/19/2024  Attending Cardiologist: Dr. Clinton    IMPRESSION  Acute Hypoxic Respiratory failure, multifactorial including CHF decompensation, COPD  Acute heart failure preserved ejection, as observed by elevated NT proBNP 2230, chest x-ray with mild edema on 1/19/2024  Acute Coronary syndrome with dynamic EKG changes, anterior ST elevations present on EKG from 1/19/2024 2200  Coronary risk equivalent DM  Coronary risk equivalent CKD  HTN - normotensive to stage II pressure  HLD  Tobacco Abuse    PLAN  Monitor fluid status, adjust as necessary, fluid restrication 2L/day, salt intake <2g per day.  On Lasix 40 mg IV twice daily  Recommend Guideline Directed medical therapy as can tolerate.  Monitor blood pressure, adjust antihypertensive medications as necessary.  On lisinopril 20 mg p.o. daily, metoprolol tartrate 12.5 mg p.o. twice daily  Statin if can tolerate prior to discharge.  On pravastatin 40 mg p.o. daily  Continue with aspirin 81 mg p.o. daily, heparin GTT  The benefits and risks of cardiac catheterization have been discussed in detail with the patient or healthcare power of . Patient understands  risk of potential cath complications including but not limited to bleeding, infection, dialysis or renal function decline, difficulty healing the arteriotomy access site which may require surgical repair, potential thromboembolic complications which could result in stroke, myocardial infarction, vascular injury, loss of limb or organ function and/or death and potential allergic reaction to contrast dye or other medication used during the procedure. Patient is also aware of the therapeutic implications for medical management vs coronary artery bypass surgery vs percutaneous coronary intervention in treatment of coronary artery disease. The additional risks for percutaneous coronary artery intervention include the need for emergent bypass surgery  and for restenosis for  plain balloon angioplasty, for bare metal stent, and for drug eluting stent implants. The need for mandatory uninterrupted dual antiplatelet therapy with lifelong Aspirin combined with Plavix or similar for up to 12 months following drug eluting stents, and minimum 1 month following bare metal stents to prevent stent thrombosis which is the equivalent of acute heart attack has been reviewed in detail.  Patient or healthcare power of  verbalized understanding and all questions were answered.  Repeat EKG    Thank you for this consultation and for allowing us to participate in this patient's care.    Subjective:     Denies chest pain  States improved shortness of breath  Denies abdominal pain    Objective:      Patient Vitals for the past 8 hrs:   Temp Pulse Resp BP SpO2   01/20/24 0530 98 °F (36.7 °C) 78 13 100/67 96 %   01/20/24 0500 -- 79 -- -- --   01/20/24 0300 -- 88 -- -- --   01/20/24 0100 -- 85 -- -- --   01/20/24 0000 98.3 °F (36.8 °C) 83 16 (!) 144/77 100 %         Patient Vitals for the past 96 hrs:   Weight   01/19/24 0813 71.2 kg (157 lb)           Intake/Output Summary (Last 24 hours) at 1/20/2024 0752  Last data filed at 1/20/2024 0611  Gross per 24 hour   Intake 100 ml   Output 600 ml   Net -500 ml       EXAMINATION:  General:  Alert, cooperative, no distress  Head:  Normocephalic, without obvious abnormality, atraumatic.  Eyes:  Conjunctivae/corneas clear  Lungs:   Clear to auscultation bilaterally, no wheezes, no rales, no rhonchi  Heart:  Regular rate and rhythm, S1, S2 normal, no murmur, click, rub or gallop.  Abdomen:   Soft, non-tender. Bowel sounds normal.   Extremities: Extremities normal, trace to 1+ edema lower extremities bilaterally  Skin:  No rashes or lesions visible extremities  Neurologic:  Normal strength, sensation      Visit Vitals  /67   Pulse 78   Temp 98 °F (36.7 °C) (Oral)   Resp 13   Ht 1.6 m (5' 3\")   Wt 71.2 kg (157 lb)   SpO2 96%   BMI 27.81 kg/m²       Data

## 2024-01-20 NOTE — PROGRESS NOTES
[facility] Hospitalist Group  Progress Note    Patient: Adrienne Allen Age: 63 y.o. : 1960 MR#: [id number] SSN: [id number]  Date/Time: 2024     C/C: SOB       Subjective:   HPI : Patient with past medical history of some developmental delay, heavy smoker, admitted with increasing shortness of breath found to have congestive heart failure picture, initial troponin mildly elevated later on which showed significant increase suggestive of acute ACS, cardiology consulted, initially patient was unable to lie flat because of her orthopnea, after diuresis patient felt comfortable patient did not have any chest pain or no other complaints during this time.  Patient received cath s/p cath multiple vessel disease requiring CABG .  CT surgery was informed by cardiologist.    Patient also has a history of diabetes mellitus type 2 uncontrolled secondary to some noncompliance to medications.          Review of Systems:  positive responses in bold type   Constitutional: Negative for fever, chills, diaphoresis and unexpected weight change.   HENT: Negative for ear pain, congestion, sore throat, rhinorrhea, drooling, trouble swallowing, neck pain and tinnitus.   Eyes: Negative for photophobia, pain, redness and visual disturbance.   Respiratory: negative for shortness of breath, cough, choking, chest tightness, wheezing or stridor.   Cardiovascular: Negative for chest pain, palpitations and leg swelling.   Gastrointestinal: Negative for nausea, vomiting, abdominal pain, diarrhea, constipation, blood in stool, abdominal distention and anal bleeding.   Genitourinary: Negative for dysuria, urgency, frequency, hematuria, flank pain and difficulty urinating.   Musculoskeletal: Negative for back pain and arthralgias.   Skin: Negative for color change, rash and wound.   Neurological: Negative for dizziness, seizures, syncope, speech difficulty, light-headedness or headaches.   Hematological: Does

## 2024-01-20 NOTE — PROGRESS NOTES
1402: TRANSFER - IN REPORT:    Verbal report received from ALYSSA Wilson on Adrienne Allen  being received from Emergency Department for routine progression of patient care      Report consisted of patient's Situation, Background, Assessment and   Recommendations(SBAR).     Information from the following report(s) Nurse Handoff Report, Index, Adult Overview, and Recent Results was reviewed with the receiving nurse.    Opportunity for questions and clarification was provided.      Assessment completed upon patient's arrival to unit and care assumed.

## 2024-01-20 NOTE — FLOWSHEET NOTE
01/19/24 1921   Treatment Team Notification   Reason for Communication Critical results   Type of Critical Result Laboratory   Critical Lab Information Troponin 4299   Person Result Received From [facility], Lab   Critical Lab Result Type Troponin   Name of Team Member Notified Dr. Barlow   Treatment Team Role Attending Provider   Method of Communication Page   Response Waiting for response   Notification Time 1921 1921: Primary RN, Dorota, informed.

## 2024-01-20 NOTE — CONSULTS
[facility]  -  Consult  1/20/2024    Adrienne Allen is a 63 y.o. female who is being seen on consult for severe MV CAD, at  Dr. Clinton's request.    Assessment:   STEMI-CAD severe MV  CHF- BNP 2300  Acute on CKD  DM II uncontrolled  HLD  Chronic spinal pain  Developmental delay  Tobacco abuse  BMI Readings from Last 2 Encounters:   01/19/24 27.81 kg/m²   12/06/23 21.61 kg/m²       Patient Active Problem List    Diagnosis Date Noted    STEMI (ST elevation myocardial infarction) ([facility]) 01/20/2024    Hypertension 01/20/2024    NSTEMI (non-ST elevated myocardial infarction) ([facility]) 01/20/2024    Acute heart failure with preserved ejection fraction ([facility]) 01/20/2024    CHF (congestive heart failure), NYHA class I, acute on chronic, combined ([facility]) 01/19/2024    Respiratory failure ([facility]) 01/19/2024    Acute on chronic renal insufficiency 10/01/2019    Proteinuria 10/01/2019    Hyponatremia 09/30/2019    Puncture wound of foot, left, complicated 09/30/2019    Microalbuminuria 10/24/2018    Type 2 diabetes with nephropathy ([facility]) 04/12/2018    Type 2 diabetes mellitus with diabetic neuropathy ([facility]) 04/10/2018    Encounter for long-term (current) use of other medications     Midline low back pain without sciatica 01/21/2016    DM (diabetes mellitus), type 2, uncontrolled 01/21/2016    Vaginitis, atrophic 05/03/2015    Hyperlipidemia 05/09/2014    Hip pain 06/23/2012    Sacroiliac joint pain 08/11/2011    Depression 08/11/2011    Lumbago     Chronic pain syndrome        Plan:     Would benefit from CABG  Will  discontinue lisinopril to avoid perioperative vasoplegic syndrome.  Will also need the following tests to complete the workup and risk stratification.    Type and Cross 2 units PRBC and 1 unit Platelet  Lipid panel  Coagulation profile / INR  Liver function tests  HbA1c  Chest CT scan  Echocardiogram  Carotid duplex scanning  Room air arterial blood gas   Amiodarone for Atrial Fibrillation

## 2024-01-20 NOTE — PROGRESS NOTES
Occupational Therapy  Orders received, chart reviewed and this patient is currently in cath lab. Will follow up as she is available and as appropriate for this patient. Keysha Colby, MEGGANR/L

## 2024-01-20 NOTE — PROGRESS NOTES
Patient arrived from the cath lab at approximately 115 and the charge nurse Kathie started her admission.    Supervisor was called because this unit does not accept patients with TR Band on due to the fact most nurses have not been educated on TR Band.    I am a travel telemetry nurse and Daniel is an ICU nurse so we assumed care for the TR Band post cath lab.    It was reported patient had 11 ml left in TR Band.    Assessment showed bruising at site of band with no gauze or pressure dressing underneath TR Band. Bruising located from right ulnar and right radial volnar side of Right arm all the way up the anterior view of right arm to the RAC. No hematoma noted.  Pt has a wrist stablizer on to prevent movement. Pt was educated on the proper placement of right arm and not to move or lift arm.

## 2024-01-20 NOTE — CARE COORDINATION
Chart reviewed.  Plan for CABG next week  Pt has history of developmental delay.  Met with pt.  She stated she lives alone on the 2nd floor of an apartment building and able to care for herself.  She stated she has counselors that check up on her and one of them Margot [phone] takes her to her doctors appointments.  She could not tell CM if the counselors are with the [facility] or private organizations.   Informed her CM will call Margot and she stated CM cannot get Margot on weekends until Monday.  She stated she has 3 brothers and one of them Kar Diaz comes to take her for grocery shopping and assists her with whatever she needs.  She stated her daughter Rosa Allen has 6 children so Rosa does not have time to check up on her.  She stated her son Pratik used to help her but now he is in half-way.  Informed pt that when she is ready for discharged home, she will need family support or caregiver at home.    CM will call pt's counselor Margot on Monday.            Denisa Goddard, TRISTONN RN  Care Management

## 2024-01-21 ENCOUNTER — APPOINTMENT (OUTPATIENT)
Facility: HOSPITAL | Age: 64
DRG: 174 | End: 2024-01-21
Payer: MEDICAID

## 2024-01-21 ENCOUNTER — APPOINTMENT (OUTPATIENT)
Facility: HOSPITAL | Age: 64
DRG: 174 | End: 2024-01-21
Attending: INTERNAL MEDICINE
Payer: MEDICAID

## 2024-01-21 LAB
APPEARANCE UR: CLEAR
APTT PPP: 115.6 SEC (ref 23–36.4)
APTT PPP: 61.2 SEC (ref 23–36.4)
APTT PPP: 62.3 SEC (ref 23–36.4)
ARTERIAL PATENCY WRIST A: POSITIVE
BACTERIA URNS QL MICRO: ABNORMAL /HPF
BASE EXCESS BLD CALC-SCNC: 2.1 MMOL/L
BDY SITE: ABNORMAL
BILIRUB UR QL: NEGATIVE
BODY TEMPERATURE: 98.6
CHOLEST SERPL-MCNC: 161 MG/DL
COLOR UR: YELLOW
ECHO BSA: 1.78 M2
ECHO BSA: 1.78 M2
EPITH CASTS URNS QL MICRO: ABNORMAL /LPF (ref 0–5)
ERYTHROCYTE [DISTWIDTH] IN BLOOD BY AUTOMATED COUNT: 14.7 % (ref 11.6–14.5)
EST. AVERAGE GLUCOSE BLD GHB EST-MCNC: 338 MG/DL
GAS FLOW.O2 O2 DELIVERY SYS: ABNORMAL
GLUCOSE BLD STRIP.AUTO-MCNC: 109 MG/DL (ref 70–110)
GLUCOSE BLD STRIP.AUTO-MCNC: 185 MG/DL (ref 70–110)
GLUCOSE BLD STRIP.AUTO-MCNC: 322 MG/DL (ref 70–110)
GLUCOSE BLD STRIP.AUTO-MCNC: 343 MG/DL (ref 70–110)
GLUCOSE UR STRIP.AUTO-MCNC: NEGATIVE MG/DL
HBA1C MFR BLD: 13.4 % (ref 4.2–5.6)
HCO3 BLD-SCNC: 26.1 MMOL/L (ref 22–26)
HCT VFR BLD AUTO: 35.1 % (ref 35–45)
HDLC SERPL-MCNC: 56 MG/DL (ref 40–60)
HDLC SERPL: 2.9 (ref 0–5)
HGB BLD-MCNC: 11.4 G/DL (ref 12–16)
HGB UR QL STRIP: NEGATIVE
INR PPP: 0.9 (ref 0.9–1.1)
KETONES UR QL STRIP.AUTO: NEGATIVE MG/DL
LDLC SERPL CALC-MCNC: 88.2 MG/DL (ref 0–100)
LEUKOCYTE ESTERASE UR QL STRIP.AUTO: ABNORMAL
LIPID PANEL: NORMAL
MCH RBC QN AUTO: 29.2 PG (ref 24–34)
MCHC RBC AUTO-ENTMCNC: 32.5 G/DL (ref 31–37)
MCV RBC AUTO: 89.8 FL (ref 78–100)
NITRITE UR QL STRIP.AUTO: NEGATIVE
NRBC # BLD: 0 K/UL (ref 0–0.01)
NRBC BLD-RTO: 0 PER 100 WBC
O2/TOTAL GAS SETTING VFR VENT: 21 %
PCO2 BLD: 37.9 MMHG (ref 35–45)
PH BLD: 7.45 (ref 7.35–7.45)
PH UR STRIP: 5.5 (ref 5–8)
PLATELET # BLD AUTO: 416 K/UL (ref 135–420)
PMV BLD AUTO: 11.7 FL (ref 9.2–11.8)
PO2 BLD: 75 MMHG (ref 80–100)
PROT UR STRIP-MCNC: NEGATIVE MG/DL
PROTHROMBIN TIME: 12.6 SEC (ref 11.9–14.7)
RBC # BLD AUTO: 3.91 M/UL (ref 4.2–5.3)
RBC #/AREA URNS HPF: ABNORMAL /HPF (ref 0–5)
RESPIRATORY RATE, POC: 14 (ref 5–40)
SAO2 % BLD: 95.5 % (ref 92–97)
SERVICE CMNT-IMP: ABNORMAL
SP GR UR REFRACTOMETRY: 1.01 (ref 1–1.03)
SPECIMEN TYPE: ABNORMAL
T4 FREE SERPL-MCNC: 0.9 NG/DL (ref 0.7–1.5)
TRIGL SERPL-MCNC: 84 MG/DL
TSH SERPL DL<=0.05 MIU/L-ACNC: 1.82 UIU/ML (ref 0.36–3.74)
UROBILINOGEN UR QL STRIP.AUTO: 0.2 EU/DL (ref 0.2–1)
VAS LEFT BULB EDV: 12.4 CM/S
VAS LEFT BULB PSV: 58.6 CM/S
VAS LEFT CCA DIST EDV: 16.8 CM/S
VAS LEFT CCA DIST PSV: 82.7 CM/S
VAS LEFT CCA MID EDV: 18.24 CM/S
VAS LEFT CCA MID PSV: 81.97 CM/S
VAS LEFT CCA PROX EDV: 19 CM/S
VAS LEFT CCA PROX PSV: 83.9 CM/S
VAS LEFT CFV DIAM: 80 MM
VAS LEFT ECA EDV: 0 CM/S
VAS LEFT ECA PSV: 68.8 CM/S
VAS LEFT FV DIST DIAM: 3.8 MM
VAS LEFT FV MID DIAM: 6 MM
VAS LEFT FV PROX DIAM: 5 MM
VAS LEFT GSV ANKLE DIAM: 2.1 MM
VAS LEFT GSV AT KNEE DIAM: 2.7 MM
VAS LEFT GSV BK DIST DIAM: 2.3 MM
VAS LEFT GSV BK MID DIAM: 2.5 MM
VAS LEFT GSV BK PROX DIAM: 2.7 MM
VAS LEFT GSV JUNC DIAM: 5 MM
VAS LEFT GSV THIGH DIST DIAM: 2.7 MM
VAS LEFT GSV THIGH MID DIAM: 3.6 MM
VAS LEFT GSV THIGH PROX DIAM: 3.7 MM
VAS LEFT ICA DIST EDV: 25.6 CM/S
VAS LEFT ICA DIST PSV: 72.7 CM/S
VAS LEFT ICA MID EDV: 23 CM/S
VAS LEFT ICA MID PSV: 61.4 CM/S
VAS LEFT ICA PROX EDV: 23.7 CM/S
VAS LEFT ICA PROX PSV: 66.6 CM/S
VAS LEFT ICA/CCA PSV: 0.88 NO UNITS
VAS LEFT POPLITEAL VEIN DIAM: 6.5 MM
VAS LEFT SSV DIST DIAM: 3 MM
VAS LEFT SSV JUNCTION DIAM: 2.4 MM
VAS LEFT SSV MID DIAM: 2.5 MM
VAS LEFT SSV PROX DIAM: 2.1 MM
VAS LEFT VERTEBRAL EDV: 13.02 CM/S
VAS LEFT VERTEBRAL PSV: 40.8 CM/S
VAS RIGHT BULB EDV: 14.5 CM/S
VAS RIGHT BULB PSV: 61.6 CM/S
VAS RIGHT CCA DIST EDV: 13 CM/S
VAS RIGHT CCA DIST PSV: 49.6 CM/S
VAS RIGHT CCA MID EDV: 14.22 CM/S
VAS RIGHT CCA MID PSV: 63.78 CM/S
VAS RIGHT CCA PROX EDV: 10.3 CM/S
VAS RIGHT CCA PROX PSV: 65.1 CM/S
VAS RIGHT CFV DIAM: 8.2 MM
VAS RIGHT ECA EDV: 0 CM/S
VAS RIGHT ECA PSV: 122.1 CM/S
VAS RIGHT FV DIST DIAM: 4.8 MM
VAS RIGHT FV MID DIAM: 6.4 MM
VAS RIGHT FV PROX DIAM: 5.7 MM
VAS RIGHT GSV ANKLE DIAM: 1.1 MM
VAS RIGHT GSV AT KNEE DIAM: 3.1 MM
VAS RIGHT GSV BK DIST DIAM: 1.6 MM
VAS RIGHT GSV BK MID DIAM: 1.5 MM
VAS RIGHT GSV BK PROX DIAM: 3 MM
VAS RIGHT GSV JUNC DIAM: 3.8 MM
VAS RIGHT GSV THIGH DIST DIAM: 1.9 MM
VAS RIGHT GSV THIGH MID DIAM: 3.1 MM
VAS RIGHT GSV THIGH PROX DIAM: 5.1 MM
VAS RIGHT ICA DIST EDV: 20.3 CM/S
VAS RIGHT ICA DIST PSV: 88.6 CM/S
VAS RIGHT ICA MID EDV: 26.3 CM/S
VAS RIGHT ICA MID PSV: 105.2 CM/S
VAS RIGHT ICA PROX EDV: 17.9 CM/S
VAS RIGHT ICA PROX PSV: 68.5 CM/S
VAS RIGHT ICA/CCA PSV: 2.1 NO UNITS
VAS RIGHT POPLITEAL VEIN DIAM: 5.1 MM
VAS RIGHT VERTEBRAL EDV: 7.69 CM/S
VAS RIGHT VERTEBRAL PSV: 32 CM/S
VLDLC SERPL CALC-MCNC: 16.8 MG/DL
WBC # BLD AUTO: 11.4 K/UL (ref 4.6–13.2)
WBC URNS QL MICRO: ABNORMAL /HPF (ref 0–4)

## 2024-01-21 PROCEDURE — 82803 BLOOD GASES ANY COMBINATION: CPT

## 2024-01-21 PROCEDURE — 2580000003 HC RX 258: Performed by: INTERNAL MEDICINE

## 2024-01-21 PROCEDURE — 97530 THERAPEUTIC ACTIVITIES: CPT

## 2024-01-21 PROCEDURE — 71250 CT THORAX DX C-: CPT

## 2024-01-21 PROCEDURE — 84439 ASSAY OF FREE THYROXINE: CPT

## 2024-01-21 PROCEDURE — 85027 COMPLETE CBC AUTOMATED: CPT

## 2024-01-21 PROCEDURE — 36415 COLL VENOUS BLD VENIPUNCTURE: CPT

## 2024-01-21 PROCEDURE — 6370000000 HC RX 637 (ALT 250 FOR IP)

## 2024-01-21 PROCEDURE — 80061 LIPID PANEL: CPT

## 2024-01-21 PROCEDURE — 6370000000 HC RX 637 (ALT 250 FOR IP): Performed by: INTERNAL MEDICINE

## 2024-01-21 PROCEDURE — 97165 OT EVAL LOW COMPLEX 30 MIN: CPT

## 2024-01-21 PROCEDURE — 93880 EXTRACRANIAL BILAT STUDY: CPT | Performed by: INTERNAL MEDICINE

## 2024-01-21 PROCEDURE — 6360000002 HC RX W HCPCS: Performed by: INTERNAL MEDICINE

## 2024-01-21 PROCEDURE — 99024 POSTOP FOLLOW-UP VISIT: CPT | Performed by: PHYSICIAN ASSISTANT

## 2024-01-21 PROCEDURE — 93970 EXTREMITY STUDY: CPT | Performed by: INTERNAL MEDICINE

## 2024-01-21 PROCEDURE — 93970 EXTREMITY STUDY: CPT

## 2024-01-21 PROCEDURE — 83036 HEMOGLOBIN GLYCOSYLATED A1C: CPT

## 2024-01-21 PROCEDURE — 94761 N-INVAS EAR/PLS OXIMETRY MLT: CPT

## 2024-01-21 PROCEDURE — 93880 EXTRACRANIAL BILAT STUDY: CPT

## 2024-01-21 PROCEDURE — 85730 THROMBOPLASTIN TIME PARTIAL: CPT

## 2024-01-21 PROCEDURE — 36600 WITHDRAWAL OF ARTERIAL BLOOD: CPT

## 2024-01-21 PROCEDURE — 82962 GLUCOSE BLOOD TEST: CPT

## 2024-01-21 PROCEDURE — 84443 ASSAY THYROID STIM HORMONE: CPT

## 2024-01-21 PROCEDURE — 81001 URINALYSIS AUTO W/SCOPE: CPT

## 2024-01-21 PROCEDURE — 6360000002 HC RX W HCPCS

## 2024-01-21 PROCEDURE — 85610 PROTHROMBIN TIME: CPT

## 2024-01-21 PROCEDURE — 99232 SBSQ HOSP IP/OBS MODERATE 35: CPT | Performed by: INTERNAL MEDICINE

## 2024-01-21 PROCEDURE — 1100000000 HC RM PRIVATE

## 2024-01-21 PROCEDURE — 71045 X-RAY EXAM CHEST 1 VIEW: CPT

## 2024-01-21 RX ADMIN — INSULIN LISPRO 6 UNITS: 100 INJECTION, SOLUTION INTRAVENOUS; SUBCUTANEOUS at 18:10

## 2024-01-21 RX ADMIN — GABAPENTIN 300 MG: 300 CAPSULE ORAL at 21:09

## 2024-01-21 RX ADMIN — GABAPENTIN 300 MG: 300 CAPSULE ORAL at 09:16

## 2024-01-21 RX ADMIN — SODIUM CHLORIDE, PRESERVATIVE FREE 10 ML: 5 INJECTION INTRAVENOUS at 09:19

## 2024-01-21 RX ADMIN — FUROSEMIDE 40 MG: 10 INJECTION, SOLUTION INTRAMUSCULAR; INTRAVENOUS at 18:10

## 2024-01-21 RX ADMIN — INSULIN LISPRO 4 UNITS: 100 INJECTION, SOLUTION INTRAVENOUS; SUBCUTANEOUS at 21:09

## 2024-01-21 RX ADMIN — METOPROLOL TARTRATE 12.5 MG: 25 TABLET, FILM COATED ORAL at 09:16

## 2024-01-21 RX ADMIN — FUROSEMIDE 40 MG: 10 INJECTION, SOLUTION INTRAMUSCULAR; INTRAVENOUS at 09:17

## 2024-01-21 RX ADMIN — HEPARIN SODIUM 4000 UNITS: 1000 INJECTION INTRAVENOUS; SUBCUTANEOUS at 19:17

## 2024-01-21 RX ADMIN — INSULIN GLARGINE 20 UNITS: 100 INJECTION, SOLUTION SUBCUTANEOUS at 21:09

## 2024-01-21 RX ADMIN — HEPARIN SODIUM 17 UNITS/KG/HR: 10000 INJECTION, SOLUTION INTRAVENOUS at 15:18

## 2024-01-21 RX ADMIN — GABAPENTIN 300 MG: 300 CAPSULE ORAL at 14:51

## 2024-01-21 RX ADMIN — SODIUM CHLORIDE, PRESERVATIVE FREE 10 ML: 5 INJECTION INTRAVENOUS at 21:10

## 2024-01-21 RX ADMIN — ASPIRIN 81 MG 81 MG: 81 TABLET ORAL at 09:16

## 2024-01-21 RX ADMIN — PRAVASTATIN SODIUM 40 MG: 20 TABLET ORAL at 09:16

## 2024-01-21 RX ADMIN — HEPARIN SODIUM 4000 UNITS: 1000 INJECTION INTRAVENOUS; SUBCUTANEOUS at 04:03

## 2024-01-21 RX ADMIN — INSULIN GLARGINE 20 UNITS: 100 INJECTION, SOLUTION SUBCUTANEOUS at 09:17

## 2024-01-21 ASSESSMENT — PAIN SCALES - GENERAL
PAINLEVEL_OUTOF10: 0

## 2024-01-21 NOTE — PLAN OF CARE
Problem: Discharge Planning  Goal: Discharge to home or other facility with appropriate resources  Outcome: Progressing     Problem: Skin/Tissue Integrity  Goal: Absence of new skin breakdown  Description: 1.  Monitor for areas of redness and/or skin breakdown  2.  Assess vascular access sites hourly  3.  Every 4-6 hours minimum:  Change oxygen saturation probe site  4.  Every 4-6 hours:  If on nasal continuous positive airway pressure, respiratory therapy assess nares and determine need for appliance change or resting period.  Outcome: Progressing     Problem: Safety - Adult  Goal: Free from fall injury  Outcome: Progressing     Problem: ABCDS Injury Assessment  Goal: Absence of physical injury  Outcome: Progressing     Problem: Chronic Conditions and Co-morbidities  Goal: Patient's chronic conditions and co-morbidity symptoms are monitored and maintained or improved  Outcome: Progressing     Problem: Pain  Goal: Verbalizes/displays adequate comfort level or baseline comfort level  Outcome: Progressing

## 2024-01-21 NOTE — PROGRESS NOTES
End of shift note    Pt is alert to self, location, and date of birth. She could not tell me the year. She cannot read.    Contacted her daughter Rosa Vivar and informed her that the doctor wanted to speak with her regarding her mother's care.    Pt states her brother Kar and her daughter are her primary contacts and that medical staff needs to speak with them.    Pt has a purewick and gets Lasix IV and is voiding yellow urine. Pt ate 78 percent of lunch and dinner.    States her pain is mild in her right arm and attempts to keep the right arm positioned in the right place.     No bleeding noted at location of TR Band and it was removed at approximately 1530. Pressure dressing was placed on the site.    At t his time patient in in bed with bed alarm on watching TV. Pt may have vein mapping done and possible CABG.

## 2024-01-21 NOTE — PROGRESS NOTES
Bedside and Verbal shift change report given to Roberto RN (oncoming nurse) by Eri RN (offgoing nurse). Report included the following information Intake/Output, MAR, Recent Results, Cardiac Rhythm NSR, and Neuro Assessment.

## 2024-01-21 NOTE — PROGRESS NOTES
Bedside and Verbal shift change report given to ALYSSA Hwang (oncoming nurse) by ALYSSA Perez (offgoing nurse). Report included the following information Nurse Handoff Report, Recent Results, and Cardiac Rhythm Sinus Rhythm .      1930 Assumed care of patient, she is resting in bed. A&0x3, disoriented to time. Shift assessment completed. Provided with incontinence care, changed gown and bed pad. Assessed for safety precautions.       2230 scheduled medications given. Tolerated well.       0000 patient reassessment, no changes.    0400 reassessment of patient, no changes.    0500 provided with personal hygiene care, tolerated well.    Bedside and Verbal shift change report given to ALYSSA Mejia (oncoming nurse) by ALYSSA Hwang (offgoing nurse). Report included the following information Nurse Handoff Report, Recent Results, and Cardiac Rhythm Sinus Rhythm .

## 2024-01-21 NOTE — PLAN OF CARE
OCCUPATIONAL THERAPY EVALUATION/DISCHARGE    Patient: Adrienne Allen (63 y.o. female)  Date: 1/21/2024  Primary Diagnosis: Respiratory failure ([facility]) [J96.90]  Acute on chronic renal insufficiency [N28.9, N18.9]  CHF (congestive heart failure), NYHA class I, acute on chronic, combined ([facility]) [I50.43]  STEMI (ST elevation myocardial infarction) ([facility]) [I21.3]  Procedure(s) (LRB):  Coronary angiography (N/A)  Left heart cath (N/A) 1 Day Post-Op   Precautions: General Precautions  PLOF: Pt lives alone in a second story apartment with elevator access. Independent in self care tasks.      ASSESSMENT AND RECOMMENDATIONS:  Pt cleared for OT evaluation and pt agreeable to participate. Pt presents at baseline functioning.  Pt Trevor in bed mobility, LB dressing, STS txr and functional mobility within the room. Pt reports no concerns about returning home independently. No skilled OT services recommended at this time.     Maximum therapeutic gains met at current level of care and patient will be discharged from occupational therapy at this time.    Further Equipment Recommendations for Discharge:  NA    [facility]: AM-PAC Inpatient Daily Activity Raw Score: 24    At this time and based on an AM-PAC score, no further OT is recommended upon discharge due to patient at baseline functional status.  Recommend patient returns to prior setting with prior services.    This [facility] score should be considered in conjunction with interdisciplinary team recommendations to determine the most appropriate discharge setting. Patient's social support, diagnosis, medical stability, and prior level of function should also be taken into consideration.     SUBJECTIVE:   Patient stated “I can do all that by myself.”    OBJECTIVE DATA SUMMARY:     Past Medical History:   Diagnosis Date    Bladder infection     Depression     Diabetes ([facility])     Difficulty walking     DM (diabetes mellitus) ([facility]) 6/22/2012    Ill-defined condition     leaking bladder;high

## 2024-01-21 NOTE — PROGRESS NOTES
CT on hold until either patient nurse can accompany them down for the scan or the orders for TELE monitoring has been changed to allow the patient to come down without the monitor. Nurse is aware.

## 2024-01-21 NOTE — PLAN OF CARE
Problem: Discharge Planning  Goal: Discharge to home or other facility with appropriate resources  1/21/2024 1710 by Roberto Brown RN  Outcome: Progressing  Flowsheets (Taken 1/21/2024 0800)  Discharge to home or other facility with appropriate resources:   Identify barriers to discharge with patient and caregiver   Arrange for needed discharge resources and transportation as appropriate  1/21/2024 0715 by Eri Rehman RN  Outcome: Progressing     Problem: Skin/Tissue Integrity  Goal: Absence of new skin breakdown  Description: 1.  Monitor for areas of redness and/or skin breakdown  2.  Assess vascular access sites hourly  3.  Every 4-6 hours minimum:  Change oxygen saturation probe site  4.  Every 4-6 hours:  If on nasal continuous positive airway pressure, respiratory therapy assess nares and determine need for appliance change or resting period.  1/21/2024 1710 by Roberto Brown RN  Outcome: Progressing  1/21/2024 0715 by Eri Rehman RN  Outcome: Progressing     Problem: Safety - Adult  Goal: Free from fall injury  1/21/2024 1710 by Roberto Brown RN  Outcome: Progressing  Flowsheets (Taken 1/21/2024 0745)  Free From Fall Injury: Based on caregiver fall risk screen, instruct family/caregiver to ask for assistance with transferring infant if caregiver noted to have fall risk factors  1/21/2024 0715 by Eri Rehman RN  Outcome: Progressing     Problem: ABCDS Injury Assessment  Goal: Absence of physical injury  1/21/2024 1710 by Roberto Brown RN  Outcome: Progressing  1/21/2024 0715 by Eri Rehman RN  Outcome: Progressing     Problem: Chronic Conditions and Co-morbidities  Goal: Patient's chronic conditions and co-morbidity symptoms are monitored and maintained or improved  1/21/2024 1710 by Roberto Brown RN  Outcome: Progressing  Flowsheets (Taken 1/21/2024 0800)  Care Plan - Patient's Chronic Conditions and Co-Morbidity Symptoms are Monitored and Maintained or Improved:   Monitor and assess patient's

## 2024-01-21 NOTE — PROGRESS NOTES
[facility]  Progress Note    Patient: Adrienne Allen Age: 63 y.o. : 1960 MR#: [id number] SSN: [id number]  Date/Time: 2024     C/C: SOB       Subjective:   HPI : Patient with past medical history of some developmental delay, heavy smoker, admitted with increasing shortness of breath found to have congestive heart failure picture, initial troponin mildly elevated later on which showed significant increase suggestive of acute ACS, cardiology consulted, initially patient was unable to lie flat because of her orthopnea, after diuresis patient felt comfortable patient did not have any chest pain or no other complaints during this time.  Patient received cath s/p cath multiple vessel disease requiring CABG .  CT surgery was informed by cardiologist.    Patient also has a history of diabetes mellitus type 2 uncontrolled secondary to some noncompliance to medications.          Review of Systems:  positive responses in bold type   Constitutional: Negative for fever, chills, diaphoresis and unexpected weight change.   HENT: Negative for ear pain, congestion, sore throat, rhinorrhea, drooling, trouble swallowing, neck pain and tinnitus.   Eyes: Negative for photophobia, pain, redness and visual disturbance.   Respiratory: negative for shortness of breath, cough, choking, chest tightness, wheezing or stridor.   Cardiovascular: Negative for chest pain, palpitations and leg swelling.   Gastrointestinal: Negative for nausea, vomiting, abdominal pain, diarrhea, constipation, blood in stool, abdominal distention and anal bleeding.   Genitourinary: Negative for dysuria, urgency, frequency, hematuria, flank pain and difficulty urinating.   Musculoskeletal: Negative for back pain and arthralgias.   Skin: Negative for color change, rash and wound.   Neurological: Negative for dizziness, seizures, syncope, speech difficulty, light-headedness or headaches.   Hematological: Does  not bruise/bleed easily.   Psychiatric/Behavioral: Negative for suicidal ideas, hallucinations, behavioral problems, self-injury or agitation     Assessment/Plan:     1 Acute hypoxic respiratory failure   2 Elevated BNP   3 DM2 uncontrolled high   4 Developmental delay   5 HLD   6 HTN   7 Diabetic neuropathy   8 Non Compliance   9 ? H/o Gout   10 CKD3 - likely from Medical renal disease   11 Elevated Trop - Up trending - Cardiology aware - Follow ECHO   12 Vit D deficiency   13 chronic tobacco abuse  14 high likelihood of COPD development      Plan   -Patient is doing fine  -Urine ordered ruling out UTI  -Diabetic control  -Blood pressure control   Follow-up with CT surgery-        Objective:       General:  Alert, cooperative, no acute distress  HEENT: No facial asymmetry, CLARI Rahul, External ears - WNL    Cardiovascular: S1S2 - regular , No Murmur   Pulmonary: Equal expansion , No Use of accessory muscles , No Rales No Rhonchi    GI:  +BS in all four quadrants, soft, non-tender  Extremities:  No edema; 2+ dorsalis pedis pulses bilaterally  Neuro: Alert and oriented X 2.       DVT Prophylaxis:  []Lovenox  []Hep SQ  []SCDs  []Coumadin   []On Heparin gtt    [] Eliquis [] Xarelto     Vitals:         VS: /81   Pulse 76   Temp 97.9 °F (36.6 °C) (Oral)   Resp 16   Ht 1.6 m (5' 3\")   Wt 59.9 kg (132 lb 0.9 oz)   SpO2 94%   BMI 23.39 kg/m²    Tmax/24hrs: Temp (24hrs), Av °F (36.7 °C), Min:97.5 °F (36.4 °C), Max:98.4 °F (36.9 °C)        Medications:   Current Facility-Administered Medications   Medication Dose Route Frequency    hydrALAZINE (APRESOLINE) injection 5 mg  5 mg IntraVENous Q6H PRN    diphenhydrAMINE (BENADRYL) capsule 25 mg  25 mg Oral Q6H PRN    glucose chewable tablet 16 g  4 tablet Oral PRN    dextrose bolus 10% 125 mL  125 mL IntraVENous PRN    Or    dextrose bolus 10% 250 mL  250 mL IntraVENous PRN    glucagon injection 1 mg  1 mg SubCUTAneous PRN    dextrose 10 % infusion   IntraVENous

## 2024-01-21 NOTE — PROGRESS NOTES
Physical Therapy  Pt not seen for skilled PT due to:    []  Nausea/vomiting  []  Eating  []  Pain  []  Pt lethargic  [x]  Off Unit  Other: Per nurse at vascular lab    Will f/u later as schedule allows. Thank you.  Karla Layne PT, DPT

## 2024-01-21 NOTE — PROGRESS NOTES
[facility]      Follow up for MV CAD    Chief Complaint:  SOB    Interval History:  Pre OP tests ordered. Pt c/o burning with urination        Exam:   BP 99/60   Pulse 76   Temp 98.4 °F (36.9 °C) (Oral)   Resp 15   Ht 1.6 m (5' 3\")   Wt 59.9 kg (132 lb 0.9 oz)   SpO2 95%   BMI 23.39 kg/m²      Const:  alert and oriented.  NAD   CV:   RRR without murmur or rub.  No peripheral edema   Respiratory:  Clear to ascultation without wheezes, rales. Mild rhonchi.  No accessory muscle use.        Latest Reference Range & Units 01/20/24 01:28   Sodium 136 - 145 mmol/L 135 (L)   Potassium 3.5 - 5.5 mmol/L 4.0   Chloride 100 - 111 mmol/L 105   CO2 21 - 32 mmol/L 24   BUN,BUNPL 7.0 - 18 MG/DL 18   Creatinine 0.6 - 1.3 MG/DL 1.36 (H)   (L): Data is abnormally low  (H): Data is abnormally high     Latest Reference Range & Units 01/20/24 01:28   WBC 4.6 - 13.2 K/uL 12.9   RBC 4.20 - 5.30 M/uL 3.82 (L)   Hemoglobin Quant 12.0 - 16.0 g/dL 11.4 (L)   Hematocrit 35.0 - 45.0 % 34.3 (L)   MCV 78.0 - 100.0 FL 89.8   MCH 24.0 - 34.0 PG 29.8   MCHC 31.0 - 37.0 g/dL 33.2   MPV 9.2 - 11.8 FL 12.7 (H)   RDW 11.6 - 14.5 % 14.8 (H)   Platelet Count 135 - 420 K/uL 413   (L): Data is abnormally low  (H): Data is abnormally high     Latest Reference Range & Units 01/21/24 02:49   Hemoglobin A1C 4.2 - 5.6 % 13.4 (H)   (H): Data is abnormally high    Assessment:  Awaiting pre OP studies    PLAN:    Order UA for dysuria- long hx klebsiella in urine  Cont heparin  Cont controlling BS

## 2024-01-22 ENCOUNTER — APPOINTMENT (OUTPATIENT)
Facility: HOSPITAL | Age: 64
DRG: 174 | End: 2024-01-22
Payer: MEDICAID

## 2024-01-22 ENCOUNTER — APPOINTMENT (OUTPATIENT)
Facility: HOSPITAL | Age: 64
DRG: 174 | End: 2024-01-22
Attending: INTERNAL MEDICINE
Payer: MEDICAID

## 2024-01-22 LAB
APTT PPP: 105.6 SEC (ref 23–36.4)
APTT PPP: 132.5 SEC (ref 23–36.4)
APTT PPP: 64.7 SEC (ref 23–36.4)
APTT PPP: 97 SEC (ref 23–36.4)
ECHO AO ASC DIAM: 3.4 CM
ECHO AO ASCENDING AORTA INDEX: 2.1 CM/M2
ECHO AO ROOT DIAM: 3.4 CM
ECHO AO ROOT INDEX: 2.1 CM/M2
ECHO AV AREA PEAK VELOCITY: 2 CM2
ECHO AV AREA/BSA PEAK VELOCITY: 1.2 CM2/M2
ECHO AV PEAK GRADIENT: 7 MMHG
ECHO AV PEAK VELOCITY: 1.3 M/S
ECHO AV VELOCITY RATIO: 0.77
ECHO BSA: 1.63 M2
ECHO LA VOL A-L A2C: 44 ML (ref 22–52)
ECHO LA VOL A-L A4C: 28 ML (ref 22–52)
ECHO LA VOL MOD A2C: 42 ML (ref 22–52)
ECHO LA VOL MOD A4C: 27 ML (ref 22–52)
ECHO LA VOLUME AREA LENGTH: 36 ML
ECHO LA VOLUME INDEX A-L A2C: 27 ML/M2 (ref 16–34)
ECHO LA VOLUME INDEX A-L A4C: 17 ML/M2 (ref 16–34)
ECHO LA VOLUME INDEX AREA LENGTH: 22 ML/M2 (ref 16–34)
ECHO LA VOLUME INDEX MOD A2C: 26 ML/M2 (ref 16–34)
ECHO LA VOLUME INDEX MOD A4C: 17 ML/M2 (ref 16–34)
ECHO LV E' LATERAL VELOCITY: 7 CM/S
ECHO LV E' SEPTAL VELOCITY: 7 CM/S
ECHO LV EDV A2C: 69 ML
ECHO LV EDV A4C: 57 ML
ECHO LV EDV BP: 64 ML (ref 56–104)
ECHO LV EDV INDEX A4C: 35 ML/M2
ECHO LV EDV INDEX BP: 40 ML/M2
ECHO LV EDV NDEX A2C: 43 ML/M2
ECHO LV EJECTION FRACTION A2C: 58 %
ECHO LV EJECTION FRACTION A4C: 43 %
ECHO LV EJECTION FRACTION BIPLANE: 51 % (ref 55–100)
ECHO LV ESV A2C: 29 ML
ECHO LV ESV A4C: 33 ML
ECHO LV ESV BP: 31 ML (ref 19–49)
ECHO LV ESV INDEX A2C: 18 ML/M2
ECHO LV ESV INDEX A4C: 20 ML/M2
ECHO LV ESV INDEX BP: 19 ML/M2
ECHO LV FRACTIONAL SHORTENING: 31 % (ref 28–44)
ECHO LV INTERNAL DIMENSION DIASTOLE INDEX: 2.41 CM/M2
ECHO LV INTERNAL DIMENSION DIASTOLIC: 3.9 CM (ref 3.9–5.3)
ECHO LV INTERNAL DIMENSION SYSTOLIC INDEX: 1.67 CM/M2
ECHO LV INTERNAL DIMENSION SYSTOLIC: 2.7 CM
ECHO LV IVSD: 1.1 CM (ref 0.6–0.9)
ECHO LV MASS 2D: 122.1 G (ref 67–162)
ECHO LV MASS INDEX 2D: 75.4 G/M2 (ref 43–95)
ECHO LV POSTERIOR WALL DIASTOLIC: 0.9 CM (ref 0.6–0.9)
ECHO LV RELATIVE WALL THICKNESS RATIO: 0.46
ECHO LVOT AREA: 2.8 CM2
ECHO LVOT DIAM: 1.9 CM
ECHO LVOT PEAK GRADIENT: 4 MMHG
ECHO LVOT PEAK VELOCITY: 1 M/S
ECHO MV A VELOCITY: 0.83 M/S
ECHO MV E DECELERATION TIME (DT): 170.8 MS
ECHO MV E VELOCITY: 0.95 M/S
ECHO MV E/A RATIO: 1.14
ECHO MV E/E' LATERAL: 13.57
ECHO MV E/E' RATIO (AVERAGED): 13.57
ECHO RA VOLUME: 25 ML
ECHO RA VOLUME: 25 ML
ECHO RV FREE WALL PEAK S': 10 CM/S
ECHO RV TAPSE: 2 CM (ref 1.7–?)
ECHO TV REGURGITANT MAX VELOCITY: 2.51 M/S
ECHO TV REGURGITANT PEAK GRADIENT: 25 MMHG
GLUCOSE BLD STRIP.AUTO-MCNC: 114 MG/DL (ref 70–110)
GLUCOSE BLD STRIP.AUTO-MCNC: 123 MG/DL (ref 70–110)
GLUCOSE BLD STRIP.AUTO-MCNC: 149 MG/DL (ref 70–110)
GLUCOSE BLD STRIP.AUTO-MCNC: 201 MG/DL (ref 70–110)
GLUCOSE BLD STRIP.AUTO-MCNC: 304 MG/DL (ref 70–110)
GLUCOSE BLD STRIP.AUTO-MCNC: 334 MG/DL (ref 70–110)
GLUCOSE BLD STRIP.AUTO-MCNC: 55 MG/DL (ref 70–110)
GLUCOSE BLD STRIP.AUTO-MCNC: 64 MG/DL (ref 70–110)

## 2024-01-22 PROCEDURE — 1100000000 HC RM PRIVATE

## 2024-01-22 PROCEDURE — 93306 TTE W/DOPPLER COMPLETE: CPT | Performed by: INTERNAL MEDICINE

## 2024-01-22 PROCEDURE — 97162 PT EVAL MOD COMPLEX 30 MIN: CPT

## 2024-01-22 PROCEDURE — 2580000003 HC RX 258: Performed by: INTERNAL MEDICINE

## 2024-01-22 PROCEDURE — 6370000000 HC RX 637 (ALT 250 FOR IP)

## 2024-01-22 PROCEDURE — 99232 SBSQ HOSP IP/OBS MODERATE 35: CPT | Performed by: INTERNAL MEDICINE

## 2024-01-22 PROCEDURE — 6360000002 HC RX W HCPCS: Performed by: INTERNAL MEDICINE

## 2024-01-22 PROCEDURE — 99024 POSTOP FOLLOW-UP VISIT: CPT | Performed by: PHYSICIAN ASSISTANT

## 2024-01-22 PROCEDURE — 6370000000 HC RX 637 (ALT 250 FOR IP): Performed by: INTERNAL MEDICINE

## 2024-01-22 PROCEDURE — 94761 N-INVAS EAR/PLS OXIMETRY MLT: CPT

## 2024-01-22 PROCEDURE — 6360000004 HC RX CONTRAST MEDICATION: Performed by: INTERNAL MEDICINE

## 2024-01-22 PROCEDURE — C8929 TTE W OR WO FOL WCON,DOPPLER: HCPCS

## 2024-01-22 PROCEDURE — 6360000002 HC RX W HCPCS

## 2024-01-22 PROCEDURE — 85730 THROMBOPLASTIN TIME PARTIAL: CPT

## 2024-01-22 PROCEDURE — 97530 THERAPEUTIC ACTIVITIES: CPT

## 2024-01-22 PROCEDURE — 6360000002 HC RX W HCPCS: Performed by: PHYSICIAN ASSISTANT

## 2024-01-22 PROCEDURE — 36415 COLL VENOUS BLD VENIPUNCTURE: CPT

## 2024-01-22 PROCEDURE — 82962 GLUCOSE BLOOD TEST: CPT

## 2024-01-22 RX ORDER — ATORVASTATIN CALCIUM 40 MG/1
80 TABLET, FILM COATED ORAL NIGHTLY
Status: DISCONTINUED | OUTPATIENT
Start: 2024-01-22 | End: 2024-01-26 | Stop reason: HOSPADM

## 2024-01-22 RX ORDER — CIPROFLOXACIN 2 MG/ML
400 INJECTION, SOLUTION INTRAVENOUS EVERY 12 HOURS
Status: DISCONTINUED | OUTPATIENT
Start: 2024-01-22 | End: 2024-01-24

## 2024-01-22 RX ADMIN — PRAVASTATIN SODIUM 40 MG: 20 TABLET ORAL at 08:56

## 2024-01-22 RX ADMIN — HEPARIN SODIUM 2000 UNITS: 1000 INJECTION INTRAVENOUS; SUBCUTANEOUS at 17:36

## 2024-01-22 RX ADMIN — FUROSEMIDE 40 MG: 10 INJECTION, SOLUTION INTRAMUSCULAR; INTRAVENOUS at 08:59

## 2024-01-22 RX ADMIN — METOPROLOL TARTRATE 12.5 MG: 25 TABLET, FILM COATED ORAL at 21:58

## 2024-01-22 RX ADMIN — HEPARIN SODIUM 18 UNITS/KG/HR: 10000 INJECTION, SOLUTION INTRAVENOUS at 10:28

## 2024-01-22 RX ADMIN — DIPHENHYDRAMINE HYDROCHLORIDE 25 MG: 25 CAPSULE ORAL at 11:04

## 2024-01-22 RX ADMIN — DEXTROSE MONOHYDRATE 125 ML: 100 INJECTION, SOLUTION INTRAVENOUS at 00:46

## 2024-01-22 RX ADMIN — CIPROFLOXACIN 400 MG: 400 INJECTION, SOLUTION INTRAVENOUS at 10:28

## 2024-01-22 RX ADMIN — SODIUM CHLORIDE, PRESERVATIVE FREE 10 ML: 5 INJECTION INTRAVENOUS at 08:57

## 2024-01-22 RX ADMIN — ATORVASTATIN CALCIUM 80 MG: 40 TABLET, FILM COATED ORAL at 21:58

## 2024-01-22 RX ADMIN — ASPIRIN 81 MG 81 MG: 81 TABLET ORAL at 08:57

## 2024-01-22 RX ADMIN — METOPROLOL TARTRATE 12.5 MG: 25 TABLET, FILM COATED ORAL at 08:56

## 2024-01-22 RX ADMIN — CIPROFLOXACIN 400 MG: 400 INJECTION, SOLUTION INTRAVENOUS at 22:04

## 2024-01-22 RX ADMIN — GABAPENTIN 300 MG: 300 CAPSULE ORAL at 13:30

## 2024-01-22 RX ADMIN — PERFLUTREN 2 ML: 6.52 INJECTION, SUSPENSION INTRAVENOUS at 16:05

## 2024-01-22 RX ADMIN — GABAPENTIN 300 MG: 300 CAPSULE ORAL at 21:58

## 2024-01-22 RX ADMIN — INSULIN LISPRO 6 UNITS: 100 INJECTION, SOLUTION INTRAVENOUS; SUBCUTANEOUS at 12:22

## 2024-01-22 RX ADMIN — INSULIN GLARGINE 20 UNITS: 100 INJECTION, SOLUTION SUBCUTANEOUS at 21:59

## 2024-01-22 RX ADMIN — GABAPENTIN 300 MG: 300 CAPSULE ORAL at 08:57

## 2024-01-22 RX ADMIN — INSULIN GLARGINE 20 UNITS: 100 INJECTION, SOLUTION SUBCUTANEOUS at 08:58

## 2024-01-22 ASSESSMENT — PAIN SCALES - GENERAL
PAINLEVEL_OUTOF10: 0

## 2024-01-22 NOTE — PROGRESS NOTES
Bedside and Verbal shift change report given to ALYSSA Hwang (oncoming nurse) by ALYSSA Mejia (offgoing nurse). Report included the following information Nurse Handoff Report, Recent Results, and Cardiac Rhythm Sinus Rhythm .

## 2024-01-22 NOTE — PROGRESS NOTES
[facility]  Progress Note    Patient: Adrienne Allen Age: 63 y.o. : 1960 MR#: [id number] SSN: [id number]  Date/Time: 2024     C/C: SOB       Subjective:   HPI : Patient with past medical history of some developmental delay, heavy smoker, admitted with increasing shortness of breath found to have congestive heart failure picture, initial troponin mildly elevated later on which showed significant increase suggestive of acute ACS, cardiology consulted, initially patient was unable to lie flat because of her orthopnea, after diuresis patient felt comfortable patient did not have any chest pain or no other complaints during this time.  Patient received cath s/p cath multiple vessel disease requiring CABG .  CT surgery was informed by cardiologist.    Patient also has a history of diabetes mellitus type 2 uncontrolled secondary to some noncompliance to medications.          Review of Systems:  positive responses in bold type   Constitutional: Negative for fever, chills, diaphoresis and unexpected weight change.   HENT: Negative for ear pain, congestion, sore throat, rhinorrhea, drooling, trouble swallowing, neck pain and tinnitus.   Eyes: Negative for photophobia, pain, redness and visual disturbance.   Respiratory: negative for shortness of breath, cough, choking, chest tightness, wheezing or stridor.   Cardiovascular: Negative for chest pain, palpitations and leg swelling.   Gastrointestinal: Negative for nausea, vomiting, abdominal pain, diarrhea, constipation, blood in stool, abdominal distention and anal bleeding.   Genitourinary: Negative for dysuria, urgency, frequency, hematuria, flank pain and difficulty urinating.   Musculoskeletal: Negative for back pain and arthralgias.   Skin: Negative for color change, rash and wound.   Neurological: Negative for dizziness, seizures, syncope, speech difficulty, light-headedness or headaches.   Hematological: Does

## 2024-01-22 NOTE — PROGRESS NOTES
Bedside and Verbal shift change report given to ALYSSA Barfield (oncoming nurse) by ALYSSA Hwang (offgoing nurse). Report included the following information Nurse Handoff Report, Intake/Output, MAR, and Quality Measures.     1100  Patient complaining of itchiness of right arm where IV med is going through.  1102 Paged MD, waiting for a call back.  1104 25 mg diphenhydramine given  1105 stopped IV ciprofloxacin infusion.     Assessment completed, no redness, swelling, rashes, elevated temperature or SOB noted. Patient remain stable with O2 at 99% on room air, respiration of 16.     1157  Received a called back from the Md. Notified him about CIPRO being stopped. Continue to observe patient per MD.    1920  Bedside and Verbal shift change report given to ALYSSA Fitzgerald (oncoming nurse) by ALYSSA Barfield (offgoing nurse). Report included the following information Nurse Handoff Report, Intake/Output, MAR, and Quality Measures.

## 2024-01-22 NOTE — CARE COORDINATION
Called pt's counselor Margot Ozuna [phone].  She stated she is pt's housing  or .  She stated this is with a program called [facility].  She stated she visits pt regularly but not everyday.  She stated pt is non compliant with her medications because pt cannot read.  She stated pt will need someone at home to assist with her medications.      Pt might need home health for medication management when discharging home.          Denisa Goddard, TRISTONN RN  Care Management

## 2024-01-22 NOTE — PROGRESS NOTES
Bedside and Verbal shift change report given to Eri RN (oncoming nurse) by Roberto RN (offgoing nurse). Report included the following information Intake/Output, MAR, Recent Results, Cardiac Rhythm NSR, and Neuro Assessment.

## 2024-01-22 NOTE — PLAN OF CARE
Problem: Physical Therapy - Adult  Goal: By Discharge: Performs mobility at highest level of function for planned discharge setting.  See evaluation for individualized goals.  Description: Physical Therapy Goals:  Initiated 1/22/2024 to be met within 7-10 days.    1.  Patient will move from supine to sit and sit to supine  in bed with independence.    2.  Patient will transfer from bed to chair and chair to bed with independence.  3.  Patient will perform sit to stand with independence.  4.  Patient will ambulate with independence for 250 feet.   5.  Patient will ascend/descend 4 stairs with 1 handrail(s) with minimal assistance/contact guard assist.    PLOF: lives alone in a second floor apartment, independent without an AD     Outcome: Progressing   PHYSICAL THERAPY EVALUATION    Patient: Adrienne Allen (63 y.o. female)  Date: 1/22/2024  Primary Diagnosis: Respiratory failure ([facility]) [J96.90]  Acute on chronic renal insufficiency [N28.9, N18.9]  CHF (congestive heart failure), NYHA class I, acute on chronic, combined ([facility]) [I50.43]  STEMI (ST elevation myocardial infarction) ([facility]) [I21.3]  Procedure(s) (LRB):  Coronary angiography (N/A)  Left heart cath (N/A) 2 Days Post-Op   Precautions: Contact Precautions, Fall Risk,    ASSESSMENT :  Pt in bed and agreeable to PT evaluation.  Pt was mod I with bed mobility and supervision for sit to stand transfer without UE use.  Pt ambulated 60 feet in her room with supervision without an assistive device.  Pt with mild path deviations when ambulating but no major losses of balance.  Pt stood for several minutes talking without loss of balance.  Pt returned to her bed and was left with needs in reach and bed alarm set.    DEFICITS/IMPAIRMENTS:    , Body Structures, Functions, Activity Limitations Requiring Skilled Therapeutic Intervention: Decreased balance;Decreased functional mobility     Patient will benefit from skilled intervention to address the above  level pre-treatment: 0/10   Pain level post-treatment: 0/10   Pain Intervention(s): n/a    Activity Tolerance:   Activity Tolerance: Patient tolerated treatment well  Please refer to the flowsheet for vital signs taken during this treatment.    After treatment:   []         Patient left in no apparent distress sitting up in chair  [x]         Patient left in no apparent distress in bed  [x]         Call bell left within reach  [x]         Nursing notified  []         Caregiver present  [x]         Bed alarm activated  []         SCDs applied    COMMUNICATION/EDUCATION:   Patient Education  Education Given To: Patient  Education Provided: Plan of Care;Role of Therapy;Transfer Training;Fall Prevention Strategies  Education Method: Demonstration;Verbal;Teach Back  Barriers to Learning: None  Education Outcome: Verbalized understanding;Demonstrated understanding;Continued education needed    Thank you for this referral.  Keysha Peña, PT  Minutes: 25      Eval Complexity: Decision Making: Medium Complexity

## 2024-01-22 NOTE — PROGRESS NOTES
[facility] - Progress Note    Admit Date: 1/19/2024  Attending Cardiologist: Dr. Smith    Assessment:     Patient Active Problem List    Diagnosis Date Noted    STEMI (ST elevation myocardial infarction) ([facility]) 01/20/2024    Hypertension 01/20/2024    NSTEMI (non-ST elevated myocardial infarction) ([facility]) 01/20/2024    Acute heart failure with preserved ejection fraction ([facility]) 01/20/2024    CHF (congestive heart failure), NYHA class I, acute on chronic, combined ([facility]) 01/19/2024    Respiratory failure ([facility]) 01/19/2024    Acute on chronic renal insufficiency 10/01/2019    Proteinuria 10/01/2019    Hyponatremia 09/30/2019    Puncture wound of foot, left, complicated 09/30/2019    Microalbuminuria 10/24/2018    Type 2 diabetes with nephropathy ([facility]) 04/12/2018    Type 2 diabetes mellitus with diabetic neuropathy ([facility]) 04/10/2018    Encounter for long-term (current) use of other medications     Midline low back pain without sciatica 01/21/2016    DM (diabetes mellitus), type 2, uncontrolled 01/21/2016    Vaginitis, atrophic 05/03/2015    Hyperlipidemia 05/09/2014    Hip pain 06/23/2012    Sacroiliac joint pain 08/11/2011    Depression 08/11/2011    Lumbago     Chronic pain syndrome      - Acute hypoxic respiratory failure: required bipap on admission. Likely related to volume overload and possible COPD exacerbation.  - Elevated troponin/STEMI: troponin 133 > 361 > 543 > 4299. EKG on 1/19 with ST elevation in anterior leads. Went for urgent LHC 1/20 which revealed triple vessel disease. CT surgery following.   -  Volume overload: BNP 2,230. CXR with increased pulmonary interstitial opacity which could represent edema. Received 1 x dose of lasix in the ER, now on Lasix 40 mg IV daily with net negative balance.   - UTI: on Ciprofloxacin   - CKD stage 3  - DM type 2: Uncontrolled, last A1c 15.  - HTN: on lisinopril 20mg as otpt, on hold per CTS  - HLD: on statin  -Tobacco use: 1 PPD. Patient has been smoking

## 2024-01-22 NOTE — PROGRESS NOTES
63 years old female presented with shortness of breath and chest pain. Patient lives by herself and neighbor called 911. Patient was found to have pulmonary edema and STEMI. Cardiac cath showed diffuse CAD with calcified vessels, with high grade stenosis in LAD and CX. Patient has a history of developmental delay, heavy smoking, uncontrolled DM HbA1c 15. Chronic kidney disease.  CT scan shows COPD diffuse aortic calcification. Echo shows EF 40-50%. Patient is currently asymptomatic and stable.  Patient is non compliant and has poor understanding of her health problems and the proposed surgical procedure. Unfortunately , she does not have good family support. Patient is not a good candidate for invasive strategy. I recommend GDMT

## 2024-01-22 NOTE — PLAN OF CARE
Problem: Discharge Planning  Goal: Discharge to home or other facility with appropriate resources  Outcome: Progressing  Flowsheets (Taken 1/22/2024 2930)  Discharge to home or other facility with appropriate resources: Identify barriers to discharge with patient and caregiver

## 2024-01-22 NOTE — PROGRESS NOTES
[facility]      Follow up for MV CAD    Chief Complaint:  SOB    Interval History:  Pt with +4 bacteria in urine. Had hypoglycemic episode last night. No CP or SOB. Pt does not seem willing to quit smoking cigarettes and it is questionable if she will manage her diabetes or take medications post operatively as she is currently non-compliant.       Exam:   /64   Pulse 83   Temp 97.7 °F (36.5 °C) (Temporal)   Resp 13   Ht 1.6 m (5' 3\")   Wt 59.9 kg (132 lb 0.9 oz)   SpO2 94%   BMI 23.39 kg/m²      Const: alert and oriented.  NAD   CV:  RRR No peripheral edema   Respiratory: RA.  No accessory muscle use.        Latest Reference Range & Units 01/21/24 16:57   Color, UA -   YELLOW   Glucose, UA NEG mg/dL Negative   Bilirubin, Urine NEG   Negative   Ketones, Urine NEG mg/dL Negative   Specific Gravity, UA 1.005 - 1.030   1.008   Blood, Urine NEG   Negative   Protein, UA NEG mg/dL Negative   Urobilinogen, Urine 0.2 - 1.0 EU/dL 0.2   Nitrite, Urine NEG   Negative   Leukocyte Esterase, Urine NEG   SMALL !   Appearance -   CLEAR   pH, Urine 5.0 - 8.0   5.5   WBC, UA 0 - 4 /hpf 11 to 20   RBC, UA 0 - 5 /hpf 0 to 2   Epithelial Cells, UA 0 - 5 /lpf 1+   Bacteria, UA NEG /hpf 4+ !   !: Data is abnormal    Assessment:  PreOP workup for MV CAD    PLAN:  Awaiting ECHO  Start IV ciprofloxacin for assumed Klebsiella UTI (chronic hx)

## 2024-01-23 PROBLEM — I25.110 CORONARY ARTERY DISEASE INVOLVING NATIVE CORONARY ARTERY OF NATIVE HEART WITH UNSTABLE ANGINA PECTORIS (HCC): Status: ACTIVE | Noted: 2024-01-23

## 2024-01-23 LAB
APTT PPP: 84.5 SEC (ref 23–36.4)
APTT PPP: 87.3 SEC (ref 23–36.4)
ERYTHROCYTE [DISTWIDTH] IN BLOOD BY AUTOMATED COUNT: 14.6 % (ref 11.6–14.5)
GLUCOSE BLD STRIP.AUTO-MCNC: 170 MG/DL (ref 70–110)
GLUCOSE BLD STRIP.AUTO-MCNC: 239 MG/DL (ref 70–110)
GLUCOSE BLD STRIP.AUTO-MCNC: 241 MG/DL (ref 70–110)
GLUCOSE BLD STRIP.AUTO-MCNC: 81 MG/DL (ref 70–110)
HCT VFR BLD AUTO: 30.8 % (ref 35–45)
HGB BLD-MCNC: 9.9 G/DL (ref 12–16)
MCH RBC QN AUTO: 29.2 PG (ref 24–34)
MCHC RBC AUTO-ENTMCNC: 32.1 G/DL (ref 31–37)
MCV RBC AUTO: 90.9 FL (ref 78–100)
NRBC # BLD: 0 K/UL (ref 0–0.01)
NRBC BLD-RTO: 0 PER 100 WBC
PLATELET # BLD AUTO: 345 K/UL (ref 135–420)
PMV BLD AUTO: 11.9 FL (ref 9.2–11.8)
RBC # BLD AUTO: 3.39 M/UL (ref 4.2–5.3)
WBC # BLD AUTO: 8.1 K/UL (ref 4.6–13.2)

## 2024-01-23 PROCEDURE — 85027 COMPLETE CBC AUTOMATED: CPT

## 2024-01-23 PROCEDURE — 1100000000 HC RM PRIVATE

## 2024-01-23 PROCEDURE — 36415 COLL VENOUS BLD VENIPUNCTURE: CPT

## 2024-01-23 PROCEDURE — 6370000000 HC RX 637 (ALT 250 FOR IP)

## 2024-01-23 PROCEDURE — 6360000002 HC RX W HCPCS: Performed by: INTERNAL MEDICINE

## 2024-01-23 PROCEDURE — 85730 THROMBOPLASTIN TIME PARTIAL: CPT

## 2024-01-23 PROCEDURE — 99232 SBSQ HOSP IP/OBS MODERATE 35: CPT | Performed by: INTERNAL MEDICINE

## 2024-01-23 PROCEDURE — 6360000002 HC RX W HCPCS

## 2024-01-23 PROCEDURE — 2580000003 HC RX 258: Performed by: INTERNAL MEDICINE

## 2024-01-23 PROCEDURE — 6370000000 HC RX 637 (ALT 250 FOR IP): Performed by: INTERNAL MEDICINE

## 2024-01-23 PROCEDURE — 82962 GLUCOSE BLOOD TEST: CPT

## 2024-01-23 PROCEDURE — 99233 SBSQ HOSP IP/OBS HIGH 50: CPT | Performed by: INTERNAL MEDICINE

## 2024-01-23 PROCEDURE — 6360000002 HC RX W HCPCS: Performed by: PHYSICIAN ASSISTANT

## 2024-01-23 RX ADMIN — GABAPENTIN 300 MG: 300 CAPSULE ORAL at 21:06

## 2024-01-23 RX ADMIN — SODIUM CHLORIDE, PRESERVATIVE FREE 10 ML: 5 INJECTION INTRAVENOUS at 10:22

## 2024-01-23 RX ADMIN — HEPARIN SODIUM 19 UNITS/KG/HR: 10000 INJECTION, SOLUTION INTRAVENOUS at 07:45

## 2024-01-23 RX ADMIN — METOPROLOL TARTRATE 12.5 MG: 25 TABLET, FILM COATED ORAL at 10:18

## 2024-01-23 RX ADMIN — ATORVASTATIN CALCIUM 80 MG: 40 TABLET, FILM COATED ORAL at 21:06

## 2024-01-23 RX ADMIN — METOPROLOL TARTRATE 12.5 MG: 25 TABLET, FILM COATED ORAL at 21:06

## 2024-01-23 RX ADMIN — GABAPENTIN 300 MG: 300 CAPSULE ORAL at 15:34

## 2024-01-23 RX ADMIN — CIPROFLOXACIN 400 MG: 400 INJECTION, SOLUTION INTRAVENOUS at 21:37

## 2024-01-23 RX ADMIN — CIPROFLOXACIN 400 MG: 400 INJECTION, SOLUTION INTRAVENOUS at 10:19

## 2024-01-23 RX ADMIN — INSULIN GLARGINE 20 UNITS: 100 INJECTION, SOLUTION SUBCUTANEOUS at 21:07

## 2024-01-23 RX ADMIN — INSULIN LISPRO 2 UNITS: 100 INJECTION, SOLUTION INTRAVENOUS; SUBCUTANEOUS at 11:51

## 2024-01-23 RX ADMIN — FUROSEMIDE 40 MG: 10 INJECTION, SOLUTION INTRAMUSCULAR; INTRAVENOUS at 10:18

## 2024-01-23 RX ADMIN — INSULIN GLARGINE 20 UNITS: 100 INJECTION, SOLUTION SUBCUTANEOUS at 10:19

## 2024-01-23 RX ADMIN — ASPIRIN 81 MG 81 MG: 81 TABLET ORAL at 10:18

## 2024-01-23 RX ADMIN — GABAPENTIN 300 MG: 300 CAPSULE ORAL at 10:18

## 2024-01-23 ASSESSMENT — PAIN SCALES - GENERAL
PAINLEVEL_OUTOF10: 0

## 2024-01-23 NOTE — PLAN OF CARE
1930  Verbalizes/displays adequate comfort level or baseline comfort level:   Encourage patient to monitor pain and request assistance   Assess pain using appropriate pain scale

## 2024-01-23 NOTE — PROGRESS NOTES
[facility]      Follow up for MV CAD    Chief Complaint:  SOB    Interval History:  Pt feeling good with no complaints. No CP or SOB. ECHO completed with mildly reduced EF. Pt can not confirm willingness to quit smoking.       Exam:   /66   Pulse 70   Temp 97.6 °F (36.4 °C) (Oral)   Resp 13   Ht 1.6 m (5' 3\")   Wt 59.9 kg (132 lb)   SpO2 99%   BMI 23.38 kg/m²      Const: alert and oriented.  NAD   CV:  RRR  Noperipheral edema   Respiratory: RA  No accessory muscle use.    ECHO:      Left Ventricle: Reduced left ventricular systolic function with a visually estimated EF of 45 - 50%. Left ventricle size is normal. Mildly increased wall thickness. Akinesis of the following segments: apical anterior, apical septal and apical inferior. Grade I diastolic dysfunction with normal LAP.    Mitral Valve: Mildly calcified leaflet. Mild regurgitation.    Pericardium: There is evidence of epicardial fat. Trivial pericardial effusion present. No indication of cardiac tamponade.    Image quality is fair. Contrast used: Definity.      Assessment:  As pt is not willing to quit her 2ppd smoking and is non-compliant with prescriptions, Dr. Ernandez does not feel that she is a good OHS candidate.     PLAN:  Recommend medical management  Cipro can be transitioned to PO when she is DC  Recommend checking Cr prior to DC

## 2024-01-23 NOTE — PROGRESS NOTES
[facility] - Progress Note    Admit Date: 1/19/2024  Attending Cardiologist: Dr. Garza    Assessment:     Patient Active Problem List    Diagnosis Date Noted    STEMI (ST elevation myocardial infarction) ([facility]) 01/20/2024    Hypertension 01/20/2024    NSTEMI (non-ST elevated myocardial infarction) ([facility]) 01/20/2024    Acute heart failure with preserved ejection fraction ([facility]) 01/20/2024    CHF (congestive heart failure), NYHA class I, acute on chronic, combined ([facility]) 01/19/2024    Respiratory failure ([facility]) 01/19/2024    Acute on chronic renal insufficiency 10/01/2019    Proteinuria 10/01/2019    Hyponatremia 09/30/2019    Puncture wound of foot, left, complicated 09/30/2019    Microalbuminuria 10/24/2018    Type 2 diabetes with nephropathy ([facility]) 04/12/2018    Type 2 diabetes mellitus with diabetic neuropathy ([facility]) 04/10/2018    Encounter for long-term (current) use of other medications     Midline low back pain without sciatica 01/21/2016    DM (diabetes mellitus), type 2, uncontrolled 01/21/2016    Vaginitis, atrophic 05/03/2015    Hyperlipidemia 05/09/2014    Hip pain 06/23/2012    Sacroiliac joint pain 08/11/2011    Depression 08/11/2011    Lumbago     Chronic pain syndrome        - Acute hypoxic respiratory failure: required bipap on admission. Likely related to volume overload and possible COPD exacerbation.  - Elevated troponin/STEMI: troponin 133 > 361 > 543 > 4299. EKG on 1/19 with ST elevation in anterior leads. Went for urgent LHC 1/20 which revealed triple vessel disease. CT surgery following.   -  Volume overload: BNP 2,230. CXR with increased pulmonary interstitial opacity which could represent edema. Received 1 x dose of lasix in the ER, now on Lasix 40 mg IV daily with net negative balance.   - UTI: on Ciprofloxacin   - CKD stage 3  - DM type 2: Uncontrolled, last A1c 15.  - HTN: on lisinopril 20mg as otpt, on hold per CTS  - HLD: on statin  -Tobacco use: 1 PPD. Patient has been

## 2024-01-23 NOTE — PROGRESS NOTES
not bruise/bleed easily.   Psychiatric/Behavioral: Negative for suicidal ideas, hallucinations, behavioral problems, self-injury or agitation     Assessment/Plan:     1 Acute hypoxic respiratory failure   2 Elevated BNP   3 DM2 uncontrolled high   4 Developmental delay   5 HLD   6 HTN   7 Diabetic neuropathy   8 Non Compliance   9 ? H/o Gout   10 CKD3 - likely from Medical renal disease   11 Elevated Trop - Up trending - Cardiology aware - Follow ECHO   12 Vit D deficiency   13 chronic tobacco abuse  14 high likelihood of COPD development      Plan   - Continue current management   - Follow Labs   - Patient on room air -maintaining acceptable oxygenation   -Discussed with patient's care, she was asking me if we can order home health for medication management, especially her sugar control-Home health ordered    Likely DC in AM     Objective:       General:  Alert, cooperative, no acute distress  HEENT: No facial asymmetry, CLARI Rahul, External ears - WNL    Cardiovascular: S1S2 - regular , No Murmur   Pulmonary: Equal expansion , No Use of accessory muscles , No Rales No Rhonchi    GI:  +BS in all four quadrants, soft, non-tender  Extremities:  No edema; 2+ dorsalis pedis pulses bilaterally  Neuro: Alert and oriented X 2.       DVT Prophylaxis:  []Lovenox  []Hep SQ  []SCDs  []Coumadin   []On Heparin gtt    [] Eliquis [] Xarelto     Vitals:         VS: /66   Pulse 70   Temp 97.6 °F (36.4 °C) (Oral)   Resp 13   Ht 1.6 m (5' 3\")   Wt 59.9 kg (132 lb)   SpO2 99%   BMI 23.38 kg/m²    Tmax/24hrs: Temp (24hrs), Av.9 °F (36.6 °C), Min:96.7 °F (35.9 °C), Max:98.8 °F (37.1 °C)        Medications:   Current Facility-Administered Medications   Medication Dose Route Frequency    ciprofloxacin (CIPRO) IVPB 400 mg  400 mg IntraVENous Q12H    atorvastatin (LIPITOR) tablet 80 mg  80 mg Oral Nightly    hydrALAZINE (APRESOLINE) injection 5 mg  5 mg IntraVENous Q6H PRN    diphenhydrAMINE (BENADRYL) capsule 25 mg  25 mg  Oral Q6H PRN    glucose chewable tablet 16 g  4 tablet Oral PRN    dextrose bolus 10% 125 mL  125 mL IntraVENous PRN    Or    dextrose bolus 10% 250 mL  250 mL IntraVENous PRN    glucagon injection 1 mg  1 mg SubCUTAneous PRN    dextrose 10 % infusion   IntraVENous Continuous PRN    insulin glargine (LANTUS) injection vial 20 Units  20 Units SubCUTAneous BID    insulin lispro (HUMALOG) injection vial 0-8 Units  0-8 Units SubCUTAneous TID WC    insulin lispro (HUMALOG) injection vial 0-4 Units  0-4 Units SubCUTAneous Nightly    gabapentin (NEURONTIN) capsule 300 mg  300 mg Oral TID    sodium chloride flush 0.9 % injection 5-40 mL  5-40 mL IntraVENous 2 times per day    sodium chloride flush 0.9 % injection 5-40 mL  5-40 mL IntraVENous PRN    0.9 % sodium chloride infusion   IntraVENous PRN    ondansetron (ZOFRAN-ODT) disintegrating tablet 4 mg  4 mg Oral Q8H PRN    Or    ondansetron (ZOFRAN) injection 4 mg  4 mg IntraVENous Q6H PRN    polyethylene glycol (GLYCOLAX) packet 17 g  17 g Oral Daily PRN    acetaminophen (TYLENOL) tablet 650 mg  650 mg Oral Q6H PRN    Or    acetaminophen (TYLENOL) suppository 650 mg  650 mg Rectal Q6H PRN    heparin (porcine) injection 4,000 Units  4,000 Units IntraVENous PRN    heparin (porcine) injection 2,000 Units  2,000 Units IntraVENous PRN    heparin 25,000 units in dextrose 5% 250 mL (premix) infusion  5-30 Units/kg/hr IntraVENous Continuous    nitroGLYCERIN 200 mcg/mL in dextrose 5%  10 mcg/min IntraVENous Continuous    aspirin chewable tablet 81 mg  81 mg Oral Daily    metoprolol tartrate (LOPRESSOR) tablet 12.5 mg  12.5 mg Oral BID       Labs:    Recent Labs     01/21/24  0249 01/23/24  0004   WBC 11.4 8.1   HGB 11.4* 9.9*   HCT 35.1 30.8*    345     Recent Labs     01/21/24  0249   INR 0.9         Time spent on direct patient care >35 mints     Complexity : High complex - due to multiple medical issues outlined above.     CODE Status : Full CODE     Case discussed with:

## 2024-01-24 LAB
ANION GAP SERPL CALC-SCNC: 6 MMOL/L (ref 3–18)
APTT PPP: 83.3 SEC (ref 23–36.4)
BUN SERPL-MCNC: 24 MG/DL (ref 7–18)
BUN/CREAT SERPL: 16 (ref 12–20)
CALCIUM SERPL-MCNC: 8.5 MG/DL (ref 8.5–10.1)
CHLORIDE SERPL-SCNC: 99 MMOL/L (ref 100–111)
CO2 SERPL-SCNC: 25 MMOL/L (ref 21–32)
CREAT SERPL-MCNC: 1.46 MG/DL (ref 0.6–1.3)
GLUCOSE BLD STRIP.AUTO-MCNC: 194 MG/DL (ref 70–110)
GLUCOSE BLD STRIP.AUTO-MCNC: 201 MG/DL (ref 70–110)
GLUCOSE BLD STRIP.AUTO-MCNC: 202 MG/DL (ref 70–110)
GLUCOSE BLD STRIP.AUTO-MCNC: 73 MG/DL (ref 70–110)
GLUCOSE SERPL-MCNC: 114 MG/DL (ref 74–99)
POTASSIUM SERPL-SCNC: 3.7 MMOL/L (ref 3.5–5.5)
SODIUM SERPL-SCNC: 130 MMOL/L (ref 136–145)

## 2024-01-24 PROCEDURE — 99232 SBSQ HOSP IP/OBS MODERATE 35: CPT | Performed by: INTERNAL MEDICINE

## 2024-01-24 PROCEDURE — 6370000000 HC RX 637 (ALT 250 FOR IP)

## 2024-01-24 PROCEDURE — 80048 BASIC METABOLIC PNL TOTAL CA: CPT

## 2024-01-24 PROCEDURE — 85730 THROMBOPLASTIN TIME PARTIAL: CPT

## 2024-01-24 PROCEDURE — 6360000002 HC RX W HCPCS

## 2024-01-24 PROCEDURE — 36415 COLL VENOUS BLD VENIPUNCTURE: CPT

## 2024-01-24 PROCEDURE — 6370000000 HC RX 637 (ALT 250 FOR IP): Performed by: INTERNAL MEDICINE

## 2024-01-24 PROCEDURE — 82962 GLUCOSE BLOOD TEST: CPT

## 2024-01-24 PROCEDURE — 2580000003 HC RX 258: Performed by: INTERNAL MEDICINE

## 2024-01-24 PROCEDURE — 6360000002 HC RX W HCPCS: Performed by: PHYSICIAN ASSISTANT

## 2024-01-24 PROCEDURE — 1100000000 HC RM PRIVATE

## 2024-01-24 RX ADMIN — GABAPENTIN 300 MG: 300 CAPSULE ORAL at 14:23

## 2024-01-24 RX ADMIN — GABAPENTIN 300 MG: 300 CAPSULE ORAL at 21:50

## 2024-01-24 RX ADMIN — ASPIRIN 81 MG 81 MG: 81 TABLET ORAL at 09:44

## 2024-01-24 RX ADMIN — SODIUM CHLORIDE, PRESERVATIVE FREE 10 ML: 5 INJECTION INTRAVENOUS at 21:54

## 2024-01-24 RX ADMIN — GABAPENTIN 300 MG: 300 CAPSULE ORAL at 09:43

## 2024-01-24 RX ADMIN — SODIUM CHLORIDE, PRESERVATIVE FREE 10 ML: 5 INJECTION INTRAVENOUS at 09:44

## 2024-01-24 RX ADMIN — HEPARIN SODIUM 19 UNITS/KG/HR: 10000 INJECTION, SOLUTION INTRAVENOUS at 04:29

## 2024-01-24 RX ADMIN — ATORVASTATIN CALCIUM 80 MG: 40 TABLET, FILM COATED ORAL at 21:50

## 2024-01-24 RX ADMIN — CIPROFLOXACIN 400 MG: 400 INJECTION, SOLUTION INTRAVENOUS at 09:49

## 2024-01-24 RX ADMIN — INSULIN GLARGINE 20 UNITS: 100 INJECTION, SOLUTION SUBCUTANEOUS at 09:43

## 2024-01-24 RX ADMIN — METOPROLOL TARTRATE 12.5 MG: 25 TABLET, FILM COATED ORAL at 09:43

## 2024-01-24 RX ADMIN — INSULIN GLARGINE 20 UNITS: 100 INJECTION, SOLUTION SUBCUTANEOUS at 21:51

## 2024-01-24 RX ADMIN — INSULIN LISPRO 1 UNITS: 100 INJECTION, SOLUTION INTRAVENOUS; SUBCUTANEOUS at 18:18

## 2024-01-24 ASSESSMENT — PAIN SCALES - GENERAL
PAINLEVEL_OUTOF10: 0
PAINLEVEL_OUTOF10: 0

## 2024-01-24 NOTE — PROGRESS NOTES
[facility] Hospitalist [facility]  Progress Note    Patient: Adrienne Allen Age: 63 y.o. : 1960 MR#: [id number] SSN: [id number]  Date/Time: 2024     C/C: SOB       Subjective:   HPI : Patient with past medical history of some developmental delay, heavy smoker, admitted with increasing shortness of breath found to have congestive heart failure picture, initial troponin mildly elevated later on which showed significant increase suggestive of acute ACS, cardiology consulted, initially patient was unable to lie flat because of her orthopnea, after diuresis patient felt comfortable patient did not have any chest pain or no other complaints during this time.  Patient received cath s/p cath multiple vessel disease requiring CABG .  CT surgery was informed by cardiologist.    Patient also has a history of diabetes mellitus type 2 uncontrolled secondary to some noncompliance to medications.          Review of Systems:\" I hope I will get through what ever they are going to d tomorrow \" . Stable , no distress , no NVD       positive responses in bold type   Constitutional: Negative for fever, chills, diaphoresis and unexpected weight change.   HENT: Negative for ear pain, congestion, sore throat, rhinorrhea, drooling, trouble swallowing, neck pain and tinnitus.   Eyes: Negative for photophobia, pain, redness and visual disturbance.   Respiratory: negative for shortness of breath, cough, choking, chest tightness, wheezing or stridor.   Cardiovascular: Negative for chest pain, palpitations and leg swelling.   Gastrointestinal: Negative for nausea, vomiting, abdominal pain, diarrhea, constipation, blood in stool, abdominal distention and anal bleeding.   Genitourinary: Negative for dysuria, urgency, frequency, hematuria, flank pain and difficulty urinating.   Musculoskeletal: Negative for back pain and arthralgias.   Skin: Negative for color change, rash and wound.   Neurological: Negative

## 2024-01-24 NOTE — PROGRESS NOTES
192: Bedside shift change report given to ALYSSA Fitzgerald (oncoming nurse) by ALYSSA Ortiz (offgoing nurse). Report included the following information SBAR, Kardex, Intake/Output, MAR, Recent Results, and Cardiac Rhythm NSR .     Wound Prevention Checklist    Patient: Adrienne Allen (63 y.o. female)  Date: 2024  Diagnosis: Respiratory failure ([facility]) [J96.90]  Acute on chronic renal insufficiency [N28.9, N18.9]  CHF (congestive heart failure), NYHA class I, acute on chronic, combined ([facility]) [I50.43]  STEMI (ST elevation myocardial infarction) ([facility]) [I21.3] CHF (congestive heart failure), NYHA class I, acute on chronic, combined ([facility])    Precautions:         []  Heel prevention boots placed on patient    []  Patient turned q2h during shift    []  Lift team ordered    []  Patient on [place] bed/Specialty bed    []  Each Wound is documented during shift (Stage, Color, drainage, odor, measurements, and dressings)    [x]  Dual skin check done with ALYSSA Mejia RN     : Shift assessment done. V/S obtained. Pt resting quietly in bed. A/Ox4. No c/o pain or SOB at this time. 98% SpO2 on RA. Patient oriented to room and call light. Heparin gtt infusing; rate verified. Patient aware to use call light to communicate any pain or needs.     : Scheduled med given.  B; No insulin coverage needed per protocol.     : Scheduled med given. Old IV infiltrated; removed. New IV inserted.     0429: New bag heparin gtt given.     0610: aPTT: 83.3; Therapeutic. No bolus, no gtt rate change needed per protocol.     0630: Pt asleep; easily awaken. No distress noted at this time.    0710: Bedside shift change report given to ALYSSA Mejia (oncoming nurse) by ALYSSA Fitzgerald (offgoing nurse). Report included the following information SBAR, Kardex, Intake/Output, MAR, Recent Results, and Cardiac Rhythm NSR .

## 2024-01-24 NOTE — PROGRESS NOTES
Bedside and Verbal shift change report given to Roberto RN (oncoming nurse) by  RN (offgoing nurse). Report included the following information Intake/Output, MAR, Recent Results, Cardiac Rhythm NSR, and Neuro Assessment.

## 2024-01-24 NOTE — PLAN OF CARE
Problem: Discharge Planning  Goal: Discharge to home or other facility with appropriate resources  1/24/2024 1357 by Roberto Brown RN  Outcome: Progressing  Flowsheets (Taken 1/24/2024 0800)  Discharge to home or other facility with appropriate resources:   Identify barriers to discharge with patient and caregiver   Arrange for needed discharge resources and transportation as appropriate  1/24/2024 0719 by Princess Bertha Keating, ALYSSA  Outcome: Progressing  Flowsheets (Taken 1/23/2024 1930)  Discharge to home or other facility with appropriate resources:   Identify barriers to discharge with patient and caregiver   Arrange for needed discharge resources and transportation as appropriate     Problem: Skin/Tissue Integrity  Goal: Absence of new skin breakdown  Description: 1.  Monitor for areas of redness and/or skin breakdown  2.  Assess vascular access sites hourly  3.  Every 4-6 hours minimum:  Change oxygen saturation probe site  4.  Every 4-6 hours:  If on nasal continuous positive airway pressure, respiratory therapy assess nares and determine need for appliance change or resting period.  1/24/2024 1357 by Roberto Brown RN  Outcome: Progressing  1/24/2024 0719 by Princess Bertha Keating RN  Outcome: Progressing     Problem: Safety - Adult  Goal: Free from fall injury  1/24/2024 1357 by Roberto Brown RN  Outcome: Progressing  Flowsheets (Taken 1/24/2024 1357)  Free From Fall Injury: Based on caregiver fall risk screen, instruct family/caregiver to ask for assistance with transferring infant if caregiver noted to have fall risk factors  1/24/2024 0719 by Princess Bertha Keating RN  Outcome: Progressing     Problem: ABCDS Injury Assessment  Goal: Absence of physical injury  1/24/2024 1357 by Roberto Brown RN  Outcome: Progressing  1/24/2024 0719 by Princess Bertha Keating RN  Outcome: Progressing     Problem: Chronic Conditions and Co-morbidities  Goal: Patient's chronic conditions and co-morbidity symptoms are

## 2024-01-24 NOTE — PROGRESS NOTES
Bedside and Verbal shift change report given to  RN (oncoming nurse) by Roberto RN (offgoing nurse). Report included the following information Intake/Output, MAR, Recent Results, Cardiac Rhythm NSR, and Neuro Assessment.

## 2024-01-24 NOTE — PROGRESS NOTES
Cardiovascular Specialists - Progress Note    Admit Date: 1/19/2024  Attending Cardiologist: Dr. Garza    Assessment:     Patient Active Problem List    Diagnosis Date Noted    STEMI (ST elevation myocardial infarction) ([facility]) 01/20/2024    Coronary artery disease involving native coronary artery of native heart with unstable angina pectoris ([facility]) 01/23/2024    Hypertension 01/20/2024    NSTEMI (non-ST elevated myocardial infarction) ([facility]) 01/20/2024    Acute heart failure with preserved ejection fraction ([facility]) 01/20/2024    CHF (congestive heart failure), NYHA class I, acute on chronic, combined ([facility]) 01/19/2024    Respiratory failure ([facility]) 01/19/2024    Acute on chronic renal insufficiency 10/01/2019    Proteinuria 10/01/2019    Hyponatremia 09/30/2019    Puncture wound of foot, left, complicated 09/30/2019    Microalbuminuria 10/24/2018    Type 2 diabetes with nephropathy ([facility]) 04/12/2018    Type 2 diabetes mellitus with diabetic neuropathy ([facility]) 04/10/2018    Encounter for long-term (current) use of other medications     Midline low back pain without sciatica 01/21/2016    DM (diabetes mellitus), type 2, uncontrolled 01/21/2016    Vaginitis, atrophic 05/03/2015    Hyperlipidemia 05/09/2014    Hip pain 06/23/2012    Sacroiliac joint pain 08/11/2011    Depression 08/11/2011    Lumbago     Chronic pain syndrome        - Acute hypoxic respiratory failure: required bipap on admission. Likely related to volume overload and COPD. Patient now on room air  - Elevated troponin/STEMI: troponin 133 > 361 > 543 > 4299. EKG on 1/19 with ST elevation in anterior leads. Went for urgent LHC 1/20 which revealed triple vessel disease. CT surgery following.   -  Volume overload: BNP 2,230. CXR with increased pulmonary interstitial opacity which could represent edema. Diuresed with IVP lasix. Pt now euvolemic.  - UTI: on Ciprofloxacin   - CKD stage 3  - DM type 2: Uncontrolled, last A1c 15.  - HTN: on lisinopril 20mg as otpt, on  PRN    0.9 % sodium chloride infusion   IntraVENous PRN    ondansetron (ZOFRAN-ODT) disintegrating tablet 4 mg  4 mg Oral Q8H PRN    Or    ondansetron (ZOFRAN) injection 4 mg  4 mg IntraVENous Q6H PRN    polyethylene glycol (GLYCOLAX) packet 17 g  17 g Oral Daily PRN    acetaminophen (TYLENOL) tablet 650 mg  650 mg Oral Q6H PRN    Or    acetaminophen (TYLENOL) suppository 650 mg  650 mg Rectal Q6H PRN    heparin (porcine) injection 4,000 Units  4,000 Units IntraVENous PRN    heparin (porcine) injection 2,000 Units  2,000 Units IntraVENous PRN    heparin 25,000 units in dextrose 5% 250 mL (premix) infusion  5-30 Units/kg/hr IntraVENous Continuous    nitroGLYCERIN 200 mcg/mL in dextrose 5%  10 mcg/min IntraVENous Continuous    aspirin chewable tablet 81 mg  81 mg Oral Daily    metoprolol tartrate (LOPRESSOR) tablet 12.5 mg  12.5 mg Oral BID         Intake/Output Summary (Last 24 hours) at 1/24/2024 1049  Last data filed at 1/24/2024 0430  Gross per 24 hour   Intake 1189.76 ml   Output 2250 ml   Net -1060.24 ml       Physical Exam:  General:  alert, appears stated age, and cooperative  Neck:  no JVD  Lungs:  clear to auscultation bilaterally  Heart:  regular rate and rhythm, S1, S2 normal, no murmur, click, rub or gallop  Abdomen:  abdomen is soft without significant tenderness, masses, organomegaly or guarding  Extremities:  extremities normal, atraumatic, no cyanosis or edema    Visit Vitals  /63   Pulse 70   Temp 97.6 °F (36.4 °C) (Oral)   Resp 18   Ht 1.6 m (5' 3\")   Wt 59.9 kg (132 lb)   SpO2 97%   BMI 23.38 kg/m²       Data Review:     Labs: Results:       Chemistry Recent Labs     01/24/24  0502   *   K 3.7   CL 99*   CO2 25   BUN 24*      CBC w/Diff Recent Labs     01/23/24  0004   WBC 8.1   RBC 3.39*   HGB 9.9*   HCT 30.8*         Cardiac Enzymes Lab Results   Component Value Date/Time    TROPHS 4,299 01/19/2024 06:46 PM      Coagulation Recent Labs     01/23/24  0532 01/24/24  0502   APTT

## 2024-01-24 NOTE — PROGRESS NOTES
Physical Therapy    Pt not seen for skilled PT due to:    []  Nausea/vomiting  []  Eating  []  Pain  []  Pt lethargic  []  Off Unit  Other: Pt sleeping soundly and sternly requests to keep sleeping upon arrival.     Will f/u later as schedule allows. Thank you.  Ella Harper, PT, DPT

## 2024-01-24 NOTE — CARE COORDINATION
Home health orders sent to [facility].  [facility] was notified.            TRISTON MoranN RN  Care Management

## 2024-01-25 ENCOUNTER — HOME HEALTH ADMISSION (OUTPATIENT)
Age: 64
End: 2024-01-25
Payer: MEDICAID

## 2024-01-25 LAB
ANION GAP SERPL CALC-SCNC: 5 MMOL/L (ref 3–18)
APTT PPP: 100.9 SEC (ref 23–36.4)
APTT PPP: 99.1 SEC (ref 23–36.4)
BASOPHILS # BLD: 0.1 K/UL (ref 0–0.1)
BASOPHILS NFR BLD: 1 % (ref 0–2)
BUN SERPL-MCNC: 23 MG/DL (ref 7–18)
BUN/CREAT SERPL: 17 (ref 12–20)
CALCIUM SERPL-MCNC: 8.6 MG/DL (ref 8.5–10.1)
CHLORIDE SERPL-SCNC: 104 MMOL/L (ref 100–111)
CO2 SERPL-SCNC: 24 MMOL/L (ref 21–32)
CREAT SERPL-MCNC: 1.36 MG/DL (ref 0.6–1.3)
DIFFERENTIAL METHOD BLD: ABNORMAL
ECHO BSA: 1.63 M2
EOSINOPHIL # BLD: 0.2 K/UL (ref 0–0.4)
EOSINOPHIL NFR BLD: 2 % (ref 0–5)
ERYTHROCYTE [DISTWIDTH] IN BLOOD BY AUTOMATED COUNT: 14.8 % (ref 11.6–14.5)
GLUCOSE BLD STRIP.AUTO-MCNC: 117 MG/DL (ref 70–110)
GLUCOSE BLD STRIP.AUTO-MCNC: 142 MG/DL (ref 70–110)
GLUCOSE BLD STRIP.AUTO-MCNC: 156 MG/DL (ref 70–110)
GLUCOSE BLD STRIP.AUTO-MCNC: 304 MG/DL (ref 70–110)
GLUCOSE BLD STRIP.AUTO-MCNC: 64 MG/DL (ref 70–110)
GLUCOSE BLD STRIP.AUTO-MCNC: 67 MG/DL (ref 70–110)
GLUCOSE SERPL-MCNC: 111 MG/DL (ref 74–99)
HCT VFR BLD AUTO: 31.1 % (ref 35–45)
HGB BLD-MCNC: 10.1 G/DL (ref 12–16)
IMM GRANULOCYTES # BLD AUTO: 0.1 K/UL (ref 0–0.04)
IMM GRANULOCYTES NFR BLD AUTO: 2 % (ref 0–0.5)
LYMPHOCYTES # BLD: 2.7 K/UL (ref 0.9–3.6)
LYMPHOCYTES NFR BLD: 39 % (ref 21–52)
MCH RBC QN AUTO: 29.6 PG (ref 24–34)
MCHC RBC AUTO-ENTMCNC: 32.5 G/DL (ref 31–37)
MCV RBC AUTO: 91.2 FL (ref 78–100)
MONOCYTES # BLD: 0.5 K/UL (ref 0.05–1.2)
MONOCYTES NFR BLD: 7 % (ref 3–10)
NEUTS SEG # BLD: 3.4 K/UL (ref 1.8–8)
NEUTS SEG NFR BLD: 49 % (ref 40–73)
NRBC # BLD: 0 K/UL (ref 0–0.01)
NRBC BLD-RTO: 0 PER 100 WBC
PLATELET # BLD AUTO: 329 K/UL (ref 135–420)
PMV BLD AUTO: 11.6 FL (ref 9.2–11.8)
POTASSIUM SERPL-SCNC: 3.8 MMOL/L (ref 3.5–5.5)
RBC # BLD AUTO: 3.41 M/UL (ref 4.2–5.3)
SODIUM SERPL-SCNC: 133 MMOL/L (ref 136–145)
WBC # BLD AUTO: 6.9 K/UL (ref 4.6–13.2)

## 2024-01-25 PROCEDURE — 6360000002 HC RX W HCPCS: Performed by: INTERNAL MEDICINE

## 2024-01-25 PROCEDURE — C9602 PERC D-E COR STENT ATHER S: HCPCS | Performed by: INTERNAL MEDICINE

## 2024-01-25 PROCEDURE — C1769 GUIDE WIRE: HCPCS | Performed by: INTERNAL MEDICINE

## 2024-01-25 PROCEDURE — C1713 ANCHOR/SCREW BN/BN,TIS/BN: HCPCS | Performed by: INTERNAL MEDICINE

## 2024-01-25 PROCEDURE — 85730 THROMBOPLASTIN TIME PARTIAL: CPT

## 2024-01-25 PROCEDURE — 2580000003 HC RX 258: Performed by: INTERNAL MEDICINE

## 2024-01-25 PROCEDURE — 92924 PRQ TRLUML C ATHRC 1 ART&/BR: CPT | Performed by: INTERNAL MEDICINE

## 2024-01-25 PROCEDURE — 1100000000 HC RM PRIVATE

## 2024-01-25 PROCEDURE — 97116 GAIT TRAINING THERAPY: CPT

## 2024-01-25 PROCEDURE — 2709999900 HC NON-CHARGEABLE SUPPLY: Performed by: INTERNAL MEDICINE

## 2024-01-25 PROCEDURE — 6370000000 HC RX 637 (ALT 250 FOR IP): Performed by: INTERNAL MEDICINE

## 2024-01-25 PROCEDURE — 99232 SBSQ HOSP IP/OBS MODERATE 35: CPT

## 2024-01-25 PROCEDURE — C1874 STENT, COATED/COV W/DEL SYS: HCPCS | Performed by: INTERNAL MEDICINE

## 2024-01-25 PROCEDURE — C1894 INTRO/SHEATH, NON-LASER: HCPCS | Performed by: INTERNAL MEDICINE

## 2024-01-25 PROCEDURE — 2500000003 HC RX 250 WO HCPCS: Performed by: INTERNAL MEDICINE

## 2024-01-25 PROCEDURE — 76000 FLUOROSCOPY <1 HR PHYS/QHP: CPT | Performed by: INTERNAL MEDICINE

## 2024-01-25 PROCEDURE — 80048 BASIC METABOLIC PNL TOTAL CA: CPT

## 2024-01-25 PROCEDURE — C1887 CATHETER, GUIDING: HCPCS | Performed by: INTERNAL MEDICINE

## 2024-01-25 PROCEDURE — 99153 MOD SED SAME PHYS/QHP EA: CPT | Performed by: INTERNAL MEDICINE

## 2024-01-25 PROCEDURE — 6370000000 HC RX 637 (ALT 250 FOR IP)

## 2024-01-25 PROCEDURE — 36415 COLL VENOUS BLD VENIPUNCTURE: CPT

## 2024-01-25 PROCEDURE — C1725 CATH, TRANSLUMIN NON-LASER: HCPCS | Performed by: INTERNAL MEDICINE

## 2024-01-25 PROCEDURE — 99232 SBSQ HOSP IP/OBS MODERATE 35: CPT | Performed by: INTERNAL MEDICINE

## 2024-01-25 PROCEDURE — 99152 MOD SED SAME PHYS/QHP 5/>YRS: CPT | Performed by: INTERNAL MEDICINE

## 2024-01-25 PROCEDURE — 02C03ZZ EXTIRPATION OF MATTER FROM CORONARY ARTERY, ONE ARTERY, PERCUTANEOUS APPROACH: ICD-10-PCS | Performed by: INTERNAL MEDICINE

## 2024-01-25 PROCEDURE — 6360000004 HC RX CONTRAST MEDICATION: Performed by: INTERNAL MEDICINE

## 2024-01-25 PROCEDURE — 6360000002 HC RX W HCPCS: Performed by: PHYSICIAN ASSISTANT

## 2024-01-25 PROCEDURE — 82962 GLUCOSE BLOOD TEST: CPT

## 2024-01-25 PROCEDURE — C1724 CATH, TRANS ATHEREC,ROTATION: HCPCS | Performed by: INTERNAL MEDICINE

## 2024-01-25 PROCEDURE — 93458 L HRT ARTERY/VENTRICLE ANGIO: CPT | Performed by: INTERNAL MEDICINE

## 2024-01-25 PROCEDURE — 94761 N-INVAS EAR/PLS OXIMETRY MLT: CPT

## 2024-01-25 PROCEDURE — 6360000002 HC RX W HCPCS

## 2024-01-25 PROCEDURE — 85025 COMPLETE CBC W/AUTO DIFF WBC: CPT

## 2024-01-25 PROCEDURE — 027034Z DILATION OF CORONARY ARTERY, ONE ARTERY WITH DRUG-ELUTING INTRALUMINAL DEVICE, PERCUTANEOUS APPROACH: ICD-10-PCS | Performed by: INTERNAL MEDICINE

## 2024-01-25 DEVICE — XIENCE SIERRA™ EVEROLIMUS ELUTING CORONARY STENT SYSTEM 3.00 MM X 12 MM / RAPID-EXCHANGE
Type: IMPLANTABLE DEVICE | Status: FUNCTIONAL
Brand: XIENCE SIERRA™

## 2024-01-25 RX ORDER — MIDAZOLAM HYDROCHLORIDE 1 MG/ML
INJECTION INTRAMUSCULAR; INTRAVENOUS PRN
Status: DISCONTINUED | OUTPATIENT
Start: 2024-01-25 | End: 2024-01-25 | Stop reason: HOSPADM

## 2024-01-25 RX ORDER — CLOPIDOGREL 300 MG/1
TABLET, FILM COATED ORAL PRN
Status: DISCONTINUED | OUTPATIENT
Start: 2024-01-25 | End: 2024-01-25 | Stop reason: HOSPADM

## 2024-01-25 RX ORDER — BIVALIRUDIN 250 MG/5ML
INJECTION, POWDER, LYOPHILIZED, FOR SOLUTION INTRAVENOUS PRN
Status: DISCONTINUED | OUTPATIENT
Start: 2024-01-25 | End: 2024-01-25 | Stop reason: HOSPADM

## 2024-01-25 RX ORDER — SODIUM CHLORIDE 9 MG/ML
INJECTION, SOLUTION INTRAVENOUS CONTINUOUS PRN
Status: COMPLETED | OUTPATIENT
Start: 2024-01-25 | End: 2024-01-25

## 2024-01-25 RX ORDER — IODIXANOL 320 MG/ML
INJECTION, SOLUTION INTRAVASCULAR PRN
Status: DISCONTINUED | OUTPATIENT
Start: 2024-01-25 | End: 2024-01-25 | Stop reason: HOSPADM

## 2024-01-25 RX ORDER — HEPARIN SODIUM 1000 [USP'U]/ML
INJECTION, SOLUTION INTRAVENOUS; SUBCUTANEOUS PRN
Status: DISCONTINUED | OUTPATIENT
Start: 2024-01-25 | End: 2024-01-25 | Stop reason: HOSPADM

## 2024-01-25 RX ORDER — CLOPIDOGREL BISULFATE 75 MG/1
75 TABLET ORAL DAILY
Status: DISCONTINUED | OUTPATIENT
Start: 2024-01-26 | End: 2024-01-26 | Stop reason: HOSPADM

## 2024-01-25 RX ORDER — FENTANYL CITRATE 50 UG/ML
INJECTION, SOLUTION INTRAMUSCULAR; INTRAVENOUS PRN
Status: DISCONTINUED | OUTPATIENT
Start: 2024-01-25 | End: 2024-01-25 | Stop reason: HOSPADM

## 2024-01-25 RX ORDER — NITROGLYCERIN 20 MG/100ML
INJECTION INTRAVENOUS PRN
Status: DISCONTINUED | OUTPATIENT
Start: 2024-01-25 | End: 2024-01-25 | Stop reason: HOSPADM

## 2024-01-25 RX ADMIN — ATORVASTATIN CALCIUM 80 MG: 40 TABLET, FILM COATED ORAL at 21:15

## 2024-01-25 RX ADMIN — HEPARIN SODIUM 19 UNITS/KG/HR: 10000 INJECTION, SOLUTION INTRAVENOUS at 00:54

## 2024-01-25 RX ADMIN — INSULIN LISPRO 4 UNITS: 100 INJECTION, SOLUTION INTRAVENOUS; SUBCUTANEOUS at 21:14

## 2024-01-25 RX ADMIN — HYDRALAZINE HYDROCHLORIDE 5 MG: 20 INJECTION, SOLUTION INTRAMUSCULAR; INTRAVENOUS at 22:38

## 2024-01-25 RX ADMIN — SODIUM CHLORIDE, PRESERVATIVE FREE 10 ML: 5 INJECTION INTRAVENOUS at 09:00

## 2024-01-25 RX ADMIN — ASPIRIN 81 MG 81 MG: 81 TABLET ORAL at 10:57

## 2024-01-25 RX ADMIN — SODIUM CHLORIDE, PRESERVATIVE FREE 10 ML: 5 INJECTION INTRAVENOUS at 21:17

## 2024-01-25 RX ADMIN — METOPROLOL TARTRATE 12.5 MG: 25 TABLET, FILM COATED ORAL at 10:57

## 2024-01-25 RX ADMIN — METOPROLOL TARTRATE 12.5 MG: 25 TABLET, FILM COATED ORAL at 21:14

## 2024-01-25 RX ADMIN — INSULIN GLARGINE 20 UNITS: 100 INJECTION, SOLUTION SUBCUTANEOUS at 21:14

## 2024-01-25 RX ADMIN — INSULIN GLARGINE 20 UNITS: 100 INJECTION, SOLUTION SUBCUTANEOUS at 10:57

## 2024-01-25 RX ADMIN — GABAPENTIN 300 MG: 300 CAPSULE ORAL at 21:15

## 2024-01-25 ASSESSMENT — PAIN SCALES - GENERAL
PAINLEVEL_OUTOF10: 0

## 2024-01-25 NOTE — PROGRESS NOTES
Cardiovascular Specialists - Progress Note    Admit Date: 1/19/2024  Attending Cardiologist: Dr. Smith    Assessment:     Patient Active Problem List    Diagnosis Date Noted    STEMI (ST elevation myocardial infarction) ([facility]) 01/20/2024    Coronary artery disease involving native coronary artery of native heart with unstable angina pectoris ([facility]) 01/23/2024    Hypertension 01/20/2024    NSTEMI (non-ST elevated myocardial infarction) ([facility]) 01/20/2024    Acute heart failure with preserved ejection fraction ([facility]) 01/20/2024    CHF (congestive heart failure), NYHA class I, acute on chronic, combined ([facility]) 01/19/2024    Respiratory failure ([facility]) 01/19/2024    Acute on chronic renal insufficiency 10/01/2019    Proteinuria 10/01/2019    Hyponatremia 09/30/2019    Puncture wound of foot, left, complicated 09/30/2019    Microalbuminuria 10/24/2018    Type 2 diabetes with nephropathy ([facility]) 04/12/2018    Type 2 diabetes mellitus with diabetic neuropathy ([facility]) 04/10/2018    Encounter for long-term (current) use of other medications     Midline low back pain without sciatica 01/21/2016    DM (diabetes mellitus), type 2, uncontrolled 01/21/2016    Vaginitis, atrophic 05/03/2015    Hyperlipidemia 05/09/2014    Hip pain 06/23/2012    Sacroiliac joint pain 08/11/2011    Depression 08/11/2011    Lumbago     Chronic pain syndrome      - Acute hypoxic respiratory failure: required bipap on admission. Likely related to volume overload and COPD. Patient now on room air  - Elevated troponin/STEMI: troponin 133 > 361 > 543 > 4299. EKG on 1/19 with ST elevation in anterior leads. Went for urgent LHC 1/20 which revealed triple vessel disease. CT surgery following.   -  Volume overload: BNP 2,230. CXR with increased pulmonary interstitial opacity which could represent edema. Diuresed with IVP lasix. Pt now euvolemic.  - UTI: on Ciprofloxacin   - CKD stage 3  - DM type 2: Uncontrolled, last A1c 15.  - HTN: on lisinopril 20mg as otpt, on hold

## 2024-01-25 NOTE — FLOWSHEET NOTE
Received from cath lab A+Ox4, denied any complaints, good respiratory effort.  RFA sheath in place with dressing, no bleeding or swelling.

## 2024-01-25 NOTE — PLAN OF CARE
Problem: Discharge Planning  Goal: Discharge to home or other facility with appropriate resources  1/25/2024 1136 by Roberto Brown RN  Outcome: Progressing  Flowsheets (Taken 1/25/2024 0800)  Discharge to home or other facility with appropriate resources:   Identify barriers to discharge with patient and caregiver   Arrange for needed discharge resources and transportation as appropriate  1/25/2024 0708 by Eri Rehman RN  Outcome: Progressing     Problem: Skin/Tissue Integrity  Goal: Absence of new skin breakdown  Description: 1.  Monitor for areas of redness and/or skin breakdown  2.  Assess vascular access sites hourly  3.  Every 4-6 hours minimum:  Change oxygen saturation probe site  4.  Every 4-6 hours:  If on nasal continuous positive airway pressure, respiratory therapy assess nares and determine need for appliance change or resting period.  1/25/2024 1136 by Roberto Brown RN  Outcome: Progressing  1/25/2024 0708 by Eri Rehman RN  Outcome: Progressing     Problem: Safety - Adult  Goal: Free from fall injury  1/25/2024 1136 by Roberto Brown RN  Outcome: Progressing  Flowsheets (Taken 1/25/2024 0853)  Free From Fall Injury: Based on caregiver fall risk screen, instruct family/caregiver to ask for assistance with transferring infant if caregiver noted to have fall risk factors  1/25/2024 0708 by Eri Rehman RN  Outcome: Progressing     Problem: ABCDS Injury Assessment  Goal: Absence of physical injury  1/25/2024 1136 by Roberto Brown RN  Outcome: Progressing  1/25/2024 0708 by Eri Rehman RN  Outcome: Progressing     Problem: Chronic Conditions and Co-morbidities  Goal: Patient's chronic conditions and co-morbidity symptoms are monitored and maintained or improved  Outcome: Progressing  Flowsheets (Taken 1/25/2024 0800)  Care Plan - Patient's Chronic Conditions and Co-Morbidity Symptoms are Monitored and Maintained or Improved:   Monitor and assess patient's chronic conditions and comorbid

## 2024-01-25 NOTE — PROGRESS NOTES
TRANSFER - OUT REPORT:    Verbal report given to ALYSSA Mejia on Adrienne Allen  being transferred to [facility] for routine progression of patient care       Report consisted of patient's Situation, Background, Assessment and   Recommendations(SBAR).     Information from the following report(s) Nurse Handoff Report was reviewed with the receiving nurse.           Lines:   Peripheral IV 01/23/24 Right;Anterior Forearm (Active)   Site Assessment Clean, dry & intact 01/25/24 1200   Line Status Infusing 01/25/24 1200   Line Care Connections checked and tightened 01/25/24 1200   Phlebitis Assessment No symptoms 01/25/24 1200   Infiltration Assessment 0 01/25/24 1200   Alcohol Cap Used Yes 01/25/24 1200   Dressing Status Clean, dry & intact 01/25/24 1200   Dressing Type Transparent 01/25/24 1200       Peripheral IV 01/23/24 Left;Anterior Forearm (Active)   Site Assessment Clean, dry & intact 01/25/24 1200   Line Status Capped;Flushed 01/25/24 1200   Line Care Connections checked and tightened;Cap changed 01/25/24 1200   Phlebitis Assessment No symptoms 01/25/24 1200   Infiltration Assessment 0 01/25/24 1200   Alcohol Cap Used Yes 01/25/24 1200   Dressing Status Clean, dry & intact 01/25/24 1200   Dressing Type Transparent 01/25/24 1200        Opportunity for questions and clarification was provided.      Patient transported with:  Registered Nurse: ALYSSA Figueroa

## 2024-01-25 NOTE — PROGRESS NOTES
[facility] Hospitalist Group  Progress Note    Patient: Adrienne Allen Age: 63 y.o. : 1960 MR#: [id number] SSN: [id number]  Date/Time: 2024     C/C: SOB       Subjective:   HPI : Patient with past medical history of some developmental delay, heavy smoker, admitted with increasing shortness of breath found to have congestive heart failure picture, initial troponin mildly elevated later on which showed significant increase suggestive of acute ACS, cardiology consulted, initially patient was unable to lie flat because of her orthopnea, after diuresis patient felt comfortable patient did not have any chest pain or no other complaints during this time.  Patient received cath s/p cath multiple vessel disease requiring CABG .  CT surgery was informed by cardiologist.    Patient also has a history of diabetes mellitus type 2 uncontrolled secondary to some noncompliance to medications.          Review of Systems:  Stable   No CP  No SOB       positive responses in bold type   Constitutional: Negative for fever, chills, diaphoresis and unexpected weight change.   HENT: Negative for ear pain, congestion, sore throat, rhinorrhea, drooling, trouble swallowing, neck pain and tinnitus.   Eyes: Negative for photophobia, pain, redness and visual disturbance.   Respiratory: negative for shortness of breath, cough, choking, chest tightness, wheezing or stridor.   Cardiovascular: Negative for chest pain, palpitations and leg swelling.   Gastrointestinal: Negative for nausea, vomiting, abdominal pain, diarrhea, constipation, blood in stool, abdominal distention and anal bleeding.   Genitourinary: Negative for dysuria, urgency, frequency, hematuria, flank pain and difficulty urinating.   Musculoskeletal: Negative for back pain and arthralgias.   Skin: Negative for color change, rash and wound.   Neurological: Negative for dizziness, seizures, syncope, speech difficulty, light-headedness or  headaches.   Hematological: Does not bruise/bleed easily.   Psychiatric/Behavioral: Negative for suicidal ideas, hallucinations, behavioral problems, self-injury or agitation     Assessment/Plan:     1 Acute hypoxic respiratory failure   2 Elevated BNP   3 DM2 uncontrolled high   4 Developmental delay   5 HLD   6 HTN   7 Diabetic neuropathy   8 Non Compliance   9 ? H/o Gout   10 CKD3 - likely from Medical renal disease   11 Elevated Trop - Up trending - Cardiology aware - Follow ECHO   12 Vit D deficiency   13 chronic tobacco abuse  14 high likelihood of COPD development      Plan   - Patient NPO for cath to day with interventional cardiologist - Follow with cardiology for further management   -  Labs - reviewed - CKD stable at stage 3   - Patient on room air -maintaining acceptable oxygenation   -Discussed with patient's care, she was asking me if we can order home health for medication management, especially her sugar control-Home health ordered    Likely dc in 1-2 days - depending on Cath     Objective:       General:  Alert, cooperative, no acute distress  HEENT: No facial asymmetry, CLARI Rahul, External ears - WNL    Cardiovascular: S1S2 - regular , No Murmur   Pulmonary: Equal expansion , No Use of accessory muscles , No Rales No Rhonchi    GI:  +BS in all four quadrants, soft, non-tender  Extremities:  No edema; 2+ dorsalis pedis pulses bilaterally  Neuro: Alert and oriented X 2.       DVT Prophylaxis:  []Lovenox  []Hep SQ  []SCDs  []Coumadin   []On Heparin gtt    [] Eliquis [] Xarelto     Vitals:         VS: /62   Pulse 68   Temp 97.8 °F (36.6 °C) (Oral)   Resp 14   Ht 1.6 m (5' 3\")   Wt 59.9 kg (132 lb)   SpO2 96%   BMI 23.38 kg/m²    Tmax/24hrs: Temp (24hrs), Av.7 °F (36.5 °C), Min:97.3 °F (36.3 °C), Max:98.2 °F (36.8 °C)        Medications:   Current Facility-Administered Medications   Medication Dose Route Frequency    atorvastatin (LIPITOR) tablet 80 mg  80 mg Oral Nightly    hydrALAZINE

## 2024-01-25 NOTE — PROGRESS NOTES
Right FAS aspirated and pulled @ 1645, by JT. Sterile hemostatic dressing applied. Pressure held for 20 min. No bleeding or swelling. Safety instructions reviewed with the patient.

## 2024-01-25 NOTE — PLAN OF CARE
Problem: Physical Therapy - Adult  Goal: By Discharge: Performs mobility at highest level of function for planned discharge setting.  See evaluation for individualized goals.  Description: Physical Therapy Goals:  Initiated 1/22/2024 to be met within 7-10 days.    1.  Patient will move from supine to sit and sit to supine  in bed with independence.    2.  Patient will transfer from bed to chair and chair to bed with independence.  3.  Patient will perform sit to stand with independence.  4.  Patient will ambulate with independence for 250 feet.   5.  Patient will ascend/descend 4 stairs with 1 handrail(s) with minimal assistance/contact guard assist.    PLOF: lives alone in a second floor apartment, independent without an AD     Outcome: Adequate for Discharge     PHYSICAL THERAPY TREATMENT/DISCHARGE    Patient: Adrienne Allen (63 y.o. female)  Date: 1/25/2024  Diagnosis: Respiratory failure ([facility]) [J96.90]  Acute on chronic renal insufficiency [N28.9, N18.9]  CHF (congestive heart failure), NYHA class I, acute on chronic, combined ([facility]) [I50.43]  STEMI (ST elevation myocardial infarction) ([facility]) [I21.3] CHF (congestive heart failure), NYHA class I, acute on chronic, combined ([facility])  Procedure(s) (LRB):  Coronary angiography (N/A)  Left heart cath (N/A) 5 Days Post-Op  Precautions: Contact Precautions      ASSESSMENT:  Pt in bed and agreeable, awaiting cardiac cath this date. Denies pain. Mod ind with bed mobility and sit <> stand. Tolerated approx 150 ft of gait with no AD or LOB, supervision for assist with IV pole. Pt at her functional baseline and does not require further acute PT. Will sign off. Left in bed with all needs met.        PLAN:  Maximum therapeutic gains met at current level of care and patient will be discharged from physical therapy at this time.  Rationale for discharge:  [x]     Goals Achieved  []     Plateau Reached  []     Patient not participating in therapy  []     Other:    Further  Patient  Education Provided: Plan of Care;Role of Therapy;Transfer Training  Barriers to Learning: None  Education Outcome: Verbalized understanding;Demonstrated understanding;Continued education needed      Ella Harper, PT  Minutes: 23

## 2024-01-26 ENCOUNTER — TELEPHONE (OUTPATIENT)
Age: 64
End: 2024-01-26

## 2024-01-26 VITALS
RESPIRATION RATE: 20 BRPM | HEIGHT: 63 IN | WEIGHT: 132 LBS | SYSTOLIC BLOOD PRESSURE: 105 MMHG | BODY MASS INDEX: 23.39 KG/M2 | HEART RATE: 77 BPM | OXYGEN SATURATION: 97 % | TEMPERATURE: 97.7 F | DIASTOLIC BLOOD PRESSURE: 59 MMHG

## 2024-01-26 LAB
ANION GAP SERPL CALC-SCNC: 4 MMOL/L (ref 3–18)
APPEARANCE UR: ABNORMAL
APTT PPP: 28.9 SEC (ref 23–36.4)
BACTERIA URNS QL MICRO: ABNORMAL /HPF
BILIRUB UR QL: NEGATIVE
BUN SERPL-MCNC: 18 MG/DL (ref 7–18)
BUN/CREAT SERPL: 14 (ref 12–20)
CALCIUM SERPL-MCNC: 9.1 MG/DL (ref 8.5–10.1)
CHLORIDE SERPL-SCNC: 110 MMOL/L (ref 100–111)
CO2 SERPL-SCNC: 24 MMOL/L (ref 21–32)
COLOR UR: ABNORMAL
CREAT SERPL-MCNC: 1.26 MG/DL (ref 0.6–1.3)
EKG ATRIAL RATE: 73 BPM
EKG DIAGNOSIS: NORMAL
EKG P AXIS: 56 DEGREES
EKG P-R INTERVAL: 168 MS
EKG Q-T INTERVAL: 414 MS
EKG QRS DURATION: 102 MS
EKG QTC CALCULATION (BAZETT): 456 MS
EKG R AXIS: 39 DEGREES
EKG T AXIS: 86 DEGREES
EKG VENTRICULAR RATE: 73 BPM
EPITH CASTS URNS QL MICRO: ABNORMAL /LPF (ref 0–5)
ERYTHROCYTE [DISTWIDTH] IN BLOOD BY AUTOMATED COUNT: 15.4 % (ref 11.6–14.5)
GLUCOSE BLD STRIP.AUTO-MCNC: 108 MG/DL (ref 70–110)
GLUCOSE BLD STRIP.AUTO-MCNC: 206 MG/DL (ref 70–110)
GLUCOSE SERPL-MCNC: 125 MG/DL (ref 74–99)
GLUCOSE UR STRIP.AUTO-MCNC: NEGATIVE MG/DL
HCT VFR BLD AUTO: 33.6 % (ref 35–45)
HGB BLD-MCNC: 10.8 G/DL (ref 12–16)
HGB UR QL STRIP: ABNORMAL
KETONES UR QL STRIP.AUTO: NEGATIVE MG/DL
LEUKOCYTE ESTERASE UR QL STRIP.AUTO: ABNORMAL
MCH RBC QN AUTO: 30.4 PG (ref 24–34)
MCHC RBC AUTO-ENTMCNC: 32.1 G/DL (ref 31–37)
MCV RBC AUTO: 94.6 FL (ref 78–100)
MUCOUS THREADS URNS QL MICRO: ABNORMAL /LPF
NITRITE UR QL STRIP.AUTO: NEGATIVE
NRBC # BLD: 0 K/UL (ref 0–0.01)
NRBC BLD-RTO: 0 PER 100 WBC
PH UR STRIP: 7.5 (ref 5–8)
PLATELET # BLD AUTO: 335 K/UL (ref 135–420)
PMV BLD AUTO: 11.8 FL (ref 9.2–11.8)
POTASSIUM SERPL-SCNC: 4 MMOL/L (ref 3.5–5.5)
PROT UR STRIP-MCNC: 30 MG/DL
RBC # BLD AUTO: 3.55 M/UL (ref 4.2–5.3)
RBC #/AREA URNS HPF: ABNORMAL /HPF (ref 0–5)
SODIUM SERPL-SCNC: 138 MMOL/L (ref 136–145)
SP GR UR REFRACTOMETRY: 1.02 (ref 1–1.03)
UROBILINOGEN UR QL STRIP.AUTO: 0.2 EU/DL (ref 0.2–1)
WBC # BLD AUTO: 7 K/UL (ref 4.6–13.2)
WBC URNS QL MICRO: ABNORMAL /HPF (ref 0–4)

## 2024-01-26 PROCEDURE — 85027 COMPLETE CBC AUTOMATED: CPT

## 2024-01-26 PROCEDURE — 93005 ELECTROCARDIOGRAM TRACING: CPT | Performed by: INTERNAL MEDICINE

## 2024-01-26 PROCEDURE — 80048 BASIC METABOLIC PNL TOTAL CA: CPT

## 2024-01-26 PROCEDURE — 85730 THROMBOPLASTIN TIME PARTIAL: CPT

## 2024-01-26 PROCEDURE — 99239 HOSP IP/OBS DSCHRG MGMT >30: CPT | Performed by: STUDENT IN AN ORGANIZED HEALTH CARE EDUCATION/TRAINING PROGRAM

## 2024-01-26 PROCEDURE — 6370000000 HC RX 637 (ALT 250 FOR IP): Performed by: INTERNAL MEDICINE

## 2024-01-26 PROCEDURE — 6370000000 HC RX 637 (ALT 250 FOR IP)

## 2024-01-26 PROCEDURE — 99232 SBSQ HOSP IP/OBS MODERATE 35: CPT | Performed by: INTERNAL MEDICINE

## 2024-01-26 PROCEDURE — 36415 COLL VENOUS BLD VENIPUNCTURE: CPT

## 2024-01-26 PROCEDURE — 94761 N-INVAS EAR/PLS OXIMETRY MLT: CPT

## 2024-01-26 PROCEDURE — 93010 ELECTROCARDIOGRAM REPORT: CPT | Performed by: INTERNAL MEDICINE

## 2024-01-26 PROCEDURE — 82962 GLUCOSE BLOOD TEST: CPT

## 2024-01-26 PROCEDURE — 81001 URINALYSIS AUTO W/SCOPE: CPT

## 2024-01-26 RX ORDER — ASPIRIN 81 MG/1
81 TABLET, CHEWABLE ORAL DAILY
Qty: 30 TABLET | Refills: 0 | Status: SHIPPED | OUTPATIENT
Start: 2024-01-27

## 2024-01-26 RX ORDER — ATORVASTATIN CALCIUM 80 MG/1
80 TABLET, FILM COATED ORAL NIGHTLY
Qty: 30 TABLET | Refills: 0 | Status: SHIPPED | OUTPATIENT
Start: 2024-01-26

## 2024-01-26 RX ORDER — CLOPIDOGREL BISULFATE 75 MG/1
75 TABLET ORAL DAILY
Qty: 30 TABLET | Refills: 0 | Status: SHIPPED | OUTPATIENT
Start: 2024-01-27

## 2024-01-26 RX ADMIN — METOPROLOL TARTRATE 12.5 MG: 25 TABLET, FILM COATED ORAL at 08:13

## 2024-01-26 RX ADMIN — INSULIN LISPRO 2 UNITS: 100 INJECTION, SOLUTION INTRAVENOUS; SUBCUTANEOUS at 11:55

## 2024-01-26 RX ADMIN — INSULIN GLARGINE 20 UNITS: 100 INJECTION, SOLUTION SUBCUTANEOUS at 08:12

## 2024-01-26 RX ADMIN — GABAPENTIN 300 MG: 300 CAPSULE ORAL at 14:01

## 2024-01-26 RX ADMIN — ASPIRIN 81 MG 81 MG: 81 TABLET ORAL at 08:13

## 2024-01-26 RX ADMIN — GABAPENTIN 300 MG: 300 CAPSULE ORAL at 08:13

## 2024-01-26 RX ADMIN — CLOPIDOGREL BISULFATE 75 MG: 75 TABLET ORAL at 08:13

## 2024-01-26 ASSESSMENT — PAIN SCALES - GENERAL
PAINLEVEL_OUTOF10: 0
PAINLEVEL_OUTOF10: 0

## 2024-01-26 NOTE — HOME CARE
Received home health referral for [facility] for (SN / PT / OT).  Discharge orders noted for today.   Spoke with patient in room  patient identifiers verified.  Explained home health care services and routines.  Demographics verified including insurance, phone and address confirmed.  Patient has the following DME: has available cane for use.   Caregivers available:  patient states, lives by self.  Does have family and friends available to assist.   Orders noted and arranged and sent to central intake and scheduling.      ---  JIM Raya [facility] Home Care Liaison

## 2024-01-26 NOTE — PROGRESS NOTES
Cardiovascular Specialists - Progress Note    Admit Date: 1/19/2024  Attending Cardiologist: Dr. Smith    Assessment:     Patient Active Problem List    Diagnosis Date Noted    STEMI (ST elevation myocardial infarction) ([facility]) 01/20/2024    Coronary artery disease involving native coronary artery of native heart with unstable angina pectoris ([facility]) 01/23/2024    Hypertension 01/20/2024    NSTEMI (non-ST elevated myocardial infarction) ([facility]) 01/20/2024    Acute heart failure with preserved ejection fraction ([facility]) 01/20/2024    CHF (congestive heart failure), NYHA class I, acute on chronic, combined ([facility]) 01/19/2024    Respiratory failure ([facility]) 01/19/2024    Acute on chronic renal insufficiency 10/01/2019    Proteinuria 10/01/2019    Hyponatremia 09/30/2019    Puncture wound of foot, left, complicated 09/30/2019    Microalbuminuria 10/24/2018    Type 2 diabetes with nephropathy ([facility]) 04/12/2018    Type 2 diabetes mellitus with diabetic neuropathy ([facility]) 04/10/2018    Encounter for long-term (current) use of other medications     Midline low back pain without sciatica 01/21/2016    DM (diabetes mellitus), type 2, uncontrolled 01/21/2016    Vaginitis, atrophic 05/03/2015    Hyperlipidemia 05/09/2014    Hip pain 06/23/2012    Sacroiliac joint pain 08/11/2011    Depression 08/11/2011    Lumbago     Chronic pain syndrome      - Acute hypoxic respiratory failure: required bipap on admission. Likely related to volume overload and COPD. Patient now on room air  - Elevated troponin/STEMI: troponin 133 > 361 > 543 > 4299. EKG on 1/19 with ST elevation in anterior leads. Went for urgent LHC 1/20 which revealed triple vessel disease. Patient not a CABG candidate due to high risk per CT surgery.    C with PCI 1/26/2024    Most critical lesion is 99% ostial LAD stenosis which is heavily calcified.  She has been mid heavily calcified focal 80-85% stenosis.    Ostial LAD 99% lesion initially underwent rotational atherectomy using 1.25

## 2024-01-26 NOTE — PROGRESS NOTES
Disposition:    Cath yesterday. Possible discharge today if cleared by cardio.    Fred Coy DO, MBA, MS  [facility] Hospitalist

## 2024-01-26 NOTE — DISCHARGE SUMMARY
tablet, Refills: 0    Associated Diagnoses: Respiratory failure with hypoxia, unspecified chronicity (HCC)      aspirin 81 MG chewable tablet Take 1 tablet by mouth daily  Qty: 30 tablet, Refills: 0    Associated Diagnoses: Respiratory failure with hypoxia, unspecified chronicity (HCC)           CONTINUE these medications which have NOT CHANGED    Details   gabapentin (NEURONTIN) 300 MG capsule TAKE 1 CAPSULE BY MOUTH THREE TIMES DAILY  Qty: 90 capsule, Refills: 4    Associated Diagnoses: Chronic midline low back pain with sciatica, sciatica laterality unspecified      metFORMIN (GLUCOPHAGE) 1000 MG tablet Take 0.5 tablets by mouth 2 times daily (with meals)  Qty: 180 tablet, Refills: 3      lisinopril (PRINIVIL;ZESTRIL) 20 MG tablet TAKE 1 TABLET BY MOUTH DAILY  Qty: 30 tablet, Refills: 5      LANTUS 100 UNIT/ML injection vial INJECT 20 UNITS SUBCUTANEOUS TWO (2) TIMES A DAY.  Qty: 10 mL, Refills: 5      montelukast (SINGULAIR) 10 MG tablet Take 1 tablet by mouth daily  Qty: 30 tablet, Refills: 5      Insulin Syringe-Needle U-100 (BD INSULIN SYRINGE U/F) 31G X 5/16\" 0.5 ML MISC USE TO INJECT INSULIN TWICE DAILY  Qty: 100 each, Refills: 0           STOP taking these medications       pravastatin (PRAVACHOL) 40 MG tablet Comments:   Reason for Stopping:         cephALEXin (KEFLEX) 500 MG capsule Comments:   Reason for Stopping:         ONETOUCH ULTRA strip Comments:   Reason for Stopping:         predniSONE 10 MG (21) TBPK Comments:   Reason for Stopping:         acetaminophen (TYLENOL) 325 MG tablet Comments:   Reason for Stopping:         colchicine (COLCRYS) 0.6 MG tablet Comments:   Reason for Stopping:         ergocalciferol (ERGOCALCIFEROL) 1.25 MG (90821 UT) capsule Comments:   Reason for Stopping:         hydrOXYzine HCl (ATARAX) 25 MG tablet Comments:   Reason for Stopping:         lidocaine (LIDODERM) 5 % Comments:   Reason for Stopping:         polyethylene glycol (GLYCOLAX) 17 GM/SCOOP powder Comments:    Reason for Stopping:             It is important that you take the medication exactly as they are prescribed.   Keep your medication in the bottles provided by the pharmacist and keep a list of the medication names, dosages, and times to be taken in your wallet.   Do not take other medications without consulting your doctor.           FOLLOW UP CARE:   Follow-up Information     Kwan Ferguson MD. Schedule an appointment as soon as possible for a   visit in 1 week(s).    Specialty: Internal Medicine  Contact information:  [address]  [address]  [place] [facility] [id number] [phone]             Juan Smith MD. Schedule an appointment as soon as possible for a visit   in 4 week(s).    Specialty: Interventional Cardiology  Why: Follow up for management of CAD  Contact information:  [address]  [address]  [place] [facility] [place] [id number] [phone]                       Minutes spent on discharge: 40 minutes spent coordinating this discharge (review instructions/follow-up, prescriptions, preparing report for sign off)    Fred Coy DO  1/26/2024 12:26 PM    Disclaimer: Sections of this note are dictated using utilizing voice recognition software. Minor typographical errors may be present. If questions arise, please do not hesitate to contact me or call our department.      Fred Coy DO, MBA, MS [facility]  Hospitalist

## 2024-01-26 NOTE — CARE COORDINATION
Discharge order noted for today. Pt has been accepted to [facility]  Home Health agency. Met with patient  and are agreeable to the transition plan today. Transport has been arranged through. Patient's discharge summary and home health  orders have been forwarded to [facility]. Updated bedside RN, Jennifer,  to the transition plan.  Discharge information has been documented on the AVS.       Met with pt.  She stated her brother Kar has her apartment key in his truck and truck is at a workshop.    Per Nurse Jennifer, pt's brother Kar will be picking up pt later today.            Denisa Goddard, TRISTONN RN  Care Management

## 2024-01-26 NOTE — TELEPHONE ENCOUNTER
----- Message from Svitlana Rodriguez sent at 1/26/2024  1:55 PM EST -----  Subject: Hospital Follow Up    QUESTIONS  What hospital was the Patient Discharged from? [facility]  Date of Discharge? 2024-01-26  Discharge Location? Home  Reason for hospitalization as patient stated? congestive heart failure  What question does the patient have, if applicable? Patient needs a   hospital follow up visit. I am not able to reach the . Please   call patient.  ---------------------------------------------------------------------------  --------------  CALL BACK INFO  What is the best way for the office to contact you? OK to leave message on   voicemail  Preferred Call Back Phone Number? [phone]  ---------------------------------------------------------------------------  --------------  SCRIPT ANSWERS  Relationship to Patient? Covered Entity  Covered Entity Type? Hospital?  Representative Name? Pati

## 2024-01-27 PROBLEM — I50.21 ACUTE SYSTOLIC CONGESTIVE HEART FAILURE (HCC): Status: ACTIVE | Noted: 2024-01-27

## 2024-01-28 ENCOUNTER — HOME CARE VISIT (OUTPATIENT)
Age: 64
End: 2024-01-28
Payer: MEDICAID

## 2024-01-28 VITALS
SYSTOLIC BLOOD PRESSURE: 110 MMHG | OXYGEN SATURATION: 98 % | RESPIRATION RATE: 17 BRPM | DIASTOLIC BLOOD PRESSURE: 71 MMHG | HEART RATE: 82 BPM | TEMPERATURE: 98.1 F

## 2024-01-28 PROCEDURE — G0299 HHS/HOSPICE OF RN EA 15 MIN: HCPCS

## 2024-01-29 ENCOUNTER — TELEPHONE (OUTPATIENT)
Age: 64
End: 2024-01-29

## 2024-01-29 ENCOUNTER — CLINICAL DOCUMENTATION (OUTPATIENT)
Age: 64
End: 2024-01-29

## 2024-01-29 ENCOUNTER — HOME CARE VISIT (OUTPATIENT)
Age: 64
End: 2024-01-29
Payer: MEDICAID

## 2024-01-29 VITALS
OXYGEN SATURATION: 98 % | RESPIRATION RATE: 18 BRPM | DIASTOLIC BLOOD PRESSURE: 62 MMHG | HEART RATE: 85 BPM | TEMPERATURE: 97.8 F | SYSTOLIC BLOOD PRESSURE: 112 MMHG

## 2024-01-29 PROCEDURE — G0151 HHCP-SERV OF PT,EA 15 MIN: HCPCS

## 2024-01-29 NOTE — HOME HEALTH
patient admission to home health and plan of care including anticipated frequency of 2w2 1w4 2 prn and treatments/interventions/modalities of disease process teaching and medication management.  Discharge planning discussed with patient and caregiver.  Discharge planning as follows: Patient will be discharged once education has completed, patient is medically stable and pt/cg are able to independently manage medications and disease process.  Pt/Caregiver did verbalize understanding of discharge planning.   Next MD appointment TBD (date) with Kwan Ferguson MD.  Patient/caregiver encouraged/instructed to keep appointment as lack of follow through with physician appointment could result in discontinuation of home care services for non-compliance.

## 2024-01-29 NOTE — PROGRESS NOTES
PATIENT REQUIRES TCM OUTREACH    MRN: [id number]     PATIENT:  Adrienne Allen    ADMIT DATE:  1/19/24    DISCHARGE DATE:  1/26/24     ADMITTING DX: CHF/STEMI    MEDICATION CHANGES:   Start: aspirin, Lipitor, Plavix, Lorpressor  Stop:  Tylenol, Keflex, Colcrys, Atarax, Pravachol, prednisone  Change:    SPECIALISTS:   cardiology/cardiothoracic surgery    HOME HEALTH/WOUND CARE/PT/OT:  home health care    SCHEDULED FOLLOW UP APPTS:    Future Appointments   Date Time Provider Department Center   1/29/2024 11:00 AM Roberto Hanks, RUTH [facility][note 3] HR HOME HEAL   2/29/2024 10:30 AM Sachi Morgan APRN - SHANA [facility] BS AMB   4/8/2024  9:20 AM Kwan Ferguson MD [facility] BS AMB       *Please contact patient within 2 business days and document under .TCMOFFICE.  A scheduled Hospital Follow-up for patient within 7 days is preferred*

## 2024-01-29 NOTE — PROGRESS NOTES
Physician Progress Note      PATIENT:               DEVEN HINES  CSN #:                  [id number]  :                       1960  ADMIT DATE:       2024 8:06 AM  DISCH DATE:        2024 2:40 PM  RESPONDING  PROVIDER #:        Fred Coy DO          QUERY TEXT:    Patient admitted with chest pain.  If possible, please document in the   progress notes and discharge summary if you are evaluating and/or treating any   of the following:    The medical record reflects the following:  Risk Factors: DM, HTN, CHF, CKD  Clinical Indicators: 2024, consult, Dr. Emmett Clinton MD:  \"During her   stay she ruled in for NSTEMI which evolved to STEMI.\"  2024, discharge summary, Dr. Fred Coy DO, ZARINA, MS:  \"Patient   was found to have CHF exacerbation and NSTEMI.\"  troponin hi sensitivity:  133 --> 361 --> 543 --> 4,299 on 2024  Treatment: heart catheterization and FAUSTO placement    Thank you,  NEMO Patel RN, CDS  [email or web address]  Options provided:  -- NSTEMI  -- STEMI  -- Other - I will add my own diagnosis  -- Disagree - Not applicable / Not valid  -- Disagree - Clinically unable to determine / Unknown  -- Refer to Clinical Documentation Reviewer    PROVIDER RESPONSE TEXT:    This patient has an NSTEMI.    Query created by: Marylou Patel on 2024 9:20 AM      QUERY TEXT:    Pt admitted with shortness of breath and has respiratory failure documented.   If possible, please document in progress notes and discharge summary further   specificity regarding the type and acuity of respiratory failure:    The medical record reflects the following:  Risk Factors: DM, CKD, NSTEMI, acute CHF  Clinical Indicators:  2024, ED provider notes, Dr. Enrike He MD:  \"Per EMS patient is   short of breath today.  Patient found to be hypoxic in the mid 80s.  Patient   placed on 6 L oxygen and hypoxia improved to the 90s.\"  2024, H&P, Dr. Amelia Barlow MD:  \"Shortness of

## 2024-01-29 NOTE — TELEPHONE ENCOUNTER
Care Transitions Initial Follow Up Call 2024    Call within 2 business days of discharge: No     Patient: Adrienne Allen Patient : 1960 MRN: [id number]    RARS: Readmission Risk Score: 10.8       Spoke with: Ms. Ozuna    Discharge department/facility: [facility]    Non-face-to-face services provided:  Scheduled appointment with PCP-2024    Follow Up  Future Appointments   Date Time Provider Department Center   2024 To Be Determined Tayla Ann LPN [facility] HOME HEAL   2024 11:20 AM Kwan Ferguson MD [facility] BS AMB   2024 To Be Determined Tayla Ann LPN [facility] HOME HEAL   2024 To Be Determined Tayla Ann LPN [facility] HOME HEAL   2024 To Be Determined Tayla Ann LPN [facility] HOME HEAL   2024 To Be Determined Tayla Ann LPN [facility] HOME HEAL   2024 To Be Determined Tayla Ann LPN [facility] HOME HEAL   2024 10:30 AM Sachi Morgan APRN - NP [facility] BS AMB   3/5/2024 To Be Determined Laxmi Heredia RN [facility] HOME HEAL   2024  9:20 AM Kwan Ferguson MD [facility] BS AMB       Merle Acuna LPN

## 2024-01-29 NOTE — TELEPHONE ENCOUNTER
----- Message from Violette Mckenzie Almodovar sent at 1/29/2024 10:38 AM EST -----  Subject: Hospital Follow Up    QUESTIONS  What hospital was the Patient Discharged from? [place]   Date of Discharge? 2024-01-26  Discharge Location?   Reason for hospitalization as patient stated?   What question does the patient have, if applicable?   ---------------------------------------------------------------------------  --------------  CALL BACK INFO  What is the best way for the office to contact you? OK to leave message on   voicemail  Preferred Call Back Phone Number? [phone]  ---------------------------------------------------------------------------  --------------  SCRIPT ANSWERS  Relationship to Patient? Covered Entity  Covered Entity Type? Assisted Living Facility?  Representative Name? Margot Ozuna

## 2024-01-29 NOTE — CASE COMMUNICATION
Patient admitted to  services on 1/28/24 for hypoxic respiratory failure, CHF requiring disease process teaching and medication management.  Kwan Ferguson MD notified of patient admission to home health and plan of care including anticipated frequency of 2w2 1w4 2 prn and treatments/interventions/modalities of disease process teaching and medication management.    Medications reconciled and all medications are available in the home this  visit.    patient has all medications present but reports she is unable to read and when asked how she knows what medications to take and when patient reports she \"just figures it out\" with taking her medications. SN instructed patient on how dangerous this can be due to her not knowing what she is really taking and that she needs someone to assist her and to get a pill box for medication management. patient stating her daughter was pl anning on bringing her a pill box but they got into a fight and probably wont see her now. SN  all medications into 3 piles for patient, morning, night and twice a day medications. SN instructed patient to find a color system or a way to keep these medications straight to ensure she is taking properly. patient reports understanding however SN is unaware how much patient is understanding. patient also takes lantus with no glucom eter, instructed on danger of taking medication without checking BS levels, see education. SN to reach out to daughter as well.  No severe medication interactions noted on admission visit.    SN requesting MSW evaluation for community resources.  Please call verbal order to #[phone], send case communication with order or fax to #[phone] if approved. Thank you!  Albert House, RN, BSN

## 2024-01-29 NOTE — HOME HEALTH
Pt is referred to home care for SN/PT/OT with Dx: hypoxic respiratory failure.  She was hospitalized from 1/19 - 1/26.  Per EPIC chart \"63-year-old female past medical history of type 2 diabetes, hypertension, symptomatic elderly presenting with shortness of breath.  Patient has required BiPAP.  Patient was found to have CHF exacerbation and NSTEMI.  Cardiology was consulted.  Patient had an urgent left heart catheterization on 1/20.  Patient was found to have triple-vessel disease.  Patient does not candidate for CABG.  Therefore patient underwent two-stage PCI.  Patient had another left heart catheterization on 1/26.  He is amenable to stents.  Patient started on aspirin Plavix.  Patient was weaned off oxygen.  Patient was ambulatory.  Patient was hemodynamically stable for discharge.  Plan was discussed with patient.  All questions were answered.  Patient instructed to follow-up with cardiology.\"    PMH:   Bladder infection   Depression   Diabetes (HCC)   Difficulty walking   Proteinuria   Puncture wound of foot, left, complicated 9/30/2019        SUBJECTIVE: \"I think I'm getting around OK\"  LIVING SITUATION/PLOF: Pt lives alone in a senior high rise apt.  At baseline she is I with mobility w/o AD.  She has a community  that take her to MD appt's.  Her brother takes her to run errands and checks on her  CAREGIVER INVOLVEMENT/ ASSISTANCE NEEDED FOR: Transportation  MEDICATIONS REVIEWED AND UPDATED: Meds reviewed with no changes  NEXT MD APPT: TBD    EQUIIPMENT: SPC  ROM: B LE's WFL  STRENGTH: B hip flexion 3+/5; knee flexion/extension 4/5; dorsiflexion 4/5  WOUNDS: No wounds  BED MOBILITY: I supine<>sit and rolling L/R  TRANSFERS: S sit<>stand from rolling chair, toilet.  S walk in shower with grab bars  GAIT: Pt amb in the apt and in the singleton x 75' with S.  Gait characterized by wide JESS, decreased foot clearance.   STAIRS: NT  BALANCE: Pt scored 21/28 on Tinetti Balance Assessment placing her at

## 2024-01-30 ENCOUNTER — HOME CARE VISIT (OUTPATIENT)
Age: 64
End: 2024-01-30
Payer: MEDICAID

## 2024-01-30 PROCEDURE — G0300 HHS/HOSPICE OF LPN EA 15 MIN: HCPCS

## 2024-02-01 ENCOUNTER — HOME CARE VISIT (OUTPATIENT)
Age: 64
End: 2024-02-01
Payer: MEDICAID

## 2024-02-01 VITALS
OXYGEN SATURATION: 99 % | SYSTOLIC BLOOD PRESSURE: 118 MMHG | TEMPERATURE: 98.2 F | DIASTOLIC BLOOD PRESSURE: 64 MMHG | RESPIRATION RATE: 18 BRPM | HEART RATE: 80 BPM

## 2024-02-01 PROCEDURE — G0151 HHCP-SERV OF PT,EA 15 MIN: HCPCS

## 2024-02-01 NOTE — HOME HEALTH
2022     Stana Kussmaul, [address]  [address] [place] [address]    Patient: Nasima Xiong   YOB: 1960   Date of Visit: 2022       Dear Dr Gerri Calderón: Thank you for referring Grey Gonzales to me for evaluation  Below are my notes for this consultation  If you have questions, please do not hesitate to call me  I look forward to following your patient along with you  Sincerely,        Stana Kussmaul, DO        CC: MD Isa Narvaez MD Norberto Dixons, PA-C  2022 11:29 AM  Attested  Consultation - Cardiothoracic Surgery   Nasima Xiong 57 y o  male MRN: [id number]    Physician Requesting Consult: No att  providers found    Reason for Consult / Principal Problem: Aortic aneurysm    History of Present Illness: Nasima Xiong is a 57y o  year old male who has a PMHx of cholelithiasis s/p laparoscopic cholecystectomy, b/l knee arthroscopy, rotator cuff repair who was seen in consultation today for aortic aneurysm  Patient was referred for a CT coronary calcium score by Dr Maury Jaimes which was obtained on 22  This revealed a calcium score of 3 as well as an incidental finding of mild ectasia of the ascending aorta measuring 41 mm  Patient was referred to our office by his PCP, and obtained an ECHO on 22 which revealed normal LVEF 60%, trileaflet aortic valve, no aortic stenosis or regurgitation, mild TR and normal aortic root with proximal ascending aorta measuring 3 5 cm  Today patient has no complaints  He states that he is very active - does [employer], walks with his wife  He has not had any symptoms of fatigue, shortness of breath, dizziness, palpitations, LE edema, changes in weight  He takes only Flonase, and naproxen as needed, every couple of months  He states his dad  last year at the age of 80 due to CAD   He does not see a cardiologist      Past Medical History:  Past Medical History: SUBJECTIVE: \"I feel pretty good today\"  CAREGIVER INVOLVEMENT/ASSISTANCE NEEDED FOR: Transportation  .  OBJECTIVE:  See interventions.  PATIENT EDUCATION PROVIDED THIS VISIT: safety, HEP  PATIENT RESPONSE TO EDUCATION PROVIDED: Pt/CG verbalizes understanding of all information and demo's back as appropriate   PATIENT RESPONSE TO TREATMENT: Pt demo'd positive response to PT shown by full participation w/o c/o pain and with stable vitals    ASSESSMENT OF PROGRESS TOWARD GOALS: PT educated pt on HEP and provided a handout of standing ex's.  She will benefit from additional training to be I.  Gait training with focus on improved foot clearance and functioanl endurance.  .  PLAN FOR NEXT VISIT: mobility and balance training  THE FOLLOWING DISCHARGE PLANNING WAS DISCUSSED WITH THE PATIENT/CAREGIVER: DC to self and family under MD supervision when all goals are met or maximum potential is reached. Pt/Caregiver did verbalize understanding of DC planning. Diagnosis Date    Cholelithiasis        Past Surgical History:   Past Surgical History:   Procedure Laterality Date    APPENDECTOMY      CHOLANGIOGRAM N/A 1/31/2018    Procedure: CHOLANGIOGRAM;  Surgeon: Trisha Hunter MD;  [facility];  Service: General    CHOLECYSTECTOMY      CHOLECYSTECTOMY LAPAROSCOPIC N/A 1/31/2018    Procedure: Jennifer Dumont;  Surgeon: Trisha Hunter MD;  [facility];  Service: General    KNEE ARTHROSCOPY      RI COLONOSCOPY FLX DX W/COLLJ SPEC WHEN PFRMD N/A 7/19/2017    Procedure: COLONOSCOPY;  Surgeon: Lissa Mcnair MD;  Location: [facility]; Service: Colorectal    ROTATOR CUFF REPAIR         Family History:  Family History   Problem Relation Age of Onset    Cancer Mother     Heart disease Father        Social History:  Social History     Substance and Sexual Activity   Alcohol Use Yes    Comment: socially     Social History     Substance and Sexual Activity   Drug Use No     Social History     Tobacco Use   Smoking Status Never Smoker   Smokeless Tobacco Never Used       Home Medications:   Prior to Admission medications    Medication Sig Start Date End Date Taking? Authorizing Provider   clotrimazole-betamethasone ([employer]) 1-0 05 % cream Apply topically 2 (two) times a day 7/84/33  Yes Sunni Saeed MD   Omega-3 Fatty Acids (Fish Oil) 1000 MG CPDR Take by mouth   Yes Historical Provider, MD   Turmeric 500 MG CAPS Take by mouth   Yes Historical Provider, MD   Naproxen Sodium 220 MG CAPS Take by mouth  Patient not taking: No sig reported    Historical Provider, MD       Allergies:   Allergies   Allergen Reactions    Other Rash     Benzoin       Review of Systems:  Review of Systems - General ROS: negative  Psychological ROS: negative  Ophthalmic ROS: negative  ENT ROS: negative  Allergy and Immunology ROS: negative  Hematological and Lymphatic ROS: negative  Endocrine ROS: negative  Breast ROS: negative  Respiratory ROS: no cough, shortness of breath, or wheezing  Cardiovascular ROS: no chest pain or dyspnea on exertion  Gastrointestinal ROS: no abdominal pain, change in bowel habits, or black or bloody stools  Genito-Urinary ROS: no dysuria, trouble voiding, or hematuria  Musculoskeletal ROS: negative  Neurological ROS: no TIA or stroke symptoms  Dermatological ROS: negative    Vital Signs:     Vitals:    06/01/22 1016   BP: 134/84   BP Location: Left arm   Patient Position: Sitting   Cuff Size: Standard   Pulse: 65   Resp: 18   SpO2: 98%   Weight: 97 1 kg (214 lb)   Height: 6' 2" (1 88 m)       Physical Exam:    General: pleasant, NAD   HEENT/NECK:  PERRLA  No jugular venous distention  Cardiac:Regular rate and rhythm  Carotid arteries: no bruits  Pulmonary:  Breath sounds clear bilaterally  Abdomen:  Non-tender, Non-distended  Positive bowel sounds  Upper extremities: palpable radial pulses; brisk capillary refill  Lower extremities: Extremities warm/dry  PT/DP pulses 2+ bilaterally  No edema B/L  Neuro: Alert and oriented X 3  Sensation is grossly intact  No focal deficits  Musculoskeletal: full ROM in all extremities   Skin: Warm/Dry, without rashes or lesions  Lab Results:               Invalid input(s): LABGLOM      Lab Results   Component Value Date    HGBA1C 5 6 11/11/2021     Lab Results   Component Value Date    CKTOTAL 329 (H) 01/31/2018    CKMB 4 3 01/31/2018    CKMBINDEX 1 3 01/31/2018    TROPONINI <0 02 01/31/2018       Imaging Studies:         Echocardiogram: 5/27/2022  Study Details    This transthoracic echocardiogram was performed in the echo lab  This was a routine, outpatient study  Study quality was adequate  A complete 2D, color flow Doppler, spectral Doppler, 2D, color flow Doppler and spectral Doppler transthoracic echocardiogram was performed  The apical, parasternal, subcostal and suprasternal views were obtained             Indications  Priority: Routine  Dx: Ascending aortic aneurysm ([address] ) [I71 2 (ICD-10-CM)]       History    GERD       Interpretation Summary         Left Ventricle: Left ventricular cavity size is normal  Wall thickness is normal  The left ventricular ejection fraction is 60%  Systolic function is normal  Wall motion is normal  Diastolic function is normal     Right Ventricle: Right ventricular cavity size is normal  Systolic function is normal     Tricuspid Valve: There is mild regurgitation  There is a small, strand-like, mobile, echodensity, on the ventricular aspect  It may represent accessory chordal tissue    Aorta: The aortic root is normal in size  The proximal ascending aorta is normal in size, measuring 3 5 cm          Findings    Left Ventricle Left ventricular cavity size is normal  Wall thickness is normal  The left ventricular ejection fraction is 60%  Systolic function is normal   Wall motion is normal  Diastolic function is normal    Right Ventricle Right ventricular cavity size is normal  Systolic function is normal  Wall thickness is normal    Left Atrium The atrium is normal in size  Right Atrium The atrium is normal in size  Aortic Valve The aortic valve is trileaflet  The leaflets are not thickened  The leaflets are not calcified  The leaflets exhibit normal mobility  There is no evidence of regurgitation  The Aortic Valve has no significant stenosis  Mitral Valve The mitral valve has normal structure and function  There is trace regurgitation  There is no evidence of stenosis  Tricuspid Valve Tricuspid valve structure is normal  There is mild regurgitation  There is no evidence of stenosis  The right ventricular systolic pressure is normal  There is a small, strand-like, mobile, echodensity, on the ventricular aspect  It may represent accessory chordal tissue  Pulmonic Valve Pulmonic valve structure is normal  There is no evidence of regurgitation  There is no evidence of stenosis  Ascending Aorta The aortic root is normal in size   The proximal ascending aorta is normal in size, measuring 3 5 cm  IVC/SVC The inferior vena cava is normal in size  Respirophasic changes were normal    Pericardium There is no pericardial effusion  The pericardium is normal in appearance  Left Ventricle Measurements    Function/Volumes   A4C EF 56 %         Dimensions   LVIDd 4 7 cm         LVIDS 3 1 cm         IVSd 1 1 cm         LVPWd 1 cm         FS 34 %         Diastolic Filling   MV E' Tissue Velocity Septal 10 cm/s         E wave deceleration time 151 ms         MV Peak E Prosper 83 cm/s         MV Peak A Prosper 0 58 m/s               Right Ventricle Measurements    Dimensions   RVID d 4 9 cm               Left Atrium Measurements    Dimensions   LA size 3 4 cm         LA length (A2C) 6 cm               Right Atrium Measurements    Dimensions   RA 2D Volume 71 mL         RAA A4C 21 6 cm2               Mitral Valve Measurements    Stenosis   MV stenosis pressure 1/2 time 44 ms         MV valve area p 1/2 method 5 cm2               Tricuspid Valve Measurements    RVSP Parameters   TR Peak Prosper 2 1 m/s         Triscuspid Valve Regurgitation Peak Gradient 18 mmHg               Aorta Measurements    Aortic Dimensions   Ao root 3 4 cm         Sinus 3 7 cm         Asc Ao 3 5 cm                 CT Scan: 4/19/2022  IMPRESSION:     Total coronary calcium score equals 3  For more useful information regarding the prognostic significance of the calcium score, please consult the calculator at the website https://www [employer]/  aspx       Incidental finding of mild ectasia of the ascending aorta measuring 41 mm  Recommendation is for follow-up low radiation dose chest CT in one year  I have personally reviewed pertinent reports        Assessment:  Patient Active Problem List    Diagnosis Date Noted    FH: heart disease 06/01/2022    Ascending aortic aneurysm (HCC) 06/01/2022    Elevated serum creatinine 01/13/2022    Tinea 01/13/2022    Acute right-sided low back pain with right-sided sciatica 2021    Cellulitis 2019    Physical exam 2018    Primary osteoarthritis of right knee 2018    Acute cholecystitis 2018    Cholecystitis 2018    Gastroesophageal reflux disease without esophagitis 2018    Attention deficit disorder 2012     Ascending Aortic Aneurysm    Plan:  Reviewed ascending aortic aneurysm history and disease course with the patient  - Will refer to Cardiology   - Follow up with non-contrast CT and office visit in one year    Education was provided in regards to the followin )  Maintaining good blood pressure control and taking antihypertensive medications as prescribed  Goal BP is < 120/80  2 )  No strenuous activity involving lifting more than 50lbs  3 )  Awareness of the warning symptoms of aortic dissection such as chest pain, back pain, shortness of breath, lightheadedness or dizziness and to seek immediate medical attention at the nearest ER   4 )  The importance of continued surveillance with visits in the Aortic Disease clinic and obtaining CT Scans as recommended  5 ) Importance of avoiding tobacco products  Kristina  was comfortable with our recommendations, and their questions were answered to their satisfaction  Thank you for allowing us to participate in the care of this patient  The patient was referred to gastroenterology for consideration of routine colonoscopy screening of all patients aged 54-65  Gastroenterology evaluation will not be necessary prior to any open heart procedures, and can be completed following surgical recovery      SIGNATURE: Dusty Dunn PA-C  DATE: 2022  TIME: 11:19 AM        Attestation signed by Jose Alberto Vieyra DO at 2022 11:56 AM:  The patient was seen and examined, and I agree with the midlevel's history, physical exam, assessment and plan with the following additions:    I had the pleasure to see Minlavon Johansen today for evaluation of his incidental ascending aortic ectasia  He had undergone a CT scan for calcium scoring  This demonstrated a 40 mm ascending aortic ectasia  I had ordered an echocardiogram, reviewed by myself personally and findings shared with him  This demonstrates normal ventricular function and a trileaflet aortic valve with a normal aortic root in size  Overall based on his BSA and index, this is likely within normal range of aortic caliber  No recommended a [place] noncontrast CT scan for surveillance  If this demonstrates stability, there is very minimal need for ongoing routine imaging

## 2024-02-02 ENCOUNTER — HOME CARE VISIT (OUTPATIENT)
Age: 64
End: 2024-02-02
Payer: MEDICAID

## 2024-02-04 VITALS
RESPIRATION RATE: 17 BRPM | DIASTOLIC BLOOD PRESSURE: 72 MMHG | OXYGEN SATURATION: 97 % | SYSTOLIC BLOOD PRESSURE: 116 MMHG | TEMPERATURE: 97.5 F | HEART RATE: 76 BPM

## 2024-02-05 ENCOUNTER — HOME CARE VISIT (OUTPATIENT)
Age: 64
End: 2024-02-05
Payer: MEDICAID

## 2024-02-05 PROCEDURE — G0157 HHC PT ASSISTANT EA 15: HCPCS

## 2024-02-06 VITALS
RESPIRATION RATE: 18 BRPM | OXYGEN SATURATION: 99 % | HEART RATE: 79 BPM | DIASTOLIC BLOOD PRESSURE: 70 MMHG | TEMPERATURE: 98.7 F | SYSTOLIC BLOOD PRESSURE: 110 MMHG

## 2024-02-06 NOTE — HOME HEALTH
Skilled reason for visit: Pt assessment, disease process education and medication management    Caregiver involvement: Pt resides alone in a single story apartment in a senior [facility] community, amb independently and is independent with her ADL's her brother and daughter assts with medications and with transportation to [facility] and is available to asst as needed.    This visit: Pt is A&Ox3, able to communicate her needs, denies pain or discomfort,pt perseverates quite a bit, not very attentive to the information SN is providing to her, states she is upset because she does not have any cigarettes left and someone was suposed to bring her some, SN spoke to pt about setting up a pill box for her, will call pts brother/daughter to see if they will maintain it so pt will take the correct medication daily, pt has not located her glucometer, stated she has been looking for it because she used to take her bs daily but has not in quite some time now, SN will also speak with pts family about locating it so pt can resume taking her bs. SN will provide pt with a pill box at next visit and speak with family. Education in progress continues.    Home health supplies by type and quantity ordered/delivered this visit include: N/A    Medications reviewed and all medications are available in the home this visit.    MD notified of any discrepancies/look a-like medications/medication interactions: NA    Medications are effective at this time.     Patient education provided this visit:SEE INTERVENTIONS LIST    Pt advised not to start, stop or change the way she is currently taking any of her medications without first consulting with the MD.    Agency Progress toward goals: progressing     Patient's Progress towards personal goals: progressing    Pt acknowledged understanding of all education provided.    Patient's Progress towards personal goals: when patient reaches goals and medication is managed, disease processes are understood

## 2024-02-07 ENCOUNTER — HOME CARE VISIT (OUTPATIENT)
Age: 64
End: 2024-02-07
Payer: MEDICAID

## 2024-02-07 NOTE — HOME HEALTH
SUBJECTIVE: \"I'm doing alright. Just a little slow. I just sit here.\"     CAREGIVER INVOLVEMENT/ASSISTANCE NEEDED FOR: Pt lives alone, and needs assistance w/ transportation from a counselor. Pt's brother assists with transportation needs to the MD, grocery shopping needs.     Upcoming MD appts: Dr. Ferguson 2/6/2024   .  OBJECTIVE:  See interventions    PATIENT EDUCATION PROVIDED THIS VISIT: Fall prevention. Prevent social isolation and improve activity tolerance: walk down stairs in common areas of the complex to work on puzzles, read books, and congregate with residents.     PATIENT RESPONSE TO EDUCATION PROVIDED: Pt verbalized understanding.     PATIENT RESPONSE TO TREATMENT:   No increased pain or fatigue reported w/ gait training indoors. Vitals monitored: SpO2: 98%, HR: 75bpm. Pt declined review of stair mobility today and outdoor gait training d/t cold weather.   .  ASSESSMENT OF PROGRESS TOWARD GOALS:   Pt has missed 1 appointment w/ HHPT, however presents today in good spirits and demonstrates good progress towards goals. FTSTS goal met today completing functional transfers w/o UE support in 12 seconds. This allows for increased BLE functional strength, balance, and safety w/ stair negotiation, and transferring from all surfaces. Gait goal progressed today >200ft indoors w/o an AD, and is adequate for discharge.  HHPT is medically necessary to address stair mobility for fire safety and incase elevator is down, review HEP for compliance, and review progress towards Tinetti balance/gait assessment to restore her functional (I) w/ ADLs/IADLs and prepare for discharge in two visits.   .  PLAN FOR NEXT VISIT: Review progress towards Tinetti Balance/Gait Assessment, stair goals, and HEP.     THE FOLLOWING DISCHARGE PLANNING WAS DISCUSSED WITH THE PATIENT/CAREGIVER: HHPT frequency 3w1, 2w1, 1w1 w/ planned DC on 2/12 to self, family and under MD supervision.

## 2024-02-07 NOTE — CASE COMMUNICATION
Per Supervising PT, Mrs. Allen called to cancel PT appt as she would not be home for appt time. Missed PT frequency ths week.

## 2024-02-09 ENCOUNTER — HOME CARE VISIT (OUTPATIENT)
Age: 64
End: 2024-02-09
Payer: MEDICAID

## 2024-02-09 PROCEDURE — G0151 HHCP-SERV OF PT,EA 15 MIN: HCPCS

## 2024-02-09 PROCEDURE — G0157 HHC PT ASSISTANT EA 15: HCPCS

## 2024-02-09 PROCEDURE — G0152 HHCP-SERV OF OT,EA 15 MIN: HCPCS

## 2024-02-09 NOTE — HOME HEALTH
Caregiver involvement: Pt lives alone but has assistance with transportation to appointments by her \"couselor\".  Pt states she is unsure of the helpers title  Rosa Hernandez (daughter) lives locally and comes over to assist pt as needed with medication managment.    Medications reviewed and all medications are available in the home this visit.    The following education was provided regarding medications, medication interactions, and look alike medications (specify): Continue as directed by MD.    Medications  are effective at this time.      Patient education provided this visit: see ADL note    Sharps education provided:  does not check BG daily  Clinician instructed patient/CG on proper disposal of sharps: Containers should be made of hard plastic, be puncture-resistant and leakproof, such as a laundry detergent or bleach bottle.  When the container is ¾ full, it should be sealed with tape and labeled DO NOT RECYCLE prior to discarding in the regular trash.      Patient level of understanding of education provided: see ADL note    Skilled Care Performed this visit: Completed OT evaluation and assessment for safety with ADL and mobility.    Modified Barthel ADL Index  Bowels              ( ) 0 = Incontinent or needs enemas              ( ) 1 = Occasional Accident              ( x) 2 = Continent  Bladder               ( ) 0 = Incontinent              (x ) 1 = Occasional accident (1x/wk)              ( ) 2 = Continent  Grooming               ( ) 0 = Needs help with personal care              (x ) 1 = Independent (including face, hair, teeth, shaving)  Toilet Use               ( ) 0 = Dependent              ( ) 1 = Needs some help              (x ) 2 = Independent  Feeding              ( ) 0 = Unable              ( ) 1 = Needs help, e.g. cutting              (x ) 2 = Independent  Transfers   (bed to chair and back)              ( ) 0 = Unable, no sitting balance              ( ) 1 = Major help (1 or 2 people), can

## 2024-02-10 ENCOUNTER — HOME CARE VISIT (OUTPATIENT)
Age: 64
End: 2024-02-10
Payer: MEDICAID

## 2024-02-11 VITALS
TEMPERATURE: 98.2 F | DIASTOLIC BLOOD PRESSURE: 74 MMHG | SYSTOLIC BLOOD PRESSURE: 112 MMHG | HEART RATE: 75 BPM | OXYGEN SATURATION: 99 %

## 2024-02-12 ENCOUNTER — HOME CARE VISIT (OUTPATIENT)
Age: 64
End: 2024-02-12
Payer: MEDICAID

## 2024-02-12 ENCOUNTER — TELEPHONE (OUTPATIENT)
Age: 64
End: 2024-02-12

## 2024-02-12 VITALS
DIASTOLIC BLOOD PRESSURE: 60 MMHG | SYSTOLIC BLOOD PRESSURE: 112 MMHG | RESPIRATION RATE: 18 BRPM | TEMPERATURE: 97.7 F | OXYGEN SATURATION: 97 % | HEART RATE: 76 BPM

## 2024-02-12 VITALS
TEMPERATURE: 99 F | OXYGEN SATURATION: 97 % | DIASTOLIC BLOOD PRESSURE: 62 MMHG | HEART RATE: 79 BPM | RESPIRATION RATE: 18 BRPM | SYSTOLIC BLOOD PRESSURE: 140 MMHG

## 2024-02-12 PROCEDURE — G0151 HHCP-SERV OF PT,EA 15 MIN: HCPCS

## 2024-02-12 NOTE — CASE COMMUNICATION
Assessment and Summary of Care:  Patient's current functional status before discharge is as follows  Strength: FSTST goal met  ROM:  N/T  Bed Mobility: Independent with bed mobility  Transfers: Sit to stand with B UE to push up form bed, std chair and shower transfer -Mod I  Gait/WC mobility: Patient amb at best 400' without an AD on even and uneven suraces SBA-today within apt gait was Mod I due to extra time  Stairs: Patient declined  at today's visit  Special Tests: Tinetti test 27/28  Recommendations: Patient will continue with HEP-encouraged patient to attend activities to prevent social isolation-other residents also gather on the first foor to sit and talk-paient even though she is able to go states she doesn't like to go.

## 2024-02-12 NOTE — HOME HEALTH
SUBJECTIVE: \"I'm feeling pretty good\"  REQUIRES CAREGIVER ASSISTANCE FOR: Transportation  MEDICATIONS REVIEWED AND RECONCILED: Meds reviewed with no changes  NEXT MD APPT: 2/13 PCP    ROM: KYLIE NEWSOME's WFL.  ROM at [facility]: KYLIE NEWSOME's WFL  STRENGTH: 5 STS = 16.9 sec.  Strength at eval: B hip flexion 3+/5; knee flexion/extension 4/5; dorsiflexion 4/5  WOUNDS: No wounds.  Wound status at eval: No wounds  BED MOBILITY: I supine<>sit and rolling.  Bed mobility at eval: I supine<>sit and rolling L/R  TRANSFERS: I sit<>stand from varied surfaces and walk in shower.  Transfers at eval: S sit<>stand from rolling chair, toilet.  S walk in shower with grab bars  GAIT:  Pt amb in the apt and hallway >150' I'ly.  She declines to amb outside due to weather but states someone is always with her.  Gait characterized by wide JESS.  Gait at eval: Pt amb in the apt and in the singleton x 75' with S.  Gait characterized by wide JESS, decreased foot clearance.   STAIRS: S up/down 1 flight of steps.  Stairs at [facility] NT  BALANCE: Pt scored 26/28 on Tinetti Balance Assessment placing her at low risk for falls.  Balance at [facility]: Pt scored 21/28 on Tinetti Balance Assessment placing her at moderate risk for falls.       HEP consisting of:  1. Walking every hour during the day    2. Standing LE ther ex at kitchen counter: heel raises, hip flexion, hip abd, hamstring curls, mini squats.  Written HEP issued, patient/caregiver verbalized understanding.     PATIENT RESPONE TO TX: Pt demo'd positive response to PT shown by full participation w/o c/o pain and with stable vitals     PATIENT EDUCATION PROVIDED THIS VISIT: safety, HEP, walking, deep breathing           PATIENT LEVEL OF UNDERSTANDING OF EDUCATION PROVIDED: Pt/CG verbalizes understanding of all information and demo's back as appropriate       Patient is s/p referral with Dx: hypoxic respiratory failure and has been treated for strengthening, gait training, stair training, HEP training, safety training, and

## 2024-02-12 NOTE — TELEPHONE ENCOUNTER
Appointment scheduled, patient will probably be changing to a primary care doctor that is closer to her home per her sister

## 2024-02-12 NOTE — TELEPHONE ENCOUNTER
----- Message from Sterling Chávez sent at 2/12/2024 12:38 PM EST -----  Subject: Message to Provider    QUESTIONS  Information for Provider? please call back asap-they need to reschedule   the [facility] for 2/13 [phone]   ---------------------------------------------------------------------------  --------------  CALL BACK INFO  [phone]; OK to leave message on voicemail  ---------------------------------------------------------------------------  --------------  SCRIPT ANSWERS  Relationship to Patient? Covered Entity  Covered Entity Type? Other  Other Covered Entity Type? caregiver  Representative Name? caregiver

## 2024-02-12 NOTE — CASE COMMUNICATION
Occupational Therapy Evaluation    1w1    Ms. Allen is ambulating without use of an AD in her home.  She denies having limitation with daily tasks such as taking the trash out or doing her own laundry.  She was agreeable to walk therapy down the singleton to the trash shoot and laundry room.  No LOB noted and pt able to easily access both areas.  Suggested taking smaller bags of trash out and doing small but frequent loads of laundry to re duce exertion with tasks.  Educated pt on use of energy conservation strategies such as sitting during functional tasks, taking frequent rest breaks and keeping frequently used items within reach.  Pt agreeable and reports similar compliance with her routine.  Ms. Allen was able to demonstrate independence with functional transfers to the shower seat and toilet.  She demonstrates ability to reach all areas for simulated bathing and re ports that she dresses herself daily.  She is able to reach all clothing and toileting items without difficulty.  Ms. Allen has returned to independence with her daily routine.  No further need for skilled OT at this time.  MD office notified.

## 2024-02-12 NOTE — HOME HEALTH
SUBJECTIVE: She states her legs are\" hurting\" and she hasn't been able to rest due to everyone \"calling her.\" She states that today is a \"bad day.\"  CAREGIVER INVOLVEMENT/ASSISTANCE NEEDED FOR: Patient gets help form her Brother and has transportation set up for her for her appts  HOME HEALTH SUPPLIES BY TYPE AND QUANTITY ORDERED/DELIVERED THIS VISIT INCLUDE: None  OBJECTIVE:  See interventions.  PATIENT RESPONSE TO TREATMENT:  Fair-patient did not report increased pain after treatment however she declined mobility out of her apt and stairs-She reprots she had to walk to the front of the building earlier to let someone in and didn't have an problems getting to the front of the building.  Intercoms system is not always relieable to be able to enter building  PATIENT LEVEL OF UNDERSTANDING OF EDUCATION PROVIDED: Good reponse to education given-fall risk prevention, diabetic foot care and pain mgmt  ASSESSMENT OF PROGRESS TOWARD GOALS: Tinetti test score increased from 21/28 on eval to 27/28 at today's visit placing patient from a medium fall risk category to a low fall risk category.  Good progress meeting balance goal. Shower transfer Mod I-patient unable to stand from seated surface without B UE to push up  Assessment and Summary of Care:  Patient's current functional status before discharge is as follows  Strength: FSTST goal met  ROM:  N/T  Bed Mobility: Independent with bed mobility  Transfers: Sit to stand with B UE to push up form bed, std chair and shower transfer -Mod I  Gait/WC mobility: Patient amb at best 400' without an AD on even and uneven suraces SBA-today within apt gait was Mod I due to extra time  Stairs: Patient declined at today's visit  Special Tests: Tinetti test 27/28  Recommendations: Patient will continue with HEP-encouraged patient to attend activities to prevent social isolation-other residents also gather on the first foor to sit and talk-paient even though she is able to go states she

## 2024-02-16 ENCOUNTER — OFFICE VISIT (OUTPATIENT)
Age: 64
End: 2024-02-16
Payer: MEDICAID

## 2024-02-16 ENCOUNTER — HOME CARE VISIT (OUTPATIENT)
Age: 64
End: 2024-02-16
Payer: MEDICAID

## 2024-02-16 VITALS
SYSTOLIC BLOOD PRESSURE: 115 MMHG | BODY MASS INDEX: 21.3 KG/M2 | WEIGHT: 120.2 LBS | HEART RATE: 73 BPM | TEMPERATURE: 98.7 F | RESPIRATION RATE: 18 BRPM | OXYGEN SATURATION: 97 % | HEIGHT: 63 IN | DIASTOLIC BLOOD PRESSURE: 72 MMHG

## 2024-02-16 DIAGNOSIS — E11.22 TYPE 2 DIABETES MELLITUS WITH STAGE 3A CHRONIC KIDNEY DISEASE, UNSPECIFIED WHETHER LONG TERM INSULIN USE (HCC): ICD-10-CM

## 2024-02-16 DIAGNOSIS — N18.31 TYPE 2 DIABETES MELLITUS WITH STAGE 3A CHRONIC KIDNEY DISEASE, UNSPECIFIED WHETHER LONG TERM INSULIN USE (HCC): ICD-10-CM

## 2024-02-16 DIAGNOSIS — M54.40 CHRONIC MIDLINE LOW BACK PAIN WITH SCIATICA, SCIATICA LATERALITY UNSPECIFIED: ICD-10-CM

## 2024-02-16 DIAGNOSIS — G89.29 CHRONIC MIDLINE LOW BACK PAIN WITH SCIATICA, SCIATICA LATERALITY UNSPECIFIED: ICD-10-CM

## 2024-02-16 DIAGNOSIS — I10 ESSENTIAL (PRIMARY) HYPERTENSION: ICD-10-CM

## 2024-02-16 DIAGNOSIS — Z09 HOSPITAL DISCHARGE FOLLOW-UP: ICD-10-CM

## 2024-02-16 DIAGNOSIS — E78.00 PURE HYPERCHOLESTEROLEMIA, UNSPECIFIED: ICD-10-CM

## 2024-02-16 DIAGNOSIS — I25.110 CORONARY ARTERY DISEASE INVOLVING NATIVE CORONARY ARTERY OF NATIVE HEART WITH UNSTABLE ANGINA PECTORIS (HCC): Primary | ICD-10-CM

## 2024-02-16 PROCEDURE — G0300 HHS/HOSPICE OF LPN EA 15 MIN: HCPCS

## 2024-02-16 PROCEDURE — 1111F DSCHRG MED/CURRENT MED MERGE: CPT | Performed by: INTERNAL MEDICINE

## 2024-02-16 PROCEDURE — 99214 OFFICE O/P EST MOD 30 MIN: CPT | Performed by: INTERNAL MEDICINE

## 2024-02-16 PROCEDURE — 3046F HEMOGLOBIN A1C LEVEL >9.0%: CPT | Performed by: INTERNAL MEDICINE

## 2024-02-16 PROCEDURE — 3078F DIAST BP <80 MM HG: CPT | Performed by: INTERNAL MEDICINE

## 2024-02-16 PROCEDURE — 3074F SYST BP LT 130 MM HG: CPT | Performed by: INTERNAL MEDICINE

## 2024-02-16 RX ORDER — GABAPENTIN 300 MG/1
300 CAPSULE ORAL 3 TIMES DAILY
Qty: 90 CAPSULE | Refills: 4 | Status: SHIPPED | OUTPATIENT
Start: 2024-02-16 | End: 2024-07-17

## 2024-02-16 NOTE — PROGRESS NOTES
Post-Discharge Transitional Care  Follow Up      Adrienne Allen   YOB: 1960    Date of Office Visit:  2/16/2024  Date of Hospital Admission: 1/19/24  Date of Hospital Discharge: 1/26/24  Risk of hospital readmission (high >=14%. Medium >=10%) :Readmission Risk Score: 10.8      Care management risk score Rising risk (score 2-5) and Complex Care (Scores >=6): No Risk Score On File     Non face to face  following discharge, date last encounter closed (first attempt may have been earlier): *No documented post hospital discharge outreach found in the last 14 days    Call initiated 2 business days of discharge: *No response recorded in the last 14 days    ASSESSMENT/PLAN:     ICD-10-CM    1. Coronary artery disease involving native coronary artery of native heart with unstable angina pectoris (HCC)  I25.110 Patient status post stents and is on Plavix, Lopressor and aspirin as well as Lipitor 80 mg daily.  She needs to follow-up with cardiology for further intervention since she was not a candidate for coronary artery bypass graft      2. Type 2 diabetes mellitus with stage 3a chronic kidney disease, unspecified whether long term insulin use (HCC)  E11.22 Uncontrolled.  Patient currently on Lantus 20 units twice a day and needs help managing her diabetes from family member or home nurse.    N18.31       3. Essential (primary) hypertension  I10 Controlled on lisinopril and Lopressor      4. Pure hypercholesterolemia, unspecified  E78.00 Controlled on Lipitor 80 mg daily      5. Chronic midline low back pain with sciatica, sciatica laterality unspecified  M54.40 Controlled on gabapentin (NEURONTIN) 300 MG capsule and Ultram as needed.    G89.29       6. Hospital discharge follow-up  Z09 WA DISCHARGE MEDS RECONCILED W/ CURRENT OUTPATIENT MED LIST             Medical Decision Making: moderate complexity  Return if symptoms worsen or fail to improve.           Subjective:   HPI:  Follow up of Hospital

## 2024-02-16 NOTE — PROGRESS NOTES
Adrienne Allen presents today for   Chief Complaint   Patient presents with    Follow-Up from Hospital           \"Have you been to the ER, urgent care clinic since your last visit?  Hospitalized since your last visit?\"    YES - When: approximately 10  weeks ago.  Where and Why: [place] .Heat attack Stent placement    “Have you seen or consulted any other health care providers outside of [facility] since your last visit?”    NO    “Have you had a colorectal cancer screening such as a colonoscopy/FIT/Cologuard?    No     Have you had a mammogram?”   NO     “Have you had a pap smear?”    NO

## 2024-02-18 VITALS
TEMPERATURE: 97.5 F | OXYGEN SATURATION: 96 % | RESPIRATION RATE: 18 BRPM | DIASTOLIC BLOOD PRESSURE: 58 MMHG | HEART RATE: 75 BPM | SYSTOLIC BLOOD PRESSURE: 118 MMHG

## 2024-02-22 ENCOUNTER — HOME CARE VISIT (OUTPATIENT)
Age: 64
End: 2024-02-22
Payer: MEDICAID

## 2024-02-22 VITALS
TEMPERATURE: 98.3 F | SYSTOLIC BLOOD PRESSURE: 122 MMHG | DIASTOLIC BLOOD PRESSURE: 58 MMHG | OXYGEN SATURATION: 98 % | RESPIRATION RATE: 16 BRPM | HEART RATE: 75 BPM

## 2024-02-22 PROCEDURE — G0300 HHS/HOSPICE OF LPN EA 15 MIN: HCPCS

## 2024-02-22 NOTE — HOME HEALTH
Skilled reason for visit: Education    Caregiver involvement: Lives alone.    Medications reviewed and all medications are available in the home this visit.    The following education was provided regarding medications:  Continue to take medication as ordered.    MD notified of any discrepancies/look a-like medications/medication interactions: n/a  Medications are effective at this time.      Home health supplies by type and quantity ordered/delivered this visit include: n/a    Patient education provided this visit: See Intervention    Sharps education provided: Clinician instructed patient/CG on proper disposal of sharps: Containers should be made of hard plastic, be puncture-resistant and leakproof, such as a laundry detergent or bleach bottle.  When the container is ¾ full, it should be sealed with tape and labeled DO NOT RECYCLE prior to discarding in the regular trash.        Patient level of understanding of education provided: Alert and Orientated    Patient response to procedure performed:  n/a    Agency Progress toward goals: Continued Education     Patient's Progress towards personal goals: Patient states she feels okay. States that she doesnt have anyone to talk to so she is bored and sleeping. Encourgaed her to go out and mingle but states all they want is cigerettes.     Home exercise program: Smoke Cessassion     Continued need for the following skills: Nursing.    Plan for next visit: Education     Patient and/or caregiver notified and agrees to changes in the Plan of Care: Yes.     The following discharge planning was discussed with the pt/caregiver: Plan to discharge once goals are met.

## 2024-02-23 NOTE — PROGRESS NOTES
Adrienne Allen presents today for a post-hospital follow-up.  She is a 63 year old female with history of diabetes, hypertension who was not previously followed by cardiology.  She was accompanied to the office today by her .  Ms. Allen cannot read.      She was hospitalized from 1/19/24 through 1/26/24 after presenting with shortness of breath requiring BiPAP.  She was diagnosed with acute hypoxic respiratory failure likely related to volume overload and possible COPD exacerbation.  She was also noted to have CHF exacerbation (BNP was 2230) and NSTEMI (torponins 133 and then 361) not felt to be due to ACS but instead, related to demand ischemia.  Cardiology was consulted (Dr. Smith).  She was empirically started on ASA, low dose beta blocker, statin, heparin and NTG drip.  Because she was unable to lie flat, cardiac catheterization was postponed for a few days.  On 1/20/24, cardiac catheterization was performed and showed:   Severe Coronary Artery Disease  LM Hazy diffuse 20% stenosis likely worse due to calcification and haze, Ostial LAD 95% with diffuse moderate shelves of calcium and focal mLAD 95% stenosis with dLAD 50-69% stenosis,   mRCA 70-80% stenosis, mLCFX 70-80% stenosis with OM1 50% proximal stenosis  LVEDP 13mmHg mildly elevated  RCA Dominant System     CT surgery was consulted for surgical evaluation for triple vessel disease.  She was considered high risk for CABG so the decision was made to proceed with PCI.  On 1/25/24, she underwent cardiac catheterization again and the following was noted/performed:   Severe left coronary disease turndown by CV surgery for CABG as high risk.    Most critical lesion is 99% ostial LAD stenosis which is heavily calcified.  She has been mid heavily calcified focal 80-85% stenosis.    Ostial LAD 99% lesion initially underwent rotational atherectomy using 1.25 mm bur.  Lesion was then stented using 3 mm FAUSTO, 8 mm length to residual 0%.    Because of

## 2024-02-28 RX ORDER — LISINOPRIL 20 MG/1
20 TABLET ORAL DAILY
Qty: 30 TABLET | Refills: 5 | Status: SHIPPED | OUTPATIENT
Start: 2024-02-28

## 2024-02-29 ENCOUNTER — OFFICE VISIT (OUTPATIENT)
Age: 64
End: 2024-02-29
Payer: MEDICAID

## 2024-02-29 VITALS
SYSTOLIC BLOOD PRESSURE: 120 MMHG | OXYGEN SATURATION: 97 % | WEIGHT: 118 LBS | HEIGHT: 63 IN | BODY MASS INDEX: 20.91 KG/M2 | DIASTOLIC BLOOD PRESSURE: 70 MMHG | HEART RATE: 72 BPM

## 2024-02-29 DIAGNOSIS — I21.4 NSTEMI (NON-ST ELEVATED MYOCARDIAL INFARCTION) (HCC): Primary | ICD-10-CM

## 2024-02-29 DIAGNOSIS — I25.110 CORONARY ARTERY DISEASE INVOLVING NATIVE CORONARY ARTERY OF NATIVE HEART WITH UNSTABLE ANGINA PECTORIS (HCC): ICD-10-CM

## 2024-02-29 DIAGNOSIS — E11.40 TYPE 2 DIABETES MELLITUS WITH DIABETIC NEUROPATHY, UNSPECIFIED WHETHER LONG TERM INSULIN USE (HCC): ICD-10-CM

## 2024-02-29 DIAGNOSIS — I10 ESSENTIAL (PRIMARY) HYPERTENSION: ICD-10-CM

## 2024-02-29 PROCEDURE — 99214 OFFICE O/P EST MOD 30 MIN: CPT | Performed by: NURSE PRACTITIONER

## 2024-02-29 PROCEDURE — 93000 ELECTROCARDIOGRAM COMPLETE: CPT | Performed by: NURSE PRACTITIONER

## 2024-02-29 PROCEDURE — 3078F DIAST BP <80 MM HG: CPT | Performed by: NURSE PRACTITIONER

## 2024-02-29 PROCEDURE — 3046F HEMOGLOBIN A1C LEVEL >9.0%: CPT | Performed by: NURSE PRACTITIONER

## 2024-02-29 PROCEDURE — 3074F SYST BP LT 130 MM HG: CPT | Performed by: NURSE PRACTITIONER

## 2024-02-29 ASSESSMENT — PATIENT HEALTH QUESTIONNAIRE - PHQ9
8. MOVING OR SPEAKING SO SLOWLY THAT OTHER PEOPLE COULD HAVE NOTICED. OR THE OPPOSITE, BEING SO FIGETY OR RESTLESS THAT YOU HAVE BEEN MOVING AROUND A LOT MORE THAN USUAL: 0
5. POOR APPETITE OR OVEREATING: 0
10. IF YOU CHECKED OFF ANY PROBLEMS, HOW DIFFICULT HAVE THESE PROBLEMS MADE IT FOR YOU TO DO YOUR WORK, TAKE CARE OF THINGS AT HOME, OR GET ALONG WITH OTHER PEOPLE: 0
6. FEELING BAD ABOUT YOURSELF - OR THAT YOU ARE A FAILURE OR HAVE LET YOURSELF OR YOUR FAMILY DOWN: 0
4. FEELING TIRED OR HAVING LITTLE ENERGY: 0
9. THOUGHTS THAT YOU WOULD BE BETTER OFF DEAD, OR OF HURTING YOURSELF: 0
1. LITTLE INTEREST OR PLEASURE IN DOING THINGS: 0
SUM OF ALL RESPONSES TO PHQ9 QUESTIONS 1 & 2: 0
SUM OF ALL RESPONSES TO PHQ QUESTIONS 1-9: 0
2. FEELING DOWN, DEPRESSED OR HOPELESS: 0
7. TROUBLE CONCENTRATING ON THINGS, SUCH AS READING THE NEWSPAPER OR WATCHING TELEVISION: 0
SUM OF ALL RESPONSES TO PHQ QUESTIONS 1-9: 0
SUM OF ALL RESPONSES TO PHQ QUESTIONS 1-9: 0
3. TROUBLE FALLING OR STAYING ASLEEP: 0
SUM OF ALL RESPONSES TO PHQ QUESTIONS 1-9: 0

## 2024-02-29 ASSESSMENT — ENCOUNTER SYMPTOMS
CONSTIPATION: 0
WHEEZING: 0
CHEST TIGHTNESS: 0
SHORTNESS OF BREATH: 0
COUGH: 0
NAUSEA: 0
BLOOD IN STOOL: 0
DIARRHEA: 0
ABDOMINAL DISTENTION: 0
VOMITING: 0

## 2024-02-29 NOTE — PATIENT INSTRUCTIONS
Take clopidogrel (Plavix) and Aspirin 81mg daily without fail   All other medications to remain the same  Follow-up with Dr Smith as scheduled and as needed

## 2024-02-29 NOTE — PROGRESS NOTES
Adrienne Hernandez Alejandro presents today for   Chief Complaint   Patient presents with    Follow-Up from Hospital     4 week stemi       Adrienne Allen preferred language for health care discussion is english/other.    Is someone accompanying this pt? no    Is the patient using any DME equipment during OV? no    Depression Screening:  Depression: Not at risk (2/29/2024)    PHQ-2     PHQ-2 Score: 0   Recent Concern: Depression - At risk (2/16/2024)    PHQ-2     PHQ-2 Score: 7        Learning Assessment:  Who is the primary learner? Patient    What is the preferred language for health care of the primary learner? ENGLISH    How does the primary learner prefer to learn new concepts? DEMONSTRATION    Answered By patient    Relationship to Learner SELF           Pt currently taking Anticoagulant therapy? no    Pt currently taking Antiplatelet therapy ? Aspirin  plavix      Coordination of Care:  1. Have you been to the ER, urgent care clinic since your last visit? Hospitalized since your last visit? no    2. Have you seen or consulted any other health care providers outside of the [facility] since your last visit? Include any pap smears or colon screening. no

## 2024-03-01 ENCOUNTER — HOME CARE VISIT (OUTPATIENT)
Age: 64
End: 2024-03-01
Payer: MEDICAID

## 2024-03-05 ENCOUNTER — HOME CARE VISIT (OUTPATIENT)
Age: 64
End: 2024-03-05
Payer: MEDICAID

## 2024-03-05 PROCEDURE — G0299 HHS/HOSPICE OF RN EA 15 MIN: HCPCS

## 2024-03-05 RX ORDER — MONTELUKAST SODIUM 10 MG/1
10 TABLET ORAL DAILY
Qty: 30 TABLET | Refills: 4 | Status: SHIPPED | OUTPATIENT
Start: 2024-03-05

## 2024-03-09 VITALS
HEART RATE: 74 BPM | OXYGEN SATURATION: 95 % | TEMPERATURE: 97 F | RESPIRATION RATE: 16 BRPM | SYSTOLIC BLOOD PRESSURE: 105 MMHG | DIASTOLIC BLOOD PRESSURE: 47 MMHG

## 2024-03-09 NOTE — HOME HEALTH
Skilled reason for visit: ASSESSMENT OF  HYPOXIC RESPIRATORY  FAILURE AND TEACHING ON DISEASE PROCESS.    Caregiver involvement:COOKS, CLEANS AND TAKES PATIENT TO MD APPT.    Medications reviewed and all medications are available in the home this visit.    The following education was provided regarding medications:  INSTRUCTED ON IMPORTANCE OF MEDICATION COMPLIANCY..    MD notified of any discrepancies/look a-like medications/medication interactions: NA  Medications are IN HOME AND EFFECTIVE  at this time.      Home health supplies by type and quantity ordered/delivered this visit include: NA    Patient education provided this visit: TAUGHT S/S OF RESPIRATORY FAILURE, SEE NSG INTERVENTIONS, INSTRUCTED ON WHEN TO NOTIFY MD OF MEDICAL CHANGES. SUCH AS, INCREASED SOB, SUDDEN ONSET OF SOB.  Sharps education provided: YES    Patient level of understanding of education provided:GOOD, VERBALIZED UNDERSTANDING.    Skilled Care Performed this visit:, SEE NSG INTERVENTIONS.TAUGHT DISEASE PROCESS FOR RESPIRATORY FAILURE, INSTRUCTED ON MEDICATION AND DIETARY COMPLIANCY.    Patient response to procedure performed:  GOOD.      Patient's Progress towards personal goals: PROGRESSING.    Home exercise program: DEEP BREATHING EXERCISES TO HELP PREVENT PNEUMONIA.      Patient and/or caregiver notified and agrees to changes in the Plan of Care: Yes.     The following discharge planning was discussed with the pt/caregiver: PATIENT WILL BE DISCHARGED THIS VISIT, GOALS ARE MET AND PATIENT IS STABLE.

## 2024-04-10 ENCOUNTER — TELEPHONE (OUTPATIENT)
Age: 64
End: 2024-04-10

## 2024-04-10 ENCOUNTER — TELEMEDICINE (OUTPATIENT)
Age: 64
End: 2024-04-10
Payer: MEDICAID

## 2024-04-10 DIAGNOSIS — E55.9 VITAMIN D DEFICIENCY: ICD-10-CM

## 2024-04-10 DIAGNOSIS — I10 ESSENTIAL (PRIMARY) HYPERTENSION: ICD-10-CM

## 2024-04-10 DIAGNOSIS — E78.00 PURE HYPERCHOLESTEROLEMIA, UNSPECIFIED: ICD-10-CM

## 2024-04-10 DIAGNOSIS — N18.31 TYPE 2 DIABETES MELLITUS WITH STAGE 3A CHRONIC KIDNEY DISEASE, UNSPECIFIED WHETHER LONG TERM INSULIN USE (HCC): Primary | ICD-10-CM

## 2024-04-10 DIAGNOSIS — I25.110 CORONARY ARTERY DISEASE INVOLVING NATIVE CORONARY ARTERY OF NATIVE HEART WITH UNSTABLE ANGINA PECTORIS (HCC): ICD-10-CM

## 2024-04-10 DIAGNOSIS — E11.22 TYPE 2 DIABETES MELLITUS WITH STAGE 3A CHRONIC KIDNEY DISEASE, UNSPECIFIED WHETHER LONG TERM INSULIN USE (HCC): Primary | ICD-10-CM

## 2024-04-10 PROCEDURE — 99214 OFFICE O/P EST MOD 30 MIN: CPT | Performed by: INTERNAL MEDICINE

## 2024-04-10 PROCEDURE — 3046F HEMOGLOBIN A1C LEVEL >9.0%: CPT | Performed by: INTERNAL MEDICINE

## 2024-04-10 RX ORDER — CLOPIDOGREL BISULFATE 75 MG/1
75 TABLET ORAL DAILY
Qty: 30 TABLET | Refills: 5 | Status: SHIPPED | OUTPATIENT
Start: 2024-04-10

## 2024-04-10 RX ORDER — ATORVASTATIN CALCIUM 80 MG/1
80 TABLET, FILM COATED ORAL NIGHTLY
Qty: 30 TABLET | Refills: 5 | Status: SHIPPED | OUTPATIENT
Start: 2024-04-10

## 2024-04-10 NOTE — PROGRESS NOTES
Adrienne Mixonan Alejandro presents today for   Chief Complaint   Patient presents with    Diabetes    Hypertension    Cholesterol Problem     Follow up            \"Have you been to the ER, urgent care clinic since your last visit?  Hospitalized since your last visit?\"    Yes, [facility]    “Have you seen or consulted any other health care providers outside of [facility] since your last visit?”    NO    “Have you had a colorectal cancer screening such as a colonoscopy/FIT/Cologuard?    NO    Date of last Colonoscopy: 4/4/2011  No cologuard on file  No FIT/FOBT on file   No flexible sigmoidoscopy on file        Have you had a mammogram?”   NO    No breast cancer screening on file      “Have you had a pap smear?”    NO    No cervical cancer screening on file

## 2024-04-10 NOTE — PROGRESS NOTES
Subjective:       Chief Complaint  The patient presents for follow up of diabetesand high cholesterol. Proteinuria, back pain         HPI  Adrienne Allen is a 63 y.o.. female seen for follow up of diabetes.   Shealso has  hyperlipidemia. Diabetes uncontrolled  With pt not consistently taking Lantus insulin 20 units BID.  She is only taking it once a day and she is not checking her blood sugars.  She says she needs a battery for her glucometer and a caregiver who is with her says that she will try and get it for her..  Concerned patient also  has not been taking her Metformin.     Patient has developmental delay and has had difficulty managing her medications.  She unfortunately has not had any family members that will help her to take her medications consistently and patient does not read so has been difficult for us to teach her how to take her medications correctly..  She has difficulty coming to appointments and she has someone from the [facility] who  brought her in the past and is helping her today with virtual visit.   hyperlipidemia uncontrolled with patient recently been transition to Lipitor 80 mg after having a STEMI and being found to have three-vessel coronary artery disease.  Patient underwent two-stage PCI because she was not a candidate for CABG.    Pt is now on lisinopril 20 mg for her proteinuria and HTN. .  Patient does have chronic kidney disease stage IIIb and is at high risk of progression due to her uncontrolled diabetes and uncontrolled blood pressure.      Diet and Lifestyle: generally follows a low fat low cholesterol diet, does not rigorously follow a diabetic diet, sedentary    Diabetic Review of Systems - home glucose monitoring: is performed sporadically, should be 3x/day .    Other symptoms and concerns: . Pt's chronic back pain is stable on Ultram prn.  reviewed.  Patient is also taking Neurontin for peripheral neuropathy.      Pt's vit D level has been  uncontrolled due

## 2024-04-22 ENCOUNTER — TELEPHONE (OUTPATIENT)
Age: 64
End: 2024-04-22

## 2024-04-22 NOTE — TELEPHONE ENCOUNTER
----- Message from Kwan Ferguson MD sent at 4/10/2024  2:00 PM EDT -----  Needs to schedule OV and labs 1 week before for 3 months

## 2024-04-23 ENCOUNTER — TELEPHONE (OUTPATIENT)
Age: 64
End: 2024-04-23

## 2024-04-23 RX ORDER — NITROFURANTOIN 25; 75 MG/1; MG/1
100 CAPSULE ORAL 2 TIMES DAILY
Qty: 10 CAPSULE | Refills: 0 | Status: SHIPPED | OUTPATIENT
Start: 2024-04-23 | End: 2024-04-28

## 2024-04-23 NOTE — TELEPHONE ENCOUNTER
Patient called in and states she is having a kidney infection and her side hurts. She wants to know if she can get something sent to the pharmacy    [facility] - [place], [place] - [address] - P [phone] - F [phone] [[id number]]

## 2024-05-01 ENCOUNTER — OFFICE VISIT (OUTPATIENT)
Age: 64
End: 2024-05-01
Payer: MEDICAID

## 2024-05-01 ENCOUNTER — TELEPHONE (OUTPATIENT)
Age: 64
End: 2024-05-01

## 2024-05-01 VITALS
SYSTOLIC BLOOD PRESSURE: 120 MMHG | HEART RATE: 82 BPM | HEIGHT: 63 IN | BODY MASS INDEX: 20.91 KG/M2 | WEIGHT: 118 LBS | DIASTOLIC BLOOD PRESSURE: 68 MMHG | OXYGEN SATURATION: 97 %

## 2024-05-01 DIAGNOSIS — I10 ESSENTIAL (PRIMARY) HYPERTENSION: ICD-10-CM

## 2024-05-01 DIAGNOSIS — I25.110 CORONARY ARTERY DISEASE INVOLVING NATIVE CORONARY ARTERY OF NATIVE HEART WITH UNSTABLE ANGINA PECTORIS (HCC): ICD-10-CM

## 2024-05-01 DIAGNOSIS — I21.4 NSTEMI (NON-ST ELEVATED MYOCARDIAL INFARCTION) (HCC): Primary | ICD-10-CM

## 2024-05-01 DIAGNOSIS — E11.40 TYPE 2 DIABETES MELLITUS WITH DIABETIC NEUROPATHY, UNSPECIFIED WHETHER LONG TERM INSULIN USE (HCC): ICD-10-CM

## 2024-05-01 PROCEDURE — 3074F SYST BP LT 130 MM HG: CPT | Performed by: INTERNAL MEDICINE

## 2024-05-01 PROCEDURE — 99215 OFFICE O/P EST HI 40 MIN: CPT | Performed by: INTERNAL MEDICINE

## 2024-05-01 PROCEDURE — 93000 ELECTROCARDIOGRAM COMPLETE: CPT | Performed by: INTERNAL MEDICINE

## 2024-05-01 PROCEDURE — 3046F HEMOGLOBIN A1C LEVEL >9.0%: CPT | Performed by: INTERNAL MEDICINE

## 2024-05-01 PROCEDURE — 3078F DIAST BP <80 MM HG: CPT | Performed by: INTERNAL MEDICINE

## 2024-05-01 ASSESSMENT — PATIENT HEALTH QUESTIONNAIRE - PHQ9
6. FEELING BAD ABOUT YOURSELF - OR THAT YOU ARE A FAILURE OR HAVE LET YOURSELF OR YOUR FAMILY DOWN: NOT AT ALL
SUM OF ALL RESPONSES TO PHQ QUESTIONS 1-9: 0
4. FEELING TIRED OR HAVING LITTLE ENERGY: NOT AT ALL
5. POOR APPETITE OR OVEREATING: NOT AT ALL
3. TROUBLE FALLING OR STAYING ASLEEP: NOT AT ALL
SUM OF ALL RESPONSES TO PHQ QUESTIONS 1-9: 0
7. TROUBLE CONCENTRATING ON THINGS, SUCH AS READING THE NEWSPAPER OR WATCHING TELEVISION: NOT AT ALL
10. IF YOU CHECKED OFF ANY PROBLEMS, HOW DIFFICULT HAVE THESE PROBLEMS MADE IT FOR YOU TO DO YOUR WORK, TAKE CARE OF THINGS AT HOME, OR GET ALONG WITH OTHER PEOPLE: NOT DIFFICULT AT ALL
SUM OF ALL RESPONSES TO PHQ9 QUESTIONS 1 & 2: 0
9. THOUGHTS THAT YOU WOULD BE BETTER OFF DEAD, OR OF HURTING YOURSELF: NOT AT ALL
SUM OF ALL RESPONSES TO PHQ QUESTIONS 1-9: 0
SUM OF ALL RESPONSES TO PHQ QUESTIONS 1-9: 0
2. FEELING DOWN, DEPRESSED OR HOPELESS: NOT AT ALL
1. LITTLE INTEREST OR PLEASURE IN DOING THINGS: NOT AT ALL
8. MOVING OR SPEAKING SO SLOWLY THAT OTHER PEOPLE COULD HAVE NOTICED. OR THE OPPOSITE, BEING SO FIGETY OR RESTLESS THAT YOU HAVE BEEN MOVING AROUND A LOT MORE THAN USUAL: NOT AT ALL

## 2024-05-01 NOTE — TELEPHONE ENCOUNTER
Patient called in stating that she think she has an allergic reaction to the Macrobid, she is rashes on hand arms, she stop taking the meds. Please Advise

## 2024-05-01 NOTE — TELEPHONE ENCOUNTER
Rash should improve off the Macrobid. If it is not clearing up in a few days let the office know. Would not start another antibiotic until rash clears up. Try to increase water intake.

## 2024-05-01 NOTE — PROGRESS NOTES
Passive exposure: Never    Smokeless tobacco: Never   Substance Use Topics    Alcohol use: No    Drug use: No       Lab Results   Component Value Date/Time    CHOL 161 01/21/2024 02:49 AM    HDL 56 01/21/2024 02:49 AM    LDL 88.2 01/21/2024 02:49 AM        BP Readings from Last 3 Encounters:   05/01/24 120/68   03/05/24 (!) 105/47   02/29/24 120/70        Pulse Readings from Last 3 Encounters:   05/01/24 82   03/05/24 74   02/29/24 72       Wt Readings from Last 3 Encounters:   05/01/24 53.5 kg (118 lb)   02/29/24 53.5 kg (118 lb)   02/16/24 54.5 kg (120 lb 3.2 oz)         EKG: normal sinus rhythm, nonspecific ST and T waves changes, Q waves in leads III and aVF, unchanged from previous tracings.     Juan Smith MD   5/1/2024

## 2024-05-13 DIAGNOSIS — E78.00 PURE HYPERCHOLESTEROLEMIA, UNSPECIFIED: ICD-10-CM

## 2024-05-13 RX ORDER — ATORVASTATIN CALCIUM 80 MG/1
80 TABLET, FILM COATED ORAL NIGHTLY
Qty: 30 TABLET | Refills: 4 | OUTPATIENT
Start: 2024-05-13

## 2024-05-20 NOTE — TELEPHONE ENCOUNTER
[employer] Officer Robe called in Pt was pronounced  today and they need a sign off on death certificate.   Please advise. Office is needing a call back.     [phone]

## (undated) DEVICE — DRAPE,EXTREMITY,89X128,STERILE: Brand: MEDLINE

## (undated) DEVICE — CANISTER WND VAC ASST CLSR --

## (undated) DEVICE — BANDAGE,GAUZE,BULKEE II,4.5"X4.1YD,STRL: Brand: MEDLINE

## (undated) DEVICE — AIRLIFE™ ADULT CUSHION NASAL CANNULA 14 FOOT (4.3) CRUSH-RESISTANT OXYGEN TUBING, AND U/CONNECT-IT ADAPTER: Brand: AIRLIFE™

## (undated) DEVICE — HANDPIECE SET WITH HIGH FLOW TIP AND SUCTION TUBE: Brand: INTERPULSE

## (undated) DEVICE — CATHETER ANGIO 5FR L100CM GRY S STL NYL JL3.5 3 SEG BRAID L

## (undated) DEVICE — REM POLYHESIVE ADULT PATIENT RETURN ELECTRODE: Brand: VALLEYLAB

## (undated) DEVICE — KIT CLN UP BON SECOURS MARYV

## (undated) DEVICE — PACK PROCEDURE SURG MAJ W/ BASIN LF

## (undated) DEVICE — HI-TORQUE BALANCE GUIDE WIRE W/HYDROCOAT .014 STRAIGHT TIP 3.0 CM X 190 CM: Brand: HI-TORQUE BALANCE

## (undated) DEVICE — INTENDED FOR TISSUE SEPARATION, AND OTHER PROCEDURES THAT REQUIRE A SHARP SURGICAL BLADE TO PUNCTURE OR CUT.: Brand: BARD-PARKER SAFETY BLADES SIZE 15, STERILE

## (undated) DEVICE — CATHETER DIAG AD L100CM DIA6FR STD JUDKINS L 4 POLYUR COR

## (undated) DEVICE — DEVICE INFL W ACCS + HEMSTAS VLV INSRT TOOL AND TORQ BASIX

## (undated) DEVICE — KIT NEG PRSS DSG M W4.92XH1.3XL7.09IN SIL POLYUR FOAM

## (undated) DEVICE — INTRODUCER SHTH 6FR CANN L11CM DIL TIP 35MM GRN TUNGSTEN

## (undated) DEVICE — (D)PREP SKN CHLRAPRP APPL 26ML -- CONVERT TO ITEM 371833

## (undated) DEVICE — CATHETER ANGIO 5FR L100CM GRY S STL NYL JR4 3 SEG BRAID L

## (undated) DEVICE — STERILE POLYISOPRENE POWDER-FREE SURGICAL GLOVES: Brand: PROTEXIS

## (undated) DEVICE — CATHETER ANGIO JR4 STD 0.038 IN 6 FRX100 CM SUPER TORQUE + ORDER MULT OF 5 EACH

## (undated) DEVICE — BANDAGE COMPR W4INXL5YD BGE COHESIVE SELF ADH ADBAN CBN1104] AVCOR HEALTHCARE PRODUCTS INC]

## (undated) DEVICE — PRESSURE MONITORING SET: Brand: TRUWAVE

## (undated) DEVICE — CURITY NON-ADHERENT STRIPS: Brand: CURITY

## (undated) DEVICE — GUIDEWIRE VASC L150CM DIA0.035IN FLX END L7CM J 3MM PTFE

## (undated) DEVICE — PACK PROCEDURE SURG VASC CATH 161 MMC LF

## (undated) DEVICE — PRE-CONNECTED EXCHANGEABLE BURR CATHETER AND BURR ADVANCING DEVICE: Brand: ROTAPRO™

## (undated) DEVICE — TREK CORONARY DILATATION CATHETER 2.50 MM X 12 MM / OVER-THE-WIRE: Brand: TREK

## (undated) DEVICE — FLEX ADVANTAGE 3000CC: Brand: FLEX ADVANTAGE

## (undated) DEVICE — COPILOT BLEEDBACK CONTROL VALVE: Brand: COPILOT

## (undated) DEVICE — THREE-QUARTER SHEET: Brand: CONVERTORS

## (undated) DEVICE — INTENDED FOR TISSUE SEPARATION, AND OTHER PROCEDURES THAT REQUIRE A SHARP SURGICAL BLADE TO PUNCTURE OR CUT.: Brand: BARD-PARKER SAFETY BLADES SIZE 10, STERILE

## (undated) DEVICE — SHOE POSTOP M WOMAN 6-8 UNIV FOAM TRICOT SEMI FLX SKID

## (undated) DEVICE — STOCKINETTE,IMPERVIOUS,12X48,STERILE: Brand: MEDLINE

## (undated) DEVICE — GAUZE,SPONGE,4"X4",16PLY,STRL,LF,10/TRAY: Brand: MEDLINE

## (undated) DEVICE — BLANKET WRM AD W50XL85.8IN PACU FULL BODY FORC AIR

## (undated) DEVICE — SET FLD ADMIN 3 W STPCOCK FIX FEM L BOR 1IN

## (undated) DEVICE — MEDI-VAC NON-CONDUCTIVE SUCTION TUBING: Brand: CARDINAL HEALTH

## (undated) DEVICE — CATHETER GUID XBLAD3 0.070 INX6 FRX100 CM VISTA BRT TIP

## (undated) DEVICE — SOLUTION IV 1000ML 0.9% SOD CHL

## (undated) DEVICE — SOLUTION SCRB 4OZ 10% PVP I POVIDONE IOD TOP PAINT EXIDINE

## (undated) DEVICE — PROCEDURE KIT FLUID MGMT 10 FR CUST MAINFOLD

## (undated) DEVICE — BLADE SAW W5.5XL18MM THK0.38MM CUT THK0.43MM REPL S STL SAG

## (undated) DEVICE — BAND COMPR L24CM REG CLR PLAS HEMSTAT EXT HK AND LOOP RETEN

## (undated) DEVICE — GARMENT,MEDLINE,DVT,INT,CALF,MED, GEN2: Brand: MEDLINE

## (undated) DEVICE — DEVICE DRIVE ROTAWIRE EXTRA SUPPORT

## (undated) DEVICE — SUT PROL 0 30IN CT1 BLU --

## (undated) DEVICE — RUNTHROUGH NS EXTRA FLOPPY PTCA GUIDEWIRE: Brand: RUNTHROUGH